# Patient Record
Sex: MALE | Race: WHITE | NOT HISPANIC OR LATINO | Employment: UNEMPLOYED | URBAN - METROPOLITAN AREA
[De-identification: names, ages, dates, MRNs, and addresses within clinical notes are randomized per-mention and may not be internally consistent; named-entity substitution may affect disease eponyms.]

---

## 2019-11-06 ENCOUNTER — CONSULT (OUTPATIENT)
Dept: PAIN MEDICINE | Facility: CLINIC | Age: 59
End: 2019-11-06
Payer: COMMERCIAL

## 2019-11-06 VITALS
HEIGHT: 76 IN | SYSTOLIC BLOOD PRESSURE: 130 MMHG | HEART RATE: 80 BPM | BODY MASS INDEX: 29.1 KG/M2 | WEIGHT: 239 LBS | DIASTOLIC BLOOD PRESSURE: 80 MMHG

## 2019-11-06 DIAGNOSIS — M47.816 LUMBAR SPONDYLOSIS: Primary | ICD-10-CM

## 2019-11-06 DIAGNOSIS — M54.50 CHRONIC BILATERAL LOW BACK PAIN WITHOUT SCIATICA: ICD-10-CM

## 2019-11-06 DIAGNOSIS — G89.29 CHRONIC BILATERAL LOW BACK PAIN WITHOUT SCIATICA: ICD-10-CM

## 2019-11-06 PROCEDURE — 99244 OFF/OP CNSLTJ NEW/EST MOD 40: CPT | Performed by: ANESTHESIOLOGY

## 2019-11-06 RX ORDER — LISINOPRIL 10 MG/1
10 TABLET ORAL DAILY
Refills: 0 | COMMUNITY
Start: 2019-10-12 | End: 2020-12-14

## 2019-11-06 RX ORDER — LABETALOL 200 MG/1
TABLET, FILM COATED ORAL
Refills: 0 | Status: ON HOLD | COMMUNITY
Start: 2019-10-08 | End: 2020-07-28 | Stop reason: SDUPTHER

## 2019-11-06 RX ORDER — GLIMEPIRIDE 4 MG/1
4 TABLET ORAL 2 TIMES DAILY
Refills: 0 | COMMUNITY
Start: 2019-09-07 | End: 2020-08-05 | Stop reason: SDUPTHER

## 2019-11-06 RX ORDER — LORAZEPAM 0.5 MG/1
TABLET ORAL
Refills: 0 | COMMUNITY
Start: 2019-10-12

## 2019-11-06 RX ORDER — HYDROCODONE BITARTRATE AND ACETAMINOPHEN 5; 325 MG/1; MG/1
1 TABLET ORAL EVERY 6 HOURS PRN
COMMUNITY
End: 2020-07-28 | Stop reason: HOSPADM

## 2019-11-06 RX ORDER — GABAPENTIN 300 MG/1
CAPSULE ORAL
Refills: 0 | COMMUNITY
Start: 2019-09-12 | End: 2020-07-28 | Stop reason: HOSPADM

## 2019-11-06 NOTE — PROGRESS NOTES
Assessment  1  Lumbar spondylosis    2  Chronic bilateral low back pain without sciatica        Plan  The patient's low back pain persists despite time, relative rest, activity modification and therapy  Based on the patient's symptoms and examination, I suspect that his pain is being generated by the lumbar facet joints  The facet joints are only one of many possible low back pain generators  Unfortunately, studies have demonstrated that history and examination alone are unreliable  We will schedule the patient for diagnostic lumbar medial branch blockade using a double block paradigm  If the patient receives significant pain relief of appropriate duration with bupivicaine 0 25%, we will confirm with bupivicaine 0 75%  If the patient demonstrates appropriate response to medial branch blockade we will schedule for radiofrequency ablation of the blocked nerves to provide long-term pain relief  Will also have him undergo a course of physical therapy for lumbar stabilization and strengthening  In the office today, we reviewed the nature of the patient's pathology in depth using  diagrams and models  We discussed the approach we would use for the medial branch block and provided literature for home review  The patient understands the risks associated with the procedure including bleeding, infection, tissue injury, allergic reaction and paralysis and provided written and verbal consent  in the office today  This note is created using dictation transcription  It may contain typographical errors, grammatical errors, improperly dictated words, background noise and other errors  My impressions and treatment recommendations were discussed in detail with the patient who verbalized understanding and had no further questions  Discharge instructions were provided  I personally saw and examined the patient and I agree with the above discussed plan of care      Orders Placed This Encounter   Procedures    FL spine and pain procedure     Standing Status:   Future     Standing Expiration Date:   11/6/2023     Order Specific Question:   Reason for Exam:     Answer:   b/l L3,4,5 MBB     Order Specific Question:   Anticoagulant hold needed? Answer:   no    Ambulatory referral to Physical Therapy     Standing Status:   Future     Standing Expiration Date:   11/6/2020     Referral Priority:   Routine     Referral Type:   Physical Therapy     Referral Reason:   Specialty Services Required     Requested Specialty:   Physical Therapy     Number of Visits Requested:   1     Expiration Date:   11/6/2020     New Medications Ordered This Visit   Medications    gabapentin (NEURONTIN) 300 mg capsule     Refill:  0    glimepiride (AMARYL) 4 mg tablet     Refill:  0    labetalol (NORMODYNE) 200 mg tablet     Refill:  0    lisinopril (ZESTRIL) 10 mg tablet     Sig: Take 10 mg by mouth daily     Refill:  0    metFORMIN (GLUCOPHAGE) 500 mg tablet     Sig: Take 500 mg by mouth 3 (three) times a day     Refill:  0    LORazepam (ATIVAN) 0 5 mg tablet     Refill:  0    HYDROcodone-acetaminophen (NORCO) 5-325 mg per tablet     Sig: Take 1 tablet by mouth every 6 (six) hours as needed for pain       History of Present Illness    Carmine Sever is a 61 y o  male longstanding history of low back pain  His pain is severe rates as 8/10 on the visual analog scale significant interfering with daily living activities  His pain is nearly constant describes sharp burning and shooting and throbbing he denies any weakness in his lower limbs  Lying down and relaxation decreases symptoms will standing, bending, sitting walking all aggravate them  Chiropractic treatment the past provided no relief  Currently takes Vicodin two tablets a day as well as ibuprofen  He has a history of diabetes and alcoholism  He smokes two packs of cigarettes a day and uses marijuana recreationally      I have personally reviewed and/or updated the patient's past medical history, past surgical history, family history, social history, current medications, allergies, and vital signs today  Review of Systems   Constitutional: Negative for fever and unexpected weight change  HENT: Negative for trouble swallowing  Eyes: Positive for visual disturbance  Respiratory: Negative for shortness of breath and wheezing  Cardiovascular: Negative for chest pain and palpitations  Gastrointestinal: Negative for constipation, diarrhea, nausea and vomiting  Endocrine: Negative for cold intolerance, heat intolerance and polydipsia  Genitourinary: Positive for frequency  Negative for difficulty urinating  Musculoskeletal: Positive for gait problem (difficulty walking decreased rom ) and joint swelling (joint stiffness)  Negative for arthralgias and myalgias  Skin: Negative for rash  Neurological: Positive for dizziness and weakness  Negative for seizures, syncope and headaches  Hematological: Does not bruise/bleed easily  Psychiatric/Behavioral: Negative for dysphoric mood  All other systems reviewed and are negative  There is no problem list on file for this patient  Past Medical History:   Diagnosis Date    [facility]ism ([facility] )     Anxiety     Diabetes ([facility] )     High cholesterol        History reviewed  No pertinent surgical history  History reviewed  No pertinent family history      Social History     Occupational History    Not on file   Tobacco Use    Smoking status: Current Every Day Smoker    Smokeless tobacco: Never Used   Substance and Sexual Activity    Alcohol use: Never     Frequency: Never    Drug use: Yes     Types: Marijuana    Sexual activity: Not Currently       Current Outpatient Medications on File Prior to Visit   Medication Sig    gabapentin (NEURONTIN) 300 mg capsule     glimepiride (AMARYL) 4 mg tablet     HYDROcodone-acetaminophen (NORCO) 5-325 mg per tablet Take 1 tablet by mouth every 6 (six) hours as needed for pain  labetalol (NORMODYNE) 200 mg tablet     lisinopril (ZESTRIL) 10 mg tablet Take 10 mg by mouth daily    LORazepam (ATIVAN) 0 5 mg tablet     metFORMIN (GLUCOPHAGE) 500 mg tablet Take 500 mg by mouth 3 (three) times a day     No current facility-administered medications on file prior to visit  No Known Allergies    Physical Exam    /80   Pulse 80   Ht 6' 4" (1 93 m)   Wt 108 kg (239 lb)   BMI 29 09 kg/m²     Constitutional: normal, well developed, well nourished, alert, in no distress and non-toxic and no overt pain behavior  and overweight  Eyes: anicteric  HEENT: grossly intact  Neck: supple, symmetric, trachea midline and no masses   Pulmonary:even and unlabored  Cardiovascular:No edema or pitting edema present  Skin:Normal without rashes or lesions and well hydrated  Psychiatric:Mood and affect appropriate  Neurologic:Cranial Nerves II-XII grossly intact  Musculoskeletal:normal,   Inspection:  Normal station and gait  Normal lumbar lordotic curve with no significant scoliosis or spinal step-off  Skin intact without erythema  No gross mass or muscle atrophy  Palpation:  There is no tenderness to palpation overlying the sacroiliac joint as well as the ischial bursa bilateral   No significant tenderness over the greater trochanteric bursa bilaterally  Motor/Strength:  5/5 strength in the bilateral lower limbs  The patient is able to heel and/toe walk  Tandem gait is intact  Reflexes: Deep tendon reflex are 2+ and symmetrical bilateral patella and Achilles  Sensation:   Sensation intact to light touch and pinprick in the bilateral lower limbs  Proprioception is intact at bilateral hallux  Maneuvers: Negative bilateral straight leg raising  Negative Renny's maneuver    Pain with lumbar extension            Imaging  XRAY LUMBAR SPINE   [facility] 10/17/19    Impression   Sever discogenic degenerative disease at L5/S1   Moderate to  Sever discogenic degenerative disease at L4-5 which has increased since 9/14/17    Minimal retrolisthesis of L4 on L5     I have personally reviewed pertinent films in PACS

## 2019-11-14 ENCOUNTER — TELEPHONE (OUTPATIENT)
Dept: PAIN MEDICINE | Facility: CLINIC | Age: 59
End: 2019-11-14

## 2019-11-14 ENCOUNTER — HOSPITAL ENCOUNTER (OUTPATIENT)
Dept: RADIOLOGY | Facility: CLINIC | Age: 59
Discharge: HOME | End: 2019-11-14
Payer: COMMERCIAL

## 2019-11-14 VITALS
DIASTOLIC BLOOD PRESSURE: 93 MMHG | TEMPERATURE: 98.6 F | SYSTOLIC BLOOD PRESSURE: 158 MMHG | OXYGEN SATURATION: 97 % | HEART RATE: 84 BPM | RESPIRATION RATE: 20 BRPM

## 2019-11-14 DIAGNOSIS — M54.50 CHRONIC BILATERAL LOW BACK PAIN WITHOUT SCIATICA: ICD-10-CM

## 2019-11-14 DIAGNOSIS — G89.29 CHRONIC BILATERAL LOW BACK PAIN WITHOUT SCIATICA: ICD-10-CM

## 2019-11-14 DIAGNOSIS — M47.816 LUMBAR SPONDYLOSIS: ICD-10-CM

## 2019-11-14 PROCEDURE — 64494 INJ PARAVERT F JNT L/S 2 LEV: CPT | Performed by: ANESTHESIOLOGY

## 2019-11-14 PROCEDURE — 64493 INJ PARAVERT F JNT L/S 1 LEV: CPT | Performed by: ANESTHESIOLOGY

## 2019-11-14 RX ORDER — BUPIVACAINE HCL/PF 2.5 MG/ML
10 VIAL (ML) INJECTION ONCE
Status: COMPLETED | OUTPATIENT
Start: 2019-11-14 | End: 2019-11-14

## 2019-11-14 RX ADMIN — BUPIVACAINE HYDROCHLORIDE 6 ML: 2.5 INJECTION, SOLUTION EPIDURAL; INFILTRATION; INTRACAUDAL at 14:08

## 2019-11-14 NOTE — TELEPHONE ENCOUNTER
S/w Dr Tung Costa  Advised of pt arrived at [facility] procedure suite w/ elevated bp, 155/91 and 171/99  No c/o headache, jaw / neck / chest or arm pain, no dizziness  Pt did confirm bp medication today  Per Dr Tung Costa, ok to proceed  Pt should take an additional lisinopril when he returns home and call the office to follow up  SL aware  S/w pt, advised of above  Pt verbalized understanding and appreciation

## 2019-11-14 NOTE — DISCHARGE INSTRUCTIONS

## 2019-11-14 NOTE — H&P
History of Present Illness: The patient is a 61 y o  male who presents with complaints of low back pain  There is no problem list on file for this patient  Past Medical History:   Diagnosis Date    [facility]     Anxiety     Diabetes [facility]     High cholesterol        No past surgical history on file  Current Outpatient Medications:     gabapentin (NEURONTIN) 300 mg capsule, , Disp: , Rfl: 0    glimepiride (AMARYL) 4 mg tablet, , Disp: , Rfl: 0    HYDROcodone-acetaminophen (NORCO) 5-325 mg per tablet, Take 1 tablet by mouth every 6 (six) hours as needed for pain, Disp: , Rfl:     labetalol (NORMODYNE) 200 mg tablet, , Disp: , Rfl: 0    lisinopril (ZESTRIL) 10 mg tablet, Take 10 mg by mouth daily, Disp: , Rfl: 0    LORazepam (ATIVAN) 0 5 mg tablet, , Disp: , Rfl: 0    metFORMIN (GLUCOPHAGE) 500 mg tablet, Take 500 mg by mouth 3 (three) times a day, Disp: , Rfl: 0    Current Facility-Administered Medications:     bupivacaine (PF) (MARCAINE) 0 25 % injection 10 mL, 10 mL, Perineural, Once, Gray Osuna, DO    No Known Allergies    Physical Exam:   Vitals:    11/14/19 1336   BP: 155/91   Pulse:    Resp:    Temp:    SpO2:      General: Awake, Alert, Oriented x 3, Mood and affect appropriate  Respiratory: Respirations even and unlabored  Cardiovascular: Peripheral pulses intact; no edema  Musculoskeletal Exam:  Pain with lumbar extension    ASA Score: II    Patient/Chart Verification  Patient ID Verified: Verbal  ID Band Applied: No  Consents Confirmed: Procedural  H&P( within 30 days) Verified: To be obtained in the Pre-Procedure area  Interval H&P(within 24 hr) Complete (required for Outpatients and Surgery Admit only): To be obtained in the Pre-Procedure area  Allergies Reviewed: Yes  Anticoag/NSAID held?: NA  Currently on antibiotics?: No  Pre-op Lab/Test Results Available: N/A    Assessment:   1  Lumbar spondylosis    2   Chronic bilateral low back pain without sciatica        Plan: b/l L3,4,5 MBB

## 2019-11-18 ENCOUNTER — EVALUATION (OUTPATIENT)
Dept: PHYSICAL THERAPY | Facility: CLINIC | Age: 59
End: 2019-11-18
Payer: COMMERCIAL

## 2019-11-18 DIAGNOSIS — M54.42 CHRONIC BILATERAL LOW BACK PAIN WITH BILATERAL SCIATICA: Primary | ICD-10-CM

## 2019-11-18 DIAGNOSIS — M54.41 CHRONIC BILATERAL LOW BACK PAIN WITH BILATERAL SCIATICA: Primary | ICD-10-CM

## 2019-11-18 DIAGNOSIS — G89.29 CHRONIC BILATERAL LOW BACK PAIN WITH BILATERAL SCIATICA: Primary | ICD-10-CM

## 2019-11-18 PROCEDURE — 97110 THERAPEUTIC EXERCISES: CPT | Performed by: PHYSICAL THERAPIST

## 2019-11-19 PROCEDURE — 97162 PT EVAL MOD COMPLEX 30 MIN: CPT | Performed by: PHYSICAL THERAPIST

## 2019-11-19 NOTE — PROGRESS NOTES
PT Evaluation     Today's date: 2019  Patient name: Robert Cruz  : 1960  MRN: [id number]  Referring provider: Taylor Crain DO  Dx:   Encounter Diagnosis     ICD-10-CM    1  Chronic bilateral low back pain with bilateral sciatica M54 42     M54 41     G89 29                   Assessment  Assessment details: Robert Cruz is a 61 y o  male presenting to outpatient physical therapy at [facility] with complaints of chronic low back pain with intermittent BLE radiculopathy  He reports that he has hx of diabetic neuropathy  Pt is currently being treated at pain management for his back pain  He presents with decreased range of motion, decreased strength, limited flexibility, poor postural awareness, poor body mechanics, poor balance, decreased tolerance to activity and decreased functional mobility due to Chronic bilateral low back pain with bilateral sciatica  (primary encounter diagnosis)   He would benefit from skilled PT services in order to address these deficits and reach maximum level of function   Thank you for the referral!  Impairments: abnormal gait, abnormal muscle firing, abnormal muscle tone, abnormal or restricted ROM, abnormal movement, activity intolerance, impaired balance, impaired physical strength, lacks appropriate home exercise program, pain with function, safety issue, scapular dyskinesis, poor posture  and poor body mechanics    Symptom irritability: moderateUnderstanding of Dx/Px/POC: good   Prognosis: good    Goals  STGs (in 4 weeks):  1  Pt will be independent with HEP  2  Pt will report having at most a 2/10 pain during functional mobility  3  Pt will demonstrate at least a 1 MMT grade in core and BLE strength  LTGs (in 8 weeks):  1  Pt will report having at least a 75% improvement since I E    2  Pt will demonstrate good postural awareness with sitting and standing     3  Pt will demonstrate good transfer techniques to decrease pain to his low back especially with AM bed mobility transfers  Plan  Planned modality interventions: unattended electrical stimulation, ultrasound and TENS  Planned therapy interventions: joint mobilization, manual therapy, neuromuscular re-education, gait training, home exercise program, therapeutic activities, therapeutic exercise, stretching and strengthening  Frequency: 3x week  Plan of Care beginning date: 2019  Plan of Care expiration date: 2020  Treatment plan discussed with: patient        Subjective Evaluation    History of Present Illness  Mechanism of injury: Pt is a 61year old male who presents with long hx of LBP with recent exacerbation in sxs secondary to unknown etiology  He reports that he is currently being treated at pain management for his LBP and was referred to skilled OP PT  He reports that he had an Xray which he states he was told he has OA  Quality of life: good    Pain  Current pain rating: 3  At best pain ratin  At worst pain rating: 3  Quality: radiating, sharp, throbbing, discomfort and dull ache  Relieving factors: relaxation and rest  Aggravating factors: standing, walking and lifting (AM stifness/difficulty transferring out of bed, prolonged positions )  Progression: worsening      Diagnostic Tests  X-ray: abnormal (OA as per pt)  Treatments  Current treatment: injection treatment  Patient Goals  Patient goals for therapy: decreased pain, improved balance, increased motion, increased strength, independence with ADLs/IADLs and return to sport/leisure activities (Decrease pain with prolonged positions as well as AM stiffness)          Objective    Flowsheet Rows      Most Recent Value   PT/OT G-Codes   Current Score  52   Projected Score  64        Palpation: no TTP    Observation: Pt sits with lateral trunk lean to the left      Trunk ROM: (*=pain)  FIS (in standing): 80 degrees  EIS (in standing): 15 degrees*  Lateral Flex (in standing): (R) 25 degrees (L) 25 degrees  Rotation: (in sitting): (R) 25% (L) 25%    MMT:  Upper core: 3-/5  Lower core: 2+/5     (R)   (L)  Hip Flex  4-/5   4-/5  Hip Abd  3+/5   3+/5  Hip Ext   3-/5   3-/5  Knee Ext  4-/5   4-/5  Knee Flex  3+/5   3+/5   Ankle DF  4-/5   4-/5  Ankle PF  4-/5   4-/5    Sensation: intact to light touch throughout BLE    Balance: SLS: (R) unable (L) unable     Special tests: (+) NICHOLAS, (+) piriformis test, (+) SLR due to decreased HS flexibility, (-) Slump test, (+) Scouring test (possible guarding)    Precautions: standard    HEP: seated HS stretch, piriformis stretch, TAC    Specialty Daily Treatment Diary       Manual 11/18       STM L/S        B PADMINI fields L/S                        Exercise Diary         TAC 10 sec x 10       TAC c BKFO        TAC c Marches        Dying Bugs        Cross Leg Rot stretch        PPT        Bridges c Ball Squeezes        Clamshells         Bridges c TB        Sidelying H' ABD        Prone quad stretch        PPU        Seated HS; Piriformis stretch 20 sec x 3 ea       Forward Trunk Flex                                                        Modalities        MH

## 2019-11-20 ENCOUNTER — OFFICE VISIT (OUTPATIENT)
Dept: PHYSICAL THERAPY | Facility: CLINIC | Age: 59
End: 2019-11-20
Payer: COMMERCIAL

## 2019-11-20 DIAGNOSIS — M54.41 CHRONIC BILATERAL LOW BACK PAIN WITH BILATERAL SCIATICA: Primary | ICD-10-CM

## 2019-11-20 DIAGNOSIS — M54.42 CHRONIC BILATERAL LOW BACK PAIN WITH BILATERAL SCIATICA: Primary | ICD-10-CM

## 2019-11-20 DIAGNOSIS — G89.29 CHRONIC BILATERAL LOW BACK PAIN WITH BILATERAL SCIATICA: Primary | ICD-10-CM

## 2019-11-20 PROCEDURE — 97010 HOT OR COLD PACKS THERAPY: CPT | Performed by: PHYSICAL THERAPIST

## 2019-11-20 PROCEDURE — 97110 THERAPEUTIC EXERCISES: CPT | Performed by: PHYSICAL THERAPIST

## 2019-11-20 PROCEDURE — 97140 MANUAL THERAPY 1/> REGIONS: CPT | Performed by: PHYSICAL THERAPIST

## 2019-11-20 NOTE — PROGRESS NOTES
Daily Note     Today's date: 2019  Patient name: Veronica Calvert  : 1960  MRN: [id number]  Referring provider: Gio Benson DO  Dx:   Encounter Diagnosis     ICD-10-CM    1  Chronic bilateral low back pain with bilateral sciatica M54 42     M54 41     G89 29                   Subjective: Pt reports that he did a lot of work at his house and he was able to get to some of his exercises  Objective: See treatment diary below      Assessment: Tolerated treatment well; initiated POC with MH in sitting f/b chair exercises  Reviewed HEP  Added core stabilization exercises  He was able to tolerate prone position during MT which pt consented to  He demonstrates decreased PA jt mobs in lumbar and thoracic spine which slightly improved post MT  He was encouraged to use heat at home prior to exercises  Patient demonstrated fatigue post treatment and would benefit from continued PT      Plan: Continue per plan of care        Precautions: standard    HEP: seated HS stretch, piriformis stretch, TAC    Specialty Daily Treatment Diary       Manual       STM L/S  SMF      B LAD  SMF      PA glides L/S  SMF                      Exercise Diary         TAC 10 sec x 10 10 sec x 10      TAC c BKFO        TAC c Marches        TAC c LTR  20x      Dying Bug        Cross Leg Rot stretch        PPT  20x      Bridges c Ball Squeezes  5 sec x 20      Clamshells        Bridges c TB        Sidelying H' ABD        Prone quad stretch        PPU        Seated HS; piriformis stretch 20 sec x 3 ea 20 sec x 3       Forward Trunk Flex  10 sec x 5 (forward)                                              Modalities          10 mins

## 2019-11-25 ENCOUNTER — OFFICE VISIT (OUTPATIENT)
Dept: PHYSICAL THERAPY | Facility: CLINIC | Age: 59
End: 2019-11-25
Payer: COMMERCIAL

## 2019-11-25 ENCOUNTER — TELEPHONE (OUTPATIENT)
Dept: RADIOLOGY | Facility: CLINIC | Age: 59
End: 2019-11-25

## 2019-11-25 DIAGNOSIS — M54.42 CHRONIC BILATERAL LOW BACK PAIN WITH BILATERAL SCIATICA: Primary | ICD-10-CM

## 2019-11-25 DIAGNOSIS — G89.29 CHRONIC BILATERAL LOW BACK PAIN WITH BILATERAL SCIATICA: Primary | ICD-10-CM

## 2019-11-25 DIAGNOSIS — M54.41 CHRONIC BILATERAL LOW BACK PAIN WITH BILATERAL SCIATICA: Primary | ICD-10-CM

## 2019-11-25 PROCEDURE — 97010 HOT OR COLD PACKS THERAPY: CPT | Performed by: PHYSICAL THERAPIST

## 2019-11-25 PROCEDURE — 97140 MANUAL THERAPY 1/> REGIONS: CPT | Performed by: PHYSICAL THERAPIST

## 2019-11-25 PROCEDURE — 97110 THERAPEUTIC EXERCISES: CPT | Performed by: PHYSICAL THERAPIST

## 2019-11-25 PROCEDURE — 97112 NEUROMUSCULAR REEDUCATION: CPT | Performed by: PHYSICAL THERAPIST

## 2019-11-25 NOTE — PROGRESS NOTES
Daily Note     Today's date: 2019  Patient name: Jamison Desir  : 1960  MRN: [id number]  Referring provider: Dima Callaway DO  Dx:   Encounter Diagnosis     ICD-10-CM    1  Chronic bilateral low back pain with bilateral sciatica M54 42     M54 41     G89 29                   Subjective: Pt reports that he did a lot of work over the weekend and today he is more stiff  Objective: See treatment diary below      Assessment: Tolerated treatment well; initiated POC with MH in sitting f/b chair stretches  VCs provided on proper sequencing with exercises  He was challenged with prone on elbows however he denies having sharp pain  He reports that he has less stiffness after today's session  Encouraged pt to use heat at home  Patient demonstrated fatigue post treatment and would benefit from continued PT      Plan: Continue per plan of care        Precautions: standard    HEP: seated HS stretch, piriformis stretch, TAC    Specialty Daily Treatment Diary       Manual      STM L/S  SMF SMF     B LAD  SMF SMF     PA glides L/S  SMF SMF                     Exercise Diary         TAC 10 sec x 10 10 sec x 10 10 sec x 5     TAC c BKFO   20 ea      TAC c Marches        TAC c LTR  20x 20x     Dying Bug        Cross Leg Rot stretch        PPT  20x 20x     Bridges c Ball Squeezes  5 sec x 20 5 sec x 20     Clamshells        Bridges c TB        Sidelying H' ABD        Prone quad stretch        PPU   On Elbows: 10x c OP     Seated HS; piriformis stretch 20 sec x 3 ea 20 sec x 3  30 sec x 3 ea     Forward Trunk Flex  10 sec x 5 (forward) 10 sec x 5 ea                                             Modalities        MH  10 mins  10 mins

## 2019-11-27 ENCOUNTER — OFFICE VISIT (OUTPATIENT)
Dept: PHYSICAL THERAPY | Facility: CLINIC | Age: 59
End: 2019-11-27
Payer: COMMERCIAL

## 2019-11-27 DIAGNOSIS — M54.42 CHRONIC BILATERAL LOW BACK PAIN WITH BILATERAL SCIATICA: Primary | ICD-10-CM

## 2019-11-27 DIAGNOSIS — M54.41 CHRONIC BILATERAL LOW BACK PAIN WITH BILATERAL SCIATICA: Primary | ICD-10-CM

## 2019-11-27 DIAGNOSIS — G89.29 CHRONIC BILATERAL LOW BACK PAIN WITH BILATERAL SCIATICA: Primary | ICD-10-CM

## 2019-11-27 PROCEDURE — 97140 MANUAL THERAPY 1/> REGIONS: CPT | Performed by: PHYSICAL THERAPIST

## 2019-11-27 PROCEDURE — 97112 NEUROMUSCULAR REEDUCATION: CPT | Performed by: PHYSICAL THERAPIST

## 2019-11-27 PROCEDURE — 97110 THERAPEUTIC EXERCISES: CPT | Performed by: PHYSICAL THERAPIST

## 2019-11-27 NOTE — PROGRESS NOTES
Daily Note     Today's date: 2019  Patient name: Amber Lomas  : 1960  MRN: [id number]  Referring provider: Oli Hewitt DO  Dx:   Encounter Diagnosis     ICD-10-CM    1  Chronic bilateral low back pain with bilateral sciatica M54 42     M54 41     G89 29                   Subjective: Pt reports that he feels okay today and felt fine after last visit  Objective: See treatment diary below      Assessment: Tolerated treatment well; initiated POC with MH in sitting while performing chair stretches  VCs provided on proper sequencing with exercises  He continues to demonstrate decreased PA glides in his lumbar spine which slightly improves post MT  He denies having pain during manual therapy  Encouraged pt to use heat at home  Patient demonstrated fatigue post treatment and would benefit from continued PT      Plan: Continue per plan of care        Precautions: standard    HEP: seated HS stretch, piriformis stretch, TAC    Specialty Daily Treatment Diary       Manual     STM L/S  SMF SMF SMF    B LAD  SMF SMF SMF    PA glides L/S  SMF SMF SMF                    Exercise Diary         TAC 10 sec x 10 10 sec x 10 10 sec x 5 10 sec x 5 DC   TAC c BKFO   20 ea  20 ea    TAC c Marches        TAC c LTR  20x 20x 20x    Dying Bug        Cross Leg Rot stretch        PPT  20x 20x 20x    Bridges c Ball Squeezes  5 sec x 20 5 sec x 20 5 sec x 20    Clamshells        Bridges c TB        Sidelying H' ABD        Prone quad stretch        PPU   On Elbows: 10x c OP On Elbows: 2x10 c OP    Seated HS; piriformis stretch 20 sec x 3 ea 20 sec x 3  30 sec x 3 ea 30 sec x 3 ea    Forward Trunk Flex  10 sec x 5 (forward) 10 sec x 5 ea 10 sec x 5 ea                            Wall Sags    NV            Modalities        MH  10 mins  10 mins  10 mins

## 2019-12-02 ENCOUNTER — OFFICE VISIT (OUTPATIENT)
Dept: PHYSICAL THERAPY | Facility: CLINIC | Age: 59
End: 2019-12-02
Payer: COMMERCIAL

## 2019-12-02 DIAGNOSIS — G89.29 CHRONIC BILATERAL LOW BACK PAIN WITH BILATERAL SCIATICA: Primary | ICD-10-CM

## 2019-12-02 DIAGNOSIS — M54.42 CHRONIC BILATERAL LOW BACK PAIN WITH BILATERAL SCIATICA: Primary | ICD-10-CM

## 2019-12-02 DIAGNOSIS — M54.41 CHRONIC BILATERAL LOW BACK PAIN WITH BILATERAL SCIATICA: Primary | ICD-10-CM

## 2019-12-02 PROCEDURE — 97110 THERAPEUTIC EXERCISES: CPT | Performed by: PHYSICAL THERAPIST

## 2019-12-02 PROCEDURE — 97140 MANUAL THERAPY 1/> REGIONS: CPT | Performed by: PHYSICAL THERAPIST

## 2019-12-02 NOTE — PROGRESS NOTES
Daily Note     Today's date: 2019  Patient name: Severo Colt  : 1960  MRN: [id number]  Referring provider: Yevgeniy Mesa DO  Dx:   Encounter Diagnosis     ICD-10-CM    1  Chronic bilateral low back pain with bilateral sciatica M54 42     M54 41     G89 29                   Subjective: Pt reports that he is hurting more today in his low back  He was moving furniture over the weekend  Objective: See treatment diary below      Assessment: Tolerated treatment well; initiated POC with MH in sitting while performing chair exercises  Pt performs standing lumbar extension stretches instead of PPU today due to increased low back pain  Added cross leg rotation stretch  Patient demonstrated fatigue post treatment and would benefit from continued PT      Plan: Continue per plan of care        Precautions: standard    HEP: seated HS stretch, piriformis stretch, TAC    Specialty Daily Treatment Diary       Manual    STM L/S  SMF SMF SMF SMF   B LAD  SMF SMF SMF SMF   PA glides L/S  SMF SMF SMF SMF                   Exercise Diary         TAC 10 sec x 10 10 sec x 10 10 sec x 5 10 sec x 5 DC   TAC c BKFO   20 ea  20 ea 20 ea   TAC c Marches        TAC c LTR  20x 20x 20x 20x   Dying Bug        Cross Leg Rot stretch     20 sec x 3   PPT  20x 20x 20x 30x   Bridges c Ball Squeezes  5 sec x 20 5 sec x 20 5 sec x 20 5 sec x 30   Clamshells        Bridges c TB        Sidelying H' ABD        Prone quad stretch        PPU   On Elbows: 10x c OP On Elbows: 2x10 c OP Hold   Seated HS; piriformis stretch 20 sec x 3 ea 20 sec x 3  30 sec x 3 ea 30 sec x 3 ea 30 sec x 3 ea   Forward Trunk Flex  10 sec x 5 (forward) 10 sec x 5 ea 10 sec x 5 ea 15 sec x 5 ea                           Wall Sags    NV 3x10           Modalities        MH  10 mins  10 mins  10 mins  10 mins

## 2019-12-04 ENCOUNTER — APPOINTMENT (OUTPATIENT)
Dept: PHYSICAL THERAPY | Facility: CLINIC | Age: 59
End: 2019-12-04
Payer: COMMERCIAL

## 2019-12-05 ENCOUNTER — OFFICE VISIT (OUTPATIENT)
Dept: PHYSICAL THERAPY | Facility: CLINIC | Age: 59
End: 2019-12-05
Payer: COMMERCIAL

## 2019-12-05 DIAGNOSIS — M54.42 CHRONIC BILATERAL LOW BACK PAIN WITH BILATERAL SCIATICA: Primary | ICD-10-CM

## 2019-12-05 DIAGNOSIS — M54.41 CHRONIC BILATERAL LOW BACK PAIN WITH BILATERAL SCIATICA: Primary | ICD-10-CM

## 2019-12-05 DIAGNOSIS — G89.29 CHRONIC BILATERAL LOW BACK PAIN WITH BILATERAL SCIATICA: Primary | ICD-10-CM

## 2019-12-05 PROCEDURE — 97110 THERAPEUTIC EXERCISES: CPT | Performed by: PHYSICAL THERAPIST

## 2019-12-05 PROCEDURE — 97140 MANUAL THERAPY 1/> REGIONS: CPT | Performed by: PHYSICAL THERAPIST

## 2019-12-05 NOTE — PROGRESS NOTES
Daily Note     Today's date: 2019  Patient name: Wilfrido Malcolm  : 1960  MRN: [id number]  Referring provider: Vania Mireles DO  Dx:   Encounter Diagnosis     ICD-10-CM    1  Chronic bilateral low back pain with bilateral sciatica M54 42     M54 41     G89 29                   Subjective: Pt reports that he has more pain today possibly due to working yesterday and weather  Objective: See treatment diary below      Assessment: Tolerated treatment well; initiated POC with  while performing chair stretches  VCs provided on proper sequencing with exercises  He continues to have hypotonicity with palpation of his lumbar paraspinals  He demonstrates decreased lumbar PA glides which slightly improved post MT  Pt was encouraged to continue to use heat at home and lie prone in AM and assess response  Pt was educated with DOMS  Patient demonstrated fatigue post treatment and would benefit from continued PT      Plan: Continue per plan of care        Precautions: standard    HEP: seated HS stretch, piriformis stretch, TAC    Specialty Daily Treatment Diary       Manual    STM L/S SMF    SMF   B LAD     SMF   PA glides L/S SMF    SMF                   Exercise Diary                 TAC c BKFO 20 ea    20 ea   TAC c Marches        TAC c LTR 20    20x   Dying Bug        Cross Leg Rot stretch 20 sec x 3    20 sec x 3   PPT 30    30x   Bridges c Ball Squeezes 5 sec x 30    5 sec x 30   Clamshells GTB 20x       Bridges c TB        Sidelying H' ABD        Prone quad stretch        PPU     Hold   Seated HS; piriformis stretch 30 sec x 3 ea    30 sec x 3 ea   Forward Trunk Flex 15 sec x 5 ea    15 sec x 5 ea                           Wall Sags 3x10    3x10           Modalities         10 mins     10 mins

## 2019-12-10 ENCOUNTER — OFFICE VISIT (OUTPATIENT)
Dept: PHYSICAL THERAPY | Facility: CLINIC | Age: 59
End: 2019-12-10
Payer: COMMERCIAL

## 2019-12-10 ENCOUNTER — APPOINTMENT (OUTPATIENT)
Dept: PHYSICAL THERAPY | Facility: CLINIC | Age: 59
End: 2019-12-10
Payer: COMMERCIAL

## 2019-12-10 DIAGNOSIS — G89.29 CHRONIC BILATERAL LOW BACK PAIN WITH BILATERAL SCIATICA: Primary | ICD-10-CM

## 2019-12-10 DIAGNOSIS — M54.42 CHRONIC BILATERAL LOW BACK PAIN WITH BILATERAL SCIATICA: Primary | ICD-10-CM

## 2019-12-10 DIAGNOSIS — M54.41 CHRONIC BILATERAL LOW BACK PAIN WITH BILATERAL SCIATICA: Primary | ICD-10-CM

## 2019-12-10 PROCEDURE — 97140 MANUAL THERAPY 1/> REGIONS: CPT | Performed by: PHYSICAL THERAPIST

## 2019-12-10 PROCEDURE — 97110 THERAPEUTIC EXERCISES: CPT | Performed by: PHYSICAL THERAPIST

## 2019-12-10 PROCEDURE — 97112 NEUROMUSCULAR REEDUCATION: CPT | Performed by: PHYSICAL THERAPIST

## 2019-12-10 NOTE — PROGRESS NOTES
Daily Note     Today's date: 12/10/2019  Patient name: Robert Cruz  : 1960  MRN: [id number]  Referring provider: Taylor Crain DO  Dx:   Encounter Diagnosis     ICD-10-CM    1  Chronic bilateral low back pain with bilateral sciatica M54 42     M54 41     G89 29                   Subjective: Pt reports feeling okay today  Objective: See treatment diary below      Assessment: Tolerated treatment well; initiated POC with MH in sitting while performing chair stretches  VCs provided on proper sequencing with exercises  He continues to demonstrate decreased lumbar PA jt mobs which slightly improved post MT  He denies having pain throughout session  Encouraged pt to use heat at home and perform gentle stretching  Patient demonstrated fatigue post treatment and would benefit from continued PT      Plan: Continue per plan of care        Precautions: standard    HEP: seated HS stretch, piriformis stretch, TAC    Specialty Daily Treatment Diary       Manual 12/5 12/10   12/2   STM L/S SMF SMF   SMF   B LAD  SMF   SMF   PA glides L/S SMF SMF   SMF                   Exercise Diary                 TAC c BKFO 20 ea GTB 20 ea   20 ea   TAC c Marches        TAC c LTR 20 20 ea   20x   Dying Bug        Cross Leg Rot stretch 20 sec x 3 20 sec x 3    20 sec x 3   PPT 30 30   30x   Bridges c Ball Squeezes 5 sec x 30 30x   5 sec x 30   Clamshells (sidelying) GTB 20x GTB 30x      Bridges c TB        Sidelying H' ABD        Prone quad stretch  30 sec x 3      PPU     Hold   Seated HS; piriformis stretch 30 sec x 3 ea 30 sec x 3 ea   30 sec x 3 ea   Forward Trunk Flex 15 sec x 5 ea 15 sec x 5 ea   15 sec x 5 ea                           Wall Sags 3x10 3x10   3x10           Modalities        MH 10 mins  10 mins    10 mins

## 2019-12-13 ENCOUNTER — OFFICE VISIT (OUTPATIENT)
Dept: PHYSICAL THERAPY | Facility: CLINIC | Age: 59
End: 2019-12-13
Payer: COMMERCIAL

## 2019-12-13 DIAGNOSIS — M54.41 CHRONIC BILATERAL LOW BACK PAIN WITH BILATERAL SCIATICA: Primary | ICD-10-CM

## 2019-12-13 DIAGNOSIS — M54.42 CHRONIC BILATERAL LOW BACK PAIN WITH BILATERAL SCIATICA: Primary | ICD-10-CM

## 2019-12-13 DIAGNOSIS — G89.29 CHRONIC BILATERAL LOW BACK PAIN WITH BILATERAL SCIATICA: Primary | ICD-10-CM

## 2019-12-13 PROCEDURE — 97140 MANUAL THERAPY 1/> REGIONS: CPT | Performed by: PHYSICAL THERAPIST

## 2019-12-13 PROCEDURE — 97112 NEUROMUSCULAR REEDUCATION: CPT | Performed by: PHYSICAL THERAPIST

## 2019-12-13 PROCEDURE — 97110 THERAPEUTIC EXERCISES: CPT | Performed by: PHYSICAL THERAPIST

## 2019-12-13 NOTE — PROGRESS NOTES
Daily Note     Today's date: 2019  Patient name: Dagmar Meek  : 1960  MRN: [id number]  Referring provider: Josefina Arreola DO  Dx:   Encounter Diagnosis     ICD-10-CM    1  Chronic bilateral low back pain with bilateral sciatica M54 42     M54 41     G89 29                   Subjective: Pt reports that he feels okay today  Objective: See treatment diary below      Assessment: Tolerated treatment well; initiated POC with MH in sitting f/b chair exercises  VCs provided on proper sequencing with exercises  Added SLR with TAC  Encouraged pt to continue to use heat and stretch at home  Patient demonstrated fatigue post treatment and would benefit from continued PT      Plan: Continue per plan of care        Precautions: standard    HEP: seated HS stretch, piriformis stretch, TAC    Specialty Daily Treatment Diary       Manual 12/5 12/10 12/12     STM L/S SMF SMF SMF     B LAD  SMF SMF     PA glides L/S SMF SMF SMF                     Exercise Diary         TAC c SLR   0# 2x10     TAC c BKFO 20 ea GTB 20 ea GTB 30 ea     TAC c Marches        TAC c LTR 20 20 ea 20 ea     Dying Bug        Cross Leg Rot stretch 20 sec x 3 20 sec x 3  30 sec x 3     PPT 30 30 30     Bridges c Ball Squeezes 5 sec x 30 30x 30x     Clamshells (sidelying) GTB 20x GTB 30x GTB 30     Bridges c TB        Sidelying H' ABD        Prone quad stretch  30 sec x 3 30 sec x 3     PPU        Seated HS; piriformis stretch 30 sec x 3 ea 30 sec x 3 ea 30 sec x 3 ea     Forward Trunk Flex 15 sec x 5 ea 15 sec x 5 ea 15 sec x 5 ea                             Wall Sags 3x10 3x10 3x10             Modalities        MH 10 mins  10 mins  10 mins

## 2019-12-17 ENCOUNTER — OFFICE VISIT (OUTPATIENT)
Dept: PHYSICAL THERAPY | Facility: CLINIC | Age: 59
End: 2019-12-17
Payer: COMMERCIAL

## 2019-12-17 DIAGNOSIS — M54.42 CHRONIC BILATERAL LOW BACK PAIN WITH BILATERAL SCIATICA: Primary | ICD-10-CM

## 2019-12-17 DIAGNOSIS — G89.29 CHRONIC BILATERAL LOW BACK PAIN WITH BILATERAL SCIATICA: Primary | ICD-10-CM

## 2019-12-17 DIAGNOSIS — M54.41 CHRONIC BILATERAL LOW BACK PAIN WITH BILATERAL SCIATICA: Primary | ICD-10-CM

## 2019-12-17 PROCEDURE — 97110 THERAPEUTIC EXERCISES: CPT

## 2019-12-17 PROCEDURE — 97112 NEUROMUSCULAR REEDUCATION: CPT

## 2019-12-17 PROCEDURE — 97140 MANUAL THERAPY 1/> REGIONS: CPT

## 2019-12-17 NOTE — PROGRESS NOTES
Daily Note     Today's date: 2019  Patient name: Tamir Martin  : 1960  MRN: [id number]  Referring provider: Ze Fofana DO  Dx:   Encounter Diagnosis     ICD-10-CM    1  Chronic bilateral low back pain with bilateral sciatica M54 42     M54 41     G89 29        Start Time: 1400  Stop Time: 1518  Total time in clinic (min): 78 minutes    Subjective: Pt reports his LB is a little more "achy" than usual today  "It's constant "  Notes he was a little more active than usual two days ago helping a friend work on their house  Objective: See treatment diary below      Assessment: Tolerated treatment well  Began program with , followed by sitting exercise  Moves slowly through program, but was able to complete all assigned exercise with no complaints of increased pain  Moderate soft tissue restriction noted along b/l lumbar paraspinal musculature during IASTM performed today  Frequently likes to talk and requires cues to stay on task, as well as provide reminders for assigned reps/sets  Most discomfort felt during wall sags  Pain began to resolve with additional exercise and rest throughout session  Patient demonstrated fatigue post treatment and would benefit from continued PT  Plan: Continue per plan of care        Precautions: standard    HEP: seated HS stretch, piriformis stretch, TAC    Specialty Daily Treatment Diary       Manual 12/5 12/10 12/12 12/17    STM L/S SMF SMF SMF IASTM AFB    B LAD  SMF SMF AFB    PA glides L/S SMF SMF SMF LL                    Exercise Diary         TAC c SLR   0# 2x10 0# 2x10    TAC c BKFO 20 ea GTB 20 ea GTB 30 ea GTB 30x ea    TAC c Marches        TAC c LTR 20 20 ea 20 ea 20 ea    Dying Bug        Cross Leg Rot stretch 20 sec x 3 20 sec x 3  30 sec x 3 30 sec x 3    PPT 30 30 30 30x    Bridges c Ball Squeezes 5 sec x 30 30x 30x 30x    Clamshells (sidelying) GTB 20x GTB 30x GTB 30 GTB 30    Bridges c TB        Sidelying H' ABD        Prone quad stretch 30 sec x 3 30 sec x 3 30 sec x 3    PPU        Seated HS; piriformis stretch 30 sec x 3 ea 30 sec x 3 ea 30 sec x 3 ea 30 sec x 3 ea    Forward Trunk Flex 15 sec x 5 ea 15 sec x 5 ea 15 sec x 5 ea 15 sec x 5 ea                            Wall Sags 3x10 3x10 3x10 3x10            Modalities        MH 10 mins  10 mins  10 mins  10 mins pre

## 2019-12-20 ENCOUNTER — OFFICE VISIT (OUTPATIENT)
Dept: PHYSICAL THERAPY | Facility: CLINIC | Age: 59
End: 2019-12-20
Payer: COMMERCIAL

## 2019-12-20 DIAGNOSIS — M54.41 CHRONIC BILATERAL LOW BACK PAIN WITH BILATERAL SCIATICA: Primary | ICD-10-CM

## 2019-12-20 DIAGNOSIS — M54.42 CHRONIC BILATERAL LOW BACK PAIN WITH BILATERAL SCIATICA: Primary | ICD-10-CM

## 2019-12-20 DIAGNOSIS — G89.29 CHRONIC BILATERAL LOW BACK PAIN WITH BILATERAL SCIATICA: Primary | ICD-10-CM

## 2019-12-20 PROCEDURE — 97110 THERAPEUTIC EXERCISES: CPT

## 2019-12-20 PROCEDURE — 97140 MANUAL THERAPY 1/> REGIONS: CPT

## 2019-12-20 PROCEDURE — 97112 NEUROMUSCULAR REEDUCATION: CPT

## 2019-12-20 NOTE — PROGRESS NOTES
Daily Note     Today's date: 2019  Patient name: Sushant Ga  : 1960  MRN: [id number]  Referring provider: Geri Jameson DO  Dx:   Encounter Diagnosis     ICD-10-CM    1  Chronic bilateral low back pain with bilateral sciatica M54 42     M54 41     G89 29        Start Time: 1000  Stop Time: 1115  Total time in clinic (min): 75 minutes    Subjective: Pt reports his LB continues to feel about the same since LV, with consistent pain/stiffness  Denies any radicular pain down BLE, with symptoms focused primarily in LB  Objective: See treatment diary below  Assessment: Tolerated treatment well  Started program with MH today, in conjunction with seated stretches  Increased pain in LB reported post completion of bridges c ball squeeze  Pain resolved throughout session with manual treatment and additional exercise  Continues to have moderate soft tissue restriction in lumbar musculature, L > R  Responds well to b/l LAD with decrease in symptoms  Evaluating PT, Patricia Holt, noted continued limitations in lumbar spine ROM during PA glided, but does see improvement since starting therapy  Patient would benefit from continued PT to further reduce pain/soft tissue restriction, increase strength, and maximize function  Plan: Continue per plan of care        Precautions: standard    HEP: seated HS stretch, piriformis stretch, TAC    Specialty Daily Treatment Diary       Manual 12/5 12/10 12/12 12/17 12/20   STM L/S SMF SMF SMF IASTM AFB IASTM AFB   B LAD  SMF SMF AFB AFB   PA glides L/S SMF SMF SMF LL SMF                   Exercise Diary         TAC c SLR   0# 2x10 0# 2x10 0# 2x10   TAC c BKFO 20 ea GTB 20 ea GTB 30 ea GTB 30x ea GTB 30x ea   TAC c Marches        TAC c LTR 20 20 ea 20 ea 20 ea 20x ea   Dying Bug        Cross Leg Rot stretch 20 sec x 3 20 sec x 3  30 sec x 3 30 sec x 3 30 sec x 3   PPT 30 30 30 30x 30x   Bridges c Ball Squeezes 5 sec x 30 30x 30x 30x 30x   Clamshells (sidelying) GTB 20x GTB 30x GTB 30 GTB 30 GTB 30   Bridges c TB        Sidelying H' ABD        Prone quad stretch  30 sec x 3 30 sec x 3 30 sec x 3 30 sec x 3   PPU        Seated HS; piriformis stretch 30 sec x 3 ea 30 sec x 3 ea 30 sec x 3 ea 30 sec x 3 ea 30 sec x 3 ea   Forward Trunk Flex 15 sec x 5 ea 15 sec x 5 ea 15 sec x 5 ea 15 sec x 5 ea 15 sec x 5 ea                           Wall Sags 3x10 3x10 3x10 3x10 NV           Modalities        MH 10 mins  10 mins  10 mins  10 mins pre 10 mins pre                       Skin integrity intact prior/post application of MH

## 2019-12-23 ENCOUNTER — APPOINTMENT (OUTPATIENT)
Dept: PHYSICAL THERAPY | Facility: CLINIC | Age: 59
End: 2019-12-23
Payer: COMMERCIAL

## 2019-12-27 ENCOUNTER — APPOINTMENT (OUTPATIENT)
Dept: PHYSICAL THERAPY | Facility: CLINIC | Age: 59
End: 2019-12-27
Payer: COMMERCIAL

## 2019-12-30 ENCOUNTER — OFFICE VISIT (OUTPATIENT)
Dept: PHYSICAL THERAPY | Facility: CLINIC | Age: 59
End: 2019-12-30
Payer: COMMERCIAL

## 2019-12-30 DIAGNOSIS — G89.29 CHRONIC BILATERAL LOW BACK PAIN WITH BILATERAL SCIATICA: Primary | ICD-10-CM

## 2019-12-30 DIAGNOSIS — M54.41 CHRONIC BILATERAL LOW BACK PAIN WITH BILATERAL SCIATICA: Primary | ICD-10-CM

## 2019-12-30 DIAGNOSIS — M54.42 CHRONIC BILATERAL LOW BACK PAIN WITH BILATERAL SCIATICA: Primary | ICD-10-CM

## 2019-12-30 PROCEDURE — 97140 MANUAL THERAPY 1/> REGIONS: CPT

## 2019-12-30 PROCEDURE — 97112 NEUROMUSCULAR REEDUCATION: CPT

## 2019-12-30 PROCEDURE — 97110 THERAPEUTIC EXERCISES: CPT

## 2019-12-30 NOTE — PROGRESS NOTES
Daily Note     Today's date: 2019  Patient name: Richie Celestin  : 1960  MRN: [id number]  Referring provider: Niurka Gonzales DO  Dx:   Encounter Diagnosis     ICD-10-CM    1  Chronic bilateral low back pain with bilateral sciatica M54 42     M54 41     G89 29        Start Time: 1205  Stop Time: 1315  Total time in clinic (min): 70 minutes    Subjective: Pt reports his LB continues to feel about the same since LV, with consistent pain/stiffness  Objective: See treatment diary below  Assessment: Tolerated treatment well  Started program with MH today, in conjunction with seated stretches  Pt continues to have moderate soft tissue restriction in lumbar musculature, L > R  Responds well to b/l LAD with decrease in symptoms  Pt reports he wants to try out his inversion table  Patient would benefit from continued PT to further reduce pain/soft tissue restriction, increase strength, and maximize function  Continue to progress as able  Plan: Continue per plan of care        Precautions: standard    HEP: seated HS stretch, piriformis stretch, TAC    Specialty Daily Treatment Diary       Manual    STM L/S IASTM HY  SMF IASTM AFB IASTM AFB   B LAD HY  SMF AFB AFB   PA glides L/S SMF  SMF LL SMF                   Exercise Diary         TAC c SLR 0# 2x10  0# 2x10 0# 2x10 0# 2x10   TAC c BKFO GTB 30x ea  GTB 30 ea GTB 30x ea GTB 30x ea   TAC c Marches        TAC c LTR 20x ea  20 ea 20 ea 20x ea   Dying Bug        Cross Leg Rot stretch 3x30"  30 sec x 3 30 sec x 3 30 sec x 3   PPT 30x  30 30x 30x   Bridges c Ball Squeezes 30x  30x 30x 30x   Clamshells (sidelying) GTB 30  GTB 30 GTB 30 GTB 30   Bridges c TB        Sidelying H' ABD        Prone quad stretch 30 sec x 3  30 sec x 3 30 sec x 3 30 sec x 3   PPU        Seated HS; piriformis stretch 30 sec x 3 ea  30 sec x 3 ea 30 sec x 3 ea 30 sec x 3 ea   Forward Trunk Flex 15 sec x 5 ea  15 sec x 5 ea 15 sec x 5 ea 15 sec x 5 ea Wall Sags 3x10  3x10 3x10 NV           Modalities        MH 10' pre   10 mins  10 mins pre 10 mins pre                       Skin integrity intact prior/post application of MH

## 2020-01-03 ENCOUNTER — OFFICE VISIT (OUTPATIENT)
Dept: PHYSICAL THERAPY | Facility: CLINIC | Age: 60
End: 2020-01-03
Payer: COMMERCIAL

## 2020-01-03 DIAGNOSIS — M54.42 CHRONIC BILATERAL LOW BACK PAIN WITH BILATERAL SCIATICA: Primary | ICD-10-CM

## 2020-01-03 DIAGNOSIS — M54.41 CHRONIC BILATERAL LOW BACK PAIN WITH BILATERAL SCIATICA: Primary | ICD-10-CM

## 2020-01-03 DIAGNOSIS — G89.29 CHRONIC BILATERAL LOW BACK PAIN WITH BILATERAL SCIATICA: Primary | ICD-10-CM

## 2020-01-03 PROCEDURE — 97110 THERAPEUTIC EXERCISES: CPT

## 2020-01-03 PROCEDURE — 97112 NEUROMUSCULAR REEDUCATION: CPT

## 2020-01-03 PROCEDURE — 97140 MANUAL THERAPY 1/> REGIONS: CPT

## 2020-01-03 NOTE — PROGRESS NOTES
Daily Note     Today's date: 1/3/2020  Patient name: Luiz Pulido  : 1960  MRN: [id number]  Referring provider: Graham Unger DO  Dx:   Encounter Diagnosis     ICD-10-CM    1  Chronic bilateral low back pain with bilateral sciatica M54 42     M54 41     G89 29        Start Time: 1010  Stop Time: 1115  Total time in clinic (min): 65 minutes    Subjective: Pt reports having continued pain in LB  Pain is slightly better compared to last visit  "At least it's not throbbing "      Objective: See treatment diary below      Assessment: Tolerated treatment well  Continues to present with moderate soft tissue restrictions along LB  Cross-frictional STM performed to b/l lumbar paraspinals and QL, in effort to relieve this tension  Responds most to LAD with decrease in symptoms  Slight pain when holding bridge with ball squeeze, as well as wall sag  Overall, extension based movement seem to cause increase in symptoms  Pain resolved with additional stretches and exercise  Patient would benefit from continued PT to further reduce soft tissue restrictions and decrease pain levels  Plan: Continue per plan of care  Progress as able       Precautions: standard    HEP: seated HS stretch, piriformis stretch, TAC    Specialty Daily Treatment Diary       Manual 12/30 1/3 12/12 12/17 12/20   STM L/S IASTM HY STM AFB SMF IASTM AFB IASTM AFB   B LAD HY AFB SMF AFB AFB   PA glides L/S SMF SMF SMF LL SMF                   Exercise Diary         TAC c SLR 0# 2x10 0# 2x10 0# 2x10 0# 2x10 0# 2x10   TAC c BKFO GTB 30x ea GTB 30x ea GTB 30 ea GTB 30x ea GTB 30x ea   TAC c Marches        TAC c LTR 20x ea 20x ea 20 ea 20 ea 20x ea   Dying Bug        Cross Leg Rot stretch 3x30" 30"x3 30 sec x 3 30 sec x 3 30 sec x 3   PPT 30x 30x 30 30x 30x   Bridges c Ball Squeezes 30x 30x P! 30x 30x 30x   Clamshells (sidelying) GTB 30 GTB 30 GTB 30 GTB 30 GTB 30   Bridges c TB        Sidelying H' ABD        Prone quad stretch 30 sec x 3 30 sec x 3 30 sec x 3 30 sec x 3 30 sec x 3   PPU        Seated HS; piriformis stretch 30 sec x 3 ea 30 sec x 3 ea 30 sec x 3 ea 30 sec x 3 ea 30 sec x 3 ea   Forward Trunk Flex 15 sec x 5 ea 15 sec x 5 ea 15 sec x 5 ea 15 sec x 5 ea 15 sec x 5 ea                           Wall Sags 3x10 x10 P!  3x10 3x10 NV           Modalities        MH 10' pre  10 min pre 10 mins  10 mins pre 10 mins pre

## 2020-01-06 ENCOUNTER — OFFICE VISIT (OUTPATIENT)
Dept: PHYSICAL THERAPY | Facility: CLINIC | Age: 60
End: 2020-01-06
Payer: COMMERCIAL

## 2020-01-06 DIAGNOSIS — M54.42 CHRONIC BILATERAL LOW BACK PAIN WITH BILATERAL SCIATICA: Primary | ICD-10-CM

## 2020-01-06 DIAGNOSIS — G89.29 CHRONIC BILATERAL LOW BACK PAIN WITH BILATERAL SCIATICA: Primary | ICD-10-CM

## 2020-01-06 DIAGNOSIS — M54.41 CHRONIC BILATERAL LOW BACK PAIN WITH BILATERAL SCIATICA: Primary | ICD-10-CM

## 2020-01-06 PROCEDURE — 97110 THERAPEUTIC EXERCISES: CPT | Performed by: PHYSICAL THERAPIST

## 2020-01-06 PROCEDURE — 97112 NEUROMUSCULAR REEDUCATION: CPT | Performed by: PHYSICAL THERAPIST

## 2020-01-06 PROCEDURE — 97140 MANUAL THERAPY 1/> REGIONS: CPT | Performed by: PHYSICAL THERAPIST

## 2020-01-06 NOTE — PROGRESS NOTES
Daily Note     Today's date: 2020  Patient name: Tamir Martin  : 1960  MRN: [id number]  Referring provider: Ze Fofana DO  Dx:   Encounter Diagnosis     ICD-10-CM    1  Chronic bilateral low back pain with bilateral sciatica M54 42     M54 41     G89 29                   Subjective: Pt reports that he continues to have stiffness in his low back homer when he has to perform a lot of bending over and lifting  He continues to have difficulty getting out of bed  Objective: See treatment diary below      Assessment: Tolerated treatment well; initiated POC with  while performing seated chair exercises  He continues to have decreased PA jt mobilization in lumbar spine  Added cat and camel  Patient demonstrated fatigue post treatment and would benefit from continued PT      Plan: Continue per plan of care  Precautions: standard    HEP: seated HS stretch, piriformis stretch, TAC    Specialty Daily Treatment Diary       Manual 12/30 1/3 1/6     STM L/S IASTM HY STM AFB SMF IASTM     B LAD HY AFB SMF     PA glides L/S SMF SMF SMF                     Exercise Diary         TAC c SLR 0# 2x10 0# 2x10 0# 2x10     TAC c BKFO GTB 30x ea GTB 30x ea GTB 30 ea     TAC c Marches        TAC c LTR 20x ea 20x ea 20x ea     Dying Bug        Cross Leg Rot stretch 3x30" 30"x3 30 sec x 3     PPT 30x 30x 30x     Bridges c Ball Squeezes 30x 30x P! 30x (performed after MT and jt mobs     Clamshells (sidelying) GTB 30 GTB 30 GTB 30     Bridges c TB        Sidelying H' ABD        Prone quad stretch 30 sec x 3 30 sec x 3 30 sec x 3     PPU        Seated HS; piriformis stretch 30 sec x 3 ea 30 sec x 3 ea 30 sec x 3 ea     Forward Trunk Flex 15 sec x 5 ea 15 sec x 5 ea 15 sec x 5 ea     Cat & Camel   10 ea                     Wall Sags 3x10 x10 P!  NV             Modalities         10' pre  10 min pre 10 min pre

## 2020-01-09 ENCOUNTER — OFFICE VISIT (OUTPATIENT)
Dept: PHYSICAL THERAPY | Facility: CLINIC | Age: 60
End: 2020-01-09
Payer: COMMERCIAL

## 2020-01-09 DIAGNOSIS — M54.41 CHRONIC BILATERAL LOW BACK PAIN WITH BILATERAL SCIATICA: Primary | ICD-10-CM

## 2020-01-09 DIAGNOSIS — M54.42 CHRONIC BILATERAL LOW BACK PAIN WITH BILATERAL SCIATICA: Primary | ICD-10-CM

## 2020-01-09 DIAGNOSIS — G89.29 CHRONIC BILATERAL LOW BACK PAIN WITH BILATERAL SCIATICA: Primary | ICD-10-CM

## 2020-01-09 PROCEDURE — 97140 MANUAL THERAPY 1/> REGIONS: CPT

## 2020-01-09 PROCEDURE — 97112 NEUROMUSCULAR REEDUCATION: CPT

## 2020-01-09 PROCEDURE — 97110 THERAPEUTIC EXERCISES: CPT

## 2020-01-09 NOTE — PROGRESS NOTES
Daily Note     Today's date: 2020  Patient name: Jamison Desir  : 1960  MRN: [id number]  Referring provider: Dima Callaway DO  Dx:   Encounter Diagnosis     ICD-10-CM    1  Chronic bilateral low back pain with bilateral sciatica M54 42     M54 41     G89 29        Start Time: 1215  Stop Time: 1320  Total time in clinic (min): 65 minutes    Subjective: Pt states he was a little sore following LV  Continues to have about the same level of LB pain, but no throbbing  Objective: See treatment diary below  Program initiated with  in conjunction with seated stretches  Assessment: Tolerated treatment well  Was able to perform all exercise with no complaints of increase in symptoms  Slight cramping in b/l glutes today throughout session  Continues to present with moderate-significant b/l soft tissue restriction along lumbar spine, limiting available ROM  Patient demonstrated fatigue post treatment and would benefit from continued PT in effort to further improve core/BLE strength, increase available ROM, and decrease frequency of symptoms  Plan: Continue per plan of care        Precautions: standard    HEP: seated HS stretch, piriformis stretch, TAC    Specialty Daily Treatment Diary       Manual 12/30 1/3 1/6 1/9    STM L/S IASTM HY STM AFB SMF IASTM AFB IASTM    B LAD HY AFB SMF AFB    PA glides L/S SMF SMF SMF BG                    Exercise Diary         TAC c SLR 0# 2x10 0# 2x10 0# 2x10 1 5# 2x10    TAC c BKFO GTB 30x ea GTB 30x ea GTB 30 ea GTB 30 ea    TAC c Marches        TAC c LTR 20x ea 20x ea 20x ea 20x ea    Dying Bug        Cross Leg Rot stretch 3x30" 30"x3 30 sec x 3 30 sec x 3    PPT 30x 30x 30x 30x    Bridges c Ball Squeezes 30x 30x P! 30x (performed after MT and jt mobs NV    Clamshells (sidelying) GTB 30 GTB 30 GTB 30 GTB 30    Bridges c TB        Sidelying H' ABD        Prone quad stretch 30 sec x 3 30 sec x 3 30 sec x 3 30 sec x 3    PPU        Seated HS; piriformis stretch 30 sec x 3 ea 30 sec x 3 ea 30 sec x 3 ea 30 sec x 3 ea    Forward Trunk Flex 15 sec x 5 ea 15 sec x 5 ea 15 sec x 5 ea 15 sec x 5 ea    Cat & Camel   10 ea NV                    Wall Sags 3x10 x10 P!  NV NV            Modalities        MH 10' pre  10 min pre 10 min pre 10 min pre

## 2020-01-13 ENCOUNTER — OFFICE VISIT (OUTPATIENT)
Dept: PHYSICAL THERAPY | Facility: CLINIC | Age: 60
End: 2020-01-13
Payer: COMMERCIAL

## 2020-01-13 DIAGNOSIS — M54.42 CHRONIC BILATERAL LOW BACK PAIN WITH BILATERAL SCIATICA: Primary | ICD-10-CM

## 2020-01-13 DIAGNOSIS — M54.41 CHRONIC BILATERAL LOW BACK PAIN WITH BILATERAL SCIATICA: Primary | ICD-10-CM

## 2020-01-13 DIAGNOSIS — G89.29 CHRONIC BILATERAL LOW BACK PAIN WITH BILATERAL SCIATICA: Primary | ICD-10-CM

## 2020-01-13 PROCEDURE — 97140 MANUAL THERAPY 1/> REGIONS: CPT | Performed by: PHYSICAL THERAPIST

## 2020-01-13 PROCEDURE — 97110 THERAPEUTIC EXERCISES: CPT | Performed by: PHYSICAL THERAPIST

## 2020-01-13 NOTE — PROGRESS NOTES
Daily Note     Today's date: 2020  Patient name: Carmine Sever  : 1960  MRN: [id number]  Referring provider: Norris Lares DO  Dx:   Encounter Diagnosis     ICD-10-CM    1  Chronic bilateral low back pain with bilateral sciatica M54 42     M54 41     G89 29                   Subjective: Pt reports that he is sore today  Objective: See treatment diary below      Assessment: Tolerated treatment well; initiated POC with MH in sitting f/b chair stretches  VCs provided on proper sequencing with exercises; time constraints today  Encouraged pt to use heat and continue to perform stretches  Patient demonstrated fatigue post treatment and would benefit from continued PT      Plan: Continue per plan of care  Precautions: standard    HEP: seated HS stretch, piriformis stretch, TAC    Specialty Daily Treatment Diary       Manual 12/30 1/3 1/6 1/9 1/13   STM L/S IASTM HY STM AFB SMF IASTM AFB IASTM SMF   B LAD HY AFB SMF AFB    PA glides L/S SMF SMF SMF BG SMF                   Exercise Diary         TAC c SLR 0# 2x10 0# 2x10 0# 2x10 1 5# 2x10 NV   TAC c BKFO GTB 30x ea GTB 30x ea GTB 30 ea GTB 30 ea NV   TAC c Marches        TAC c LTR 20x ea 20x ea 20x ea 20x ea 20x ea   Dying Bug        Cross Leg Rot stretch 3x30" 30"x3 30 sec x 3 30 sec x 3 NV   PPT 30x 30x 30x 30x 30x   Bridges c Ball Squeezes 30x 30x P! 30x (performed after MT and jt mobs NV NV   Clamshells (sidelying) GTB 30 GTB 30 GTB 30 GTB 30 NV   Bridges c TB        Sidelying H' ABD        Prone quad stretch 30 sec x 3 30 sec x 3 30 sec x 3 30 sec x 3 NV   PPU        Seated HS; piriformis stretch 30 sec x 3 ea 30 sec x 3 ea 30 sec x 3 ea 30 sec x 3 ea 30 sec x 3 ea   Forward Trunk Flex 15 sec x 5 ea 15 sec x 5 ea 15 sec x 5 ea 15 sec x 5 ea 15 sec x 5 ea   Cat & Camel   10 ea NV 10 ea   Seated GB 1-3     NV           Wall Sags 3x10 x10 P!    HOLD           Modalities        MH 10' pre  10 min pre 10 min pre 10 min pre 10 min pre   EStim     NV w/ possible home TENS? Addendum: 2/3/2020: Pt was called twice and did not return for further skilled PT  He is currently being discharged

## 2020-01-23 ENCOUNTER — APPOINTMENT (OUTPATIENT)
Dept: PHYSICAL THERAPY | Facility: CLINIC | Age: 60
End: 2020-01-23
Payer: COMMERCIAL

## 2020-01-27 ENCOUNTER — APPOINTMENT (OUTPATIENT)
Dept: PHYSICAL THERAPY | Facility: CLINIC | Age: 60
End: 2020-01-27
Payer: COMMERCIAL

## 2020-01-30 ENCOUNTER — APPOINTMENT (OUTPATIENT)
Dept: PHYSICAL THERAPY | Facility: CLINIC | Age: 60
End: 2020-01-30
Payer: COMMERCIAL

## 2020-07-22 ENCOUNTER — HOSPITAL ENCOUNTER (INPATIENT)
Facility: HOSPITAL | Age: 60
LOS: 4 days | Discharge: HOME | DRG: 057 | End: 2020-07-28
Attending: EMERGENCY MEDICINE | Admitting: SURGERY
Payer: COMMERCIAL

## 2020-07-22 ENCOUNTER — APPOINTMENT (OUTPATIENT)
Dept: RADIOLOGY | Facility: HOSPITAL | Age: 60
DRG: 057 | End: 2020-07-22
Payer: COMMERCIAL

## 2020-07-22 ENCOUNTER — APPOINTMENT (EMERGENCY)
Dept: RADIOLOGY | Facility: HOSPITAL | Age: 60
DRG: 057 | End: 2020-07-22
Payer: COMMERCIAL

## 2020-07-22 DIAGNOSIS — S01.01XA SCALP LACERATION, INITIAL ENCOUNTER: ICD-10-CM
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
## 2020-07-22 DIAGNOSIS — S32.010A CLOSED COMPRESSION FRACTURE OF L1 LUMBAR VERTEBRA, INITIAL ENCOUNTER (HCC): ICD-10-CM

## 2020-07-22 DIAGNOSIS — S32.010A CLOSED COMPRESSION FRACTURE OF BODY OF L1 VERTEBRA (HCC): ICD-10-CM

## 2020-07-22 DIAGNOSIS — E55.9 VITAMIN D DEFICIENCY: ICD-10-CM

## 2020-07-22 DIAGNOSIS — E11.9 TYPE 2 DIABETES MELLITUS (HCC): ICD-10-CM
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
## 2020-07-22 PROBLEM — R91.1 PULMONARY NODULE: Status: ACTIVE | Noted: 2020-07-22

## 2020-07-22 LAB
ANION GAP SERPL CALCULATED.3IONS-SCNC: 10 MMOL/L (ref 4–13)
APAP SERPL-MCNC: <2 UG/ML (ref 10–20)
BASOPHILS # BLD AUTO: 0.05 THOUSANDS/ΜL (ref 0–0.1)
BASOPHILS NFR BLD AUTO: 0 % (ref 0–1)
BUN SERPL-MCNC: 25 MG/DL (ref 5–25)
CALCIUM SERPL-MCNC: 9.5 MG/DL (ref 8.3–10.1)
CHLORIDE SERPL-SCNC: 105 MMOL/L (ref 100–108)
CO2 SERPL-SCNC: 23 MMOL/L (ref 21–32)
CREAT SERPL-MCNC: 0.77 MG/DL (ref 0.6–1.3)
EOSINOPHIL # BLD AUTO: 0.01 THOUSAND/ΜL (ref 0–0.61)
EOSINOPHIL NFR BLD AUTO: 0 % (ref 0–6)
ERYTHROCYTE [DISTWIDTH] IN BLOOD BY AUTOMATED COUNT: 13 % (ref 11.6–15.1)
ETHANOL SERPL-MCNC: <3 MG/DL (ref 0–3)
GFR SERPL CREATININE-BSD FRML MDRD: 99 ML/MIN/1.73SQ M
GLUCOSE P FAST SERPL-MCNC: 317 MG/DL (ref 65–99)
GLUCOSE SERPL-MCNC: 317 MG/DL (ref 65–140)
HCT VFR BLD AUTO: 44.9 % (ref 36.5–49.3)
HGB BLD-MCNC: 15.2 G/DL (ref 12–17)
IMM GRANULOCYTES # BLD AUTO: 0.15 THOUSAND/UL (ref 0–0.2)
IMM GRANULOCYTES NFR BLD AUTO: 1 % (ref 0–2)
LYMPHOCYTES # BLD AUTO: 1.53 THOUSANDS/ΜL (ref 0.6–4.47)
LYMPHOCYTES NFR BLD AUTO: 10 % (ref 14–44)
MCH RBC QN AUTO: 30.1 PG (ref 26.8–34.3)
MCHC RBC AUTO-ENTMCNC: 33.9 G/DL (ref 31.4–37.4)
MCV RBC AUTO: 89 FL (ref 82–98)
MONOCYTES # BLD AUTO: 0.81 THOUSAND/ΜL (ref 0.17–1.22)
MONOCYTES NFR BLD AUTO: 5 % (ref 4–12)
NEUTROPHILS # BLD AUTO: 13.38 THOUSANDS/ΜL (ref 1.85–7.62)
NEUTS SEG NFR BLD AUTO: 84 % (ref 43–75)
NRBC BLD AUTO-RTO: 0 /100 WBCS
PLATELET # BLD AUTO: 284 THOUSANDS/UL (ref 149–390)
PMV BLD AUTO: 8.9 FL (ref 8.9–12.7)
POTASSIUM SERPL-SCNC: 4.6 MMOL/L (ref 3.5–5.3)
RBC # BLD AUTO: 5.05 MILLION/UL (ref 3.88–5.62)
SALICYLATES SERPL-MCNC: 3 MG/DL (ref 3–20)
SODIUM SERPL-SCNC: 138 MMOL/L (ref 136–145)
WBC # BLD AUTO: 15.93 THOUSAND/UL (ref 4.31–10.16)

## 2020-07-22 PROCEDURE — 80329 ANALGESICS NON-OPIOID 1 OR 2: CPT | Performed by: EMERGENCY MEDICINE

## 2020-07-22 PROCEDURE — 36415 COLL VENOUS BLD VENIPUNCTURE: CPT | Performed by: EMERGENCY MEDICINE

## 2020-07-22 PROCEDURE — 72100 X-RAY EXAM L-S SPINE 2/3 VWS: CPT

## 2020-07-22 PROCEDURE — 80048 BASIC METABOLIC PNL TOTAL CA: CPT | Performed by: EMERGENCY MEDICINE

## 2020-07-22 PROCEDURE — 99285 EMERGENCY DEPT VISIT HI MDM: CPT

## 2020-07-22 PROCEDURE — 80320 DRUG SCREEN QUANTALCOHOLS: CPT | Performed by: EMERGENCY MEDICINE

## 2020-07-22 PROCEDURE — 12002 RPR S/N/AX/GEN/TRNK2.6-7.5CM: CPT | Performed by: EMERGENCY MEDICINE

## 2020-07-22 PROCEDURE — 99285 EMERGENCY DEPT VISIT HI MDM: CPT | Performed by: EMERGENCY MEDICINE

## 2020-07-22 PROCEDURE — 90471 IMMUNIZATION ADMIN: CPT

## 2020-07-22 PROCEDURE — 74177 CT ABD & PELVIS W/CONTRAST: CPT

## 2020-07-22 PROCEDURE — 99219 PR INITIAL OBSERVATION CARE/DAY 50 MINUTES: CPT | Performed by: SURGERY

## 2020-07-22 PROCEDURE — 96374 THER/PROPH/DIAG INJ IV PUSH: CPT

## 2020-07-22 PROCEDURE — 70450 CT HEAD/BRAIN W/O DYE: CPT

## 2020-07-22 PROCEDURE — 99255 IP/OBS CONSLTJ NEW/EST HI 80: CPT | Performed by: PHYSICIAN ASSISTANT

## 2020-07-22 PROCEDURE — 0HQ0XZZ REPAIR SCALP SKIN, EXTERNAL APPROACH: ICD-10-PCS | Performed by: SURGERY

## 2020-07-22 PROCEDURE — 72125 CT NECK SPINE W/O DYE: CPT

## 2020-07-22 PROCEDURE — 71045 X-RAY EXAM CHEST 1 VIEW: CPT

## 2020-07-22 PROCEDURE — 90715 TDAP VACCINE 7 YRS/> IM: CPT | Performed by: EMERGENCY MEDICINE

## 2020-07-22 PROCEDURE — 72131 CT LUMBAR SPINE W/O DYE: CPT

## 2020-07-22 PROCEDURE — 85025 COMPLETE CBC W/AUTO DIFF WBC: CPT | Performed by: EMERGENCY MEDICINE

## 2020-07-22 RX ORDER — NICOTINE 21 MG/24HR
1 PATCH, TRANSDERMAL 24 HOURS TRANSDERMAL DAILY
Status: DISCONTINUED | OUTPATIENT
Start: 2020-07-23 | End: 2020-07-28 | Stop reason: HOSPADM

## 2020-07-22 RX ORDER — LISINOPRIL 10 MG/1
10 TABLET ORAL DAILY
Status: DISCONTINUED | OUTPATIENT
Start: 2020-07-23 | End: 2020-07-28 | Stop reason: HOSPADM

## 2020-07-22 RX ORDER — LIDOCAINE HYDROCHLORIDE AND EPINEPHRINE 10; 10 MG/ML; UG/ML
10 INJECTION, SOLUTION INFILTRATION; PERINEURAL ONCE
Status: COMPLETED | OUTPATIENT
Start: 2020-07-22 | End: 2020-07-22

## 2020-07-22 RX ORDER — ACETAMINOPHEN 325 MG/1
975 TABLET ORAL EVERY 8 HOURS SCHEDULED
Status: DISCONTINUED | OUTPATIENT
Start: 2020-07-22 | End: 2020-07-28 | Stop reason: HOSPADM

## 2020-07-22 RX ORDER — NICOTINE 21 MG/24HR
1 PATCH, TRANSDERMAL 24 HOURS TRANSDERMAL DAILY
Status: DISCONTINUED | OUTPATIENT
Start: 2020-07-23 | End: 2020-07-22

## 2020-07-22 RX ORDER — INSULIN GLARGINE 100 [IU]/ML
35 INJECTION, SOLUTION SUBCUTANEOUS
COMMUNITY
End: 2020-08-19 | Stop reason: SDUPTHER

## 2020-07-22 RX ORDER — ACETAMINOPHEN 325 MG/1
975 TABLET ORAL ONCE
Status: COMPLETED | OUTPATIENT
Start: 2020-07-22 | End: 2020-07-22

## 2020-07-22 RX ORDER — ONDANSETRON 2 MG/ML
4 INJECTION INTRAMUSCULAR; INTRAVENOUS ONCE
Status: COMPLETED | OUTPATIENT
Start: 2020-07-22 | End: 2020-07-22

## 2020-07-22 RX ORDER — KETOROLAC TROMETHAMINE 30 MG/ML
15 INJECTION, SOLUTION INTRAMUSCULAR; INTRAVENOUS ONCE
Status: COMPLETED | OUTPATIENT
Start: 2020-07-22 | End: 2020-07-22

## 2020-07-22 RX ORDER — OXYCODONE HYDROCHLORIDE 5 MG/1
5 TABLET ORAL EVERY 4 HOURS PRN
Status: DISCONTINUED | OUTPATIENT
Start: 2020-07-22 | End: 2020-07-23

## 2020-07-22 RX ORDER — LABETALOL 100 MG/1
100 TABLET, FILM COATED ORAL EVERY 12 HOURS SCHEDULED
Status: DISCONTINUED | OUTPATIENT
Start: 2020-07-22 | End: 2020-07-28 | Stop reason: HOSPADM

## 2020-07-22 RX ORDER — AMLODIPINE BESYLATE 5 MG/1
5 TABLET ORAL DAILY
COMMUNITY
End: 2021-09-18 | Stop reason: SDUPTHER

## 2020-07-22 RX ORDER — ONDANSETRON 2 MG/ML
1 INJECTION INTRAMUSCULAR; INTRAVENOUS ONCE
Status: COMPLETED | OUTPATIENT
Start: 2020-07-22 | End: 2020-07-22

## 2020-07-22 RX ORDER — FENTANYL CITRATE 50 UG/ML
1 INJECTION, SOLUTION INTRAMUSCULAR; INTRAVENOUS ONCE
Status: COMPLETED | OUTPATIENT
Start: 2020-07-22 | End: 2020-07-22

## 2020-07-22 RX ORDER — LANOLIN ALCOHOL/MO/W.PET/CERES
6 CREAM (GRAM) TOPICAL
Status: DISCONTINUED | OUTPATIENT
Start: 2020-07-22 | End: 2020-07-28 | Stop reason: HOSPADM

## 2020-07-22 RX ORDER — INSULIN GLARGINE 100 [IU]/ML
15 INJECTION, SOLUTION SUBCUTANEOUS
Status: DISCONTINUED | OUTPATIENT
Start: 2020-07-22 | End: 2020-07-24

## 2020-07-22 RX ORDER — OXYCODONE HYDROCHLORIDE 10 MG/1
10 TABLET ORAL EVERY 4 HOURS PRN
Status: DISCONTINUED | OUTPATIENT
Start: 2020-07-22 | End: 2020-07-23

## 2020-07-22 RX ORDER — GABAPENTIN 100 MG/1
100 CAPSULE ORAL 3 TIMES DAILY
Status: DISCONTINUED | OUTPATIENT
Start: 2020-07-22 | End: 2020-07-28 | Stop reason: HOSPADM

## 2020-07-22 RX ORDER — HYDROMORPHONE HCL/PF 1 MG/ML
0.5 SYRINGE (ML) INJECTION
Status: DISCONTINUED | OUTPATIENT
Start: 2020-07-22 | End: 2020-07-23

## 2020-07-22 RX ORDER — AMLODIPINE BESYLATE 5 MG/1
5 TABLET ORAL DAILY
Status: DISCONTINUED | OUTPATIENT
Start: 2020-07-23 | End: 2020-07-28 | Stop reason: HOSPADM

## 2020-07-22 RX ADMIN — LABETALOL HYDROCHLORIDE 100 MG: 100 TABLET, FILM COATED ORAL at 22:31

## 2020-07-22 RX ADMIN — GABAPENTIN 100 MG: 100 CAPSULE ORAL at 22:31

## 2020-07-22 RX ADMIN — ACETAMINOPHEN 975 MG: 325 TABLET, FILM COATED ORAL at 12:56

## 2020-07-22 RX ADMIN — ONDANSETRON 4 MG: 2 INJECTION INTRAMUSCULAR; INTRAVENOUS at 11:58

## 2020-07-22 RX ADMIN — LIDOCAINE HYDROCHLORIDE,EPINEPHRINE BITARTRATE 10 ML: 10; .01 INJECTION, SOLUTION INFILTRATION; PERINEURAL at 12:54

## 2020-07-22 RX ADMIN — ENOXAPARIN SODIUM 30 MG: 30 INJECTION SUBCUTANEOUS at 19:47

## 2020-07-22 RX ADMIN — IOHEXOL 100 ML: 350 INJECTION, SOLUTION INTRAVENOUS at 18:27

## 2020-07-22 RX ADMIN — TETANUS TOXOID, REDUCED DIPHTHERIA TOXOID AND ACELLULAR PERTUSSIS VACCINE, ADSORBED 0.5 ML: 5; 2.5; 8; 8; 2.5 SUSPENSION INTRAMUSCULAR at 11:58

## 2020-07-22 RX ADMIN — NICOTINE 1 PATCH: 21 PATCH, EXTENDED RELEASE TRANSDERMAL at 23:54

## 2020-07-22 RX ADMIN — KETOROLAC TROMETHAMINE 15 MG: 30 INJECTION, SOLUTION INTRAMUSCULAR at 12:53

## 2020-07-22 RX ADMIN — OXYCODONE HYDROCHLORIDE 10 MG: 10 TABLET ORAL at 23:51

## 2020-07-22 RX ADMIN — INSULIN GLARGINE 15 UNITS: 100 INJECTION, SOLUTION SUBCUTANEOUS at 22:31

## 2020-07-22 RX ADMIN — MELATONIN 6 MG: at 23:51

## 2020-07-22 RX ADMIN — OXYCODONE HYDROCHLORIDE 10 MG: 10 TABLET ORAL at 19:47

## 2020-07-22 NOTE — ED PROVIDER NOTES
History  Chief Complaint   Patient presents with    Fall     pt fell from ladder that was on a platform, approx 5ft  +head strike on cement/brick wall  +asa     Patient is a 66-year-old male with past medical history of non-insulin-dependent diabetes mellitus, hyperlipidemia, history of alcohol abuse who presents the ED for evaluation of fall that occurred while he was at work today  Patient states he was on the ladder, subsequently lost his footing falling 5 ft and striking the posterior aspect of his head on a brick wall behind him  No LOC, but bystander reported brief confusion initially  EMS arrived and patient is placed in a cervical collar, placed on a backboard and brought to the ED for evaluation  The patient is complaining of low back pain only  He is alert and oriented x4, GCS 15  No anticoagulation or anti-platelet medications  He denies headache, neck pain, neck stiffness, hearing or vision changes, chest pain, shortness of breath, abdominal pain, his endorsing nausea but no vomiting, denies weakness, numbness, tingling  No urinary or fecal incontinence, urinary tension  Denies EtOH, denies drug use  History provided by:  Patient and EMS personnel   used: No    Fall   Mechanism of injury: fall    Injury location:  Head/neck  Head/neck injury location:  Head and scalp  Incident location:  Outdoors  Arrived directly from scene: yes    Fall:     Fall occurred:  From a ladder    Height of fall:  5ft    Impact surface: Brick wall      Point of impact:  Head    Entrapped after fall: no    Suspicion of alcohol use: no    Suspicion of drug use: no    Tetanus status:  Out of date  Prior to arrival data:     Bystander interventions:  Bystander C-spine precautions    Blood loss:  Minimal    Responsiveness at scene:  Alert    Orientation at scene:  Person, place, situation and time    Loss of consciousness: no      Amnesic to event: no      IV access status:  Established Medications administered:  Fentanyl    Immobilization:  C-collar    Airway condition since incident:  Stable    Breathing condition since incident:  Stable    Circulation condition since incident:  Stable    Mental status condition since incident:  Stable    Disability condition since incident:  Stable  Associated symptoms: back pain and nausea    Associated symptoms: no abdominal pain, no chest pain, no headaches, no neck pain and no vomiting    Risk factors: no anticoagulation therapy        Prior to Admission Medications   Prescriptions Last Dose Informant Patient Reported? Taking? HYDROcodone-acetaminophen (NORCO) 5-325 mg per tablet   Yes No   Sig: Take 1 tablet by mouth every 6 (six) hours as needed for pain   LORazepam (ATIVAN) 0 5 mg tablet   Yes No   amLODIPine (NORVASC) 5 mg tablet   Yes Yes   Sig: Take 5 mg by mouth daily   gabapentin (NEURONTIN) 300 mg capsule   Yes No   glimepiride (AMARYL) 4 mg tablet   Yes No   insulin glargine (LANTUS) 100 units/mL subcutaneous injection   Yes Yes   Sig: Inject 35 Units under the skin daily at bedtime   labetalol (NORMODYNE) 200 mg tablet   Yes No   lisinopril (ZESTRIL) 10 mg tablet   Yes No   Sig: Take 10 mg by mouth daily   metFORMIN (GLUCOPHAGE) 500 mg tablet   Yes No   Sig: Take 500 mg by mouth 3 (three) times a day      Facility-Administered Medications: None       Past Medical History:   Diagnosis Date    Alcoholism ([facility] )     Anxiety     Diabetes ([facility] )     Diabetes mellitus ([facility] )     High cholesterol        History reviewed  No pertinent surgical history  History reviewed  No pertinent family history  I have reviewed and agree with the history as documented      E-Cigarette/Vaping    E-Cigarette Use Never User      E-Cigarette/Vaping Substances    Nicotine No     THC No     CBD No     Flavoring No      Social History     Tobacco Use    Smoking status: Current Every Day Smoker     Packs/day: 2 00     Types: Cigarettes    Smokeless tobacco: Never Used   Substance Use Topics    Alcohol use: Not Currently     Frequency: Never    Drug use: Yes     Types: Marijuana, Other     Comment: prescription pills (unprescribed) daily        Review of Systems   Constitutional: Negative  Negative for appetite change, chills and fever  HENT: Negative  Eyes: Negative  Negative for photophobia and visual disturbance  Respiratory: Negative  Negative for cough, chest tightness and shortness of breath  Cardiovascular: Negative  Negative for chest pain and leg swelling  Gastrointestinal: Positive for nausea  Negative for abdominal pain, blood in stool, constipation, diarrhea and vomiting  Endocrine: Negative  Genitourinary: Negative  Negative for difficulty urinating, dysuria, flank pain, frequency and urgency  Musculoskeletal: Positive for back pain  Negative for neck pain and neck stiffness  Skin: Negative  Allergic/Immunologic: Negative  Neurological: Negative  Negative for dizziness, weakness, light-headedness and headaches  Hematological: Negative  Psychiatric/Behavioral: Negative  Physical Exam  ED Triage Vitals   Temperature Pulse Respirations Blood Pressure SpO2   07/22/20 1130 07/22/20 1130 07/22/20 1130 07/22/20 1130 07/22/20 1130   97 5 °F (36 4 °C) 96 12 (!) 164/115 97 %      Temp Source Heart Rate Source Patient Position - Orthostatic VS BP Location FiO2 (%)   07/22/20 1130 07/22/20 1130 07/22/20 1130 07/22/20 2249 --   Tympanic Monitor Lying Right arm       Pain Score       07/22/20 1630       5             Orthostatic Vital Signs  Vitals:    07/23/20 2230 07/23/20 2233 07/23/20 2308 07/24/20 0155   BP: (!) 161/107 (!) 162/104 154/98 122/86   Pulse: 103   102   Patient Position - Orthostatic VS: Lying Lying Lying        Physical Exam   Constitutional: He is oriented to person, place, and time  He appears well-developed and well-nourished  HENT:   Head: Normocephalic         Right Ear: Tympanic membrane normal  No hemotympanum  Left Ear: Tympanic membrane normal  No hemotympanum  Nose: Nose normal  No nasal septal hematoma  Mouth/Throat: Uvula is midline and oropharynx is clear and moist    Eyes: Pupils are equal, round, and reactive to light  Conjunctivae and EOM are normal    Neck: Normal range of motion, full passive range of motion without pain and phonation normal  Neck supple  No spinous process tenderness present  Cardiovascular: Normal rate, regular rhythm, normal heart sounds and intact distal pulses  No murmur heard  Pulses:       Carotid pulses are 2+ on the right side, and 2+ on the left side  Femoral pulses are 2+ on the right side, and 2+ on the left side  Dorsalis pedis pulses are 2+ on the right side, and 2+ on the left side  Pulmonary/Chest: Effort normal and breath sounds normal  He exhibits no tenderness and no crepitus  Abdominal: Soft  Normal appearance and bowel sounds are normal  There is no tenderness  There is no rigidity  Musculoskeletal: Normal range of motion  Right shoulder: Normal         Left shoulder: Normal         Right elbow: Normal        Left elbow: Normal         Right wrist: Normal         Left wrist: Normal         Right hip: Normal         Left hip: Normal         Right knee: Normal         Left knee: Normal         Right ankle: Normal         Left ankle: Normal         Cervical back: Normal         Thoracic back: Normal         Lumbar back: He exhibits bony tenderness  No step-offs or deformities of the C, T or L-spine   Neurological: He is alert and oriented to person, place, and time  He has normal strength  No cranial nerve deficit or sensory deficit  GCS eye subscore is 4  GCS verbal subscore is 5  GCS motor subscore is 6  Patient is alert and oriented to person, place, time, and situation  Speech is normal with no dysarthria, no aphasia  Cranial nerves II-XII intact  Sensation is intact bilaterally upper and lower extremities   Normal muscle tone, no clonus, no atrophy  5/5 muscle strength bilaterally upper extremities, 5/5 muscle strength bilaterally lower extremities  No pronator drift  Skin: Skin is warm, dry and intact  He is not diaphoretic  Psychiatric: He has a normal mood and affect  His behavior is normal    Vitals reviewed        ED Medications  Medications   enoxaparin (LOVENOX) subcutaneous injection 30 mg (30 mg Subcutaneous Given 7/23/20 1716)   acetaminophen (TYLENOL) tablet 975 mg (975 mg Oral Not Given 7/24/20 0500)   amLODIPine (NORVASC) tablet 5 mg (5 mg Oral Given 7/23/20 1059)   gabapentin (NEURONTIN) capsule 100 mg (100 mg Oral Given 7/23/20 2135)   labetalol (NORMODYNE) tablet 100 mg (100 mg Oral Given 7/23/20 2135)   lisinopril (ZESTRIL) tablet 10 mg (10 mg Oral Given 7/23/20 1058)   melatonin tablet 6 mg (6 mg Oral Given 7/23/20 2135)   nicotine (NICODERM CQ) 21 mg/24 hr TD 24 hr patch 1 patch (1 patch Transdermal Medication Applied 7/23/20 1053)   ondansetron (ZOFRAN) injection 4 mg (4 mg Intravenous Given 7/23/20 0840)   insulin lispro (HumaLOG) 100 units/mL subcutaneous injection 4-20 Units (8 Units Subcutaneous Given 7/23/20 1717)   methocarbamol (ROBAXIN) tablet 500 mg (500 mg Oral Not Given 7/24/20 0502)   hydrALAZINE (APRESOLINE) injection 5 mg (5 mg Intravenous Given 7/23/20 2236)   oxyCODONE (ROXICODONE) IR tablet 15 mg (15 mg Oral Given 7/24/20 0449)   oxyCODONE (ROXICODONE) immediate release tablet 10 mg (has no administration in time range)   LORazepam (ATIVAN) tablet 0 5 mg (0 5 mg Oral Given 7/23/20 1715)   calcium carbonate (TUMS) chewable tablet 500 mg (500 mg Oral Given 7/23/20 1716)   insulin glargine (LANTUS) subcutaneous injection 20 Units 0 2 mL (has no administration in time range)   polyethylene glycol (MIRALAX) packet 17 g (has no administration in time range)   senna-docusate sodium (SENOKOT S) 8 6-50 mg per tablet 1 tablet (has no administration in time range)   fentanyl citrate (PF) (FOR EMS ONLY) 100 mcg/2 mL injection 100 mcg (0 mcg Does not apply Given to EMS 7/22/20 1132)   ondansetron (FOR EMS ONLY) (ZOFRAN) 4 mg/2 mL injection 4 mg (0 mg Does not apply Given to EMS 7/22/20 1137)   tetanus-diphtheria-acellular pertussis (BOOSTRIX) IM injection 0 5 mL (0 5 mL Intramuscular Given 7/22/20 1158)   ondansetron (ZOFRAN) injection 4 mg (4 mg Intravenous Given 7/22/20 1158)   lidocaine-epinephrine (XYLOCAINE/EPINEPHRINE) 1 %-1:100,000 injection 10 mL (10 mL Infiltration Given 7/22/20 1254)   acetaminophen (TYLENOL) tablet 975 mg (975 mg Oral Given 7/22/20 1256)   ketorolac (TORADOL) injection 15 mg (15 mg Intravenous Given 7/22/20 1253)   iohexol (OMNIPAQUE) 350 MG/ML injection (MULTI-DOSE) 100 mL (100 mL Intravenous Given 7/22/20 1827)       Diagnostic Studies  Results Reviewed     Procedure Component Value Units Date/Time    CBC (With Platelets) [[id number]]  (Abnormal) Collected:  07/23/20 0616    Lab Status:  Final result Specimen:  Blood from Arm, Left Updated:  07/23/20 0700     WBC 13 98 Thousand/uL      RBC 4 90 Million/uL      Hemoglobin 14 8 g/dL      Hematocrit 43 8 %      MCV 89 fL      MCH 30 2 pg      MCHC 33 8 g/dL      RDW 13 0 %      Platelets 538 Thousands/uL      MPV 9 3 fL     Basic metabolic panel [[id number]]  (Abnormal) Collected:  07/23/20 0616    Lab Status:  Final result Specimen:  Blood from Arm, Left Updated:  07/23/20 0654     Sodium 136 mmol/L      Potassium 3 8 mmol/L      Chloride 103 mmol/L      CO2 25 mmol/L      ANION GAP 8 mmol/L      BUN 19 mg/dL      Creatinine 0 72 mg/dL      Glucose 314 mg/dL      Calcium 9 3 mg/dL      eGFR 102 ml/min/1 73sq m     Narrative:       [place] guidelines for Chronic Kidney Disease (CKD):     Stage 1 with normal or high GFR (GFR > 90 mL/min/1 73 square meters)    Stage 2 Mild CKD (GFR = 60-89 mL/min/1 73 square meters)    Stage 3A Moderate CKD (GFR = 45-59 mL/min/1 73 square meters)    Stage 3B Moderate CKD (GFR = 30-44 mL/min/1 73 square meters)    Stage 4 Severe CKD (GFR = 15-29 mL/min/1 73 square meters)    Stage 5 End Stage CKD (GFR <15 mL/min/1 73 square meters)  Note: GFR calculation is accurate only with a steady state creatinine    Ethanol [[id number]]  (Normal) Collected:  07/22/20 1725    Lab Status:  Final result Specimen:  Blood from Arm, Right Updated:  07/22/20 1929     Ethanol Lvl <3 mg/dL     Salicylate level [[id number]]  (Normal) Collected:  07/22/20 1725    Lab Status:  Final result Specimen:  Blood from Arm, Right Updated:  76/24/39 0759     Salicylate Lvl 3 mg/dL     Acetaminophen level-If concentration is detectable, please discuss with medical  on call   [[id number]]  (Abnormal) Collected:  07/22/20 1725    Lab Status:  Final result Specimen:  Blood from Arm, Right Updated:  07/22/20 1929     Acetaminophen Level <2 ug/mL     Basic metabolic panel [[id number]]  (Abnormal) Collected:  07/22/20 1725    Lab Status:  Final result Specimen:  Blood from Arm, Right Updated:  07/22/20 1815     Sodium 138 mmol/L      Potassium 4 6 mmol/L      Chloride 105 mmol/L      CO2 23 mmol/L      ANION GAP 10 mmol/L      BUN 25 mg/dL      Creatinine 0 77 mg/dL      Glucose 317 mg/dL      Glucose, Fasting 317 mg/dL      Calcium 9 5 mg/dL      eGFR 99 ml/min/1 73sq m     Narrative:       [place] guidelines for Chronic Kidney Disease (CKD):     Stage 1 with normal or high GFR (GFR > 90 mL/min/1 73 square meters)    Stage 2 Mild CKD (GFR = 60-89 mL/min/1 73 square meters)    Stage 3A Moderate CKD (GFR = 45-59 mL/min/1 73 square meters)    Stage 3B Moderate CKD (GFR = 30-44 mL/min/1 73 square meters)    Stage 4 Severe CKD (GFR = 15-29 mL/min/1 73 square meters)    Stage 5 End Stage CKD (GFR <15 mL/min/1 73 square meters)  Note: GFR calculation is accurate only with a steady state creatinine    CBC and differential [[id number]]  (Abnormal) Collected:  07/22/20 1725    Lab Status:  Final result Specimen:  Blood from Arm, Right Updated:  07/22/20 1737     WBC 15 93 Thousand/uL      RBC 5 05 Million/uL      Hemoglobin 15 2 g/dL      Hematocrit 44 9 %      MCV 89 fL      MCH 30 1 pg      MCHC 33 9 g/dL      RDW 13 0 %      MPV 8 9 fL      Platelets 380 Thousands/uL      nRBC 0 /100 WBCs      Neutrophils Relative 84 %      Immat GRANS % 1 %      Lymphocytes Relative 10 %      Monocytes Relative 5 %      Eosinophils Relative 0 %      Basophils Relative 0 %      Neutrophils Absolute 13 38 Thousands/µL      Immature Grans Absolute 0 15 Thousand/uL      Lymphocytes Absolute 1 53 Thousands/µL      Monocytes Absolute 0 81 Thousand/µL      Eosinophils Absolute 0 01 Thousand/µL      Basophils Absolute 0 05 Thousands/µL                  XR chest portable   Final Result by Miguel Giron MD (07/22 1909)      Right mid lung opacity or mass measuring approximately 3 7 cm  Follow-up enhanced chest CT recommended in this patient with smoking history  The study was marked in EPIC for significant notification  Workstation performed: [id number]         XR spine lumbar 2 or 3 views injury   Final Result by Usman Yancey MD (07/23 0930)         1  Anterior wedge compression fracture again noted with slightly greater loss of height anteriorly compared to the CT of 7/22/2020  Workstation performed: [id number]         CT abdomen pelvis w contrast   Final Result by Kenyon Dakin, MD (07/22 9066)      Unchanged appearance of the known mild superior endplate compression deformity of the L1 vertebral body without progressive loss of height  No other sites of injury are identified within the abdomen or the pelvis  Workstation performed: [id number]         CT head without contrast   Final Result by Nazia Nettles MD (07/22 1230)      Left parietal scalp hematoma  No acute intracranial hemorrhage or depressed calvarial fracture identified              Workstation performed: [id number] CT spine cervical without contrast   Final Result by Hilario Medina MD (07/22 1225)      No acute fracture or evidence for traumatic malalignment  I personally discussed this study with Dr Cynthia Whiteside on 7/22/2020 at 12:17 PM                 Workstation performed: [id number]         CT spine lumbar without contrast   Final Result by Hilario Medina MD (07/22 1221)      Acute fracture through the superior endplate of L1 with minimal loss of height  No significant bony retropulsion into the spinal canal       Degenerative changes of the lumbar spine, as described above  I personally discussed this study with Dr Cynthia Whiteside on 7/22/2020 at 12:17 PM                   Workstation performed: [id number]         XR spine lumbar 2 or 3 views injury    (Results Pending)         Procedures  Laceration repair  Date/Time: 7/22/2020 2:45 PM  Performed by: Lisa Martinez DO  Authorized by: Lisa Martinez DO   Consent: Verbal consent obtained  Risks and benefits: risks, benefits and alternatives were discussed  Consent given by: patient  Body area: head/neck  Location details: scalp  Laceration length: 3 cm  Foreign bodies: no foreign bodies  Tendon involvement: none  Nerve involvement: none  Vascular damage: no  Anesthesia: local infiltration    Anesthesia:  Local Anesthetic: lidocaine 1% with epinephrine  Anesthetic total: 5 mL    Wound Dehiscence:  Superficial Wound Dehiscence: simple closure      Procedure Details:  Preparation: Patient was prepped and draped in the usual sterile fashion  Irrigation solution: tap water  Irrigation method: tap  Amount of cleaning: extensive  Debridement: none  Degree of undermining: none  Skin closure: staples  Number of sutures: 8  Dressing: 4x4 sterile gauze  Patient tolerance: Patient tolerated the procedure well with no immediate complications            ED Course  ED Course as of Jul 24 0729 Wed Jul 22, 2020   1508 Patient was fitted with TLSO brace   Tried to get the patient to ambulate, but patient states he is unable to do so at this time due to pain  [phone] with trauma  Will admit for pain control  US AUDIT      Most Recent Value   Initial Alcohol Screen: US AUDIT-C    1  How often do you have a drink containing alcohol?  0 Filed at: 07/22/2020 1836   2  How many drinks containing alcohol do you have on a typical day you are drinking? 0 Filed at: 07/22/2020 1836   3a  Male UNDER 65: How often do you have five or more drinks on one occasion? 0 Filed at: 07/22/2020 1836   3b  FEMALE Any Age, or MALE 65+: How often do you have 4 or more drinks on one occassion? 0 Filed at: 07/22/2020 1836   Audit-C Score  0 Filed at: 07/22/2020 1836                  RUSS/DAST-10      Most Recent Value   How many times in the past year have you    Used an illegal drug or used a prescription medication for non-medical reasons? Never Filed at: 07/22/2020 1836                              MDM  Number of Diagnoses or Management Options  Closed compression fracture of L1 lumbar vertebra, initial encounter [facility]): new and requires workup  Fall from ladder, initial encounter: new and requires workup  Scalp laceration, initial encounter: new and does not require workup  Diagnosis management comments: 55-year-old male presents the ED as level see trauma after fall that occurred while at work  He has a posterior scalp laceration with hematoma, bleeding controlled with pressure  He has complained of back pain and has L-spine tenderness on examination but no step-offs or deformities, he is neurologically intact, GCS 15, alert and oriented x4  CT head and cervical spine no acute findings, patient's cervical collar was cleared as he has no pain, no tenderness to palpation, and full range of motion of the neck without difficulty or pain  His scalp laceration was repaired with staples, see procedure note  CT lumbar spine reveals an L1 lumbar vertebral fracture    Will order a TLSO brace, and orthopedic tech came to the ED and had this fitted for the patient  Attempted to ambulate the patient, patient was able to sit up with difficulty, states he is unable to walk secondary to pain  After multiple attempts at ambulating the patient, patient unable to do so at this time secondary to his discomfort, spoke trauma and plan to admit to the trauma service for pain control  Amount and/or Complexity of Data Reviewed  Tests in the radiology section of CPT®: reviewed and ordered  Decide to obtain previous medical records or to obtain history from someone other than the patient: yes  Obtain history from someone other than the patient: yes  Review and summarize past medical records: yes  Discuss the patient with other providers: yes  Independent visualization of images, tracings, or specimens: yes          Disposition  Final diagnoses:   Fall from ladder, initial encounter   Closed compression fracture of L1 lumbar vertebra, initial encounter ([facility] )   Scalp laceration, initial encounter     Time reflects when diagnosis was documented in both MDM as applicable and the Disposition within this note     Time User Action Codes Description Comment    7/22/2020  2:46 PM Maria Guadalupe Erickson Add [W11  XXXA] Fall from ladder, initial encounter     7/22/2020  2:47 PM Maria Guadalupe Erickson Add [S32 010A] Closed compression fracture of L1 lumbar vertebra, initial encounter ([facility] )     7/22/2020  2:47 PM Maria Guadalupe Erickson Add [S01 01XA] Scalp laceration, initial encounter     7/23/2020  5:58 PM Pollo Horne Add [S32 010A] Closed compression fracture of body of L1 vertebra ([facility] )     7/23/2020  5:58 PM Theodor Captselina Add Radha Peterson, initial encounter       ED Disposition     ED Disposition Condition Date/Time Comment    Discharge Stable Wed Jul 22, 2020  2:49 PM Yoder Large discharge to home/self care              Follow-up Information     Follow up With Specialties Details Why Contact Info Additional Information    [facility] Emergency Department Emergency Medicine Go to  7-10 days for staple removal [address] [facility], [address], [place], 5 [facility],  Internal Medicine Call in 1 week  [address] [facility] 997 [facility] 20       [facility] 34 Neurosurgery Follow up in 2 week(s) Follow up as scheduled in about 2 weeks with repeat Xray of  lumbar spine to be done 1-2 days prior to your apt  [address]  [facility] [address], [address] [place], [place], [address] [phone]          Current Discharge Medication List      CONTINUE these medications which have NOT CHANGED    Details   amLODIPine (NORVASC) 5 mg tablet Take 5 mg by mouth daily      insulin glargine (LANTUS) 100 units/mL subcutaneous injection Inject 35 Units under the skin daily at bedtime      gabapentin (NEURONTIN) 300 mg capsule Refills: 0      glimepiride (AMARYL) 4 mg tablet Refills: 0      HYDROcodone-acetaminophen (NORCO) 5-325 mg per tablet Take 1 tablet by mouth every 6 (six) hours as needed for pain      labetalol (NORMODYNE) 200 mg tablet Refills: 0      lisinopril (ZESTRIL) 10 mg tablet Take 10 mg by mouth daily  Refills: 0      LORazepam (ATIVAN) 0 5 mg tablet Refills: 0      metFORMIN (GLUCOPHAGE) 500 mg tablet Take 500 mg by mouth 3 (three) times a day  Refills: 0           Outpatient Discharge Orders   XR spine lumbar 2 or 3 views injury   Standing Status: Future Standing Exp  Date: 07/23/24       PDMP Review     None           ED Provider  Attending physically available and evaluated August Alexia PARKS managed the patient along with the ED Attending      Electronically Signed by         Suze Katz DO  07/24/20 7627

## 2020-07-22 NOTE — ASSESSMENT & PLAN NOTE
Status post fall from 5 ft, positive head trauma and questionable loss of consciousness with L1 SEP fracture - TLICS 1    Imaging reviewed personally and with attending, results are as follows:   CT lumbar spine 07/22/2020:  Acute fracture through superior endplate of L1 with minimal loss of height  No significant bony retropulsion into the spinal canal     Plan:   TLSO brace when out of bed and head of bed greater than 45°, patient artery wearing in the ED   Upright lumbar x-rays ordered and pending to assess spinal alignment   Medical management and pain control per primary team   DVT ppx:  SCDs, Lovenox   Mobilize as tolerated with assistance, PT / OT evaluation, must wear brace    Neurosurgery will follow in the periphery and review imaging once completed  If stable patient will follow-up in 2 weeks time with repeat upright lumbar spine x-rays  No surgical intervention warranted on this admission  Please call with questions or concerns, signed off for now

## 2020-07-22 NOTE — DISCHARGE INSTRUCTIONS
Keep your scalp wound clean and dry  It may ooze old blood for the next 3 to 4 days  You can keep it covered loosely with a clean dry gauze and if you are lying on it or it is open, please make sure you rest the back of your head where the wound is on a clean/dry towel  Seek medical attn if you develop fevers/chills, purulent or malodorous drainage or increased, pain/redness/swelling of the wound  Follow up with neurosurgery for your broken lumbar spine  Return to ED for numbness/weakness in your legs, difficulty urinating, incontinence    Neurosurgery discharge instructions following spine fracture:      TLSO brace to be worn when out of bed of head of bed greater than 45 degrees  May place brace on while sitting on edge of bed  May be removed for showering   No bending, twisting or heavy lifting  No strenuous activities  No Driving   Recommended to refrain from strenuous activity   Recommended to refrain from bending and twisting back   Recommended to limit lifting, pulling, pushing to no more then 10 lbs   No driving while being treated in back brace   Recommended that pt take fall precautions and refrain from activity that increases chance of fall or injury to back  **Please notify MD immediately if you have increased back or leg pain  New numbness, tingling and/or weakness in your legs  **      Seek medical attn if you develop worsening headaches, dizziness, visual changes, persistent nausea/vomiting, numbness/weakness/tingling of the extremities  Limit reading, texting, computer use and television to 15 minute intervals as tolerated  No working or driving until cleared by trauma and neurosurgery  No strenuous physical activity until cleared by trauma  No contact sports for 6 weeks  Avoid repeat head trauma for 6 weeks

## 2020-07-22 NOTE — ED ATTENDING ATTESTATION
7/22/2020  I, Lore Tafoya MD, saw and evaluated the patient  I have discussed the patient with the resident/non-physician practitioner and agree with the resident's/non-physician practitioner's findings, Plan of Care, and MDM as documented in the resident's/non-physician practitioner's note, except where noted  All available labs and Radiology studies were reviewed  I was present for key portions of any procedure(s) performed by the resident/non-physician practitioner and I was immediately available to provide assistance  At this point I agree with the current assessment done in the Emergency Department  I have conducted an independent evaluation of this patient a history and physical is as follows:   The patient fell off a ladder he was up about 5 ft total he fell he struck the back of his head on a brick wall he also landed onto his buttocks he complains of both headache mild nausea with no vomiting no complaints of neck pain no chest or abdominal pain he does complain of lower back pain no neurologic symptoms  No bowel bladder difficulty no weakness in the legs  Exam the patient is in no acute distress he has a laceration posterior occipital area    Pupils equal reactive neck nontender full range of motion chest wall nontender no crepitation lungs clear heart regular abdomen soft nontender pelvis stable tenderness noted in the lumbar spine area no midline neurologic exam motor strength 5/5 in lower extremities  Sensation is intact  Impression   fall head injury scalp laceration back injury status post fall  ED Course         Critical Care Time  Procedures

## 2020-07-22 NOTE — ASSESSMENT & PLAN NOTE
Status post fall from 5 ft, positive head trauma and questionable loss of consciousness with L1 SEP fracture - TLICS 1    Imaging reviewed personally and with attending, results are as follows:   CT lumbar spine 07/22/2020:  Acute fracture through superior endplate of L1 with minimal loss of height  No significant bony retropulsion into the spinal canal    X-ray lumbar spine 07/23/2020: Anterior wedge compression fracture again noted with slightly greater loss of height anteriorly compared to the CT of 7/22/2020  Straightening of lumbar lordosis may be due to position and/or spasm  Straightening of lumbar lordosis may be due to position and/or spasm  Plan:   TLSO brace when out of bed and head of bed greater than 45°, patient was wearing it in the ED  [facility] management and pain control per primary team   DVT ppx:  SCDs, Lovenox   Mobilize as tolerated with assistance, PT / OT evaluation, must wear brace   No neurosurgical intervention is anticipated at this time  Recommend continued conservative management in TLSO brace and PT as needed  Neurosurgery will see patient as needed during the remainder of his hospitalization  Neurosurgery will follow-up in 2 weeks with repeat upright lumbar spine x-rays   Please call with questions or concerns,

## 2020-07-22 NOTE — CONSULTS
Consult- Andrés Dale 1960, 61 y o  male MRN: [id number]    Unit/Bed#: ED 10 Encounter: [id number]    Primary Care Provider: Jennifer Marina DO   Date and time admitted to hospital: 7/22/2020 11:18 AM      Inpatient consult to Neurosurgery  Consult performed by: Joshua Vazquez PA-C  Consult ordered by: Linette Eller MD      Consult completed on 07/22/2020 and 4:30 p m  Closed compression fracture of body of L1 vertebra (HCC)  Assessment & Plan  Status post fall from 5 ft, positive head trauma and questionable loss of consciousness with L1 SEP fracture - TLICS 1    Imaging reviewed personally and with attending, results are as follows:   CT lumbar spine 07/22/2020:  Acute fracture through superior endplate of L1 with minimal loss of height  No significant bony retropulsion into the spinal canal     Plan:   TLSO brace when out of bed and head of bed greater than 45°, patient artery wearing in the ED   Upright lumbar x-rays ordered and pending to assess spinal alignment   Medical management and pain control per primary team   DVT ppx:  SCDs, Lovenox   Mobilize as tolerated with assistance, PT / OT evaluation, must wear brace    Neurosurgery will follow in the periphery and review imaging once completed  If stable patient will follow-up in 2 weeks time with repeat upright lumbar spine x-rays  No surgical intervention warranted on this admission  Please call with questions or concerns, signed off for now  Fall  Assessment & Plan  Fall from 5ft with +head strike and LOC  CT head and cervical spine without acute abnormality noted      History of Present Illness   HPI: Andrés Dale is a 61y o  year old male with PMH including chronic low back pain, opioid usage, past history of alcohol abuse with 3 years of sobriety, tobacco use, diabetes mellitus with peripheral neuropathy who presents status post fall from 5 ft with L1 superior endplate fracture      Patient states he was helping a friend build a deck when he slipped off and fell on the ground  Endorses positive head strike and loss of consciousness as he does not remember anything after the fall until he got to the ambulance  He complains of acute on chronic back pain, nonradiating  Denies bowel or bladder issues, saddle anesthesia, new numbness/tingling/weakness  At baseline he has numbness and tingling in his distal lower extremities secondary to diabetes  Patient reports he smokes "a few packs of day" of cigarettes "for a long time"  States he no longer drinks and is 3 year sober  States he has a script for Vicodin that he takes occasionally for back pain however does also obtain additional narcotics from friends  States he takes ibuprofen 600mg 3x a day for back pain  Review of Systems   Constitutional: Negative for chills and fever  HENT: Negative for hearing loss and trouble swallowing  Eyes: Negative for visual disturbance  Respiratory: Negative for chest tightness and shortness of breath  Cardiovascular: Negative for chest pain  Gastrointestinal: Negative for abdominal pain, constipation, diarrhea, nausea and vomiting  Genitourinary: Negative for difficulty urinating  Musculoskeletal: Positive for back pain  Negative for neck pain  Skin: Negative for wound  Neurological: Positive for numbness (Chronic)  Negative for dizziness, facial asymmetry, speech difficulty, weakness and headaches  Hematological: Does not bruise/bleed easily  Psychiatric/Behavioral: Negative for confusion  Historical Information   Past Medical History:   Diagnosis Date    [facility]ism ([facility] )     Anxiety     Diabetes ([facility] )     Diabetes mellitus ([facility] )     High cholesterol      History reviewed  No pertinent surgical history    Social History     Substance and Sexual Activity   Alcohol Use Not Currently    Frequency: Never     Social History     Substance and Sexual Activity   Drug Use Yes    Types: Marijuana, Other Comment: prescription pills (unprescribed) daily     Social History     Tobacco Use   Smoking Status Current Every Day Smoker   Smokeless Tobacco Never Used     History reviewed  No pertinent family history  Meds/Allergies   all current active meds have been reviewed, current meds:   Current Facility-Administered Medications   Medication Dose Route Frequency    enoxaparin (LOVENOX) subcutaneous injection 30 mg  30 mg Subcutaneous BID    [START ON 7/23/2020] nicotine (NICODERM CQ) 21 mg/24 hr TD 24 hr patch 1 patch  1 patch Transdermal Daily    and PTA meds:   Prior to Admission Medications   Prescriptions Last Dose Informant Patient Reported? Taking? HYDROcodone-acetaminophen (NORCO) 5-325 mg per tablet   Yes No   Sig: Take 1 tablet by mouth every 6 (six) hours as needed for pain   LORazepam (ATIVAN) 0 5 mg tablet   Yes No   gabapentin (NEURONTIN) 300 mg capsule   Yes No   glimepiride (AMARYL) 4 mg tablet   Yes No   labetalol (NORMODYNE) 200 mg tablet   Yes No   lisinopril (ZESTRIL) 10 mg tablet   Yes No   Sig: Take 10 mg by mouth daily   metFORMIN (GLUCOPHAGE) 500 mg tablet   Yes No   Sig: Take 500 mg by mouth 3 (three) times a day      Facility-Administered Medications: None     No Known Allergies    Objective   I/O     None          Physical Exam   Constitutional: He is oriented to person, place, and time  He appears well-developed and well-nourished  He is cooperative  TLSO on   HENT:   Head: Normocephalic and atraumatic  Eyes: Conjunctivae and EOM are normal    Neck: No spinous process tenderness and no muscular tenderness present  Cardiovascular: Normal rate  Pulmonary/Chest: Effort normal  No respiratory distress  Musculoskeletal: Normal range of motion  Cervical back: He exhibits no tenderness  Thoracic back: He exhibits no tenderness  Lumbar back: He exhibits no tenderness  Neurological: He is alert and oriented to person, place, and time   He has a normal Finger-Nose-Finger Test    Reflex Scores:       Patellar reflexes are 1+ on the right side and 1+ on the left side  Skin: Skin is warm, dry and intact  Psychiatric: His speech is normal and behavior is normal  Judgment and thought content normal  Cognition and memory are normal    Flat affect, slightly slow to respond     Neurologic Exam     Mental Status   Oriented to person, place, and time  Follows 1 step commands  Attention: normal  Concentration: normal    Speech: speech is normal   Level of consciousness: alert  Knowledge: good  Able to perform simple calculations  Able to repeat  Normal comprehension  Cranial Nerves     CN III, IV, VI   Extraocular motions are normal    CN III: no CN III palsy  CN VI: no CN VI palsy  Nystagmus: none   Diplopia: none  Ophthalmoparesis: none  Upgaze: normal  Downgaze: normal  Conjugate gaze: present    CN V   Right facial sensation deficit: none  Left facial sensation deficit: none    CN VII   Right facial weakness: none  Left facial weakness: none    CN VIII   Hearing: intact    CN IX, X   CN IX normal    CN X normal      CN XI   Right trapezius strength: normal  Left trapezius strength: normal    CN XII   CN XII normal      Motor Exam   Muscle bulk: normal  Overall muscle tone: normal  Right arm pronator drift: absent  Left arm pronator drift: absent  4+-5-/5 throughout     Sensory Exam   Light touch normal    Right leg proprioception: normal  Left leg proprioception: normal  DST intact     Gait, Coordination, and Reflexes     Coordination   Finger to nose coordination: normal    Tremor   Resting tremor: absent  Intention tremor: absent  Action tremor: absent    Reflexes   Right patellar: 1+  Left patellar: 1+  Right : 2+  Left : 2+  Right Smith: absent  Left Smith: absent  Right ankle clonus: absent  Left ankle clonus: absent      Vitals:Blood pressure 151/87, pulse 100, temperature 97 5 °F (36 4 °C), temperature source Tympanic, resp   rate 14, height 6' 4" (1 93 m), weight 108 kg (238 lb 1 5 oz), SpO2 95 %  ,Body mass index is 28 98 kg/m²  Lab Results:     Imaging Studies: I have personally reviewed pertinent reports  and I have personally reviewed pertinent films in PACS    Ct Head Without Contrast    Result Date: 7/22/2020  Impression: Left parietal scalp hematoma  No acute intracranial hemorrhage or depressed calvarial fracture identified  Workstation performed: [id number]     Ct Spine Cervical Without Contrast    Result Date: 7/22/2020  Impression: No acute fracture or evidence for traumatic malalignment  I personally discussed this study with Dr Manuel Grover on 7/22/2020 at 12:17 PM   Workstation performed: [id number]     Ct Spine Lumbar Without Contrast    Result Date: 7/22/2020  Impression: Acute fracture through the superior endplate of L1 with minimal loss of height  No significant bony retropulsion into the spinal canal  Degenerative changes of the lumbar spine, as described above  I personally discussed this study with Dr Manuel Grover on 7/22/2020 at 12:17 PM  Workstation performed: [id number]     EKG, Pathology, and Other Studies: I have personally reviewed pertinent reports  VTE Prophylaxis: Sequential compression device (Venodyne)  and Enoxaparin (Lovenox)    Code Status: Level 1 - Full Code  Advance Directive and Living Will:      Power of :    POLST:      Counseling / Coordination of Care  I spent 20 minutes with the patient

## 2020-07-22 NOTE — ASSESSMENT & PLAN NOTE
Fall from [facility] 28 with +head strike and LOC  CT head and cervical spine without acute abnormality noted

## 2020-07-22 NOTE — ORTHOTIC NOTE
Orthotic Note            Date: 7/22/2020      Patient Name: Ela Fox            Reason for Consult:  Patient Active Problem List   Diagnosis    Lumbar spondylosis    Chronic bilateral low back pain without sciatica     Large [place] TLSO     I measured, fit, and donned patient in [employer] while patient was supine in bed (log rolling to fit  Instructions/adjustments reviewed  x's 3  [employer] will continue to follow up  RN aware  Recommendations:  Please call Mobility Coordinator at ext  [phone] in regards to bracing instruction and/or adjustment    Chrissie Cline Restorative Technician, BS

## 2020-07-22 NOTE — H&P
H&P Exam - Trauma   Perez Gomez 61 y o  male MRN: [id number]  Unit/Bed#: ED 10 Encounter: [id number]    Assessment/Plan   Trauma Alert:  Evaluation  Model of Arrival: Ambulance  Trauma Team: Attending Victoria Galaviz, resident Beata Sawyer  Consultants: Neurosurgery: day team  Time Called 4:20    Trauma Active Problems:     Trauma Plan:  Basic labs, coma panel  Chest x-ray, CT abdomen pelvis with contrast  With acute L1 fracture, head strike with LOC will admit step-down 2 obs with HOT for frequent neuro checks  Neuro surg consult appreciated - Upright lumbar x-rays ordered and pending to assess spinal alignment  Continue to wear brace, elevate head of bed greater than 45°  Up as tolerated with assistance  Scalp laceration repair to the ED with 8 staples, Boostrix administered   Pain control  PT/OT, CM    Chief Complaint:  Back pain    History of Present Illness   HPI:  Perez Gomez is a 61 y o  male history of alcoholism, hypertension, diabetes on insulin with chronic lower extremity neuropathy who presents with 5 ft fall from ladder with loss of consciousness  Complains of low back pain, sharp nature, moderate in severity, worse with movement relieved with rest   Patient is unsure what he fell onto whether it was grass or concrete, the last thing he remembers was waking up being put in the ambulance that his friend called for  Denies any vomiting, thinners, give focal deficits since the fall  Says he has been sober for approximately 3 years  Smokes about 2 packs per day  Denies chest pain, abdominal pain, shortness of breath, cough, fever, chills, vomiting, recent illness, known sick contacts  Review of Systems   Constitutional: Negative for chills and fever  HENT: Negative for ear pain, sinus pain and sore throat  Eyes: Negative for pain  Respiratory: Negative for shortness of breath  Cardiovascular: Negative for chest pain  Gastrointestinal: Negative for abdominal pain, diarrhea, nausea and vomiting  Genitourinary: Negative for difficulty urinating, dysuria and flank pain  Musculoskeletal: Positive for back pain  Negative for neck pain  Neurological: Positive for numbness (Chronic, symmetric lower extremity paresthesia )  All other systems reviewed and are negative  12-point, complete review of systems was reviewed and negative except as stated above  Historical Information       Past Medical History:   Diagnosis Date    Alcoholism ([facility] )     Anxiety     Diabetes ([facility] )     Diabetes mellitus ([place] [address] )     High cholesterol      History reviewed  No pertinent surgical history  Social History   Social History     Substance and Sexual Activity   Alcohol Use Not Currently    Frequency: Never     Social History     Substance and Sexual Activity   Drug Use Yes    Types: Marijuana, Other    Comment: prescription pills (unprescribed) daily     Social History     Tobacco Use   Smoking Status Current Every Day Smoker   Smokeless Tobacco Never Used     E-Cigarette/Vaping    E-Cigarette Use Never User      E-Cigarette/Vaping Substances    Nicotine No     THC No     CBD No     Flavoring No      Immunization History   Administered Date(s) Administered    Tdap 07/22/2020     Last Tetanus:  Given today  Family History: Non-contributory      Meds/Allergies   all current active meds have been reviewed    No Known Allergies      PHYSICAL EXAM        Objective   Vitals:   First set: Temperature: 97 5 °F (36 4 °C) (07/22/20 1130)  Pulse: 96 (07/22/20 1130)  Respirations: 12 (07/22/20 1130)  Blood Pressure: (!) 164/115 (07/22/20 1130)    Primary Survey:   (A) Airway: intact  (B) Breathing: b/l bs  (C) Circulation: Pulses:  3/4 radial, femoral, dorsalis pedis  (D) Disabliity:  GCS 15  (E) Expose: complete       Secondary Survey: (Click on Physical Exam tab above)  Physical Exam   Constitutional: He is oriented to person, place, and time  He appears well-developed and well-nourished  No distress     Disheveled HENT:   Head: Normocephalic  Nose: Nose normal    Mouth/Throat: Oropharynx is clear and moist    Cerumen impaction bilaterally   Eyes: Conjunctivae and EOM are normal  Right eye exhibits no discharge  Left eye exhibits no discharge  No scleral icterus  Neck: Normal range of motion  Neck supple  Cardiovascular: Normal rate, normal heart sounds and intact distal pulses  Pulmonary/Chest: Effort normal and breath sounds normal  No stridor  No respiratory distress  Abdominal: Soft  He exhibits no distension  There is no tenderness  There is no rebound and no guarding  Musculoskeletal:   Wearing brace   Neurological: He is alert and oriented to person, place, and time  No cranial nerve deficit or sensory deficit (Diminished sensation to light touch at the lateral feet, between the 1st and 2nd toes, bilaterally  )  He exhibits normal muscle tone  Skin: Skin is warm and dry  Capillary refill takes less than 2 seconds  He is not diaphoretic  Psychiatric: He has a normal mood and affect  His behavior is normal    Nursing note and vitals reviewed  Invasive Devices     Peripheral Intravenous Line            Peripheral IV 07/22/20 Left Antecubital less than 1 day    Peripheral IV 07/22/20 Right Antecubital less than 1 day                Lab Results: Results: I have personally reviewed pertinent reports  Imaging/EKG Studies: Results: I have personally reviewed pertinent reports      Other Studies:     Code Status: Level 1 - Full Code  Advance Directive and Living Will:      Power of :    POLST:

## 2020-07-23 PROBLEM — E11.9 TYPE 2 DIABETES MELLITUS (HCC): Status: ACTIVE | Noted: 2020-07-23

## 2020-07-23 LAB
ANION GAP SERPL CALCULATED.3IONS-SCNC: 8 MMOL/L (ref 4–13)
BUN SERPL-MCNC: 19 MG/DL (ref 5–25)
CALCIUM SERPL-MCNC: 9.3 MG/DL (ref 8.3–10.1)
CHLORIDE SERPL-SCNC: 103 MMOL/L (ref 100–108)
CO2 SERPL-SCNC: 25 MMOL/L (ref 21–32)
CREAT SERPL-MCNC: 0.72 MG/DL (ref 0.6–1.3)
ERYTHROCYTE [DISTWIDTH] IN BLOOD BY AUTOMATED COUNT: 13 % (ref 11.6–15.1)
GFR SERPL CREATININE-BSD FRML MDRD: 102 ML/MIN/1.73SQ M
GLUCOSE SERPL-MCNC: 258 MG/DL (ref 65–140)
GLUCOSE SERPL-MCNC: 260 MG/DL (ref 65–140)
GLUCOSE SERPL-MCNC: 308 MG/DL (ref 65–140)
GLUCOSE SERPL-MCNC: 314 MG/DL (ref 65–140)
HCT VFR BLD AUTO: 43.8 % (ref 36.5–49.3)
HGB BLD-MCNC: 14.8 G/DL (ref 12–17)
MCH RBC QN AUTO: 30.2 PG (ref 26.8–34.3)
MCHC RBC AUTO-ENTMCNC: 33.8 G/DL (ref 31.4–37.4)
MCV RBC AUTO: 89 FL (ref 82–98)
PLATELET # BLD AUTO: 284 THOUSANDS/UL (ref 149–390)
PMV BLD AUTO: 9.3 FL (ref 8.9–12.7)
POTASSIUM SERPL-SCNC: 3.8 MMOL/L (ref 3.5–5.3)
RBC # BLD AUTO: 4.9 MILLION/UL (ref 3.88–5.62)
SODIUM SERPL-SCNC: 136 MMOL/L (ref 136–145)
WBC # BLD AUTO: 13.98 THOUSAND/UL (ref 4.31–10.16)

## 2020-07-23 PROCEDURE — 82948 REAGENT STRIP/BLOOD GLUCOSE: CPT

## 2020-07-23 PROCEDURE — 99245 OFF/OP CONSLTJ NEW/EST HI 55: CPT | Performed by: PHYSICIAN ASSISTANT

## 2020-07-23 PROCEDURE — 97167 OT EVAL HIGH COMPLEX 60 MIN: CPT

## 2020-07-23 PROCEDURE — 85027 COMPLETE CBC AUTOMATED: CPT | Performed by: EMERGENCY MEDICINE

## 2020-07-23 PROCEDURE — 97163 PT EVAL HIGH COMPLEX 45 MIN: CPT

## 2020-07-23 PROCEDURE — 99225 PR SBSQ OBSERVATION CARE/DAY 25 MINUTES: CPT | Performed by: SURGERY

## 2020-07-23 PROCEDURE — 99233 SBSQ HOSP IP/OBS HIGH 50: CPT | Performed by: PHYSICIAN ASSISTANT

## 2020-07-23 PROCEDURE — 80048 BASIC METABOLIC PNL TOTAL CA: CPT | Performed by: EMERGENCY MEDICINE

## 2020-07-23 RX ORDER — HYDRALAZINE HYDROCHLORIDE 20 MG/ML
5 INJECTION INTRAMUSCULAR; INTRAVENOUS EVERY 6 HOURS PRN
Status: DISCONTINUED | OUTPATIENT
Start: 2020-07-23 | End: 2020-07-28 | Stop reason: HOSPADM

## 2020-07-23 RX ORDER — ONDANSETRON 2 MG/ML
4 INJECTION INTRAMUSCULAR; INTRAVENOUS EVERY 6 HOURS PRN
Status: DISCONTINUED | OUTPATIENT
Start: 2020-07-23 | End: 2020-07-28 | Stop reason: HOSPADM

## 2020-07-23 RX ORDER — LORAZEPAM 0.5 MG/1
0.5 TABLET ORAL EVERY 8 HOURS PRN
Status: DISCONTINUED | OUTPATIENT
Start: 2020-07-23 | End: 2020-07-28 | Stop reason: HOSPADM

## 2020-07-23 RX ORDER — METHOCARBAMOL 500 MG/1
500 TABLET, FILM COATED ORAL EVERY 6 HOURS SCHEDULED
Status: DISCONTINUED | OUTPATIENT
Start: 2020-07-23 | End: 2020-07-28 | Stop reason: HOSPADM

## 2020-07-23 RX ORDER — CALCIUM CARBONATE 200(500)MG
500 TABLET,CHEWABLE ORAL DAILY PRN
Status: DISCONTINUED | OUTPATIENT
Start: 2020-07-23 | End: 2020-07-28 | Stop reason: HOSPADM

## 2020-07-23 RX ORDER — OXYCODONE HYDROCHLORIDE 10 MG/1
10 TABLET ORAL EVERY 4 HOURS PRN
Status: DISCONTINUED | OUTPATIENT
Start: 2020-07-23 | End: 2020-07-28 | Stop reason: HOSPADM

## 2020-07-23 RX ORDER — DOCUSATE SODIUM 100 MG/1
100 CAPSULE, LIQUID FILLED ORAL 2 TIMES DAILY
Status: DISCONTINUED | OUTPATIENT
Start: 2020-07-23 | End: 2020-07-24

## 2020-07-23 RX ADMIN — OXYCODONE HYDROCHLORIDE 10 MG: 10 TABLET ORAL at 05:59

## 2020-07-23 RX ADMIN — HYDRALAZINE HYDROCHLORIDE 5 MG: 20 INJECTION INTRAMUSCULAR; INTRAVENOUS at 22:36

## 2020-07-23 RX ADMIN — METHOCARBAMOL TABLETS 500 MG: 500 TABLET, COATED ORAL at 17:16

## 2020-07-23 RX ADMIN — INSULIN LISPRO 12 UNITS: 100 INJECTION, SOLUTION INTRAVENOUS; SUBCUTANEOUS at 11:33

## 2020-07-23 RX ADMIN — ACETAMINOPHEN 975 MG: 325 TABLET, FILM COATED ORAL at 14:09

## 2020-07-23 RX ADMIN — LABETALOL HYDROCHLORIDE 100 MG: 100 TABLET, FILM COATED ORAL at 10:54

## 2020-07-23 RX ADMIN — LORAZEPAM 0.5 MG: 0.5 TABLET ORAL at 17:15

## 2020-07-23 RX ADMIN — OXYCODONE HYDROCHLORIDE 15 MG: 10 TABLET ORAL at 19:20

## 2020-07-23 RX ADMIN — ENOXAPARIN SODIUM 30 MG: 30 INJECTION SUBCUTANEOUS at 17:16

## 2020-07-23 RX ADMIN — AMLODIPINE BESYLATE 5 MG: 5 TABLET ORAL at 10:59

## 2020-07-23 RX ADMIN — NICOTINE 1 PATCH: 21 PATCH, EXTENDED RELEASE TRANSDERMAL at 10:53

## 2020-07-23 RX ADMIN — OXYCODONE HYDROCHLORIDE 15 MG: 10 TABLET ORAL at 23:26

## 2020-07-23 RX ADMIN — CALCIUM CARBONATE (ANTACID) CHEW TAB 500 MG 500 MG: 500 CHEW TAB at 17:16

## 2020-07-23 RX ADMIN — INSULIN LISPRO 8 UNITS: 100 INJECTION, SOLUTION INTRAVENOUS; SUBCUTANEOUS at 17:17

## 2020-07-23 RX ADMIN — ACETAMINOPHEN 975 MG: 325 TABLET, FILM COATED ORAL at 05:59

## 2020-07-23 RX ADMIN — DOCUSATE SODIUM 100 MG: 100 CAPSULE, LIQUID FILLED ORAL at 17:16

## 2020-07-23 RX ADMIN — ENOXAPARIN SODIUM 30 MG: 30 INJECTION SUBCUTANEOUS at 10:54

## 2020-07-23 RX ADMIN — HYDRALAZINE HYDROCHLORIDE 5 MG: 20 INJECTION INTRAMUSCULAR; INTRAVENOUS at 14:06

## 2020-07-23 RX ADMIN — DOCUSATE SODIUM 100 MG: 100 CAPSULE, LIQUID FILLED ORAL at 10:59

## 2020-07-23 RX ADMIN — HYDROMORPHONE HYDROCHLORIDE 0.5 MG: 1 INJECTION, SOLUTION INTRAMUSCULAR; INTRAVENOUS; SUBCUTANEOUS at 12:35

## 2020-07-23 RX ADMIN — LISINOPRIL 10 MG: 10 TABLET ORAL at 10:58

## 2020-07-23 RX ADMIN — INSULIN GLARGINE 15 UNITS: 100 INJECTION, SOLUTION SUBCUTANEOUS at 21:35

## 2020-07-23 RX ADMIN — HYDROMORPHONE HYDROCHLORIDE 0.5 MG: 1 INJECTION, SOLUTION INTRAMUSCULAR; INTRAVENOUS; SUBCUTANEOUS at 03:20

## 2020-07-23 RX ADMIN — OXYCODONE HYDROCHLORIDE 15 MG: 10 TABLET ORAL at 14:53

## 2020-07-23 RX ADMIN — ACETAMINOPHEN 975 MG: 325 TABLET, FILM COATED ORAL at 21:35

## 2020-07-23 RX ADMIN — LABETALOL HYDROCHLORIDE 100 MG: 100 TABLET, FILM COATED ORAL at 21:35

## 2020-07-23 RX ADMIN — GABAPENTIN 100 MG: 100 CAPSULE ORAL at 10:54

## 2020-07-23 RX ADMIN — OXYCODONE HYDROCHLORIDE 10 MG: 10 TABLET ORAL at 10:54

## 2020-07-23 RX ADMIN — METHOCARBAMOL TABLETS 500 MG: 500 TABLET, COATED ORAL at 23:03

## 2020-07-23 RX ADMIN — GABAPENTIN 100 MG: 100 CAPSULE ORAL at 17:16

## 2020-07-23 RX ADMIN — METHOCARBAMOL TABLETS 500 MG: 500 TABLET, COATED ORAL at 11:34

## 2020-07-23 RX ADMIN — GABAPENTIN 100 MG: 100 CAPSULE ORAL at 21:35

## 2020-07-23 RX ADMIN — ONDANSETRON 4 MG: 2 INJECTION INTRAMUSCULAR; INTRAVENOUS at 08:40

## 2020-07-23 RX ADMIN — MELATONIN 6 MG: at 21:35

## 2020-07-23 NOTE — OCCUPATIONAL THERAPY NOTE
Occupational Therapy Evaluation     Patient Name: Braxton Jorge  EBZDT'T Date: 7/23/2020  Problem List  Principal Problem:    Closed compression fracture of body of L1 vertebra [facility])  Active Problems:    Fall    Pulmonary nodule    Type 2 diabetes mellitus [facility])    Past Medical History  Past Medical History:   Diagnosis Date    Alcoholism ([facility] )     Anxiety     Diabetes ([facility] )     Diabetes mellitus ([facility] )     High cholesterol      Past Surgical History  History reviewed  No pertinent surgical history  07/23/20 1039   Note Type   Note type Eval only   Restrictions/Precautions   Weight Bearing Precautions Per Order No   Braces or Orthoses TLSO  (when OOB)   Other Precautions Bed Alarm; Fall Risk;Pain;Spinal precautions   Pain Assessment   Pain Assessment Tool 0-10   Pain Score 8   Pain Location/Orientation Orientation: Lower; Location: Back   Hospital Pain Intervention(s) Repositioned;Cold applied; Ambulation/increased activity; Emotional support   Home Living   Type of [address] [place] [address] Two level; Other (Comment)  (Pt lives in basement, 13 steps to go down, 0 ROLANDO home )   Bathroom Shower/Tub Tub/shower unit   Bathroom Toilet Standard   Bathroom Equipment Grab bars in shower   Additional Comments Pt lives in basement of 2 SH with 0 ROLANDO  pt reports there are 13 steps to go enter the basement  pt reports that he has to go upstairs for the bathroom and kitchen  pt denies use of DME but claims that he will use a walking stick when walking far distances outside   Prior Function   Level of [place] Independent with ADLs and functional mobility   Lives With [facility] Help From Friend(s); Family   ADL Assistance Independent   IADLs Independent   Falls in the last 6 months 1 to 4  (1)   Vocational Unemployed   Comments Pt reports that he lives with his brother but he is not helpful  pt reports that he stays in the basement and will only go upstairs when needed   pt reports that he has a supportive girlfriend that will be able to assist with his needs   Lifestyle   Autonomy PTA pt reports being I in ADLs, IADLs and functional mobility   Reciprocal Relationships girlfriend   Service to Others Unemployed, picked up small jobs but hard to do with COVID   Intrinsic Gratification watching tv  bird watching   Psychosocial   Psychosocial (WDL) WDL   Patient Behaviors/Mood Cooperative;Calm   Subjective   Subjective "my brother isn't much help but my girlfriend will be around"   ADL   Where Assessed Edge of bed   Eating Assistance 4  Minimal Assistance   Grooming Assistance 4  Minimal Assistance   UB Bathing Assistance 4  Minimal Assistance   LB [address] 3  Moderate Assistance   [address] 4  [address] 3  Moderate [address]  3  Moderate Assistance   Bed Mobility   Rolling L 4  Minimal assistance   Additional items Assist x 1;Bedrails; Increased time required;Verbal cues;LE management   Supine to Sit 4  Minimal assistance   Additional items Assist x 1;Bedrails; Increased time required;Verbal cues;LE management   Sit to Supine 4  Minimal assistance   Additional items Assist x 1; Increased time required;LE management;Verbal cues   Additional Comments Pt able to sit EOB and reported feeling nausous and dizzy  HR was in the 120s and BP was taken, resulting 178/116 with 137 MAP, BP was taken a 2nd time at EOB resulting to be 181/115 with 137 MAP  Pt was positioned back to supine in bed; BP taken resulting 187/106 with 133 MAP  BP was taken again after 5 mins @ 184/106 with 132 MAP  After an additional 5 mins of resting supine, HR decreased to 110 and BR was taken an additional time at 180/107 with 160 MAP  RODRIGO Weldon, RODRIGO Nickerson, NP from trauma were all notified  RODRIGO Chavez present at end of session to further assess pt      Transfers   Additional Comments Unable to assess transfer   Balance   Static Sitting Fair -   Dynamic Sitting Poor + Activity Tolerance   Activity Tolerance Patient limited by fatigue;Patient limited by pain;Treatment limited secondary to medical complications (Comment)  (Increase of BP, see comment above)   Medical Staff Made Aware OT Robby, PT Valdemar Reyna RN, RODRIGO Goldman and Latisha Lovett, NP updated    Nurse Made Aware Spoke with RODRIGO Wild before and updated after   RUE Assessment   RUE Assessment WFL   LUE Assessment   LUE Assessment WFL   Hand Function   Gross Motor Coordination Functional   Fine Motor Coordination Functional   Cognition   Overall Cognitive Status Impaired   Arousal/Participation Cooperative   Attention Within functional limits   Orientation Level Oriented X4   Memory Decreased recall of precautions   Following Commands Follows one step commands with increased time or repetition   Comments Pt was flat affect and reported he was nausous when moving  Pt was re-educated on spinal precautions and precautions were written on board for visual cues   Assessment   Limitation Decreased ADL status; Decreased Safe judgement during ADL;Decreased cognition;Decreased endurance;Decreased high-level ADLs; Decreased self-care trans   Prognosis Good   Assessment Pt is a 60 y/o M admitted at [facility] on 7/22/20 post fall from 5ft ladder w/ positive loss of consciousness with posterior scalp laceration   Pt dx with closed compression fx of body L1 vertebra  Imaging of head and cervical spine were negative  Pt has PMH of  alcoholism, anxiety, DM2 and high cholesterol  Active OT orders & up in chair orders  Pt currently has spinal precautions and to wear TLSO brace at all time out of bed  PTA, pt reports being I in ADLs, IADLs and functional mobility  Pt lives in basement of [address] with brother but reports his brother will not be able to assist   Pt denies use of DME, and says he uses a walking stick for long distance walks outside when his back is hurting  Pt is currently MIN A assist level with UB ADLs and MOD A with LB ADLs   Pt able to perform log rolling bed mobility with MIN Ax1  Unable to assess sit to stand transfers and funcitional mobility due to secondary medical complication of hypertension t/o session  Pt able to sit EOB and reported feeling nausous and dizzy  HR was in the 120s and BP was taken, resulting 178/116 with 137 MAP, BP was taken a 2nd time at EOB resulting to be 181/115 with 137 MAP  Pt was positioned back to supine in bed; BP taken resulting 187/106 with 133 MAP  BP was taken again after 5 mins @ 184/106 with 132 MAP  After an additional 5 mins of resting supine, HR decreased to 110 and BR was taken an additional time at 180/107 with 160 MAP  RN Rachel Gerardo, RN Celine Puri, NP from trauma were all notified  See OTR to complete transfer and functional mobility evaluation  Pt is limited at this time 2* activity tolerance, pain, unsupportive home environment, decreased I in ADLs and IADLs, endurance, cognitive impairments, and secondary medical complications  These impair baseline function in grooming, oral hygiene, bathing, toilet hygiene, dressing, health maintenance, functional mobility, household maintenance, driving, and job seeking  Pt educated on spinal precautions and use of brace  From OT standpoint anticipate d/c to short term rehab pending progress  OT will continue to follow 3-5x/wk in the next 10-14 days to meet the following goals  Goals   Patient Goals Return home   LTG Time Frame 10-14   Plan   Treatment Interventions ADL retraining; Endurance training;Cognitive reorientation;Patient/family training; Compensatory technique education;Continued evaluation; Energy conservation; Activityengagement   Goal Expiration Date 08/06/20   OT Frequency 3-5x/wk   Recommendation   OT Discharge Recommendation Post-Acute Rehabilitation Services   OT - OK to Discharge Yes   Modified Giuseppe Scale   Modified Giuseppe Scale 4     GOALS    1  Pt will complete UB ADL tasks @ MOD I assist level with DME PRN    2   Pt will complete LB ADL tasks @ MOD I assist level with DME PRN    3  Pt will complete IADL tasks @ MOD I assist level with DME PRN    4  Pt will have G carryover of safety awareness during ADL/IADL task & functional mobility    5  Pt will complete bed mobility at [facility] for preparation on OOB activity    6  Pt will sit EOB with G trunk control for 10 mins while completing ADL tasks to increase activity tolerance    7   Pt will participate in formal cog assessment w/ G attention to assist w/ safe d/c    OTR TO SEE TO ASSESS TRANSFERS AND FUNCTIONAL MOBILITY AS APPROPRIATE     OLU Boo

## 2020-07-23 NOTE — PROGRESS NOTES
Progress Note - Tina Salguero 1960, 61 y o  male MRN: [id number]    [facility] Encounter: [id number]    Primary Care Provider: iLnh Ridley DO   Date and time admitted to hospital: 7/22/2020 11:18 AM        * Closed compression fracture of body of L1 vertebra (HCC)  Assessment & Plan  Status post fall from 5 ft, positive head trauma and questionable loss of consciousness with L1 SEP fracture - TLICS 1    Imaging reviewed personally and with attending, results are as follows:   CT lumbar spine 07/22/2020:  Acute fracture through superior endplate of L1 with minimal loss of height  No significant bony retropulsion into the spinal canal    X-ray lumbar spine 07/23/2020: Anterior wedge compression fracture again noted with slightly greater loss of height anteriorly compared to the CT of 7/22/2020  Straightening of lumbar lordosis may be due to position and/or spasm  Straightening of lumbar lordosis may be due to position and/or spasm  Plan:   TLSO brace when out of bed and head of bed greater than 45°, patient was wearing it in the ED  [facility] management and pain control per primary team   DVT ppx:  SCDs, Lovenox   Mobilize as tolerated with assistance, PT / OT evaluation, must wear brace   No neurosurgical intervention is anticipated at this time  Recommend continued conservative management in TLSO brace and PT as needed  Neurosurgery will see patient as needed during the remainder of his hospitalization  Neurosurgery will follow-up in 2 weeks with repeat upright lumbar spine x-rays  Please call with questions or concerns,    Fall  Assessment & Plan  Fall from 5ft with +head strike and LOC  CT head and cervical spine without acute abnormality noted    Subjective/Objective      Chief Complaint: "My back hurts"    Subjective:  Patient reported he continued to have back pain that he rated as 8/10 on the pain scale    Patient denies any new numbness, tingling, weakness bilateral arms or legs  Patient denies bowel or bladder incontinence  Patient reported that since he had the back of his head, he has been having dizziness and nausea  Patient denies visual changes  Objective:  Alert and awake, no acute distress    I/O       07/21 0701 - 07/22 0700 07/22 0701 - 07/23 0700 07/23 0701 - 07/24 0700    P  O   480 620    Total Intake(mL/kg)  480 (4 4) 620 (5 7)    Urine (mL/kg/hr)  1000 1050 (0 9)    Total Output  1000 1050    Net  -520 -430                 Invasive Devices     Peripheral Intravenous Line            Peripheral IV 07/23/20 Left Forearm less than 1 day                Physical Exam:  Vitals: Blood pressure 148/94, pulse 104, temperature 98 5 °F (36 9 °C), resp  rate 18, height 6' 4" (1 93 m), weight 108 kg (238 lb 1 5 oz), SpO2 96 %  ,Body mass index is 28 98 kg/m²  General appearance: alert, appears stated age, cooperative and no acute distress  Head:posterior/occipital scalp laceration with staples in place  Eyes: EOMI, PERRL  Neck: supple, symmetrical, trachea midline   Lungs: non labored breathing  Heart: regular heart rate  Neurologic:   Mental status: Alert, oriented, thought content appropriate  Cranial nerves: grossly intact (Cranial nerves II-XII)  Sensory: normal to LT X 4  Motor: moving all extremities, strength bilateral upper extremities 4+-5/5, BLE 4/5 2/2 to pain inhibition     Reflexes: 2+ and symmetric  Coordination:no drift bilaterally      Lab Results:  Results from last 7 days   Lab Units 07/23/20  0616 07/22/20  1725   WBC Thousand/uL 13 98* 15 93*   HEMOGLOBIN g/dL 14 8 15 2   HEMATOCRIT % 43 8 44 9   PLATELETS Thousands/uL 284 284   NEUTROS PCT %  --  84*   MONOS PCT %  --  5     Results from last 7 days   Lab Units 07/23/20  0616 07/22/20  1725   POTASSIUM mmol/L 3 8 4 6   CHLORIDE mmol/L 103 105   CO2 mmol/L 25 23   BUN mg/dL 19 25   CREATININE mg/dL 0 72 0 77   CALCIUM mg/dL 9 3 9 5               Imaging Studies: I have personally reviewed pertinent reports and I have personally reviewed pertinent films in PACS    Xr Chest Portable    Result Date: 7/22/2020  Impression: Right mid lung opacity or mass measuring approximately 3 7 cm  Follow-up enhanced chest CT recommended in this patient with smoking history  The study was marked in EPIC for significant notification  Workstation performed: [id number]     Xr Spine Lumbar 2 Or 3 Views Injury    Result Date: 7/23/2020  Impression: 1  Anterior wedge compression fracture again noted with slightly greater loss of height anteriorly compared to the CT of 7/22/2020  Workstation performed: [id number]     Ct Head Without Contrast    Result Date: 7/22/2020  Impression: Left parietal scalp hematoma  No acute intracranial hemorrhage or depressed calvarial fracture identified  Workstation performed: [id number]     Ct Spine Cervical Without Contrast    Result Date: 7/22/2020  Impression: No acute fracture or evidence for traumatic malalignment  I personally discussed this study with Dr Jim Pringle on 7/22/2020 at 12:17 PM   Workstation performed: [id number]     Ct Spine Lumbar Without Contrast    Result Date: 7/22/2020  Impression: Acute fracture through the superior endplate of L1 with minimal loss of height  No significant bony retropulsion into the spinal canal  Degenerative changes of the lumbar spine, as described above  I personally discussed this study with Dr Jim Pringle on 7/22/2020 at 12:17 PM  Workstation performed: [id number]     Ct Abdomen Pelvis W Contrast    Result Date: 7/22/2020  Impression: Unchanged appearance of the known mild superior endplate compression deformity of the L1 vertebral body without progressive loss of height  No other sites of injury are identified within the abdomen or the pelvis  Workstation performed: [id number] ]    EKG, Pathology, and Other Studies: I have personally reviewed pertinent reports          VTE Mechanical Prophylaxis: sequential compression device    PLEASE NOTE:  This encounter may have been completed utilizing the [employer] Direct Speech Voice Recognition Software  Grammatical errors, random word insertions, pronoun errors and incomplete sentences are occasional consequences of the system due to software limitations, ambient noise and hardware issues  These may be missed by proof reading prior to affixing electronic signature  Any questions or concerns about the content, text or information contained within the body of this dictation should be directly addressed to the advanced practitioner or physician for clarification  Please do not hesitate to call me directly if you have any questions or concerns

## 2020-07-23 NOTE — PLAN OF CARE
Problem: PAIN - ADULT  Goal: Verbalizes/displays adequate comfort level or baseline comfort level  Description  Interventions:  - Encourage patient to monitor pain and request assistance  - Assess pain using appropriate pain scale  - Administer analgesics based on type and severity of pain and evaluate response  - Implement non-pharmacological measures as appropriate and evaluate response  - Consider cultural and social influences on pain and pain management  - Notify physician/advanced practitioner if interventions unsuccessful or patient reports new pain  Outcome: Progressing     Problem: INFECTION - ADULT  Goal: Absence or prevention of progression during hospitalization  Description  INTERVENTIONS:  - Assess and monitor for signs and symptoms of infection  - Monitor lab/diagnostic results  - Monitor all insertion sites, i e  indwelling lines, tubes, and drains  - Monitor endotracheal if appropriate and nasal secretions for changes in amount and color  - [place] appropriate cooling/warming therapies per order  - Administer medications as ordered  - Instruct and encourage patient and family to use good hand hygiene technique  - Identify and instruct in appropriate isolation precautions for identified infection/condition  Outcome: Progressing     Problem: SAFETY ADULT  Goal: Patient will remain free of falls  Description  INTERVENTIONS:  - Assess patient frequently for physical needs  -  Identify cognitive and physical deficits and behaviors that affect risk of falls    -  [place] fall precautions as indicated by assessment   - Educate patient/family on patient safety including physical limitations  - Instruct patient to call for assistance with activity based on assessment  - Modify environment to reduce risk of injury  - Consider OT/PT consult to assist with strengthening/mobility  Outcome: Progressing  Goal: Maintain or return to baseline ADL function  Description  INTERVENTIONS:  -  Assess patient's ability to carry out ADLs; assess patient's baseline for ADL function and identify physical deficits which impact ability to perform ADLs (bathing, care of mouth/teeth, toileting, grooming, dressing, etc )  - Assess/evaluate cause of self-care deficits   - Assess range of motion  - Assess patient's mobility; develop plan if impaired  - Assess patient's need for assistive devices and provide as appropriate  - Encourage maximum independence but intervene and supervise when necessary  - Involve family in performance of ADLs  - Assess for home care needs following discharge   - Consider OT consult to assist with ADL evaluation and planning for discharge  - Provide patient education as appropriate  Outcome: Progressing  Goal: Maintain or return mobility status to optimal level  Description  INTERVENTIONS:  - Assess patient's baseline mobility status (ambulation, transfers, stairs, etc )    - Identify cognitive and physical deficits and behaviors that affect mobility  - Identify mobility aids required to assist with transfers and/or ambulation (gait belt, sit-to-stand, lift, walker, cane, etc )  - [place] fall precautions as indicated by assessment  - Record patient progress and toleration of activity level on Mobility SBAR; progress patient to next Phase/Stage  - Instruct patient to call for assistance with activity based on assessment  - Consider rehabilitation consult to assist with strengthening/weightbearing, etc   Outcome: Progressing     Problem: DISCHARGE PLANNING  Goal: Discharge to home or other facility with appropriate resources  Description  INTERVENTIONS:  - Identify barriers to discharge w/patient and caregiver  - Arrange for needed discharge resources and transportation as appropriate  - Identify discharge learning needs (meds, wound care, etc )  - Arrange for interpretive services to assist at discharge as needed  - Refer to Case Management Department for coordinating discharge planning if the patient needs post-hospital services based on physician/advanced practitioner order or complex needs related to functional status, cognitive ability, or social support system  Outcome: Progressing     Problem: Knowledge Deficit  Goal: Patient/family/caregiver demonstrates understanding of disease process, treatment plan, medications, and discharge instructions  Description  Complete learning assessment and assess knowledge base  Interventions:  - Provide teaching at level of understanding  - Provide teaching via preferred learning methods  Outcome: Progressing     Problem: Potential for Falls  Goal: Patient will remain free of falls  Description  INTERVENTIONS:  - Assess patient frequently for physical needs  -  Identify cognitive and physical deficits and behaviors that affect risk of falls    -  [place] fall precautions as indicated by assessment   - Educate patient/family on patient safety including physical limitations  - Instruct patient to call for assistance with activity based on assessment  - Modify environment to reduce risk of injury  - Consider OT/PT consult to assist with strengthening/mobility  Outcome: Progressing

## 2020-07-23 NOTE — PLAN OF CARE
Problem: PAIN - ADULT  Goal: Verbalizes/displays adequate comfort level or baseline comfort level  Description  Interventions:  - Encourage patient to monitor pain and request assistance  - Assess pain using appropriate pain scale  - Administer analgesics based on type and severity of pain and evaluate response  - Implement non-pharmacological measures as appropriate and evaluate response  - Consider cultural and social influences on pain and pain management  - Notify physician/advanced practitioner if interventions unsuccessful or patient reports new pain  Outcome: Progressing     Problem: INFECTION - ADULT  Goal: Absence or prevention of progression during hospitalization  Description  INTERVENTIONS:  - Assess and monitor for signs and symptoms of infection  - Monitor lab/diagnostic results  - Monitor all insertion sites, i e  indwelling lines, tubes, and drains  - Monitor endotracheal if appropriate and nasal secretions for changes in amount and color  - [place] appropriate cooling/warming therapies per order  - Administer medications as ordered  - Instruct and encourage patient and family to use good hand hygiene technique  - Identify and instruct in appropriate isolation precautions for identified infection/condition  Outcome: Progressing     Problem: SAFETY ADULT  Goal: Patient will remain free of falls  Description  INTERVENTIONS:  - Assess patient frequently for physical needs  -  Identify cognitive and physical deficits and behaviors that affect risk of falls    -  [place] fall precautions as indicated by assessment   - Educate patient/family on patient safety including physical limitations  - Instruct patient to call for assistance with activity based on assessment  - Modify environment to reduce risk of injury  - Consider OT/PT consult to assist with strengthening/mobility  Outcome: Progressing  Goal: Maintain or return to baseline ADL function  Description  INTERVENTIONS:  -  Assess patient's ability to carry out ADLs; assess patient's baseline for ADL function and identify physical deficits which impact ability to perform ADLs (bathing, care of mouth/teeth, toileting, grooming, dressing, etc )  - Assess/evaluate cause of self-care deficits   - Assess range of motion  - Assess patient's mobility; develop plan if impaired  - Assess patient's need for assistive devices and provide as appropriate  - Encourage maximum independence but intervene and supervise when necessary  - Involve family in performance of ADLs  - Assess for home care needs following discharge   - Consider OT consult to assist with ADL evaluation and planning for discharge  - Provide patient education as appropriate  Outcome: Progressing  Goal: Maintain or return mobility status to optimal level  Description  INTERVENTIONS:  - Assess patient's baseline mobility status (ambulation, transfers, stairs, etc )    - Identify cognitive and physical deficits and behaviors that affect mobility  - Identify mobility aids required to assist with transfers and/or ambulation (gait belt, sit-to-stand, lift, walker, cane, etc )  - [place] fall precautions as indicated by assessment  - Record patient progress and toleration of activity level on Mobility SBAR; progress patient to next Phase/Stage  - Instruct patient to call for assistance with activity based on assessment  - Consider rehabilitation consult to assist with strengthening/weightbearing, etc   Outcome: Progressing     Problem: DISCHARGE PLANNING  Goal: Discharge to home or other facility with appropriate resources  Description  INTERVENTIONS:  - Identify barriers to discharge w/patient and caregiver  - Arrange for needed discharge resources and transportation as appropriate  - Identify discharge learning needs (meds, wound care, etc )  - Arrange for interpretive services to assist at discharge as needed  - Refer to Case Management Department for coordinating discharge planning if the patient needs post-hospital services based on physician/advanced practitioner order or complex needs related to functional status, cognitive ability, or social support system  Outcome: Progressing     Problem: Knowledge Deficit  Goal: Patient/family/caregiver demonstrates understanding of disease process, treatment plan, medications, and discharge instructions  Description  Complete learning assessment and assess knowledge base    Interventions:  - Provide teaching at level of understanding  - Provide teaching via preferred learning methods  Outcome: Progressing

## 2020-07-23 NOTE — UTILIZATION REVIEW
Initial Clinical Review    Admission: Date/Time/Statement: Admission Orders (From admission, onward)     Ordered        07/22/20 1622  Place in Observation  Once                   Orders Placed This Encounter   Procedures    Place in Observation     Standing Status:   Standing     Number of Occurrences:   1     Order Specific Question:   Admitting Physician     Answer:   Eliza Dong     Order Specific Question:   Level of Care     Answer:   Level 2 Stepdown / HOT [14]     ED Arrival Information     Expected Arrival Acuity Means of Arrival Escorted By Service Admission Type    - 7/22/2020 11:18 [facility]    Arrival Complaint    fall off ladder        Chief Complaint   Patient presents with    Fall     pt fell from ladder that was on a platform, approx 5ft  +head strike on cement/brick wall  +asa     Assessment/Plan:   61y Male to ED presents with S/p Fall from 5 ft ladder with LOC and back pain  Pain worse with movement and relieved with rest  Unable to recall event, unsure if he fell onto grass or concrete  PMH for Alcoholism, HTN, Diabetes on Insulin with Chronic lower extremity neuropathy  Smokes approx 2 pks/day  Admit Observation level of care for S/p Fall, Head strike with LOC, Acute L1 fracture and Scalp hematoma  High Trauma Observation  Neurosurgery consult  Upright lumbar xrays  Continue to wear brace, elevate head of bed greater than 45°  Scalp laceration repair with 8 staples in ED  Pain control  7/22 Neurosurgery cons; L1 fracture  Fall from 5 ft, positive head trauma and questionable loss of consciousness with L1 SEP fracture - TLICS 1  CT lumbar spine 07/22/2020:  Acute fracture through superior endplate of L1 with minimal loss of height  No significant bony retropulsion into the spinal canal  TLSO brace when out of bed and head of bed greater than 45  Upright lumbar xrays  7/23 Progress notes; Pain control    7/23 Acute Pain Management cons; Schedule tylenol, gabapentin and methocarbamol  Increase prn oxycodone and d/c Iv Dilaudid  Recommend adding Lidocaine patch for 12 hrs daily  Continue ketorolac at 30 mg IV q 6 hours scheduled x5 days total if okay with neurosurgery  ED Triage Vitals   Temperature Pulse Respirations Blood Pressure SpO2   07/22/20 1130 07/22/20 1130 07/22/20 1130 07/22/20 1130 07/22/20 1130   97 5 °F (36 4 °C) 96 12 (!) 164/115 97 %      Temp Source Heart Rate Source Patient Position - Orthostatic VS BP Location FiO2 (%)   07/22/20 1130 07/22/20 1130 07/22/20 1130 07/22/20 2249 --   Tympanic Monitor Lying Right arm       Pain Score       07/22/20 1630       5        Wt Readings from Last 1 Encounters:   07/22/20 108 kg (238 lb 1 5 oz)     Additional Vital Signs:   07/23/20 06:42:49  98 7 °F (37 1 °C)  107  Abnormal   18  133/95  108  97 %  --  --   07/23/20 03:17:15  98 4 °F (36 9 °C)  115  Abnormal   18  132/95  107  97 %  None (Room air)  Lying   07/22/20 22:53:33  --  129  Abnormal   18  151/101  Abnormal   118  95 %  None (Room air)  Lying   07/22/20 22:49:25  99 °F (37 2 °C)  124  Abnormal   --  153/105  Abnormal   121  95 %  None (Room air)  Lying   07/22/20 22:31:20  --  126  Abnormal   --  137/96  110  94 %         07/22/20 19:16:39  98 °F (36 7 °C)  114  Abnormal   --  141/98  112  93 %  --  --   07/22/20 1630  --  82  16  142/82  --  95 %  --  Lying   07/22/20 1530  --  98  14  140/80  --  95 %  --  Lying   07/22/20 14:30:43  --  100  14  151/87  --  95 %  --  Lying   07/22/20 1330  --  102  14  156/106  Abnormal              Pertinent Labs/Diagnostic Test Results:   7/22 CT Lumbar Spine -  Acute fracture through the superior endplate of L1 with minimal loss of height  No significant bony retropulsion into the spinal canal   Degenerative changes of the lumbar spine  7/22 CT Cervical Spine - No acute fracture or evidence for traumatic malalignment      7/22 CT Head - Left parietal scalp hematoma  No acute intracranial hemorrhage or depressed calvarial fracture identified    7/22 CT Abd/Pelvis - Unchanged appearance of the known mild superior endplate compression deformity of the L1 vertebral body without progressive loss of height  No other sites of injury are identified within the abdomen or the pelvis      7/22 PCXR - Right mid lung opacity or mass measuring approximately 3 7 cm      Results from last 7 days   Lab Units 07/23/20  0616 07/22/20  1725   WBC Thousand/uL 13 98* 15 93*   HEMOGLOBIN g/dL 14 8 15 2   HEMATOCRIT % 43 8 44 9   PLATELETS Thousands/uL 284 284   NEUTROS ABS Thousands/µL  --  13 38*         Results from last 7 days   Lab Units 07/23/20  0616 07/22/20  1725   SODIUM mmol/L 136 138   POTASSIUM mmol/L 3 8 4 6   CHLORIDE mmol/L 103 105   CO2 mmol/L 25 23   ANION GAP mmol/L 8 10   BUN mg/dL 19 25   CREATININE mg/dL 0 72 0 77   EGFR ml/min/1 73sq m 102 99   CALCIUM mg/dL 9 3 9 5     Results from last 7 days   Lab Units 07/23/20  0616 07/22/20  1725   GLUCOSE RANDOM mg/dL 314* 317*       Results from last 7 days   Lab Units 07/22/20  1725   ETHANOL LVL mg/dL <3   ACETAMINOPHEN LVL ug/mL <2*   SALICYLATE LVL mg/dL 3       ED Treatment:   Medication Administration from 07/22/2020 1118 to 07/22/2020 1913       Date/Time Order Dose Route Action     07/22/2020 1158 tetanus-diphtheria-acellular pertussis (BOOSTRIX) IM injection 0 5 mL 0 5 mL Intramuscular Given     07/22/2020 1158 ondansetron (ZOFRAN) injection 4 mg 4 mg Intravenous Given     07/22/2020 1254 lidocaine-epinephrine (XYLOCAINE/EPINEPHRINE) 1 %-1:100,000 injection 10 mL 10 mL Infiltration Given     07/22/2020 1256 acetaminophen (TYLENOL) tablet 975 mg 975 mg Oral Given     07/22/2020 1253 ketorolac (TORADOL) injection 15 mg 15 mg Intravenous Given     07/22/2020 1827 iohexol (OMNIPAQUE) 350 MG/ML injection (MULTI-DOSE) 100 mL 100 mL Intravenous Given        Past Medical History:   Diagnosis Date    Alcoholism ([address] )     Anxiety     Diabetes ([facility] )     Diabetes mellitus ([facility] )     High cholesterol      Present on Admission:  **None**      Admitting Diagnosis: Injury [T14 90XA]  Scalp laceration, initial encounter [S01 01XA]  Closed compression fracture of L1 lumbar vertebra, initial encounter ([facility] ) [S32 010A]  Age/Sex: 61 y o  male     Admission Orders:  Neuro checks q1h  Spine Brace    Scheduled Medications:  Medications:  acetaminophen 975 mg Oral Q8H [facility]   amLODIPine 5 mg Oral Daily   docusate sodium 100 mg Oral BID   enoxaparin 30 mg Subcutaneous BID   gabapentin 100 mg Oral TID   insulin glargine 15 Units Subcutaneous HS   insulin lispro 4-20 Units Subcutaneous TID AC   labetalol 100 mg Oral Q12H [facility]   lisinopril 10 mg Oral Daily   melatonin 6 mg Oral HS   nicotine 1 patch Transdermal Daily     Continuous IV Infusions: None     PRN Meds:  HYDROmorphone 0 5 mg Intravenous Q1H PRN 7/23 x1   ondansetron 4 mg Intravenous Q6H PRN 7/23 x1   oxyCODONE 10 mg Oral Q4H PRN 7/22 x2, 7/23 x1   oxyCODONE 5 mg Oral Q4H PRN     Network Utilization Review Department  [email or web address]  ATTENTION: Please call with any questions or concerns to [phone] and carefully listen to the prompts so that you are directed to the right person  All voicemails are confidential   Lizett Grand all requests for admission clinical reviews, approved or denied determinations and any other requests to dedicated fax number below belonging to the campus where the patient is receiving treatment   List of dedicated fax numbers for the Facilities:  FACILITY NAME UR FAX NUMBER   ADMISSION DENIALS (Administrative/Medical Necessity) [phone]   [address]  (Maternity/NICU/Pediatrics) [phone]   Debra Minaya [phone]   Melanie Polk [phone]   [place] [phone]   Sheron Combs [place] [id number] [facility] [phone] [facility]  [phone] [address] [facility]  [phone]   [address] [place] [address] [phone]

## 2020-07-23 NOTE — PHYSICAL THERAPY NOTE
PHYSICAL THERAPY EVALUATION          Patient Name: Lopez Mata  [id number] Date: 7/23/2020 07/23/20 1038   Note Type   Note type Eval only   Pain Assessment   Pain Assessment Tool 0-10   Pain Score 8   Pain Location/Orientation Location: Back   Pain Onset/Description Descriptor: Sore   Patient's Stated Pain Goal No pain   Hospital Pain Intervention(s) Repositioned; Ambulation/increased activity   Home Living   Type of [address] Two level; Laundry in basement;Bed/bath upstairs;Stairs to enter with rails  (Pt lives in basement of [facility] w/ FF in to basement, FF to BR)   Bathroom Shower/Tub Tub/shower unit   Bathroom Toilet Standard   Bathroom Equipment Grab bars in [facility] Other (Comment)  (Pt denies)   Prior Function   Level of [place] Independent with ADLs and functional mobility   Lives With Family  (Pt's brother lives on first floor above basement)   Receives Help From Family;Friend(s)   ADL Assistance Independent   IADLs Independent   Falls in the last 6 months 1 to 4  (1)   Vocational Unemployed   Comments PTA, pt denies use of DME and was fully independent  Pt reports his gf stays with him often and can assist prn  Pt reports that his brother "isn't much help, he works a lot"  Restrictions/Precautions   Weight Bearing Precautions Per Order No   Braces or Orthoses TLSO  (When OOB)   Other Precautions Bed Alarm; Fall Risk;Pain;Spinal precautions   General   Additional Pertinent History Pt's vitals taken once seated EOB as pt began to c/o dizziness, nausea and sweating  EOB BP - 178/116, an additional BP taken seated - 181/115  Pt assisted back to Supine, BP was taken - 187/106  5 min later in supine - 184/106  An additional 5 min later in supine - 180/107   At rest, pt's HR was low 100s bpm  With activity, pt's HR elevated to 120s bpm  Once returned to lying supine, pt's HR recovered to low 100s bpm  Sana Ga RN, Ariel Wolf RN, and Pratima Pacheco NP all made aware  OOB functional mobility deferred at this time  Further assessment of mobility to be completed when appropriate  Family/Caregiver Present No   Cognition   Overall Cognitive Status WFL   Arousal/Participation Alert   Orientation Level Oriented X4   Memory Within functional limits   Following Commands Follows one step commands without difficulty   RLE Assessment   RLE Assessment WFL   LLE Assessment   LLE Assessment WFL   Bed Mobility   Rolling L 4  Minimal assistance   Additional items Assist x 1;Bedrails; Increased time required;Verbal cues;LE management   Supine to Sit 4  Minimal assistance   Additional items Assist x 1;HOB elevated; Bedrails; Increased time required;Verbal cues;LE management   Sit to Supine 4  Minimal assistance   Additional items Assist x 1; Increased time required; Bedrails;LE management;Verbal cues   Transfers   Sit to Stand Unable to assess   Ambulation/Elevation   Gait pattern Not tested   Stair Management Assistance Not tested   Balance   Static Sitting Fair -   Dynamic Sitting Poor +   Endurance Deficit   Endurance Deficit Yes   Endurance Deficit Description increased HR, sweating   Activity Tolerance   Activity Tolerance Patient limited by pain; Patient limited by fatigue;Treatment limited secondary to medical complications (Comment)   Medical Staff Made Aware Sana Ga RN, Ariel Wolf RN, Pratima Pacheco NP   Nurse Made Aware Yes   Assessment   Prognosis Fair   Problem List Decreased range of motion;Decreased endurance; Impaired balance;Decreased mobility;Orthopedic restrictions;Pain   Assessment Pt is a 61 y o  Male presenting to [facility]  s/p fall from ladder with + head strike, +LOC  Pt was admitted with a primary diagnosis of closed compression fracture of body of L1 vertebra 2* fall   Pt's PMH affecting evaluation includes alcoholism, anxiety, diabetes mellitus, high cholesterol and personal factors including living alone and steps to negotiate at home  TLSO when OOB per neurosurgery  Pt seen for high complexity PT eval due to ongoing medical management of admitting diagnosis, TLSO, spinal precautions, continuous pulse oximetry monitoring, decreased functional ability and activity tolerance compared to baseline, pain limiting function and fall risk  Upon evaluation, pt was resting in bed  Pt required Min Ax1 with bed mobility  Once seated EOB, pt c/o nausea, dizziness and asked "is it hot in here?", and shortly after pt noted to be diaphoretic  Vitals were assessed multiple times, please see above for BP and HR  Pt was assisted back into supine position due to unchanging symptoms  Once in supine, pt's HR recovered to low 100s bpm, however BP remained high, which deferred further assessment of functional ability  Nursing team was made aware and Bonnie Meyers was present for end of session  Based on PT eval, pt's current list of impairments includes decreased ROM 2* spinal precautions and pain, decreased endurance, impaired balance, decreased mobility and fall risk  PT will continue to follow pt for remainder of hospital stay to address these impairments  PT D/C recommendation for short term rehab pending pt's progress and functional ability  Barriers to Discharge None   Goals   Patient Goals To go home   STG Expiration Date 08/02/20   Short Term Goal #1 In 10 days, pt will  Christopher Paige 1) perform all aspects of bed mobility with supervision in order to reduce caregiver burden  2) maintain spinal precautions 100% of the time in order to encourage proper healing  3) improve dynamic sitting balance by 1 grade in order to reduce risk of falls  Deferred evaluation of further mobility due to high BP  Transfer, ambulation and stair negotiation to be assessed when appropriate  PT to see at that time  Plan   Treatment/Interventions Functional transfer training;LE strengthening/ROM; Elevations; Therapeutic exercise; Endurance training;Patient/family training;Equipment eval/education; Bed mobility;Gait training;Spoke to nursing;Spoke to advanced practitioner;OT   PT Frequency Other (Comment)  (3-6x/wk)   Recommendation   PT Discharge Recommendation Post-Acute Rehabilitation Services   Equipment Recommended Walker  (RW)   PT - OK to Discharge No  (Pending transfers, ambulation and stair trial )   Additional Comments PT eval limited by unstable vitals  Nursing team made aware     Modified Giuseppe Scale   Modified Giuseppe Scale 4   Barthel Index   Feeding 10   Bathing 0   Grooming Score 0   Dressing Score 5   Bladder Score 10   Bowels Score 10   Toilet Use Score 5   Transfers (Bed/Chair) Score 5   Mobility (Level Surface) Score 0   Stairs Score 0   Barthel Index Score 45     Aarti Edmondson, SPT

## 2020-07-23 NOTE — CONSULTS
Consultation - Acute Pain Service   Manuel Chaney 61 y o  male MRN: [id number]  Unit/Bed#: [facility] 604-01 Encounter: [id number]               Assessment/Plan     Assessment:   Patient Active Problem List   Diagnosis    Lumbar spondylosis    Chronic bilateral low back pain without sciatica    Closed compression fracture of body of L1 vertebra (HCC)    Fall    Pulmonary nodule    Type 2 diabetes mellitus ([facility] [address] )        Plan:   · Continue acetaminophen 975 mg p o  q 8 hours scheduled  · Suggest increase oxycodone to 15 mg p o  q 4 hours p r n  moderate pain  · Suggest increase oxycodone to 15 mg p o  q 4 hours p r n  severe pain  · Suggest discontinue IV hydromorphone  · Suggest continue ketorolac at 30 mg IV q 6 hours scheduled x5 days total if okay with neurosurgery  · Continue gabapentin 100 mg p o  t i d  scheduled  · Continue methocarbamol 500 mg p o  q 6 hours scheduled  · Suggest add lidocaine patch over L1 for 12 hours daily  · Continue Colace to help avoid opioid induced constipation  · At discharge, suggest provide patient with prescription for additional hydrocodone/acetaminophen 5-325 mg for 3-4 day course  Would also supply patient with prescription for methocarbamol 500 mg p o  q 6 hours scheduled for several days  · Patient would benefit from continued follow-up with [facility]  APS will continue to follow; please contact APS ( [phone]) with any further questions    History of Present Illness    Admit Date:  7/22/2020  Hospital Day:  0 days  Primary Service:  Trauma  Attending Provider:  [place] Chaney MD  Reason for Consult / Principal Problem:  Acute pain secondary to acute compression fracture of L1   HPI: Manuel Chaney is a 61y o  year old male who presents with a 5 ft fall with head trauma and possible loss of consciousness diagnosed with an acute L1 compression fracture with no neurologic deficits    Patient with a history of type 2 diabetes, nicotine abuse, alcoholism with 3 years sobriety and apparent opioid use disorder  Patient has chronic lower back pain for which he has seen pain management and has begun injections but has not yet had an ablation  He currently takes hydrocodone/acetaminophen prescribed by his primary physician  Review of the PDMP shows a prescription on 6/25/20 for 60 tablets and a 30 day supply  On questioning, patient admits to smoking 2 packs of cigarettes a day  States he has been sober for 3 years and occasionally smokes marijuana  Patient also seen by the resident who was told by the patient that he gets Morphine from friends and other things  Current pain location(s):  Midline lower back  Pain Scale:   9/10  Quality:  Sharp, aching  Current Analgesic regimen:    Oxycodone 5 mg p o  q 4 hours p r n  moderate pain  Oxycodone 10 mg p o  q 4 hours p r n  severe pain  Hydromorphone 0 5 mg IV q 1 hour p r n  breakthrough pain  Acetaminophen 975 mg p o  q 8 hours scheduled  Gabapentin 100 mg p o  t i d  scheduled  Methocarbamol 500 mg p o  q 6 hours scheduled  Pain History:  Chronic lower back pain  Pain Management Provider:  Pain medication prescribed by primary care physician although patient has followed with Yasmin Segura's pain and spine  I have reviewed the patient's controlled substance dispensing history in the Prescription Drug Monitoring Program in compliance with the [facility] regulations before prescribing any controlled substances  Consults    Review of Systems   Constitutional: Positive for activity change  Negative for fever  Respiratory: Negative  Cardiovascular: Negative  Gastrointestinal: Negative  Genitourinary: Negative  Musculoskeletal: Positive for back pain  Negative for arthralgias, myalgias and neck pain  Neurological: Negative for dizziness, weakness, numbness and headaches  Hematological: Negative          Historical Information   Past Medical History:   Diagnosis Date  Alcoholism ([facility] )     Anxiety     Diabetes ([facility] )     Diabetes mellitus ([place] [address] )     High cholesterol      History reviewed  No pertinent surgical history    Social History   Social History     Substance and Sexual Activity   Alcohol Use Not Currently    Frequency: Never     Social History     Substance and Sexual Activity   Drug Use Yes    Types: Marijuana, Other    Comment: prescription pills (unprescribed) daily     Social History     Tobacco Use   Smoking Status Current Every Day Smoker    Packs/day: 2 00    Types: Cigarettes   Smokeless Tobacco Never Used     Family History: non-contributory    Meds/Allergies   all current active meds have been reviewed, current meds:   Current Facility-Administered Medications   Medication Dose Route Frequency    acetaminophen (TYLENOL) tablet 975 mg  975 mg Oral Q8H [address]    amLODIPine (NORVASC) tablet 5 mg  5 mg Oral Daily    docusate sodium (COLACE) capsule 100 mg  100 mg Oral BID    enoxaparin (LOVENOX) subcutaneous injection 30 mg  30 mg Subcutaneous BID    gabapentin (NEURONTIN) capsule 100 mg  100 mg Oral TID    HYDROmorphone (DILAUDID) injection 0 5 mg  0 5 mg Intravenous Q1H PRN    insulin glargine (LANTUS) subcutaneous injection 15 Units 0 15 mL  15 Units Subcutaneous HS    insulin lispro (HumaLOG) 100 units/mL subcutaneous injection 4-20 Units  4-20 Units Subcutaneous TID AC    labetalol (NORMODYNE) tablet 100 mg  100 mg Oral Q12H [address]    lisinopril (ZESTRIL) tablet 10 mg  10 mg Oral Daily    melatonin tablet 6 mg  6 mg Oral HS    methocarbamol (ROBAXIN) tablet 500 mg  500 mg Oral Q6H [address]    nicotine (NICODERM CQ) 21 mg/24 hr TD 24 hr patch 1 patch  1 patch Transdermal Daily    ondansetron (ZOFRAN) injection 4 mg  4 mg Intravenous Q6H PRN    oxyCODONE (ROXICODONE) immediate release tablet 10 mg  10 mg Oral Q4H PRN    oxyCODONE (ROXICODONE) IR tablet 5 mg  5 mg Oral Q4H PRN    and PTA meds:   Prior to Admission Medications   Prescriptions Last Dose Informant Patient Reported? Taking? HYDROcodone-acetaminophen (NORCO) 5-325 mg per tablet   Yes No   Sig: Take 1 tablet by mouth every 6 (six) hours as needed for pain   LORazepam (ATIVAN) 0 5 mg tablet   Yes No   amLODIPine (NORVASC) 5 mg tablet   Yes Yes   Sig: Take 5 mg by mouth daily   gabapentin (NEURONTIN) 300 mg capsule   Yes No   glimepiride (AMARYL) 4 mg tablet   Yes No   insulin glargine (LANTUS) 100 units/mL subcutaneous injection   Yes Yes   Sig: Inject 35 Units under the skin daily at bedtime   labetalol (NORMODYNE) 200 mg tablet   Yes No   lisinopril (ZESTRIL) 10 mg tablet   Yes No   Sig: Take 10 mg by mouth daily   metFORMIN (GLUCOPHAGE) 500 mg tablet   Yes No   Sig: Take 500 mg by mouth 3 (three) times a day      Facility-Administered Medications: None       No Known Allergies    Objective   Temp:  [98 °F (36 7 °C)-99 °F (37 2 °C)] 98 5 °F (36 9 °C)  HR:  [] 115  Resp:  [14-20] 18  BP: (132-187)/() 162/104    Intake/Output Summary (Last 24 hours) at 7/23/2020 1248  Last data filed at 7/23/2020 1119  Gross per 24 hour   Intake 480 ml   Output 1650 ml   Net -1170 ml       Physical Exam   Constitutional: He is oriented to person, place, and time  Vital signs are normal  He is cooperative  Non-toxic appearance  No distress  Eyes: Pupils are equal, round, and reactive to light  Conjunctivae are normal    Cardiovascular: Normal rate and regular rhythm  Pulmonary/Chest: Effort normal and breath sounds normal    Abdominal: Soft  There is no tenderness  Musculoskeletal:        Lumbar back: He exhibits tenderness  He exhibits no deformity  Neurological: He is alert and oriented to person, place, and time  GCS eye subscore is 4  GCS verbal subscore is 5  GCS motor subscore is 6  Skin: Skin is warm and dry  Lab Results:   I have personally reviewed pertinent labs  , CBC:   Lab Results   Component Value Date    WBC 13 98 (H) 07/23/2020    HGB 14 8 07/23/2020    HCT 43 8 07/23/2020 MCV 89 07/23/2020     07/23/2020    MCH 30 2 07/23/2020    MCHC 33 8 07/23/2020    RDW 13 0 07/23/2020    MPV 9 3 07/23/2020    NRBC 0 07/22/2020   , CMP:   Lab Results   Component Value Date    SODIUM 136 07/23/2020    K 3 8 07/23/2020     07/23/2020    CO2 25 07/23/2020    BUN 19 07/23/2020    CREATININE 0 72 07/23/2020    CALCIUM 9 3 07/23/2020    EGFR 102 07/23/2020   , PT/PTT:No results found for: PT, PTT    Imaging Studies: I have personally reviewed pertinent reports  EKG, Pathology, and Other Studies: I have personally reviewed pertinent reports  Counseling / Coordination of Care  Total floor / unit time spent today Level 4 = 80 minutes  Greater than 50% of total time was spent with the patient and / or family counseling and / or coordination of care  A description of the counseling / coordination of care:  Patient interview, physical examination, review of PDMP, review of medical record, review of imaging and laboratory data, development pain management plan, discussion of pain management plan with patient and primary service      Sofia Buckley PA-C

## 2020-07-23 NOTE — PLAN OF CARE
Problem: OCCUPATIONAL THERAPY ADULT  Goal: Performs self-care activities at highest level of function for planned discharge setting  See evaluation for individualized goals  Description  Treatment Interventions: ADL retraining, Endurance training, Cognitive reorientation, Patient/family training, Compensatory technique education, Continued evaluation, Energy conservation, Activityengagement          See flowsheet documentation for full assessment, interventions and recommendations  Note:   Limitation: Decreased ADL status, Decreased Safe judgement during ADL, Decreased cognition, Decreased endurance, Decreased high-level ADLs, Decreased self-care trans  Prognosis: Good  Assessment: Pt is a 60 y/o M admitted [facility] on 7/[facility]/20 post fall from 5ft ladder w/ positive loss of consciousness with posterior scalp laceration   Pt dx with closed compression fx of body L1 vertebra  Imaging of head and cervical spine were negative  Pt has PMH of  alcoholism, anxiety, DM2 and high cholesterol  Active OT orders & up in chair orders  Pt currently has spinal precautions and to wear TLSO brace at all time out of bed  PTA, pt reports being I in ADLs, IADLs and functional mobility  Pt lives in [place] [facility] with brother but reports his brother will not be able to assist   Pt denies use of DME, and says he uses a walking stick for long distance walks outside when his back is hurting  Pt is currently MIN A assist level with UB ADLs and MOD A with LB ADLs  Pt able to perform log rolling bed mobility with MIN Ax1  Unable to assess sit to stand transfers and funcitional mobility due to secondary medical complication of hypertension t/o session  Pt able to sit EOB and reported feeling nausous and dizzy  HR was in the 120s and BP was taken, resulting 178/116 with 137 MAP, BP was taken a 2nd time at EOB resulting to be 181/115 with 137 MAP  Pt was positioned back to supine in bed; BP taken resulting 187/106 with 133 MAP   BP was taken again after 5 mins @ 184/106 with 132 MAP  After an additional 5 mins of resting supine, HR decreased to 110 and BR was taken an additional time at 180/107 with 160 MAP  RN Isabell Eng, RN Roshni Malloy, NP from trauma were all notified  See OTR to complete transfer and functional mobility evaluation  Pt is limited at this time 2* activity tolerance, pain, unsupportive home environment, decreased I in ADLs and IADLs, endurance, cognitive impairments, and secondary medical complications  These impair baseline function in grooming, oral hygiene, bathing, toilet hygiene, dressing, health maintenance, functional mobility, household maintenance, driving, and job seeking  Pt educated on spinal precautions and use of brace  From OT standpoint anticipate d/c to short term rehab pending progress  OT will continue to follow 3-5x/wk in the next 10-14 days to meet the following goals  OT Discharge Recommendation: Post-Acute Rehabilitation Services  OT - OK to Discharge:  Yes

## 2020-07-23 NOTE — ASSESSMENT & PLAN NOTE
Status post fall from 5 ft, positive head trauma and questionable loss of consciousness with L1 SEP fracture - TLICS 1     Imaging reviewed personally and with attending, results are as follows:  · CT lumbar spine 07/22/2020:  Acute fracture through superior endplate of L1 with minimal loss of height  No significant bony retropulsion into the spinal canal      Plan:  · TLSO brace when out of bed and head of bed greater than 45°, patient artery wearing in the ED  · Upright lumbar x-rays ordered and pending to assess spinal alignment  · Medical management and pain control per primary team  · DVT ppx:  SCDs, Lovenox  · Mobilize as tolerated with assistance, PT / OT evaluation, must wear brace     Neurosurgery will follow in the periphery and review imaging once completed  If stable patient will follow-up in 2 weeks time with repeat upright lumbar spine x-rays  No surgical intervention warranted on this admission    Please call with questions or concerns, signed off for now       -Pain regimen in place, monitor for improvement in pain

## 2020-07-23 NOTE — PROGRESS NOTES
Progress Note - Karine Pfeiffer 1960, 61 y o  male MRN: [id number]    [facility] Encounter: [id number]    Primary Care Provider: Akbar García DO   Date and time admitted to hospital: 7/22/2020 11:18 AM        Type 2 diabetes mellitus ([facility] [address] )  Assessment & Plan    -Bedtime lispro 15U (home dose is 35U)  -Algorithm 5 SSI   -Advance as needed       Pulmonary nodule  Assessment & Plan  -Right mid lung opacity or mass measuring approximately 3 7 cm, will need outpatient follow up CT as he has smoking h/o    Closed compression fracture of body of L1 vertebra (HCC)  Assessment & Plan  Status post fall from 5 ft, positive head trauma and questionable loss of consciousness with L1 SEP fracture - TLICS 1     Imaging reviewed personally and with attending, results are as follows:  · CT lumbar spine 07/22/2020:  Acute fracture through superior endplate of L1 with minimal loss of height  No significant bony retropulsion into the spinal canal      Plan:  · TLSO brace when out of bed and head of bed greater than 45°, patient artery wearing in the ED  · Upright lumbar x-rays ordered and pending to assess spinal alignment  · Medical management and pain control per primary team  · DVT ppx:  SCDs, Lovenox  · Mobilize as tolerated with assistance, PT / OT evaluation, must wear brace     Neurosurgery will follow in the periphery and review imaging once completed  If stable patient will follow-up in 2 weeks time with repeat upright lumbar spine x-rays  No surgical intervention warranted on this admission  Please call with questions or concerns, signed off for now       -Pain regimen in place, monitor for improvement in pain         Disposition: PT, OT eval pending      SUBJECTIVE:  Chief Complaint: "My back hurts"    Subjective: This am patient reporting continued back pain that is severe with any movement, causing him to be nauseous   Feels like the back of his head feels "like a lump" and has been at times touching/rubbing it, stating he wishes his head had been shaved  Some evidence of recent oozing on pillowcase from head wound  Denies any significant head pain, abdominal pain, chest pain, SOB, lightheadedness, or dizziness         OBJECTIVE:     Meds/Allergies     Current Facility-Administered Medications:     acetaminophen (TYLENOL) tablet 975 mg, 975 mg, Oral, Q8H [facility], Doretha Rock MD, 975 mg at 07/23/20 0559    amLODIPine (NORVASC) tablet 5 mg, 5 mg, Oral, Daily, Charles Vidal MD    docusate sodium (COLACE) capsule 100 mg, 100 mg, Oral, BID, Charles Vidal MD    enoxaparin (LOVENOX) subcutaneous injection 30 mg, 30 mg, Subcutaneous, BID, Doretha Rock MD, 30 mg at 07/22/20 1947    gabapentin (NEURONTIN) capsule 100 mg, 100 mg, Oral, TID, Gilmer Rainey MD, 100 mg at 07/22/20 2231    HYDROmorphone (DILAUDID) injection 0 5 mg, 0 5 mg, Intravenous, Q1H PRN, Gilmer Rainey MD, 0 5 mg at 07/23/20 0320    insulin glargine (LANTUS) subcutaneous injection 15 Units 0 15 mL, 15 Units, Subcutaneous, HS, Gilmer Rainey MD, 15 Units at 07/22/20 2231    insulin lispro (HumaLOG) 100 units/mL subcutaneous injection 4-20 Units, 4-20 Units, Subcutaneous, TID AC **AND** Fingerstick Glucose (POCT), , , TID AC, Charles Vidal MD    labetalol (NORMODYNE) tablet 100 mg, 100 mg, Oral, Q12H [facility], Gilmer Rainey MD, 100 mg at 07/22/20 2231    lisinopril (ZESTRIL) tablet 10 mg, 10 mg, Oral, Daily, Chalres Vidal MD    melatonin tablet 6 mg, 6 mg, Oral, HS, Charles Vidal MD, 6 mg at 07/22/20 2351    nicotine (NICODERM CQ) 21 mg/24 hr TD 24 hr patch 1 patch, 1 patch, Transdermal, Daily, Charles Vidal MD, 1 patch at 07/22/20 7278    ondansetron (ZOFRAN) injection 4 mg, 4 mg, Intravenous, Q6H PRN, Charles Vidal MD    oxyCODONE (ROXICODONE) immediate release tablet 10 mg, 10 mg, Oral, Q4H PRN, Doretha Rock MD, 10 mg at 07/23/20 0862    oxyCODONE (ROXICODONE) IR tablet 5 mg, 5 mg, Oral, Q4H PRN, Doretha Rock MD Vitals:   Vitals:    07/23/20 0642   BP: 133/95   Pulse: (!) 107   Resp: 18   Temp: 98 7 °F (37 1 °C)   SpO2: 97%       Intake/Output:  I/O       07/21 0701 - 07/22 0700 07/22 0701 - 07/23 0700 07/23 0701 - 07/24 0700    P  O   480     Total Intake(mL/kg)  480 (4 4)     Urine (mL/kg/hr)  1000     Total Output  1000     Net  -520                   Nutrition/GI Proph/Bowel Reg: Diabetic diet  Colace  Physical Exam:   GENERAL APPEARANCE: Well appearing, slightly disheveled   NEURO: awake, oriented  Full strength throughout  HEENT: pupils equal and reactive  Posterior scalp wound with staples in place  Evidence of recent oozing on pillow but no evident active bleeding present  CV: Rate of 102, no murmurs  LUNGS: ctab  GI: soft, nontender  : deferred  MSK: no deformities   SKIN: warm, dry     Invasive Devices     Peripheral Intravenous Line            Peripheral IV 07/23/20 Left Forearm less than 1 day                 Lab Results:   BMP/CMP:   Lab Results   Component Value Date    SODIUM 136 07/23/2020    K 3 8 07/23/2020     07/23/2020    CO2 25 07/23/2020    BUN 19 07/23/2020    CREATININE 0 72 07/23/2020    CALCIUM 9 3 07/23/2020    EGFR 102 07/23/2020    and CBC:   Lab Results   Component Value Date    WBC 13 98 (H) 07/23/2020    HGB 14 8 07/23/2020    HCT 43 8 07/23/2020    MCV 89 07/23/2020     07/23/2020    MCH 30 2 07/23/2020    MCHC 33 8 07/23/2020    RDW 13 0 07/23/2020    MPV 9 3 07/23/2020    NRBC 0 07/22/2020     Imaging/EKG Studies:   CXR: Right mid lung opacity or mass measuring approximately 3 7 cm  Follow-up enhanced chest CT recommended in this patient with smoking history  CT abd/pelv: ABDOMEN     LOWER CHEST:  No clinically significant abnormality identified in the visualized lower chest      LIVER/BILIARY TREE:  Unremarkable      GALLBLADDER:  No calcified gallstones   No pericholecystic inflammatory change      SPLEEN:  Unremarkable      PANCREAS: Unremarkable      ADRENAL GLANDS:  Unremarkable      KIDNEYS/URETERS:  Unremarkable  No hydronephrosis      STOMACH AND BOWEL:  Unremarkable      APPENDIX:  No findings to suggest appendicitis      ABDOMINOPELVIC CAVITY:  No ascites  No pneumoperitoneum  No lymphadenopathy      VESSELS:  Unremarkable for patient's age      PELVIS     REPRODUCTIVE ORGANS:  Unremarkable for patient's age      URINARY BLADDER:  Unremarkable      ABDOMINAL WALL/INGUINAL REGIONS:  Unremarkable      OSSEOUS STRUCTURES:  Known mild acute superior endplate compression deformity of L1 unchanged from 2 days earlier CT lumbar spine  No progressive loss of height since today's earlier study  No paraspinal hematoma      IMPRESSION:     Unchanged appearance of the known mild superior endplate compression deformity of the L1 vertebral body without progressive loss of height      No other sites of injury are identified within the abdomen or the pelvis        VTE Prophylaxis: Enoxaparin (Lovenox)

## 2020-07-23 NOTE — QUICK NOTE
Please note that on my consultation note dated 7/23/20 at 12:48, IA wrote under the plan to increase oxycodone to 15 mg p o  q 4 hours p r n  moderate pain  That should read suggest increase oxycodone to 10 mg p o  q 4 hours p r n  moderate pain  This was incorrectly dictated and the orders reflect the appropriate doses      Gricelda Kingston PA-C

## 2020-07-23 NOTE — PLAN OF CARE
Problem: PHYSICAL THERAPY ADULT  Goal: Performs mobility at highest level of function for planned discharge setting  See evaluation for individualized goals  Description  Treatment/Interventions: Functional transfer training, LE strengthening/ROM, Elevations, Therapeutic exercise, Endurance training, Patient/family training, Equipment eval/education, Bed mobility, Gait training, Spoke to nursing, Spoke to advanced practitioner, OT  Equipment Recommended: Walker(RW)       See flowsheet documentation for full assessment, interventions and recommendations  Note:   Prognosis: Fair  Problem List: Decreased range of motion, Decreased endurance, Impaired balance, Decreased mobility, Orthopedic restrictions, Pain  Assessment: Pt is a 61 y o  Male presenting to [facility] s/p fall from ladder with + head strike, +LOC  Pt was admitted with a primary diagnosis of closed compression fracture of body of L1 vertebra 2* fall  Pt's PMH affecting evaluation includes alcoholism, anxiety, diabetes mellitus, high cholesterol and personal factors including living alone and steps to negotiate at home  TLSO when OOB per neurosurgery  Pt seen for high complexity PT eval due to ongoing medical management of admitting diagnosis, TLSO, spinal precautions, continuous pulse oximetry monitoring, decreased functional ability and activity tolerance compared to baseline, pain limiting function and fall risk  Upon evaluation, pt was resting in bed  Pt required Min Ax1 with bed mobility  Once seated EOB, pt c/o nausea, dizziness and asked "is it hot in here?", and shortly after pt noted to be diaphoretic  Vitals were assessed multiple times, please see above for BP and HR  Pt was assisted back into supine position due to unchanging symptoms  Once in supine, pt's HR recovered to low 100s bpm, however BP remained high, which deferred further assessment of functional ability   Nursing team was made aware and Anita Jung RN was present for end of session  Based on PT eval, pt's current list of impairments includes decreased ROM 2* spinal precautions and pain, decreased endurance, impaired balance, decreased mobility and fall risk  PT will continue to follow pt for remainder of hospital stay to address these impairments  PT D/C recommendation for short term rehab pending pt's progress and functional ability  Barriers to Discharge: None     PT Discharge Recommendation: Post-Acute Rehabilitation Services     PT - OK to Discharge: No(Pending transfers, ambulation and stair trial )    See flowsheet documentation for full assessment

## 2020-07-23 NOTE — ASSESSMENT & PLAN NOTE
-Bedtime lispro increased to 20 U from 15U (home dose is 35U)  -Algorithm 5 SSI   -Advance as needed   - On metformin and amaryl at home, currently held

## 2020-07-23 NOTE — ASSESSMENT & PLAN NOTE
-Right mid lung opacity or mass measuring approximately 3 7 cm, will need outpatient follow up CT as he has smoking h/o

## 2020-07-23 NOTE — ASSESSMENT & PLAN NOTE
Status post fall from 5 ft, positive head trauma and questionable loss of consciousness with L1 SEP fracture - TLICS 1    · CT lumbar spine 07/22/2020:  Acute fracture through superior endplate of L1 with minimal loss of height    No significant bony retropulsion into the spinal canal      · No surgical intervention at this time   · TLSO brace when out of bed and head of bed greater than 45°  · Upright lumbar spine XR completed, reviewed by neurosurgery   · Multimodal pain regimen as below  · Mobilize as tolerated with assistance, PT / OT evaluation, must wear brace  · Neurosurgery follow up in 2 weeks as outpatient with repeat lumbar spine XR

## 2020-07-23 NOTE — SOCIAL WORK
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  Pt has no hx of IP rehab or VNA  Pt's children will transport home  CM will discuss therapy recs once they're completed  CM reviewed d/c planning process including the following: identifying help at home, patient preference for d/c planning needs, Discharge Lounge, [facility] Meds to Bed program, availability of treatment team to discuss questions or concerns patient and/or family may have regarding understanding medications and recognizing signs and symptoms once discharged  CM also encouraged patient to follow up with all recommended appointments after discharge  Patient advised of importance for patient and family to participate in managing patients medical well being

## 2020-07-24 ENCOUNTER — TELEPHONE (OUTPATIENT)
Dept: NEUROSURGERY | Facility: CLINIC | Age: 60
End: 2020-07-24

## 2020-07-24 PROBLEM — G89.11 ACUTE PAIN DUE TO TRAUMA: Status: ACTIVE | Noted: 2020-07-24

## 2020-07-24 LAB
GLUCOSE SERPL-MCNC: 209 MG/DL (ref 65–140)
GLUCOSE SERPL-MCNC: 228 MG/DL (ref 65–140)
GLUCOSE SERPL-MCNC: 243 MG/DL (ref 65–140)
GLUCOSE SERPL-MCNC: 245 MG/DL (ref 65–140)

## 2020-07-24 PROCEDURE — 97535 SELF CARE MNGMENT TRAINING: CPT

## 2020-07-24 PROCEDURE — 97530 THERAPEUTIC ACTIVITIES: CPT

## 2020-07-24 PROCEDURE — 99232 SBSQ HOSP IP/OBS MODERATE 35: CPT | Performed by: SURGERY

## 2020-07-24 PROCEDURE — 82948 REAGENT STRIP/BLOOD GLUCOSE: CPT

## 2020-07-24 PROCEDURE — 99232 SBSQ HOSP IP/OBS MODERATE 35: CPT | Performed by: PHYSICIAN ASSISTANT

## 2020-07-24 RX ORDER — INSULIN GLARGINE 100 [IU]/ML
35 INJECTION, SOLUTION SUBCUTANEOUS
Status: DISCONTINUED | OUTPATIENT
Start: 2020-07-24 | End: 2020-07-28 | Stop reason: HOSPADM

## 2020-07-24 RX ORDER — AMOXICILLIN 250 MG
1 CAPSULE ORAL 2 TIMES DAILY
Status: DISCONTINUED | OUTPATIENT
Start: 2020-07-24 | End: 2020-07-28 | Stop reason: HOSPADM

## 2020-07-24 RX ORDER — FAMOTIDINE 20 MG/1
20 TABLET, FILM COATED ORAL 2 TIMES DAILY
Status: DISCONTINUED | OUTPATIENT
Start: 2020-07-24 | End: 2020-07-28 | Stop reason: HOSPADM

## 2020-07-24 RX ORDER — POLYETHYLENE GLYCOL 3350 17 G/17G
17 POWDER, FOR SOLUTION ORAL DAILY PRN
Status: DISCONTINUED | OUTPATIENT
Start: 2020-07-24 | End: 2020-07-28 | Stop reason: HOSPADM

## 2020-07-24 RX ORDER — INSULIN GLARGINE 100 [IU]/ML
20 INJECTION, SOLUTION SUBCUTANEOUS
Status: DISCONTINUED | OUTPATIENT
Start: 2020-07-24 | End: 2020-07-24

## 2020-07-24 RX ADMIN — LABETALOL HYDROCHLORIDE 100 MG: 100 TABLET, FILM COATED ORAL at 08:49

## 2020-07-24 RX ADMIN — FAMOTIDINE 20 MG: 20 TABLET ORAL at 08:49

## 2020-07-24 RX ADMIN — ACETAMINOPHEN 975 MG: 325 TABLET, FILM COATED ORAL at 21:24

## 2020-07-24 RX ADMIN — LABETALOL HYDROCHLORIDE 100 MG: 100 TABLET, FILM COATED ORAL at 20:30

## 2020-07-24 RX ADMIN — METHOCARBAMOL TABLETS 500 MG: 500 TABLET, COATED ORAL at 12:17

## 2020-07-24 RX ADMIN — DOCUSATE SODIUM AND SENNOSIDES 1 TABLET: 8.6; 5 TABLET ORAL at 17:45

## 2020-07-24 RX ADMIN — OXYCODONE HYDROCHLORIDE 15 MG: 10 TABLET ORAL at 13:50

## 2020-07-24 RX ADMIN — INSULIN LISPRO 8 UNITS: 100 INJECTION, SOLUTION INTRAVENOUS; SUBCUTANEOUS at 08:56

## 2020-07-24 RX ADMIN — GABAPENTIN 100 MG: 100 CAPSULE ORAL at 20:30

## 2020-07-24 RX ADMIN — ONDANSETRON 4 MG: 2 INJECTION INTRAMUSCULAR; INTRAVENOUS at 12:17

## 2020-07-24 RX ADMIN — GABAPENTIN 100 MG: 100 CAPSULE ORAL at 08:49

## 2020-07-24 RX ADMIN — AMLODIPINE BESYLATE 5 MG: 5 TABLET ORAL at 08:49

## 2020-07-24 RX ADMIN — GABAPENTIN 100 MG: 100 CAPSULE ORAL at 17:44

## 2020-07-24 RX ADMIN — POLYETHYLENE GLYCOL 3350 17 G: 17 POWDER, FOR SOLUTION ORAL at 12:17

## 2020-07-24 RX ADMIN — NICOTINE 1 PATCH: 21 PATCH, EXTENDED RELEASE TRANSDERMAL at 08:50

## 2020-07-24 RX ADMIN — OXYCODONE HYDROCHLORIDE 15 MG: 10 TABLET ORAL at 04:49

## 2020-07-24 RX ADMIN — ENOXAPARIN SODIUM 30 MG: 30 INJECTION SUBCUTANEOUS at 08:48

## 2020-07-24 RX ADMIN — METHOCARBAMOL TABLETS 500 MG: 500 TABLET, COATED ORAL at 04:54

## 2020-07-24 RX ADMIN — FAMOTIDINE 20 MG: 20 TABLET ORAL at 17:46

## 2020-07-24 RX ADMIN — CALCIUM CARBONATE (ANTACID) CHEW TAB 500 MG 500 MG: 500 CHEW TAB at 20:30

## 2020-07-24 RX ADMIN — ACETAMINOPHEN 975 MG: 325 TABLET, FILM COATED ORAL at 13:51

## 2020-07-24 RX ADMIN — OXYCODONE HYDROCHLORIDE 15 MG: 10 TABLET ORAL at 20:36

## 2020-07-24 RX ADMIN — METHOCARBAMOL TABLETS 500 MG: 500 TABLET, COATED ORAL at 17:44

## 2020-07-24 RX ADMIN — INSULIN GLARGINE 35 UNITS: 100 INJECTION, SOLUTION SUBCUTANEOUS at 21:24

## 2020-07-24 RX ADMIN — DOCUSATE SODIUM AND SENNOSIDES 1 TABLET: 8.6; 5 TABLET ORAL at 08:49

## 2020-07-24 RX ADMIN — ACETAMINOPHEN 975 MG: 325 TABLET, FILM COATED ORAL at 04:53

## 2020-07-24 RX ADMIN — MELATONIN 6 MG: at 21:24

## 2020-07-24 RX ADMIN — LISINOPRIL 10 MG: 10 TABLET ORAL at 08:49

## 2020-07-24 RX ADMIN — INSULIN LISPRO 8 UNITS: 100 INJECTION, SOLUTION INTRAVENOUS; SUBCUTANEOUS at 17:45

## 2020-07-24 RX ADMIN — INSULIN LISPRO 8 UNITS: 100 INJECTION, SOLUTION INTRAVENOUS; SUBCUTANEOUS at 12:17

## 2020-07-24 RX ADMIN — ENOXAPARIN SODIUM 30 MG: 30 INJECTION SUBCUTANEOUS at 17:44

## 2020-07-24 NOTE — TELEPHONE ENCOUNTER
2020-CALLED PT, LEFT MESSAGE ON MACHINE CONFIRMING 2020 APT AND XRAY NEEDED PRIOR TO APT    2020-PT DISCHARGED TO HOME    2020-PT Erich 2020-PT Erich 2020-PT Erich 2020 APT Beau [facility]oks      ----- Message from Armando Dutta PA-C sent at 2020  6:00 PM EDT -----  Regardin Wk apt  Hello  Please help pt with 2 wk apt with xray lumbar with PA

## 2020-07-24 NOTE — PLAN OF CARE
Problem: PHYSICAL THERAPY ADULT  Goal: Performs mobility at highest level of function for planned discharge setting  See evaluation for individualized goals  Description  Treatment/Interventions: Functional transfer training, LE strengthening/ROM, Elevations, Therapeutic exercise, Endurance training, Patient/family training, Equipment eval/education, Bed mobility, Gait training, Spoke to nursing, Spoke to advanced practitioner, OT  Equipment Recommended: Walker(RW)       See flowsheet documentation for full assessment, interventions and recommendations  7/24/2020 1501 by Leanna Ly, PT  Note:   Prognosis: Fair  Problem List: Decreased strength, Decreased range of motion, Decreased endurance, Impaired balance, Decreased mobility, Pain, Orthopedic restrictions  Assessment: Pt presents with decreased mobility, strength, balance, and activity tolerance  Pt demonstrated ability to perform bed mobility at supervision  Pt sat EOB and immediately reports dizzines, nausea, diaphoretic, and dry heaving  Pt sat EOB ~3 minutes w/ no resolution of symptoms  Pt requesting to be returned supine  Pt educated on importance of elevating HOB throughout the day to orient body to an upright position  Pt continues to benefit from skilled therapy to maximize functional independence  Recommendation at this time is TBD- medical status limiting evaluation at this time  Pt requires transfers, mobility, and stair trials before d/c  Pt significantly limited at this time 2' N/V, dizziness, and diaphoresis  Barriers to Discharge: None     PT Discharge Recommendation: (TBD- medical status limiting evaluation at this time)     PT - OK to Discharge: No    See flowsheet documentation for full assessment

## 2020-07-24 NOTE — PROGRESS NOTES
Progress Note - Braxton Jorge 1960, 61 y o  male MRN: [id number]    Unit/Bed#: [facility] 604-01 Encounter: [id number]    Primary Care Provider: Magy Hauser DO   Date and time admitted to hospital: 7/22/2020 11:18 AM    Fall  Assessment & Plan  · Injuries as below   · PT/OT - rec post acute IP rehab     * Closed compression fracture of body of L1 vertebra (HCC)  Assessment & Plan  Status post fall from 5 ft, positive head trauma and questionable loss of consciousness with L1 SEP fracture - TLICS 1    · CT lumbar spine 07/22/2020:  Acute fracture through superior endplate of L1 with minimal loss of height    No significant bony retropulsion into the spinal canal      · No surgical intervention at this time   · TLSO brace when out of bed and head of bed greater than 45°  · Upright lumbar spine XR completed, reviewed by neurosurgery   · Multimodal pain regimen as below  · Mobilize as tolerated with assistance, PT / OT evaluation, must wear brace  · Neurosurgery follow up in 2 weeks as outpatient with repeat lumbar spine XR     Acute pain due to trauma  Assessment & Plan  · APS consult, recs appreciated    · Regimen as below:  · Tylenol 975 mg q8h georgia   · Oxycodone increased to 10/15 mg for mod/severe pain q4h prn  · Gabpentin 100 mg TID georgia   · Robaxin 500 mg q6h georgia  · Lidocaine patch to L spine   · Can consider Toradol 30 mg IV q6h georgia x 5 doses if cleared by neurosurgery   · Rec'd to d/c on hydrocodone/acetaminophen 5-325 mg x3-4 days and robaxin 500 q6h [place] x 4 days   · Suggest continued outpatient follow up with [facility]     Type 2 diabetes mellitus ([facility] [address] )  Assessment & Plan  -Bedtime lispro increased to 20 U from 15U (home dose is 35U)  -Algorithm 5 SSI   -Advance as needed   - On metformin and amaryl at home, currently held     Pulmonary nodule  Assessment & Plan  -Right mid lung opacity or mass measuring approximately 3 7 cm, will need outpatient follow up CT as he has smoking h/o    Disposition: IP rehab vs home with outpatient therapy depending on stair work and transfers       SUBJECTIVE:  Chief Complaint: Back pain    Subjective: Patient reports low back pain, unsure if changes in pain regimen per APS yesterday have made a different in his pain  Complaining of GERD symptoms, denies CP, SOB, abdominal pain  Discussed his BG control with him, reports his blood sugars typically run in 200s         OBJECTIVE:     Meds/Allergies     Current Facility-Administered Medications:     acetaminophen (TYLENOL) tablet 975 mg, 975 mg, Oral, Q8H [address], Catalina Arzola MD, 975 mg at 07/24/20 0453    amLODIPine (NORVASC) tablet 5 mg, 5 mg, Oral, Daily, Alexandria Vidal MD, 5 mg at 07/23/20 1059    calcium carbonate (TUMS) chewable tablet 500 mg, 500 mg, Oral, Daily PRN, Vipin Salomon MD, 500 mg at 07/23/20 1716    enoxaparin (LOVENOX) subcutaneous injection 30 mg, 30 mg, Subcutaneous, BID, Catalina Arzola MD, 30 mg at 07/23/20 1716    famotidine (PEPCID) tablet 20 mg, 20 mg, Oral, BID, Tom Wynn PA-C    gabapentin (NEURONTIN) capsule 100 mg, 100 mg, Oral, TID, Alexandria Vidal MD, 100 mg at 07/23/20 2135    hydrALAZINE (APRESOLINE) injection 5 mg, 5 mg, Intravenous, Q6H PRN, Cal Luque PA-C, 5 mg at 07/23/20 2236    insulin glargine (LANTUS) subcutaneous injection 20 Units 0 2 mL, 20 Units, Subcutaneous, HS, Tom Wynn PA-C    insulin lispro (HumaLOG) 100 units/mL subcutaneous injection 4-20 Units, 4-20 Units, Subcutaneous, TID AC, 8 Units at 07/23/20 1717 **AND** Fingerstick Glucose (POCT), , , TID AC, Alexandria Vidal MD    labetalol (NORMODYNE) tablet 100 mg, 100 mg, Oral, Q12H [address], Will Kamara MD, 100 mg at 07/23/20 2135    lisinopril (ZESTRIL) tablet 10 mg, 10 mg, Oral, Daily, Alexandria Vidal MD, 10 mg at 07/23/20 1058    LORazepam (ATIVAN) tablet 0 5 mg, 0 5 mg, Oral, Q8H PRN, Vipin Salomon MD, 0 5 mg at 07/23/20 1715    melatonin tablet 6 mg, 6 mg, Oral, HS, Alexandria Vidal MD, 6 mg at 07/23/20 2135    methocarbamol (ROBAXIN) tablet 500 mg, 500 mg, Oral, Q6H EARNEST, GRZEGORZ Shepherd, 500 mg at 07/24/20 0454    nicotine (NICODERM CQ) 21 mg/24 hr TD 24 hr patch 1 patch, 1 patch, Transdermal, Daily, Danielle Owen MD, 1 patch at 07/23/20 1053    ondansetron (ZOFRAN) injection 4 mg, 4 mg, Intravenous, Q6H PRN, Khari Vidal MD, 4 mg at 07/23/20 0840    oxyCODONE (ROXICODONE) immediate release tablet 10 mg, 10 mg, Oral, Q4H PRN, GRZEGORZ Shepherd    oxyCODONE (ROXICODONE) IR tablet 15 mg, 15 mg, Oral, Q4H PRN, GRZEGORZ Shepherd, 15 mg at 07/24/20 0449    polyethylene glycol (MIRALAX) packet 17 g, 17 g, Oral, Daily PRN, Raymond Gaona PA-C    senna-docusate sodium (SENOKOT S) 8 6-50 mg per tablet 1 tablet, 1 tablet, Oral, BID, Raymond Gaona PA-C     Vitals:   Vitals:    07/24/20 0729   BP: 129/90   Pulse: 104   Resp: 16   Temp: 98 6 °F (37 °C)   SpO2: 96%       Intake/Output:  I/O       07/22 0701 - 07/23 0700 07/23 0701 - 07/24 0700    P  O  480 738    Total Intake(mL/kg) 480 (4 4) 738 (6 8)    Urine (mL/kg/hr) 1000 1780 (0 7)    Total Output 1000 1780    Net -520 1041                 Nutrition/GI Proph/Bowel Reg: Level 2 carb controlled, senna/colace, miralax    Physical Exam:   GENERAL APPEARANCE: Patient seen lying in bed, appears comfortable  In no acute distress  NEURO: GCS 15  No focal deficits  HEENT: Normocephalic, posterior occipital scalp laceration with staples in place, dried blood matted hair but without active bleeding  PEERL, EOMI  CV: RRR, no murmurs, rubs or gallops  LUNGS: CTABL, no wheezes, rhonchi, rales, or crackles  GI: Soft, non-tender, non-distended  Bowel sounds active   MSK: Moves all extremities  Strength 5/5 throughout and equal bilaterally  Sensation intact  SKIN: Warm and dry  Invasive Devices     Peripheral Intravenous Line            Peripheral IV 07/23/20 Left Forearm 1 day                 Lab Results: N/A  Imaging/EKG Studies: Results:  I have personally reviewed pertinent reports  and I have personally reviewed pertinent films in PACS     Xr Spine Lumbar 2 Or 3 Views Injury    Result Date: 7/23/2020  Impression: 1  Anterior wedge compression fracture again noted with slightly greater loss of height anteriorly compared to the CT of 7/22/2020  Workstation performed: [id number]     Ct Spine Lumbar Without Contrast    Result Date: 7/22/2020  Impression: Acute fracture through the superior endplate of L1 with minimal loss of height  No significant bony retropulsion into the spinal canal  Degenerative changes of the lumbar spine, as described above    I personally discussed this study with Dr Kathryn Sterling on 7/22/2020 at 12:17 PM  Workstation performed: [id number]     Other Studies: n/a  VTE Prophylaxis: Sequential compression device (Venodyne)  and Enoxaparin (Lovenox)

## 2020-07-24 NOTE — UTILIZATION REVIEW
Notification of Inpatient Admission/Inpatient Authorization Request   This is a Notification of Inpatient Admission for [address]  Be advised that this patient was admitted to our facility under Inpatient Status  Contact Krystle Naqvi at [phone] for additional admission information  Santiago Snell  DEPT  DEDICATED -[phone]  Patient Name:   Sara Padilla   YOB: 1960       [address]   [address]:   [facility]  Tax ID: [id number]  NPI: [id number] Attending Provider/NPI:  Phone:  Address: Alyssa Huber, Donna Jama [[id number]]   [phone]  Same as NIKI/Miguel [address] of Service Code: 24 Place of Service Name:  [address] [place] [address]   Start Date: 7/24/20 1422 Discharge Date & Time: No discharge date for patient encounter  Type of Admission: Inpatient Status Discharge Disposition (if discharged): Final discharge disposition not confirmed   Patient Diagnoses: Injury [T14 90XA]  Scalp laceration, initial encounter [S01 01XA]  Closed compression fracture of L1 lumbar vertebra, initial [facility] ([facility] ) [S59 814J]   Orders: Admission Orders (From admission, onward)   Ordered     07/24/20 1421 Inpatient Admission  Once        07/22/20 1622 Place in Observation  Once                 Assigned Utilization Review Contact: Krystle Naqvi  Utilization   Network Utilization Review Department  Phone: [phone]; Fax [phone]  Email: [email or web address]   ATTENTION PAYERS: Please call the assigned Utilization  directly with any questions or concerns ALL voicemails in the department are confidential  Send all requests for admission clinical reviews, approved or denied determinations and any other requests to dedicated fax number belonging to the campus where the patient is receiving treatment

## 2020-07-24 NOTE — PHYSICAL THERAPY NOTE
PHYSICAL THERAPY NOTE  Patient Name: Lopez Mata  FBRVL'M Date: 7/24/2020 07/24/20 1012   Pain Assessment   Pain Assessment Tool 0-10   Pain Score 4   Pain Location/Orientation Location: Head;Location: Neck   Hospital Pain Intervention(s) Repositioned; Ambulation/increased activity; Emotional support   Restrictions/Precautions   Weight Bearing Precautions Per Order No   Braces or Orthoses TLSO   Other Precautions Bed Alarm; Fall Risk;Pain;Spinal precautions   General   Chart Reviewed Yes   Response to Previous Treatment Patient reporting fatigue but able to participate  Family/Caregiver Present No   Cognition   Arousal/Participation Alert; Responsive; Cooperative   Attention Within functional limits   Orientation Level Oriented X4   Memory Decreased recall of precautions   Following Commands Follows one step commands with increased time or repetition   Subjective   Subjective Pt reporting feeling better lying supine and agreeable to work w/ therapy   Bed Mobility   Supine to Sit 5  Supervision   Additional items Increased time required   Sit to Supine 5  Supervision   Additional items Increased time required   Additional Comments Pt lying supine upon PT arrival  Pt sat EOB and immediately reports dizzines, nausea, diaphoretic, and dry heaving  Pt sat EOB ~3 minutes w/ no resolution of symptoms  Pt requesting to be returned supine  Pt educated on importance of elevating HOB throughout the day to orient body to an upright position     Transfers   Sit to Stand Unable to assess   Balance   Static Sitting Fair +   Endurance Deficit   Endurance Deficit Yes   Endurance Deficit Description nausea, vomitting, dizziness, diaphoretic   Activity Tolerance   Activity Tolerance Patient limited by fatigue;Treatment limited secondary to medical complications (Comment)  (dizziness, N/V, diaphoretic)   Medical Staff Made Aware OT   Nurse Made Aware yes Assessment   Prognosis Fair   Problem List Decreased strength;Decreased range of motion;Decreased endurance; Impaired balance;Decreased mobility;Pain;Orthopedic restrictions   Assessment Pt presents with decreased mobility, strength, balance, and activity tolerance  Pt demonstrated ability to perform bed mobility at supervision  Pt sat EOB and immediately reports dizzines, nausea, diaphoretic, and dry heaving  Pt sat EOB ~3 minutes w/ no resolution of symptoms  Pt requesting to be returned supine  Pt educated on importance of elevating HOB throughout the day to orient body to an upright position  Pt continues to benefit from skilled therapy to maximize functional independence  Recommendation at this time is TBD- medical status limiting evaluation at this time  Pt requires transfers, mobility, and stair trials before d/c  Pt significantly limited at this time 2' N/V, dizziness, and diaphoresis     Goals   Patient Goals to feel better   STG Expiration Date 08/02/20   PT Treatment Day 1   Plan   Treatment/Interventions Functional transfer training;Patient/family training;Bed mobility;Spoke to nursing   Progress Slow progress, decreased activity tolerance   PT Frequency   (3-6x/week)   Recommendation   PT Discharge Recommendation   (TBD- medical status limiting evaluation at this time)   Equipment Recommended   (TBD)   PT - OK to Discharge No   Additional Comments Needs transfers, ambulation, and stair trials     Eleno Ghotra, PT, DPT

## 2020-07-24 NOTE — PROGRESS NOTES
Progress Note - Acute Pain Service    An Diaz 61 y o  male MRN: [id number]  Unit/Bed#: [facility] 604-01 Encounter: [id number]      Assessment:   Principal Problem:    Closed compression fracture of body of L1 vertebra ([address] )  Active Problems:    Fall    Pulmonary nodule    Type 2 diabetes mellitus (HCC)    Acute pain due to trauma      Plan:   · Continue acetaminophen 975 mg p o  q 8 hours scheduled  · Continue oxycodone 10 mg p o  q 4 hours p r n  moderate pain  · Continue oxycodone 15 mg p o  q 4 hours p r n  severe pain  · Continue gabapentin 100 mg p o  t i d  scheduled  · Continue methocarbamol 500 mg p o  q 6 hours scheduled  · Continue bowel regimen to avoid opioid induced constipation  · At discharge, suggest oxycodone 5-10 mg p o  q 4 hours p r n  moderate to severe pain  APS sign off  Thank you for the Consult  Please contact APS ( btwn [phone]) with any further questions    Pain History  Current pain location(s):  Lower back  Pain Scale:   6/10  Quality:  Aching  24 hour history:  Patient states pain somewhat improved this morning, however continues to have hot flashes, hypertension and diaphoresis any time he stands up which takes approximately 30 minutes to resolve  At rest, patient comfortable with soreness in the back  States he is able to move around in the bed better today  Opioid requirement previous 24 hours:  Oxycodone 75 mg p o , hydromorphone 0 5 mg IV       Meds/Allergies   all current active meds have been reviewed, current meds:   Current Facility-Administered Medications   Medication Dose Route Frequency    acetaminophen (TYLENOL) tablet 975 mg  975 mg Oral Q8H [address]    amLODIPine (NORVASC) tablet 5 mg  5 mg Oral Daily    calcium carbonate (TUMS) chewable tablet 500 mg  500 mg Oral Daily PRN    enoxaparin (LOVENOX) subcutaneous injection 30 mg  30 mg Subcutaneous BID    famotidine (PEPCID) tablet 20 mg  20 mg Oral BID    gabapentin (NEURONTIN) capsule 100 mg  100 mg Oral TID    hydrALAZINE (APRESOLINE) injection 5 mg  5 mg Intravenous Q6H PRN    insulin glargine (LANTUS) subcutaneous injection 35 Units 0 35 mL  35 Units Subcutaneous HS    insulin lispro (HumaLOG) 100 units/mL subcutaneous injection 4-20 Units  4-20 Units Subcutaneous TID AC    labetalol (NORMODYNE) tablet 100 mg  100 mg Oral Q12H [facility]    lisinopril (ZESTRIL) tablet 10 mg  10 mg Oral Daily    LORazepam (ATIVAN) tablet 0 5 mg  0 5 mg Oral Q8H PRN    melatonin tablet 6 mg  6 mg Oral HS    methocarbamol (ROBAXIN) tablet 500 mg  500 mg Oral Q6H [facility]    nicotine (NICODERM CQ) 21 mg/24 hr TD 24 hr patch 1 patch  1 patch Transdermal Daily    ondansetron (ZOFRAN) injection 4 mg  4 mg Intravenous Q6H PRN    oxyCODONE (ROXICODONE) immediate release tablet 10 mg  10 mg Oral Q4H PRN    oxyCODONE (ROXICODONE) IR tablet 15 mg  15 mg Oral Q4H PRN    polyethylene glycol (MIRALAX) packet 17 g  17 g Oral Daily PRN    senna-docusate sodium (SENOKOT S) 8 6-50 mg per tablet 1 tablet  1 tablet Oral BID    and PTA meds:   Prior to Admission Medications   Prescriptions Last Dose Informant Patient Reported? Taking?    HYDROcodone-acetaminophen (NORCO) 5-325 mg per tablet   Yes No   Sig: Take 1 tablet by mouth every 6 (six) hours as needed for pain   LORazepam (ATIVAN) 0 5 mg tablet   Yes No   amLODIPine (NORVASC) 5 mg tablet   Yes Yes   Sig: Take 5 mg by mouth daily   gabapentin (NEURONTIN) 300 mg capsule   Yes No   glimepiride (AMARYL) 4 mg tablet   Yes No   insulin glargine (LANTUS) 100 units/mL subcutaneous injection   Yes Yes   Sig: Inject 35 Units under the skin daily at bedtime   labetalol (NORMODYNE) 200 mg tablet   Yes No   lisinopril (ZESTRIL) 10 mg tablet   Yes No   Sig: Take 10 mg by mouth daily   metFORMIN (GLUCOPHAGE) 500 mg tablet   Yes No   Sig: Take 500 mg by mouth 3 (three) times a day      Facility-Administered Medications: None       No Known Allergies    Objective     Temp:  [97 8 °F (36 6 °C)-99 °F (37 2 °C)] 97 8 °F (36 6 °C)  HR:  [101-115] 101  Resp:  [16-19] 16  BP: (122-162)/() 137/94    Physical Exam   Constitutional: He is oriented to person, place, and time  He is cooperative  No distress  Eyes: Pupils are equal, round, and reactive to light  Cardiovascular: Normal rate and regular rhythm  Pulmonary/Chest: Effort normal and breath sounds normal    Neurological: He is alert and oriented to person, place, and time  GCS eye subscore is 4  GCS verbal subscore is 5  GCS motor subscore is 6  Skin: Skin is warm and dry  Lab Results:   Results from last 7 days   Lab Units 07/23/20  0616   WBC Thousand/uL 13 98*   HEMOGLOBIN g/dL 14 8   HEMATOCRIT % 43 8   PLATELETS Thousands/uL 284      Results from last 7 days   Lab Units 07/23/20  0616   POTASSIUM mmol/L 3 8   CHLORIDE mmol/L 103   CO2 mmol/L 25   BUN mg/dL 19   CREATININE mg/dL 0 72   CALCIUM mg/dL 9 3       Imaging Studies: I have personally reviewed pertinent reports  EKG, Pathology, and Other Studies: I have personally reviewed pertinent reports  Counseling / Coordination of Care  Total floor / unit time spent today 20 minutes  Greater than 50% of total time was spent with the patient and / or family counseling and / or coordination of care  A description of the counseling / coordination of care:  Patient interview, physical examination, review of medical record, review of imaging and laboratory data, development of pain management plan, discussion of pain management plan with patient and primary service      Mariann Slater PA-C

## 2020-07-24 NOTE — ASSESSMENT & PLAN NOTE
· APS consult, recs appreciated    · Regimen as below:  · Tylenol 975 mg q8h [place]   · Oxycodone increased to 10/15 mg for mod/severe pain q4h prn  · Gabpentin 100 mg TID georg[place]   · Robaxin 500 mg q6h [place]  · Lidocaine patch to L spine   · Can consider Toradol 30 mg IV q6h [place] x 5 doses if cleared by neurosurgery   · Rec'd to d/c on hydrocodone/acetaminophen 5-325 mg x3-4 days and robaxin 500 q6h [place] x 4 days   · Suggest continued outpatient follow up with [facility]

## 2020-07-24 NOTE — PLAN OF CARE
Problem: OCCUPATIONAL THERAPY ADULT  Goal: Performs self-care activities at highest level of function for planned discharge setting  See evaluation for individualized goals  Description  Treatment Interventions: ADL retraining, Endurance training, Cognitive reorientation, Patient/family training, Compensatory technique education, Continued evaluation, Energy conservation, Activityengagement          See flowsheet documentation for full assessment, interventions and recommendations  Outcome: Progressing  Note:   Limitation: Decreased ADL status, Decreased Safe judgement during ADL, Decreased cognition, Decreased endurance, Decreased high-level ADLs, Decreased self-care trans  Prognosis: Good  Assessment: pt seen for OT session -continues to be limited by poor tolerance to upright activity - trauma informed of same - will need to continue to follow and assess physical needs as pt is able to tolerate activity     OT Discharge Recommendation: (pending )  OT - OK to Discharge:  Yes

## 2020-07-24 NOTE — UTILIZATION REVIEW
Initial Clinical Review    Admission: Date/Time/Statement:   Admission Orders: Observation 7/22 @ 1622 and changed to Inpatient on 7/24 @ 1421  Pt requiring continue stay for Treatment for Vertebral fractures/Pain issues/  Med Adjusments     Ordered        07/24/20 1421  Inpatient Admission  Once         07/22/20 1622  Place in Observation  Once                    Inpatient Admission     Standing Status:   Standing     Number of Occurrences:   1     Order Specific Question:   Admitting Physician     Answer:   Norman Ashley     Order Specific Question:   Level of Care     Answer:   Med Surg [16]     Order Specific Question:   Estimated length of stay     Answer:   More than 2 Midnights     Order Specific Question:   Certification     Answer:   I certify that inpatient services are medically necessary for this patient for a duration of greater than two midnights  See H&P and MD Progress Notes for additional information about the patient's course of treatment  ED Arrival Information     Expected Arrival Acuity Means of Arrival Escorted By Service Admission Type    - 7/22/2020 11:18 [facility] [address] [place] [address]    Arrival Complaint    fall off ladder        Chief Complaint   Patient presents with    Fall     pt fell from ladder that was on a platform, approx 5ft  +head strike on cement/brick wall  +asa     Assessment/Plan:   61y Male to ED presents with S/p Fall from 5 ft ladder with LOC and back pain  Pain worse with movement and relieved with rest  Unable to recall event, unsure if he fell onto grass or concrete  PMH for Alcoholism, HTN, Diabetes on Insulin with Chronic lower extremity neuropathy  Smokes approx 2 pks/day  Admit Observation level of care for S/p Fall, Head strike with LOC, Acute L1 fracture and Scalp hematoma  High Trauma Observation  Neurosurgery consult  Upright lumbar xrays  Continue to wear brace, elevate head of bed greater than 45°   Scalp laceration repair with 8 staples in ED  Pain control  7/22 Neurosurgery cons; L1 fracture  Fall from 5 ft, positive head trauma and questionable loss of consciousness with L1 SEP fracture - TLICS 1  CT lumbar spine 07/22/2020:  Acute fracture through superior endplate of L1 with minimal loss of height  No significant bony retropulsion into the spinal canal  TLSO brace when out of bed and head of bed greater than 45  Upright lumbar xrays  7/23 Progress notes; Pain control  7/23 Acute Pain Management cons; Schedule tylenol, gabapentin and methocarbamol  Increase prn oxycodone and d/c Iv Dilaudid  Recommend adding Lidocaine patch for 12 hrs daily  Continue ketorolac at 30 mg IV q 6 hours scheduled x5 days total if okay with neurosurgery  7/24 Progress notes; No surgical intervention at this time  Multimodal pain regimen  TLSO brace when OOB  Can consider Toradol 30 mg IV q6h [place] x 5 doses if cleared by neurosurgery  HS lispro insulin increased to 20 units from 15 units  On metformin and amaryl at home, currently held  Pt c/o low back pain, unsure if changes in pain regimen per APS yesterday have made a different in his pain  Complaining of GERD symptoms  No clear d/c recommendation by PT as this time  Pain somewhat improved this am but pt continues to have hot flashes, hypertension and diaphoresis any time he stands up which takes approximately 30 minutes to resolve per Pain management       ED Triage Vitals   Temperature Pulse Respirations Blood Pressure SpO2   07/22/20 1130 07/22/20 1130 07/22/20 1130 07/22/20 1130 07/22/20 1130   97 5 °F (36 4 °C) 96 12 (!) 164/115 97 %      Temp Source Heart Rate Source Patient Position - Orthostatic VS BP Location FiO2 (%)   07/22/20 1130 07/22/20 1130 07/22/20 1130 07/22/20 2249 --   Tympanic Monitor Lying Right arm       Pain Score       07/22/20 1630       5          Wt Readings from Last 1 Encounters:   07/22/20 108 kg (238 lb 1 5 oz)     Additional Vital Signs:   07/24/20 07:29:59  98 6 °F (37 °C)  104  16  129/90  103  96 %  --  --   07/24/20 01:55:26  98 5 °F (36 9 °C)  102  18  122/86  98  95 %  --  --   07/23/20 2308  --  --  --  154/98  --  --  --  Lying   07/23/20 2233  --  --  --  162/104  Abnormal    --  --  --  Lying   BP: Katelyn Ehrich from Trauma notified and aware at 07/23/20 2233 07/23/20 22:30:56  --  103  --  161/107Abnormal    125  94 %  --  Lying   BP: Katelyn Ryanich from Trauma notified and aware at 07/23/20 2230 07/23/20 2134  --  --  --  162/102  Abnormal   --  --  --  Lying   07/23/20 21:32:07  98 9 °F (37 2 °C)  105  19  159/106  Abnormal   124  97 %  --  Lying   07/23/20 1923  --  --  --  154/96  --  --  --  Lying   07/23/20 19:15:10  99 °F (37 2 °C)  103  18  153/104  Abnormal                07/23/20 06:42:49  98 7 °F (37 1 °C)  107  Abnormal   18  133/95  108  97 %  --  --   07/23/20 03:17:15  98 4 °F (36 9 °C)  115  Abnormal   18  132/95  107  97 %  None (Room air)  Lying   07/22/20 22:53:33  --  129  Abnormal   18  151/101  Abnormal   118  95 %  None (Room air)  Lying   07/22/20 22:49:25  99 °F (37 2 °C)  124  Abnormal   --  153/105  Abnormal   121  95 %  None (Room air)  Lying   07/22/20 22:31:20  --  126  Abnormal   --  137/96  110  94 %         07/22/20 19:16:39  98 °F (36 7 °C)  114  Abnormal   --  141/98  112  93 %  --  --   07/22/20 1630  --  82  16  142/82  --  95 %  --  Lying   07/22/20 1530  --  98  14  140/80  --  95 %  --  Lying   07/22/20 14:30:43  --  100  14  151/87  --  95 %  --  Lying   07/22/20 1330  --  102  14  156/106  Abnormal              Pertinent Labs/Diagnostic Test Results:   7/22 CT Lumbar Spine -  Acute fracture through the superior endplate of L1 with minimal loss of height  No significant bony retropulsion into the spinal canal   Degenerative changes of the lumbar spine  7/22 CT Cervical Spine - No acute fracture or evidence for traumatic malalignment      7/22 CT Head - Left parietal scalp hematoma    No acute intracranial hemorrhage or depressed calvarial fracture identified    7/22 CT Abd/Pelvis - Unchanged appearance of the known mild superior endplate compression deformity of the L1 vertebral body without progressive loss of height  No other sites of injury are identified within the abdomen or the pelvis      7/22 PCXR - Right mid lung opacity or mass measuring approximately 3 7 cm      Results from last 7 days   Lab Units 07/23/20  0616 07/22/20  1725   WBC Thousand/uL 13 98* 15 93*   HEMOGLOBIN g/dL 14 8 15 2   HEMATOCRIT % 43 8 44 9   PLATELETS Thousands/uL 284 284   NEUTROS ABS Thousands/µL  --  13 38*         Results from last 7 days   Lab Units 07/23/20  0616 07/22/20  1725   SODIUM mmol/L 136 138   POTASSIUM mmol/L 3 8 4 6   CHLORIDE mmol/L 103 105   CO2 mmol/L 25 23   ANION GAP mmol/L 8 10   BUN mg/dL 19 25   CREATININE mg/dL 0 72 0 77   EGFR ml/min/1 73sq m 102 99   CALCIUM mg/dL 9 3 9 5     Results from last 7 days   Lab Units 07/23/20  0616 07/22/20  1725   GLUCOSE RANDOM mg/dL 314* 317*       Results from last 7 days   Lab Units 07/22/20  1725   ETHANOL LVL mg/dL <3   ACETAMINOPHEN LVL ug/mL <2*   SALICYLATE LVL mg/dL 3       ED Treatment:   Medication Administration from 07/22/2020 1118 to 07/22/2020 1913       Date/Time Order Dose Route Action     07/22/2020 1158 tetanus-diphtheria-acellular pertussis (BOOSTRIX) IM injection 0 5 mL 0 5 mL Intramuscular Given     07/22/2020 1158 ondansetron (ZOFRAN) injection 4 mg 4 mg Intravenous Given     07/22/2020 1254 lidocaine-epinephrine (XYLOCAINE/EPINEPHRINE) 1 %-1:100,000 injection 10 mL 10 mL Infiltration Given     07/22/2020 1256 acetaminophen (TYLENOL) tablet 975 mg 975 mg Oral Given     07/22/2020 1253 ketorolac (TORADOL) injection 15 mg 15 mg Intravenous Given     07/22/2020 1827 iohexol (OMNIPAQUE) 350 MG/ML injection (MULTI-DOSE) 100 mL 100 mL Intravenous Given        Past Medical History:   Diagnosis Date    Alcoholism ([address] )     Anxiety     Diabetes ([facility] )     Diabetes mellitus ([place] [address] )     High cholesterol      Present on Admission:   Acute pain due to trauma      Admitting Diagnosis: Injury [T14 90XA]  Scalp laceration, initial encounter [S01 01XA]  Closed compression fracture of L1 lumbar vertebra, initial encounter ([facility] ) [S32 010A]  Age/Sex: 61 y o  male     Admission Orders:  Neuro checks q1h  Spine Brace    Scheduled Medications:    Medications:  acetaminophen 975 mg Oral Q8H [address]   amLODIPine 5 mg Oral Daily   enoxaparin 30 mg Subcutaneous BID   famotidine 20 mg Oral BID   gabapentin 100 mg Oral TID   insulin glargine 35 Units Subcutaneous HS   insulin lispro 4-20 Units Subcutaneous TID AC   labetalol 100 mg Oral Q12H [address]   lisinopril 10 mg Oral Daily   melatonin 6 mg Oral HS   methocarbamol 500 mg Oral Q6H [address]   nicotine 1 patch Transdermal Daily   senna-docusate sodium 1 tablet Oral BID     Continuous IV Infusions: None     PRN Meds:  HYDROmorphone 0 5 mg Intravenous Q1H PRN 7/23 x2   Hydralazine 5mg  Intravenous Q6H PRN 7/23 x2   ondansetron 4 mg Intravenous Q6H PRN 7/23 x1, 7/24 x1   oxyCODONE 10 mg Oral Q4H PRN 7/22 x2, 7/23 x2   oxyCODONE 15 mg Oral Q4H PRN 7/23 x3, 7/24 x2     Network Utilization Review Department  [email or web address]  ATTENTION: Please call with any questions or concerns to [phone] and carefully listen to the prompts so that you are directed to the right person  All voicemails are confidential   Aric Junior all requests for admission clinical reviews, approved or denied determinations and any other requests to dedicated fax number below belonging to the campus where the patient is receiving treatment   List of dedicated fax numbers for the Facilities:  FACILITY NAME UR FAX NUMBER   ADMISSION DENIALS (Administrative/Medical Necessity) [phone]   [address] (Maternity/NICU/Pediatrics) [phone]   Riley Cline [address] [facility] [phone]   Kaleb Seay Dionte Kwok [phone]   Margarita Kingston [facility] [address] [facility] [phone]   [facility]  [phone]   [address] [facility]  [phone]   [address] [place] [address] [address] [facility] [phone]

## 2020-07-24 NOTE — PLAN OF CARE
Problem: PHYSICAL THERAPY ADULT  Goal: Performs mobility at highest level of function for planned discharge setting  See evaluation for individualized goals  Description  Treatment/Interventions: Functional transfer training, LE strengthening/ROM, Elevations, Therapeutic exercise, Endurance training, Patient/family training, Equipment eval/education, Bed mobility, Gait training, Spoke to nursing, Spoke to advanced practitioner, OT  Equipment Recommended: Walker(RW)       See flowsheet documentation for full assessment, interventions and recommendations  Outcome: Progressing  Note:   Prognosis: Fair  Problem List: Decreased strength, Decreased range of motion, Decreased endurance, Impaired balance, Decreased mobility, Pain, Orthopedic restrictions  Assessment: Pt presents with decreased mobility, strength, balance, and activity tolerance  Pt demonstrated ability to perform bed mobility at supervision  Pt sat EOB and immediately reports dizzines, nausea, diaphoretic, and dry heaving  Pt sat EOB ~3 minutes w/ no resolution of symptoms  Pt requesting to be returned supine  Pt educated on importance of elevating HOB throughout the day to orient body to an upright position  Pt continues to benefit from skilled therapy to maximize functional independence  Recommendation at this time is TBD- medical status limiting evaluation at this time  Pt requires transfers, mobility, and stair trials before d/c  Pt significantly limited at this time 2' N/V, dizziness, and diaphoresis  Barriers to Discharge: None     PT Discharge Recommendation: (TBD- medical status limiting evaluation at this time)     PT - OK to Discharge: No    See flowsheet documentation for full assessment

## 2020-07-24 NOTE — PHYSICAL THERAPY NOTE
Pt presents with decreased mobility, strength, balance, and activity tolerance  Pt demonstrated ability to perform bed mobility at supervision  Pt sat EOB and immediately reports dizzines, nausea, diaphoretic, and dry heaving  Pt sat EOB ~3 minutes w/ no resolution of symptoms  Pt requesting to be returned supine  Pt educated on importance of elevating HOB throughout the day to orient body to an upright position  Pt continues to benefit from skilled therapy to maximize functional independence  Recommendation at this time is rehab  Pt requires transfers, mobility, and stair trials before d/c  Pt significantly limited at this time 2' N/V, dizziness, and diaphoresis

## 2020-07-24 NOTE — OCCUPATIONAL THERAPY NOTE
OccupationalTherapy Progress Note     Patient Name: Karine Pfeiffer  Today's Date: 7/24/2020  Problem List  Principal Problem:    Closed compression fracture of body of L1 vertebra [facility])  Active Problems:    Fall    Pulmonary nodule    Type 2 diabetes mellitus ([facility] )    Acute pain due to trauma          07/24/20 1015   Restrictions/Precautions   Weight Bearing Precautions Per Order No   Braces or Orthoses TLSO   Other Precautions Chair Alarm;Cognitive; Fall Risk;Spinal precautions   Lifestyle   Autonomy I adls and mobiltiy - i iadls   Reciprocal Relationships supportive s/o   Service to Others not working    Intrinsic Gratification sedentary   Pain Assessment   Pain Assessment Tool 0-10   Pain Score 4   Pain Location/Orientation Location: Neck; Location: Head   Hospital Pain Intervention(s) Repositioned; Emotional support   Bed Mobility   Supine to Sit 5  Supervision   Sit to Supine 5  Supervision   Additional Comments pt laying in bed on approach - agreeable to try to get oob - able to sit self up on EOB w/o difficulty - sat at EOB x 3 min - c/o dizziness, nausea - no resolution of symptoms with time - pt noted to become diaphoretic, pale and began dry heaving - needed to return to supine - symptoms resolved after returning to supine after ~5 min - educated pt on importance of gradually increasing HOB t/o day to increase upright tolerance - pt expressed understanding    Transfers   Sit to Stand Unable to assess   Cognition   Arousal/Participation Alert; Cooperative   Attention Within functional limits   Orientation Level Oriented X4   Memory Decreased recall of precautions   Following Commands Follows one step commands without difficulty   Comments unable to fully assess cognitive function 2* poor tolerance to upright    Activity Tolerance   Activity Tolerance Treatment limited secondary to medical complications (Comment)   Assessment   Assessment pt seen for OT session -continues to be limited by poor tolerance to upright activity - trauma informed of same - will need to continue to follow and assess physical needs as pt is able to tolerate activity   Plan   Treatment Interventions ADL retraining;Functional transfer training; Endurance training;Patient/family training;Cognitive reorientation;Equipment evaluation/education; Compensatory technique education; Activityengagement   Goal Expiration Date 08/06/20   OT Treatment Day 1   OT Frequency 3-5x/wk   Recommendation   OT Discharge Recommendation   (pending )     Lou Ch, OT

## 2020-07-24 NOTE — SOCIAL WORK
Pt not medically stable for d/c at this time  Pt is home v rehab   Pt wants to d/c home when medically stable

## 2020-07-24 NOTE — PLAN OF CARE
Problem: PAIN - ADULT  Goal: Verbalizes/displays adequate comfort level or baseline comfort level  Description  Interventions:  - Encourage patient to monitor pain and request assistance  - Assess pain using appropriate pain scale  - Administer analgesics based on type and severity of pain and evaluate response  - Implement non-pharmacological measures as appropriate and evaluate response  - Consider cultural and social influences on pain and pain management  - Notify physician/advanced practitioner if interventions unsuccessful or patient reports new pain  Outcome: Progressing     Problem: SAFETY ADULT  Goal: Patient will remain free of falls  Description  INTERVENTIONS:  - Assess patient frequently for physical needs  -  Identify cognitive and physical deficits and behaviors that affect risk of falls    -  [place] fall precautions as indicated by assessment   - Educate patient/family on patient safety including physical limitations  - Instruct patient to call for assistance with activity based on assessment  - Modify environment to reduce risk of injury  - Consider OT/PT consult to assist with strengthening/mobility  Outcome: Progressing  Goal: Maintain or return to baseline ADL function  Description  INTERVENTIONS:  -  Assess patient's ability to carry out ADLs; assess patient's baseline for ADL function and identify physical deficits which impact ability to perform ADLs (bathing, care of mouth/teeth, toileting, grooming, dressing, etc )  - Assess/evaluate cause of self-care deficits   - Assess range of motion  - Assess patient's mobility; develop plan if impaired  - Assess patient's need for assistive devices and provide as appropriate  - Encourage maximum independence but intervene and supervise when necessary  - Involve family in performance of ADLs  - Assess for home care needs following discharge   - Consider OT consult to assist with ADL evaluation and planning for discharge  - Provide patient education as appropriate  Outcome: Progressing  Goal: Maintain or return mobility status to optimal level  Description  INTERVENTIONS:  - Assess patient's baseline mobility status (ambulation, transfers, stairs, etc )    - Identify cognitive and physical deficits and behaviors that affect mobility  - Identify mobility aids required to assist with transfers and/or ambulation (gait belt, sit-to-stand, lift, walker, cane, etc )  - [place] fall precautions as indicated by assessment  - Record patient progress and toleration of activity level on Mobility SBAR; progress patient to next Phase/Stage  - Instruct patient to call for assistance with activity based on assessment  - Consider rehabilitation consult to assist with strengthening/weightbearing, etc   Outcome: Progressing     Problem: Potential for Falls  Goal: Patient will remain free of falls  Description  INTERVENTIONS:  - Assess patient frequently for physical needs  -  Identify cognitive and physical deficits and behaviors that affect risk of falls    -  [place] fall precautions as indicated by assessment   - Educate patient/family on patient safety including physical limitations  - Instruct patient to call for assistance with activity based on assessment  - Modify environment to reduce risk of injury  - Consider OT/PT consult to assist with strengthening/mobility  Outcome: Progressing     Problem: SKIN/TISSUE INTEGRITY - ADULT  Goal: Skin integrity remains intact  Description  INTERVENTIONS  - Identify patients at risk for skin breakdown  - Assess and monitor skin integrity  - Assess and monitor nutrition and hydration status  - Monitor labs (i e  albumin)  - Assess for incontinence   - Turn and reposition patient  - Assist with mobility/ambulation  - Relieve pressure over bony prominences  - Avoid friction and shearing  - Provide appropriate hygiene as needed including keeping skin clean and dry  - Evaluate need for skin moisturizer/barrier cream  - Collaborate with interdisciplinary team (i e  Nutrition, Rehabilitation, etc )   - Patient/family teaching  Outcome: Progressing  Goal: Incision(s), wounds(s) or drain site(s) healing without S/S of infection  Description  INTERVENTIONS  - Assess and document risk factors for skin impairment   - Assess and document dressing, incision, wound bed, drain sites and surrounding tissue  - Consider nutrition services referral as needed  - Oral mucous membranes remain intact  - Provide patient/ family education  Outcome: Progressing  Goal: Oral mucous membranes remain intact  Description  INTERVENTIONS  - Assess oral mucosa and hygiene practices  - Implement preventative oral hygiene regimen  - Implement oral medicated treatments as ordered  - Initiate Nutrition services referral as needed  Outcome: Progressing     Problem: MUSCULOSKELETAL - ADULT  Goal: Maintain or return mobility to safest level of function  Description  INTERVENTIONS:  - Assess patient's ability to carry out ADLs; assess patient's baseline for ADL function and identify physical deficits which impact ability to perform ADLs (bathing, care of mouth/teeth, toileting, grooming, dressing, etc )  - Assess/evaluate cause of self-care deficits   - Assess range of motion  - Assess patient's mobility  - Assess patient's need for assistive devices and provide as appropriate  - Encourage maximum independence but intervene and supervise when necessary  - Involve family in performance of ADLs  - Assess for home care needs following discharge   - Consider OT consult to assist with ADL evaluation and planning for discharge  - Provide patient education as appropriate  Outcome: Progressing  Goal: Maintain proper alignment of affected body part  Description  INTERVENTIONS:  - Support, maintain and protect limb and body alignment  - Provide patient/ family with appropriate education  Outcome: Progressing

## 2020-07-25 PROBLEM — S06.0X0A CONCUSSION WITHOUT LOSS OF CONSCIOUSNESS: Status: ACTIVE | Noted: 2020-07-25

## 2020-07-25 LAB
ANION GAP SERPL CALCULATED.3IONS-SCNC: 7 MMOL/L (ref 4–13)
BASOPHILS # BLD AUTO: 0.04 THOUSANDS/ΜL (ref 0–0.1)
BASOPHILS NFR BLD AUTO: 0 % (ref 0–1)
BUN SERPL-MCNC: 13 MG/DL (ref 5–25)
CALCIUM SERPL-MCNC: 8.7 MG/DL (ref 8.3–10.1)
CHLORIDE SERPL-SCNC: 100 MMOL/L (ref 100–108)
CO2 SERPL-SCNC: 28 MMOL/L (ref 21–32)
CREAT SERPL-MCNC: 0.66 MG/DL (ref 0.6–1.3)
EOSINOPHIL # BLD AUTO: 0.23 THOUSAND/ΜL (ref 0–0.61)
EOSINOPHIL NFR BLD AUTO: 3 % (ref 0–6)
ERYTHROCYTE [DISTWIDTH] IN BLOOD BY AUTOMATED COUNT: 12.6 % (ref 11.6–15.1)
GFR SERPL CREATININE-BSD FRML MDRD: 106 ML/MIN/1.73SQ M
GLUCOSE SERPL-MCNC: 213 MG/DL (ref 65–140)
GLUCOSE SERPL-MCNC: 241 MG/DL (ref 65–140)
GLUCOSE SERPL-MCNC: 253 MG/DL (ref 65–140)
GLUCOSE SERPL-MCNC: 276 MG/DL (ref 65–140)
GLUCOSE SERPL-MCNC: 295 MG/DL (ref 65–140)
HCT VFR BLD AUTO: 41.5 % (ref 36.5–49.3)
HGB BLD-MCNC: 14 G/DL (ref 12–17)
IMM GRANULOCYTES # BLD AUTO: 0.04 THOUSAND/UL (ref 0–0.2)
IMM GRANULOCYTES NFR BLD AUTO: 0 % (ref 0–2)
LYMPHOCYTES # BLD AUTO: 3.14 THOUSANDS/ΜL (ref 0.6–4.47)
LYMPHOCYTES NFR BLD AUTO: 35 % (ref 14–44)
MCH RBC QN AUTO: 30.3 PG (ref 26.8–34.3)
MCHC RBC AUTO-ENTMCNC: 33.7 G/DL (ref 31.4–37.4)
MCV RBC AUTO: 90 FL (ref 82–98)
MONOCYTES # BLD AUTO: 1.02 THOUSAND/ΜL (ref 0.17–1.22)
MONOCYTES NFR BLD AUTO: 11 % (ref 4–12)
NEUTROPHILS # BLD AUTO: 4.64 THOUSANDS/ΜL (ref 1.85–7.62)
NEUTS SEG NFR BLD AUTO: 51 % (ref 43–75)
NRBC BLD AUTO-RTO: 0 /100 WBCS
PLATELET # BLD AUTO: 273 THOUSANDS/UL (ref 149–390)
PMV BLD AUTO: 9.2 FL (ref 8.9–12.7)
POTASSIUM SERPL-SCNC: 3.3 MMOL/L (ref 3.5–5.3)
RBC # BLD AUTO: 4.62 MILLION/UL (ref 3.88–5.62)
SODIUM SERPL-SCNC: 135 MMOL/L (ref 136–145)
WBC # BLD AUTO: 9.11 THOUSAND/UL (ref 4.31–10.16)

## 2020-07-25 PROCEDURE — 97535 SELF CARE MNGMENT TRAINING: CPT

## 2020-07-25 PROCEDURE — 82948 REAGENT STRIP/BLOOD GLUCOSE: CPT

## 2020-07-25 PROCEDURE — 85025 COMPLETE CBC W/AUTO DIFF WBC: CPT | Performed by: PHYSICIAN ASSISTANT

## 2020-07-25 PROCEDURE — 99232 SBSQ HOSP IP/OBS MODERATE 35: CPT | Performed by: SURGERY

## 2020-07-25 PROCEDURE — 97116 GAIT TRAINING THERAPY: CPT

## 2020-07-25 PROCEDURE — 97530 THERAPEUTIC ACTIVITIES: CPT

## 2020-07-25 PROCEDURE — 80048 BASIC METABOLIC PNL TOTAL CA: CPT | Performed by: PHYSICIAN ASSISTANT

## 2020-07-25 RX ORDER — POTASSIUM CHLORIDE 20 MEQ/1
40 TABLET, EXTENDED RELEASE ORAL ONCE
Status: COMPLETED | OUTPATIENT
Start: 2020-07-25 | End: 2020-07-25

## 2020-07-25 RX ADMIN — OXYCODONE HYDROCHLORIDE 15 MG: 10 TABLET ORAL at 04:44

## 2020-07-25 RX ADMIN — OXYCODONE HYDROCHLORIDE 15 MG: 10 TABLET ORAL at 00:28

## 2020-07-25 RX ADMIN — ONDANSETRON 4 MG: 2 INJECTION INTRAMUSCULAR; INTRAVENOUS at 12:27

## 2020-07-25 RX ADMIN — METHOCARBAMOL TABLETS 500 MG: 500 TABLET, COATED ORAL at 00:28

## 2020-07-25 RX ADMIN — INSULIN LISPRO 12 UNITS: 100 INJECTION, SOLUTION INTRAVENOUS; SUBCUTANEOUS at 18:07

## 2020-07-25 RX ADMIN — INSULIN LISPRO 8 UNITS: 100 INJECTION, SOLUTION INTRAVENOUS; SUBCUTANEOUS at 12:28

## 2020-07-25 RX ADMIN — OXYCODONE HYDROCHLORIDE 15 MG: 10 TABLET ORAL at 21:55

## 2020-07-25 RX ADMIN — OXYCODONE HYDROCHLORIDE 15 MG: 10 TABLET ORAL at 15:40

## 2020-07-25 RX ADMIN — LABETALOL HYDROCHLORIDE 100 MG: 100 TABLET, FILM COATED ORAL at 08:24

## 2020-07-25 RX ADMIN — INSULIN LISPRO 12 UNITS: 100 INJECTION, SOLUTION INTRAVENOUS; SUBCUTANEOUS at 08:28

## 2020-07-25 RX ADMIN — MELATONIN 6 MG: at 21:54

## 2020-07-25 RX ADMIN — GABAPENTIN 100 MG: 100 CAPSULE ORAL at 15:40

## 2020-07-25 RX ADMIN — FAMOTIDINE 20 MG: 20 TABLET ORAL at 08:24

## 2020-07-25 RX ADMIN — DOCUSATE SODIUM AND SENNOSIDES 1 TABLET: 8.6; 5 TABLET ORAL at 08:24

## 2020-07-25 RX ADMIN — ENOXAPARIN SODIUM 30 MG: 30 INJECTION SUBCUTANEOUS at 08:24

## 2020-07-25 RX ADMIN — ACETAMINOPHEN 975 MG: 325 TABLET, FILM COATED ORAL at 21:54

## 2020-07-25 RX ADMIN — AMLODIPINE BESYLATE 5 MG: 5 TABLET ORAL at 08:25

## 2020-07-25 RX ADMIN — METHOCARBAMOL TABLETS 500 MG: 500 TABLET, COATED ORAL at 23:24

## 2020-07-25 RX ADMIN — OXYCODONE HYDROCHLORIDE 15 MG: 10 TABLET ORAL at 09:26

## 2020-07-25 RX ADMIN — POTASSIUM CHLORIDE 40 MEQ: 1500 TABLET, EXTENDED RELEASE ORAL at 08:24

## 2020-07-25 RX ADMIN — METHOCARBAMOL TABLETS 500 MG: 500 TABLET, COATED ORAL at 18:07

## 2020-07-25 RX ADMIN — FAMOTIDINE 20 MG: 20 TABLET ORAL at 18:07

## 2020-07-25 RX ADMIN — ACETAMINOPHEN 975 MG: 325 TABLET, FILM COATED ORAL at 13:44

## 2020-07-25 RX ADMIN — METHOCARBAMOL TABLETS 500 MG: 500 TABLET, COATED ORAL at 12:35

## 2020-07-25 RX ADMIN — LISINOPRIL 10 MG: 10 TABLET ORAL at 08:25

## 2020-07-25 RX ADMIN — LORAZEPAM 0.5 MG: 0.5 TABLET ORAL at 23:25

## 2020-07-25 RX ADMIN — NICOTINE 1 PATCH: 21 PATCH, EXTENDED RELEASE TRANSDERMAL at 08:25

## 2020-07-25 RX ADMIN — INSULIN GLARGINE 35 UNITS: 100 INJECTION, SOLUTION SUBCUTANEOUS at 21:54

## 2020-07-25 RX ADMIN — LABETALOL HYDROCHLORIDE 100 MG: 100 TABLET, FILM COATED ORAL at 21:54

## 2020-07-25 RX ADMIN — DOCUSATE SODIUM AND SENNOSIDES 1 TABLET: 8.6; 5 TABLET ORAL at 18:07

## 2020-07-25 RX ADMIN — ACETAMINOPHEN 975 MG: 325 TABLET, FILM COATED ORAL at 05:43

## 2020-07-25 RX ADMIN — GABAPENTIN 100 MG: 100 CAPSULE ORAL at 21:54

## 2020-07-25 RX ADMIN — POLYETHYLENE GLYCOL 3350 17 G: 17 POWDER, FOR SOLUTION ORAL at 06:25

## 2020-07-25 RX ADMIN — ENOXAPARIN SODIUM 30 MG: 30 INJECTION SUBCUTANEOUS at 18:09

## 2020-07-25 RX ADMIN — GABAPENTIN 100 MG: 100 CAPSULE ORAL at 08:24

## 2020-07-25 RX ADMIN — METHOCARBAMOL TABLETS 500 MG: 500 TABLET, COATED ORAL at 05:43

## 2020-07-25 NOTE — OCCUPATIONAL THERAPY NOTE
Occupational Therapy Treatment Note      Lazarus Hsu    7/25/2020    Principal Problem:    Closed compression fracture of body of L1 vertebra (HCC)  Active Problems:    Fall    Pulmonary nodule    Type 2 diabetes mellitus (HCC)    Acute pain due to trauma    Concussion without loss of consciousness      Past Medical History:   Diagnosis Date    Alcoholism ([facility] )     Anxiety     Diabetes ([facility] )     Diabetes mellitus ([facility] )     High cholesterol        History reviewed  No pertinent surgical history  07/25/20 1003   Restrictions/Precautions   Weight Bearing Precautions Per Order No   Braces or Orthoses TLSO   Other Precautions Chair Alarm;Multiple lines;Telemetry; Fall Risk;Pain;Spinal precautions   Lifestyle   Autonomy I adls and mobiltiy - i iadls   Reciprocal Relationships supportive s/o   Service to Others not working    Intrinsic Gratification sedentary   Pain Assessment   Pain Assessment Tool 0-10   Pain Score 9   Pain Location/Orientation Location: Back;Orientation: Lower   Pain Onset/Description Onset: Ongoing   Patient's Stated Pain Goal No pain   Hospital Pain Intervention(s) Repositioned; Ambulation/increased activity; Emotional support   ADL   Toileting Assistance  5  Supervision/Setup   Toileting Deficit Setup;Use of bedpan/urinal setup   Toileting Comments pt found supine in bed attempting to use bedpan; pt unable to have BM but reported having to urinate  Pt handed urinal and able to manage toileting w/ setup  Also able to bridge and move bedpan independently  Used urinal x2 during treatment; both times from supine/seated position   Offered to have pt use bathroom once up but pt reporting he no longer had to go   Bed Mobility   Rolling L 5  Supervision   Additional items Increased time required;Verbal cues   Supine to Sit 5  Supervision   Additional items Increased time required;Verbal cues   Sit to Supine Unable to assess   Additional Comments Pt log rolled to L side w/ S, +VC for safe log roll technique  Pt went from supine to sit w/ S, HOB partially elevated for assist  TLSO donned with assistance w/ seated EOB  Transfers   Sit to Stand 4  Minimal assistance   Additional items Assist x 1; Increased time required;Verbal cues   Stand to Sit 4  Minimal assistance   Additional items Assist x 1; Increased time required;Verbal cues   Additional Comments Pt performed STS transfer from EOB w/ MIn A x1 for mild force production into standing  RW for support  +VC for hand placement  Pt reported feeling dizzy/nausea while seated EOB; /97; w/ 2 min seated break pt reporting symptoms starting to subside  Pt then stood and /98  After short functional mobility in room, pt seated in recliner and /96  Pt reported feeling diaphoretic; given cold cloth to keep on head during treatment which helped resolve pt's symptoms  Functional Mobility   Functional Mobility 4  Minimal assistance   Additional Comments pt ambulated short household distance w/ Min A x1, RW for support in standing  SBA 2nd for chair follow 2/2 ongoing symptoms of dizziness,nausea  Did not require chair for seated rest break   Additional items Rolling walker   Cognition   Overall Cognitive Status Impaired   Arousal/Participation Responsive; Cooperative   Attention Within functional limits   Orientation Level Oriented X4   Memory Within functional limits   Following Commands Follows one step commands without difficulty   Comments Pt is pleasant and cooperative; able to recall spinal precautions w/ VC but has decreased carryover/safety during functional tasks  Needs occasional safety cues     Activity Tolerance   Activity Tolerance Patient limited by fatigue;Patient limited by pain   Medical Staff Made Aware PT, RN, Trauma   Assessment   Assessment Patient participated in Skilled OT session 7/25/2020 with interventions consisting of ADL re training with the use of correct body mechnaics, Energy Conservation techniques, deep breathing technique, safety awareness and fall prevention techniques, maintaining spinal precautions,  therapeutic activities to: increase activity tolerance and increase OOB/ sitting tolerance   Patient agreeable to OT treatment session, upon arrival patient was found supine in bed  In comparison to previous session, patient with improvements in EOB sitting tolerance, functional mobility, standing tolerance, endurance and toileting  Patient requiring verbal cues for safety, verbal cues for correct technique, verbal cues for pacing thru activity steps and frequent rest periods  Patient continues to be functioning below baseline level, occupational performance remains limited secondary to factors listed above and increased risk for falls and injury  From OT standpoint, recommendation at time of d/c would be Short Term Rehab; pending progress, LOS and availability of increased support, may progress to home w/ family support  Patient to benefit from continued Occupational Therapy treatment while in the hospital to address deficits as defined above and maximize level of functional independence with ADLs and functional mobility  Plan   Treatment Interventions ADL retraining;Functional transfer training; Endurance training;Cognitive reorientation;Patient/family training;Equipment evaluation/education; Compensatory technique education;Continued evaluation; Energy conservation; Activityengagement   Goal Expiration Date 08/06/20   OT Treatment Day 2   OT Frequency 3-5x/wk   Recommendation   OT Discharge Recommendation Post-Acute Rehabilitation Services  (vs  home pending progress/LOS)   OT - OK to Discharge Yes  (when medically stable)   Barthel Index   Feeding 10   Bathing 0   Grooming Score 5   Dressing Score 5   Bladder Score 10   Bowels Score 10   Toilet Use Score 5   Transfers (Bed/Chair) Score 10   Mobility (Level Surface) Score 0   Stairs Score 0   Barthel Index Score 55       ADDITIONAL OT GOALS:    - Pt will improve functional transfers to Mod I on/off all surfaces using DME as needed w/ G balance/safety     - Pt will improve functional mobility during ADL/IADL/leisure tasks to Mod I using DME as needed w/ G balance/safety       Areta Money MS, OTR/L

## 2020-07-25 NOTE — PROGRESS NOTES
Progress Note - Karine Pfeiffer 1960, 61 y o  male MRN: [id number]    Unit/Bed#: [facility] 604-01 Encounter: [id number]    Primary Care Provider: Akbar García DO   Date and time admitted to hospital: 7/22/2020 11:18 AM        Concussion without loss of consciousness  Assessment & Plan  - concussion education given at bedside  - patient informed that he needs to get up and ambulate today with PT and OT  - will require outpatient follow-up  - continue p r n  Pain control  - monitor for any worsening nausea or vomiting  - no new visual or auditory deficits    Acute pain due to trauma  Assessment & Plan  · APS consult, recs appreciated    · Regimen as below:  · Tylenol 975 mg q8h [place]   · Oxycodone increased to 10/15 mg for mod/severe pain q4h prn  · Gabpentin 100 mg TID [place]   · Robaxin 500 mg q6h [place]  · Lidocaine patch to L spine   · Rec'd to d/c on hydrocodone/acetaminophen 5-325 mg x3-4 days and robaxin 500 q6h [place] x 4 days   · Suggest continued outpatient follow up with [facility]     Type 2 diabetes mellitus (HCC)  Assessment & Plan  (P) [phone]   - Restarted on home regimen Lantus 35 units  - Algorithm 5 SSI    - Advance as needed   - On metformin and amaryl at home, currently held     Pulmonary nodule  Assessment & Plan  -Right mid lung opacity or mass measuring approximately 3 7 cm, will need outpatient follow up CT as he has smoking h/o    Fall  Assessment & Plan  · Injuries as below   · PT/OT - rec post acute IP rehab     * Closed compression fracture of body of L1 vertebra (HCC)  Assessment & Plan  Status post fall from 5 ft, positive head trauma and questionable loss of consciousness with L1 SEP fracture - TLICS 1    · CT lumbar spine 07/22/2020:  Acute fracture through superior endplate of L1 with minimal loss of height    No significant bony retropulsion into the spinal canal      · No surgical intervention at this time   · TLSO brace when out of bed and head of bed greater than 45°  · Upright lumbar spine XR completed, reviewed by neurosurgery   · Multimodal pain regimen as below  · Mobilize as tolerated with assistance, PT / OT evaluation, must wear brace  · Neurosurgery follow up in 2 weeks as outpatient with repeat lumbar spine XR     DVT prophylaxis: SCDs and Lovenox  PT and OT: eval and treat    Disposition:  DC planning  PT and OT evaluation  Continue p r n  Pain control  Add lengthy discussion with patient today regarding the plan moving forward in regards of his possible concussion as well as compression fracture  Informed the importance of wearing the brace at this time  Recommending PT and OT continue to evaluate patient  Up and out of bed today  SUBJECTIVE:  Chief Complaint: "No new complaints "    Subjective:  Patient reports that he has not been wearing his brace as well as he feels he has concussion  He is reporting headaches  He is also reporting some back pain  Does have a history of chronic back pain performed this a m  Otherwise resting comfortably        OBJECTIVE:     Meds/Allergies     Current Facility-Administered Medications:     acetaminophen (TYLENOL) tablet 975 mg, 975 mg, Oral, Q8H [facility], Ida Aguilar MD, 975 mg at 07/25/20 0543    amLODIPine (NORVASC) tablet 5 mg, 5 mg, Oral, Daily, Bridgette Vidal MD, 5 mg at 07/25/20 0825    calcium carbonate (TUMS) chewable tablet 500 mg, 500 mg, Oral, Daily PRN, Hellen Price MD, 500 mg at 07/24/20 2030    enoxaparin (LOVENOX) subcutaneous injection 30 mg, 30 mg, Subcutaneous, BID, Ida Aguilar MD, 30 mg at 07/25/20 0824    famotidine (PEPCID) tablet 20 mg, 20 mg, Oral, BID, Mary Beth Turner PA-C, 20 mg at 07/25/20 3374    gabapentin (NEURONTIN) capsule 100 mg, 100 mg, Oral, TID, Bridgette Vidal MD, 100 mg at 07/25/20 0824    hydrALAZINE (APRESOLINE) injection 5 mg, 5 mg, Intravenous, Q6H PRN, Lucky Mortimer, PA-C, 5 mg at 07/23/20 2236    insulin glargine (LANTUS) subcutaneous injection 35 Units 0 35 mL, 35 Units, Subcutaneous, HS, Rollo Meth, PA-C, 35 Units at 07/24/20 2124    insulin lispro (HumaLOG) 100 units/mL subcutaneous injection 4-20 Units, 4-20 Units, Subcutaneous, TID AC, 12 Units at 07/25/20 0828 **AND** Fingerstick Glucose (POCT), , , TID AC, Christiano Collier MD    labetalol (NORMODYNE) tablet 100 mg, 100 mg, Oral, Q12H [address], Christiano Collier MD, 100 mg at 07/25/20 0824    lisinopril (ZESTRIL) tablet 10 mg, 10 mg, Oral, Daily, Marty Vidal MD, 10 mg at 07/25/20 0825    LORazepam (ATIVAN) tablet 0 5 mg, 0 5 mg, Oral, Q8H PRN, Carrillo Patterson MD, 0 5 mg at 07/23/20 1715    melatonin tablet 6 mg, 6 mg, Oral, HS, Marty Vidal MD, 6 mg at 07/24/20 2124    methocarbamol (ROBAXIN) tablet 500 mg, 500 mg, Oral, Q6H EARNEST, GRZEGORZ Shepherd, 500 mg at 07/25/20 0543    nicotine (NICODERM CQ) 21 mg/24 hr TD 24 hr patch 1 patch, 1 patch, Transdermal, Daily, Marty Vidal MD, 1 patch at 07/25/20 0825    ondansetron (ZOFRAN) injection 4 mg, 4 mg, Intravenous, Q6H PRN, Marty Vidal MD, 4 mg at 07/24/20 1217    oxyCODONE (ROXICODONE) immediate release tablet 10 mg, 10 mg, Oral, Q4H PRN, GRZEGORZ Shepherd    oxyCODONE (ROXICODONE) IR tablet 15 mg, 15 mg, Oral, Q4H PRN, GRZEGORZ Shepherd, 15 mg at 07/25/20 0926    polyethylene glycol (MIRALAX) packet 17 g, 17 g, Oral, Daily PRN, Karen BlendNOAH, 17 g at 07/25/20 9814    senna-docusate sodium (SENOKOT S) 8 6-50 mg per tablet 1 tablet, 1 tablet, Oral, BID, Kaleta Blend, PA-C, 1 tablet at 07/25/20 5480     Vitals:   Vitals:    07/25/20 0805   BP: 143/96   Pulse: 91   Resp:    Temp: 98 2 °F (36 8 °C)   SpO2: 96%       Intake/Output:  I/O       07/23 0701 - 07/24 0700 07/24 0701 - 07/25 0700 07/25 0701 - 07/26 0700    P  O  738 1898 240    Total Intake(mL/kg) 738 (6 8) 1898 (17 6) 240 (2 2)    Urine (mL/kg/hr) 1780 (0 7) 2730 (1 1)     Total Output 1780 2730     Net -1042 -832 +240                  Nutrition/GI Proph/Bowel Reg: Continue current diet    Physical Exam:   GENERAL APPEARANCE: NAD  NEURO: GCS 15  HEENT: Normocephalic  CV: RRR  LUNGS: CTA b/l  GI: Non-tender, non-distended  : No siddiqi  MSK: +2 pulses on extremities  SKIN: Warm, dry, intact    Invasive Devices     Peripheral Intravenous Line            Peripheral IV 07/23/20 Left Forearm 2 days                 Lab Results:   Results: I have personally reviewed pertinent reports   , BMP/CMP:   Lab Results   Component Value Date    SODIUM 135 (L) 07/25/2020    K 3 3 (L) 07/25/2020     07/25/2020    CO2 28 07/25/2020    BUN 13 07/25/2020    CREATININE 0 66 07/25/2020    CALCIUM 8 7 07/25/2020    EGFR 106 07/25/2020    and CBC:   Lab Results   Component Value Date    WBC 9 11 07/25/2020    HGB 14 0 07/25/2020    HCT 41 5 07/25/2020    MCV 90 07/25/2020     07/25/2020    MCH 30 3 07/25/2020    MCHC 33 7 07/25/2020    RDW 12 6 07/25/2020    MPV 9 2 07/25/2020    NRBC 0 07/25/2020     Imaging/EKG Studies: Results: I have personally reviewed pertinent reports      Other Studies: no other studies  VTE Prophylaxis: SCDs and Lovenox

## 2020-07-25 NOTE — ASSESSMENT & PLAN NOTE
- concussion education given at bedside  - patient informed that he needs to get up and ambulate today with PT and OT  - will require outpatient follow-up  - continue p r n   Pain control  - monitor for any worsening nausea or vomiting  - no new visual or auditory deficits

## 2020-07-25 NOTE — ASSESSMENT & PLAN NOTE
· APS consult, recs appreciated    · Regimen as below:  · Tylenol 975 mg q8h [place]   · Oxycodone increased to 10/15 mg for mod/severe pain q4h prn  · Gabpentin 100 mg TID [place]   · Robaxin 500 mg q6h [place]  · Lidocaine patch to L spine   · Rec'd to d/c on hydrocodone/acetaminophen 5-325 mg x3-4 days and robaxin 500 q6h [place] x 4 days   · Suggest continued outpatient follow up with [facility]

## 2020-07-25 NOTE — PLAN OF CARE
Problem: OCCUPATIONAL THERAPY ADULT  Goal: Performs self-care activities at highest level of function for planned discharge setting  See evaluation for individualized goals  Description  Treatment Interventions: ADL retraining, Endurance training, Cognitive reorientation, Patient/family training, Compensatory technique education, Continued evaluation, Energy conservation, Activityengagement          See flowsheet documentation for full assessment, interventions and recommendations  Outcome: Progressing  Note:   Limitation: Decreased ADL status, Decreased Safe judgement during ADL, Decreased cognition, Decreased endurance, Decreased high-level ADLs, Decreased self-care trans  Prognosis: Good  Assessment: Patient participated in Skilled OT session 7/25/2020 with interventions consisting of ADL re training with the use of correct body mechnaics, Energy Conservation techniques, deep breathing technique, safety awareness and fall prevention techniques, maintaining spinal precautions,  therapeutic activities to: increase activity tolerance and increase OOB/ sitting tolerance   Patient agreeable to OT treatment session, upon arrival patient was found supine in bed  In comparison to previous session, patient with improvements in EOB sitting tolerance, functional mobility, standing tolerance, endurance and toileting  Patient requiring verbal cues for safety, verbal cues for correct technique, verbal cues for pacing thru activity steps and frequent rest periods  Patient continues to be functioning below baseline level, occupational performance remains limited secondary to factors listed above and increased risk for falls and injury  From OT standpoint, recommendation at time of d/c would be Short Term Rehab; pending progress, LOS and availability of increased support, may progress to home w/ family support     Patient to benefit from continued Occupational Therapy treatment while in the hospital to address deficits as defined above and maximize level of functional independence with ADLs and functional mobility       OT Discharge Recommendation: Post-Acute Rehabilitation Services(vs  home pending progress/LOS)  OT - OK to Discharge: Yes(when medically stable)     Jyoti Aguilar MS, OTR/L

## 2020-07-25 NOTE — PLAN OF CARE
Problem: PHYSICAL THERAPY ADULT  Goal: Performs mobility at highest level of function for planned discharge setting  See evaluation for individualized goals  Description  Treatment/Interventions: Functional transfer training, LE strengthening/ROM, Elevations, Therapeutic exercise, Endurance training, Patient/family training, Equipment eval/education, Bed mobility, Gait training, Spoke to nursing, Spoke to advanced practitioner, OT  Equipment Recommended: Walker(RW)       See flowsheet documentation for full assessment, interventions and recommendations  Outcome: Progressing  Note:   Prognosis: Good  Problem List: Decreased strength, Decreased range of motion, Decreased endurance, Impaired balance, Decreased mobility, Decreased safety awareness, Pain, Orthopedic restrictions  Assessment: Pt presents with decreased mobility, strength, balance, and activity tolerance  Pt demonstrated ability to perform bed mobility at supervision  Pt demonstrates good understanding of logroll technique for OOB mobility  Pt able to recall 3/3 spinal precautions  TLSO donned sitting EOB  Pt reported feeling dizzy/nauseous sitting EOB  BP found to be 143/97  Pt performed STS at [address] [facility] A and RW  Pt's BP in standing was 143/98  Pt given cold, wet washcloth and placed on forehead throughout session- pt reports this helped symptoms  Pt ambulated 50ft x2 w/ RW at [address] + chair follow  Pt required multiple standing rest breaks throughout ambulation 2' pain and dizziness  Pt c/o of dizziness, pain, diaphoretic, and nausea throughout treatment  Pt's BP found to be 144/96 post-ambulation  Pt demonstrates improvement this session being able to perform transfers and mobility- pt continue to be limited by above stated symptoms  Pt continues to benefit from skilled therapy to maximize functional independence  Recommendation at this time is STR vs Home PT pending progression and available support at home   Pt would benefit from continued ambulation, functional transfers, strength, activity tolerance, and stair trial   Barriers to Discharge: None     PT Discharge Recommendation: (STR vs Home PT)     PT - OK to Discharge: No    See flowsheet documentation for full assessment

## 2020-07-25 NOTE — ASSESSMENT & PLAN NOTE
(P) [phone]   - Restarted on home regimen Lantus 35 units  - Algorithm 5 SSI    - Advance as needed   - On metformin and amaryl at home, currently held

## 2020-07-25 NOTE — PHYSICAL THERAPY NOTE
PHYSICAL THERAPY NOTE  Patient Name: Jana Galvan  [id number] Date: 7/25/2020 07/25/20 1002   Pain Assessment   Pain Assessment Tool 0-10   Pain Score 8   Pain Location/Orientation Location: Back   Hospital Pain Intervention(s) Repositioned; Ambulation/increased activity; Emotional support   Restrictions/Precautions   Weight Bearing Precautions Per Order No   Braces or Orthoses TLSO   Other Precautions Chair Alarm;Multiple lines; Fall Risk;Pain;Spinal precautions   General   Chart Reviewed Yes   Response to Previous Treatment Patient reporting fatigue but able to participate  Family/Caregiver Present No   Cognition   Arousal/Participation Alert; Responsive; Cooperative   Attention Within functional limits   Orientation Level Oriented X4   Memory Decreased recall of precautions   Following Commands Follows one step commands without difficulty   Comments Pt able to recall 3/3 spinal precautions but requires VCs for carryover w/ functional mobility   Subjective   Subjective "I feel better than yesterday"   Bed Mobility   Supine to Sit 5  Supervision   Additional items Increased time required   Sit to Supine Unable to assess   Additional Comments Pt demonstrated good understanding of logroll technique  Pt performed bed mobility at supervision  Pt's TLSO donned sitting EOB  Transfers   Sit to Stand 4  Minimal assistance   Additional items Assist x 1; Increased time required;Verbal cues   Stand to Sit 4  Minimal assistance   Additional items Assist x 1; Increased time required;Verbal cues   Additional Comments Pt reported feeling dizzy/nauseous sitting EOB  BP found to be 143/97  Pt performed STS at [address] [facility] A and RW  Pt's BP in standing was 143/98  Pt given cold, wet washcloth and pt placed on forehead throughout session- pt reports this helped symptoms  Pt c/o fo dizziness, pain, diaphoretic, and nausea throughout treatment   Pt's BP found to be 144/96 post-ambulation    Ambulation/Elevation   Gait pattern Excessively slow; Short stride; Inconsistent edward   Gait Assistance 4  Minimal assist   Additional items Assist x 1;Verbal cues  (+ chair follow)   Assistive Device Rolling walker   Distance 50ft x2  (multiple standing rest breaks 2' pain and dizziness)   Stair Management Assistance Not tested   Balance   Static Sitting Fair +   Dynamic Sitting Fair   Static Standing Fair   Dynamic Standing Fair -   Ambulatory Poor +   Endurance Deficit   Endurance Deficit Yes   Endurance Deficit Description dizzy, pain, nausea, diaphoresis   Activity Tolerance   Activity Tolerance Patient limited by fatigue;Patient limited by pain   Medical Staff Made Aware OT   Nurse Made Aware yes   Assessment   Prognosis Good   Problem List Decreased strength;Decreased range of motion;Decreased endurance; Impaired balance;Decreased mobility; Decreased safety awareness;Pain;Orthopedic restrictions   Assessment Pt presents with decreased mobility, strength, balance, and activity tolerance  Pt demonstrated ability to perform bed mobility at supervision  Pt demonstrates good understanding of logroll technique for OOB mobility  Pt able to recall 3/3 spinal precautions  TLSO donned sitting EOB  Pt reported feeling dizzy/nauseous sitting EOB  BP found to be 143/97  Pt performed STS at [address] [place] [address] A and RW  Pt's BP in standing was 143/98  Pt given cold, wet washcloth and placed on forehead throughout session- pt reports this helped symptoms  Pt ambulated 50ft x2 w/ RW at [address] + chair follow  Pt required multiple standing rest breaks throughout ambulation 2' pain and dizziness  Pt c/o of dizziness, pain, diaphoretic, and nausea throughout treatment  Pt's BP found to be 144/96 post-ambulation  Pt demonstrates improvement this session being able to perform transfers and mobility- pt continue to be limited by above stated symptoms   Pt continues to benefit from skilled therapy to maximize functional independence  Recommendation at this time is STR vs Home PT pending progression and available support at home  Pt would benefit from continued ambulation, functional transfers, strength, activity tolerance, and stair trial    Goals   Patient Goals to feel better   [facility] Expiration Date 08/02/20   Short Term Goal #2 In addition to above stated goals, 1 Pt will demonstrate the ability to perform all functional transfers at Mod I in order to maximize functional independence and decrease burden on caregivers  2 Pt will demonstrate the ability to ambulate at least 150ft with least restrictive device at Mod I in order to maximize functional independence  3 Pt will demonstrate the ability to negotiate 12 steps at Mod I in order to return to household/community mobility  4 Pt will demonstrate improved balance by one grade in order to decrease risk of falls  5 Pt will increase b/l LE strength by 1 grade in order to increase ease of functional mobility and transfers   PT Treatment Day 2   Plan   Treatment/Interventions Functional transfer training;LE strengthening/ROM; Endurance training;Patient/family training;Equipment eval/education; Bed mobility;Gait training;Spoke to nursing   Progress Progressing toward goals   PT Frequency   (3-6x/week)   Recommendation   PT Discharge Recommendation   (STR vs Home PT)   Equipment Recommended Walker   PT - OK to Discharge No   Additional Comments more ambulation and stair trial     Calra Monsivais, PT, DPT

## 2020-07-25 NOTE — PLAN OF CARE
Problem: PAIN - ADULT  Goal: Verbalizes/displays adequate comfort level or baseline comfort level  Description  Interventions:  - Encourage patient to monitor pain and request assistance  - Assess pain using appropriate pain scale  - Administer analgesics based on type and severity of pain and evaluate response  - Implement non-pharmacological measures as appropriate and evaluate response  - Consider cultural and social influences on pain and pain management  - Notify physician/advanced practitioner if interventions unsuccessful or patient reports new pain  Outcome: Progressing     Problem: INFECTION - ADULT  Goal: Absence or prevention of progression during hospitalization  Description  INTERVENTIONS:  - Assess and monitor for signs and symptoms of infection  - Monitor lab/diagnostic results  - Monitor all insertion sites, i e  indwelling lines, tubes, and drains  - Monitor endotracheal if appropriate and nasal secretions for changes in amount and color  - [place] appropriate cooling/warming therapies per order  - Administer medications as ordered  - Instruct and encourage patient and family to use good hand hygiene technique  - Identify and instruct in appropriate isolation precautions for identified infection/condition  Outcome: Progressing     Problem: SAFETY ADULT  Goal: Patient will remain free of falls  Description  INTERVENTIONS:  - Assess patient frequently for physical needs  -  Identify cognitive and physical deficits and behaviors that affect risk of falls    -  [place] fall precautions as indicated by assessment   - Educate patient/family on patient safety including physical limitations  - Instruct patient to call for assistance with activity based on assessment  - Modify environment to reduce risk of injury  - Consider OT/PT consult to assist with strengthening/mobility  Outcome: Progressing  Goal: Maintain or return to baseline ADL function  Description  INTERVENTIONS:  -  Assess patient's ability to carry out ADLs; assess patient's baseline for ADL function and identify physical deficits which impact ability to perform ADLs (bathing, care of mouth/teeth, toileting, grooming, dressing, etc )  - Assess/evaluate cause of self-care deficits   - Assess range of motion  - Assess patient's mobility; develop plan if impaired  - Assess patient's need for assistive devices and provide as appropriate  - Encourage maximum independence but intervene and supervise when necessary  - Involve family in performance of ADLs  - Assess for home care needs following discharge   - Consider OT consult to assist with ADL evaluation and planning for discharge  - Provide patient education as appropriate  Outcome: Progressing  Goal: Maintain or return mobility status to optimal level  Description  INTERVENTIONS:  - Assess patient's baseline mobility status (ambulation, transfers, stairs, etc )    - Identify cognitive and physical deficits and behaviors that affect mobility  - Identify mobility aids required to assist with transfers and/or ambulation (gait belt, sit-to-stand, lift, walker, cane, etc )  - [place] fall precautions as indicated by assessment  - Record patient progress and toleration of activity level on Mobility SBAR; progress patient to next Phase/Stage  - Instruct patient to call for assistance with activity based on assessment  - Consider rehabilitation consult to assist with strengthening/weightbearing, etc   Outcome: Progressing     Problem: DISCHARGE PLANNING  Goal: Discharge to home or other facility with appropriate resources  Description  INTERVENTIONS:  - Identify barriers to discharge w/patient and caregiver  - Arrange for needed discharge resources and transportation as appropriate  - Identify discharge learning needs (meds, wound care, etc )  - Arrange for interpretive services to assist at discharge as needed  - Refer to Case Management Department for coordinating discharge planning if the patient needs post-hospital services based on physician/advanced practitioner order or complex needs related to functional status, cognitive ability, or social support system  Outcome: Progressing     Problem: Knowledge Deficit  Goal: Patient/family/caregiver demonstrates understanding of disease process, treatment plan, medications, and discharge instructions  Description  Complete learning assessment and assess knowledge base  Interventions:  - Provide teaching at level of understanding  - Provide teaching via preferred learning methods  Outcome: Progressing     Problem: Potential for Falls  Goal: Patient will remain free of falls  Description  INTERVENTIONS:  - Assess patient frequently for physical needs  -  Identify cognitive and physical deficits and behaviors that affect risk of falls    -  [place] fall precautions as indicated by assessment   - Educate patient/family on patient safety including physical limitations  - Instruct patient to call for assistance with activity based on assessment  - Modify environment to reduce risk of injury  - Consider OT/PT consult to assist with strengthening/mobility  Outcome: Progressing     Problem: SKIN/TISSUE INTEGRITY - ADULT  Goal: Skin integrity remains intact  Description  INTERVENTIONS  - Identify patients at risk for skin breakdown  - Assess and monitor skin integrity  - Assess and monitor nutrition and hydration status  - Monitor labs (i e  albumin)  - Assess for incontinence   - Turn and reposition patient  - Assist with mobility/ambulation  - Relieve pressure over bony prominences  - Avoid friction and shearing  - Provide appropriate hygiene as needed including keeping skin clean and dry  - Evaluate need for skin moisturizer/barrier cream  - Collaborate with interdisciplinary team (i e  Nutrition, Rehabilitation, etc )   - Patient/family teaching  Outcome: Progressing  Goal: Incision(s), wounds(s) or drain site(s) healing without S/S of infection  Description  INTERVENTIONS  - Assess and document risk factors for skin impairment   - Assess and document dressing, incision, wound bed, drain sites and surrounding tissue  - Consider nutrition services referral as needed  - Oral mucous membranes remain intact  - Provide patient/ family education  Outcome: Progressing  Goal: Oral mucous membranes remain intact  Description  INTERVENTIONS  - Assess oral mucosa and hygiene practices  - Implement preventative oral hygiene regimen  - Implement oral medicated treatments as ordered  - Initiate Nutrition services referral as needed  Outcome: Progressing     Problem: MUSCULOSKELETAL - ADULT  Goal: Maintain or return mobility to safest level of function  Description  INTERVENTIONS:  - Assess patient's ability to carry out ADLs; assess patient's baseline for ADL function and identify physical deficits which impact ability to perform ADLs (bathing, care of mouth/teeth, toileting, grooming, dressing, etc )  - Assess/evaluate cause of self-care deficits   - Assess range of motion  - Assess patient's mobility  - Assess patient's need for assistive devices and provide as appropriate  - Encourage maximum independence but intervene and supervise when necessary  - Involve family in performance of ADLs  - Assess for home care needs following discharge   - Consider OT consult to assist with ADL evaluation and planning for discharge  - Provide patient education as appropriate  Outcome: Progressing  Goal: Maintain proper alignment of affected body part  Description  INTERVENTIONS:  - Support, maintain and protect limb and body alignment  - Provide patient/ family with appropriate education  Outcome: Progressing

## 2020-07-26 LAB
GLUCOSE SERPL-MCNC: 144 MG/DL (ref 65–140)
GLUCOSE SERPL-MCNC: 219 MG/DL (ref 65–140)
GLUCOSE SERPL-MCNC: 288 MG/DL (ref 65–140)
GLUCOSE SERPL-MCNC: 303 MG/DL (ref 65–140)

## 2020-07-26 PROCEDURE — 97116 GAIT TRAINING THERAPY: CPT

## 2020-07-26 PROCEDURE — 99233 SBSQ HOSP IP/OBS HIGH 50: CPT | Performed by: SURGERY

## 2020-07-26 PROCEDURE — 82948 REAGENT STRIP/BLOOD GLUCOSE: CPT

## 2020-07-26 PROCEDURE — 97535 SELF CARE MNGMENT TRAINING: CPT

## 2020-07-26 RX ORDER — MAGNESIUM CARB/ALUMINUM HYDROX 105-160MG
296 TABLET,CHEWABLE ORAL ONCE
Status: COMPLETED | OUTPATIENT
Start: 2020-07-26 | End: 2020-07-26

## 2020-07-26 RX ADMIN — METHOCARBAMOL TABLETS 500 MG: 500 TABLET, COATED ORAL at 05:26

## 2020-07-26 RX ADMIN — LABETALOL HYDROCHLORIDE 100 MG: 100 TABLET, FILM COATED ORAL at 21:47

## 2020-07-26 RX ADMIN — INSULIN LISPRO 12 UNITS: 100 INJECTION, SOLUTION INTRAVENOUS; SUBCUTANEOUS at 09:32

## 2020-07-26 RX ADMIN — INSULIN GLARGINE 35 UNITS: 100 INJECTION, SOLUTION SUBCUTANEOUS at 21:47

## 2020-07-26 RX ADMIN — ACETAMINOPHEN 975 MG: 325 TABLET, FILM COATED ORAL at 05:26

## 2020-07-26 RX ADMIN — GABAPENTIN 100 MG: 100 CAPSULE ORAL at 09:34

## 2020-07-26 RX ADMIN — ACETAMINOPHEN 975 MG: 325 TABLET, FILM COATED ORAL at 14:07

## 2020-07-26 RX ADMIN — MAGNESIUM CITRATE 296 ML: 1.75 LIQUID ORAL at 12:07

## 2020-07-26 RX ADMIN — LORAZEPAM 0.5 MG: 0.5 TABLET ORAL at 21:47

## 2020-07-26 RX ADMIN — ENOXAPARIN SODIUM 30 MG: 30 INJECTION SUBCUTANEOUS at 09:31

## 2020-07-26 RX ADMIN — OXYCODONE HYDROCHLORIDE 15 MG: 10 TABLET ORAL at 18:10

## 2020-07-26 RX ADMIN — INSULIN LISPRO 8 UNITS: 100 INJECTION, SOLUTION INTRAVENOUS; SUBCUTANEOUS at 18:12

## 2020-07-26 RX ADMIN — GABAPENTIN 100 MG: 100 CAPSULE ORAL at 21:47

## 2020-07-26 RX ADMIN — OXYCODONE HYDROCHLORIDE 15 MG: 10 TABLET ORAL at 14:08

## 2020-07-26 RX ADMIN — NICOTINE 1 PATCH: 21 PATCH, EXTENDED RELEASE TRANSDERMAL at 09:32

## 2020-07-26 RX ADMIN — METHOCARBAMOL TABLETS 500 MG: 500 TABLET, COATED ORAL at 18:10

## 2020-07-26 RX ADMIN — FAMOTIDINE 20 MG: 20 TABLET ORAL at 18:10

## 2020-07-26 RX ADMIN — INSULIN LISPRO 8 UNITS: 100 INJECTION, SOLUTION INTRAVENOUS; SUBCUTANEOUS at 12:07

## 2020-07-26 RX ADMIN — ACETAMINOPHEN 975 MG: 325 TABLET, FILM COATED ORAL at 21:46

## 2020-07-26 RX ADMIN — METHOCARBAMOL TABLETS 500 MG: 500 TABLET, COATED ORAL at 12:06

## 2020-07-26 RX ADMIN — LABETALOL HYDROCHLORIDE 100 MG: 100 TABLET, FILM COATED ORAL at 09:34

## 2020-07-26 RX ADMIN — OXYCODONE HYDROCHLORIDE 15 MG: 10 TABLET ORAL at 02:15

## 2020-07-26 RX ADMIN — DOCUSATE SODIUM AND SENNOSIDES 1 TABLET: 8.6; 5 TABLET ORAL at 18:10

## 2020-07-26 RX ADMIN — INSULIN LISPRO 8 UNITS: 100 INJECTION, SOLUTION INTRAVENOUS; SUBCUTANEOUS at 09:33

## 2020-07-26 RX ADMIN — AMLODIPINE BESYLATE 5 MG: 5 TABLET ORAL at 09:35

## 2020-07-26 RX ADMIN — MELATONIN 6 MG: at 21:47

## 2020-07-26 RX ADMIN — GABAPENTIN 100 MG: 100 CAPSULE ORAL at 18:10

## 2020-07-26 RX ADMIN — DOCUSATE SODIUM AND SENNOSIDES 1 TABLET: 8.6; 5 TABLET ORAL at 09:34

## 2020-07-26 RX ADMIN — FAMOTIDINE 20 MG: 20 TABLET ORAL at 09:35

## 2020-07-26 RX ADMIN — ENOXAPARIN SODIUM 30 MG: 30 INJECTION SUBCUTANEOUS at 18:10

## 2020-07-26 RX ADMIN — OXYCODONE HYDROCHLORIDE 15 MG: 10 TABLET ORAL at 09:35

## 2020-07-26 RX ADMIN — LISINOPRIL 10 MG: 10 TABLET ORAL at 09:34

## 2020-07-26 NOTE — PLAN OF CARE
Problem: OCCUPATIONAL THERAPY ADULT  Goal: Performs self-care activities at highest level of function for planned discharge setting  See evaluation for individualized goals  Description  Treatment Interventions: ADL retraining, Endurance training, Cognitive reorientation, Patient/family training, Compensatory technique education, Continued evaluation, Energy conservation, Activityengagement          See flowsheet documentation for full assessment, interventions and recommendations  Outcome: Progressing  Note:   Limitation: Decreased ADL status, Decreased Safe judgement during ADL, Decreased cognition, Decreased endurance, Decreased high-level ADLs, Decreased self-care trans  Prognosis: Good  Assessment: pt participated in am ot session and was seen focusing on ub and lb adls, bed mobility, functional mobility and trnasfers with rw as well as toileting  pt 's tolerence of oob activity improved  he is not impulsive, reports minimal dizziness / sweating  pt had no episodes of lob or unsteadiness  pt was conversant and answered / engaged with therapists/   pt does however require mod cues and asst to hieu tlso, need to assess carryover of newlearning     OT Discharge Recommendation: Post-Acute Rehabilitation Services  OT - OK to Discharge:  Yes  ARELI Melo

## 2020-07-26 NOTE — PHYSICAL THERAPY NOTE
Physical Therapy Progress Note     07/26/20 1110   Pain Assessment   Pain Assessment Tool 0-10   Pain Score 7   Pain Location/Orientation Orientation: Lower; Location: Back   Hospital Pain Intervention(s) Medication (See MAR); Repositioned; Ambulation/increased activity   Restrictions/Precautions   Weight Bearing Precautions Per Order No   Braces or Orthoses TLSO   Other Precautions Chair Alarm; Bed Alarm;Pain; Fall Risk;Spinal precautions   General   Chart Reviewed Yes   Response to Previous Treatment Patient with no complaints from previous session  Family/Caregiver Present No   Cognition   Overall Cognitive Status WFL   Arousal/Participation Alert; Responsive; Cooperative   Attention Within functional limits   Orientation Level Oriented X4   Memory Within functional limits   Following Commands Follows all commands and directions without difficulty   Bed Mobility   Supine to Sit 5  Supervision   Additional items HOB elevated; Increased time required;Verbal cues   Transfers   Sit to Stand 5  Supervision   Additional items Increased time required;Verbal cues   Stand to Sit 5  Supervision   Additional items Increased time required;Verbal cues   Ambulation/Elevation   Gait pattern Improper Weight shift; Short stride; Ataxia; Shuffling;Decreased foot clearance   Gait Assistance 5  Supervision   Assistive Device Rolling walker   Distance 360'   Endurance Deficit   Endurance Deficit No   Activity Tolerance   Nurse Made Aware RODRIGO Patel ok to see   Assessment   Prognosis Good   Problem List Decreased strength;Decreased endurance;Decreased range of motion; Impaired balance;Decreased mobility;Pain;Orthopedic restrictions   Assessment Pt supine in bed upon arrival, pleasant and agreeable to OOB ambualtion, BP supine 158/105  Pt requires S for supine to sit EOB once seated pt reports some dizziness, drecreased by sitting EOB and cold washcloth on neck   Pt reports dizziness subsided requires S for STS transfer, once standing pt ambulates to bathroom and is able to stand a toilet without LOB, pt ambulates 360' with RW and S with one LOB but able to correct himself  Pt returned to seated in recliner at end of session restng comfortably no reports of dizziness  Pt will continue to benenfit from skilled PT to increase strength, endurance and balance to maximize safe fucntional mobility  Barriers to Discharge Decreased caregiver support; Inaccessible home environment   Goals   Patient Goals none stated   [facility] Expiration Date 08/02/20   Short Term Goal #2 In addition to above stated goals, 1 Pt will demonstrate the ability to perform all functional transfers at Mod I in order to maximize functional independence and decrease burden on caregivers  2 Pt will demonstrate the ability to ambulate at least 150ft with least restrictive device at Mod I in order to maximize functional independence  3 Pt will demonstrate the ability to negotiate 12 steps at Mod I in order to return to household/community mobility  4 Pt will demonstrate improved balance by one grade in order to decrease risk of falls  5 Pt will increase b/l LE strength by 1 grade in order to increase ease of functional mobility and transfers   Plan   Treatment/Interventions Functional transfer training;LE strengthening/ROM; Therapeutic exercise; Endurance training;Patient/family training;Bed mobility;Gait training   Progress Progressing toward goals   PT Frequency   (3-6x/week)   Recommendation   PT Discharge Recommendation Post-Acute Rehabilitation Services   Equipment Recommended Walker   PT - OK to Discharge Yes  (to STR when medically ready)     Jarett Adams, PTA

## 2020-07-26 NOTE — ASSESSMENT & PLAN NOTE
- concussion education given at bedside  - patient informed that he needs continue to work with PT and OT  - will require outpatient follow-up  - continue p r n   Pain control  - monitor for any worsening nausea or vomiting  - no new visual or auditory deficits

## 2020-07-26 NOTE — PROGRESS NOTES
Progress Note - Jana Galvan 1960, 61 y o  male MRN: [id number]    Unit/Bed#: [facility] 604-01 Encounter: [id number]    Primary Care Provider: Kelly Okeefe DO   Date and time admitted to hospital: 7/22/2020 11:18 AM        Concussion without loss of consciousness  Assessment & Plan  - concussion education given at bedside  - patient informed that he needs continue to work with PT and OT  - will require outpatient follow-up  - continue p r n  Pain control  - monitor for any worsening nausea or vomiting  - no new visual or auditory deficits    Acute pain due to trauma  Assessment & Plan  · APS consult, recs appreciated    · Regimen as below:  · Tylenol 975 mg q8h [place]   · Oxycodone increased to 10/15 mg for mod/severe pain q4h prn  · Gabpentin 100 mg TID [place]   · Robaxin 500 mg q6h [place]  · Lidocaine patch to L spine   · Rec'd to d/c on hydrocodone/acetaminophen 5-325 mg x3-4 days and robaxin 500 q6h [place] x 4 days   · Suggest continued outpatient follow up with [facility] pain     Type 2 diabetes mellitus ([facility] [address] )  Assessment & Plan  (P) 256   - Restarted on home regimen Lantus 35 units  - Algorithm 5 SSI    - Advance as needed   - added 8 units of Humalog t i d  With meals  - On metformin and amaryl at home, currently held     Pulmonary nodule  Assessment & Plan  -Right mid lung opacity or mass measuring approximately 3 7 cm, will need outpatient follow up CT as he has smoking h/o    Fall  Assessment & Plan  · Injuries as below   · PT/OT - rec post acute IP rehab     * Closed compression fracture of body of L1 vertebra (HCC)  Assessment & Plan  Status post fall from 5 ft, positive head trauma and questionable loss of consciousness with L1 SEP fracture - TLICS 1    · CT lumbar spine 07/22/2020:  Acute fracture through superior endplate of L1 with minimal loss of height    No significant bony retropulsion into the spinal canal      · No surgical intervention at this time   · TLSO brace when out of bed and head of bed greater than 45°  · Upright lumbar spine XR completed, reviewed by neurosurgery   · Multimodal pain regimen as below  · Mobilize as tolerated with assistance, PT / OT evaluation, must wear brace  · Neurosurgery follow up in 2 weeks as outpatient with repeat lumbar spine XR     DVT Prophylaxis: SCDs and Lovenox  PT and OT: eval and treat    Disposition: D/C pending  Likely require rehab  Will discuss with case management  SUBJECTIVE:  Chief Complaint: "I am doing better today "    Subjective:  Patient reports that he was able to work with PT and OT yesterday  Reports that he feeling little bit better after he was able to get up and ambulate  Reports his pain is more controlled today  Reports no new headaches  No new visual or auditory deficits        OBJECTIVE:     Meds/Allergies     Current Facility-Administered Medications:     acetaminophen (TYLENOL) tablet 975 mg, 975 mg, Oral, Q8H [address], Riya Dixon MD, 975 mg at 07/26/20 0526    amLODIPine (NORVASC) tablet 5 mg, 5 mg, Oral, Daily, Maria D Vidal MD, 5 mg at 07/25/20 0825    calcium carbonate (TUMS) chewable tablet 500 mg, 500 mg, Oral, Daily PRN, Sebastian Elliott MD, 500 mg at 07/24/20 2030    enoxaparin (LOVENOX) subcutaneous injection 30 mg, 30 mg, Subcutaneous, BID, Riya Dixon MD, 30 mg at 07/25/20 1809    famotidine (PEPCID) tablet 20 mg, 20 mg, Oral, BID, Silvia Hdz PA-C, 20 mg at 07/25/20 1807    gabapentin (NEURONTIN) capsule 100 mg, 100 mg, Oral, TID, Maria D Vidal MD, 100 mg at 07/25/20 2154    hydrALAZINE (APRESOLINE) injection 5 mg, 5 mg, Intravenous, Q6H PRN, Leatha Bazan PA-C, 5 mg at 07/23/20 2236    insulin glargine (LANTUS) subcutaneous injection 35 Units 0 35 mL, 35 Units, Subcutaneous, HS, Yajaira Hoyos PA-C, 35 Units at 07/25/20 2154    insulin lispro (HumaLOG) 100 units/mL subcutaneous injection 4-20 Units, 4-20 Units, Subcutaneous, TID AC, 12 Units at 07/25/20 1807 **AND** Fingerstick Glucose (POCT), , , PAXTON SANDOVAL, Rosalva Abby Park MD    insulin lispro (HumaLOG) 100 units/mL subcutaneous injection 8 Units, 8 Units, Subcutaneous, TID With Meals, Kaylie Livingston, NOAH    labetalol (NORMODYNE) tablet 100 mg, 100 mg, Oral, Q12H [address], Danielle Owen MD, 100 mg at 07/25/20 2154    lisinopril (ZESTRIL) tablet 10 mg, 10 mg, Oral, Daily, Khari Vidal MD, 10 mg at 07/25/20 0825    LORazepam (ATIVAN) tablet 0 5 mg, 0 5 mg, Oral, Q8H PRN, Myrlene Crigler, MD, 0 5 mg at 07/25/20 2325    melatonin tablet 6 mg, 6 mg, Oral, HS, Khari Vidal MD, 6 mg at 07/25/20 2154    methocarbamol (ROBAXIN) tablet 500 mg, 500 mg, Oral, Q6H EARNEST, GRZEGORZ Shepherd, 500 mg at 07/26/20 0526    nicotine (NICODERM CQ) 21 mg/24 hr TD 24 hr patch 1 patch, 1 patch, Transdermal, Daily, Khari Vidal MD, 1 patch at 07/25/20 0825    ondansetron (ZOFRAN) injection 4 mg, 4 mg, Intravenous, Q6H PRN, Khari Vidal MD, 4 mg at 07/25/20 1227    oxyCODONE (ROXICODONE) immediate release tablet 10 mg, 10 mg, Oral, Q4H PRN, GRZEGORZ Shepherd    oxyCODONE (ROXICODONE) IR tablet 15 mg, 15 mg, Oral, Q4H PRN, GRZEGORZ Shepherd, 15 mg at 07/26/20 0215    polyethylene glycol (MIRALAX) packet 17 g, 17 g, Oral, Daily PRN, Raymond Gaona PA-C, 17 g at 07/25/20 0270    senna-docusate sodium (SENOKOT S) 8 6-50 mg per tablet 1 tablet, 1 tablet, Oral, BID, Raymond Gaona PA-C, 1 tablet at 07/25/20 1807     Vitals:   Vitals:    07/26/20 0655   BP: (!) 153/106   Pulse: 84   Resp: 16   Temp: 98 7 °F (37 1 °C)   SpO2: 98%       Intake/Output:  I/O       07/24 0701 - 07/25 0700 07/25 0701 - 07/26 0700 07/26 0701 - 07/27 0700    P  O  1898 1764     Total Intake(mL/kg) 1898 (17 6) 1764 (16 3)     Urine (mL/kg/hr) 2730 (1 1) 650 (0 3)     Total Output 2730 650     Net -832 +1114            Unmeasured Urine Occurrence  3 x            Nutrition/GI Proph/Bowel Reg:  Continue current diet    Physical Exam:   GENERAL APPEARANCE:  No acute distress  NEURO:  GCS 15  HEENT: Normocephalic  CV:  Regular rate and rhythm  LUNGS:  CTA bilaterally  GI:  Nontender, nondistended  :  No [place]  MSK:  +2 pulses on extremities  SKIN:  Warm, dry, intact    Invasive Devices     Peripheral Intravenous Line            Peripheral IV 07/23/20 Left Forearm 3 days                 Lab Results: Results: I have personally reviewed pertinent reports   , BMP/CMP: No results found for: SODIUM, K, CL, CO2, ANIONGAP, BUN, CREATININE, GLUCOSE, CALCIUM, AST, ALT, ALKPHOS, PROT, BILITOT, EGFR and CBC: No results found for: WBC, HGB, HCT, MCV, PLT, ADJUSTEDWBC, MCH, MCHC, RDW, MPV, NRBC  Imaging/EKG Studies: Results: I have personally reviewed pertinent reports      Other Studies: No other studies  VTE Prophylaxis: SCDs and Lovenox

## 2020-07-26 NOTE — PLAN OF CARE
Problem: PHYSICAL THERAPY ADULT  Goal: Performs mobility at highest level of function for planned discharge setting  See evaluation for individualized goals  Description  Treatment/Interventions: Functional transfer training, LE strengthening/ROM, Elevations, Therapeutic exercise, Endurance training, Patient/family training, Equipment eval/education, Bed mobility, Gait training, Spoke to nursing, Spoke to advanced practitioner, OT  Equipment Recommended: Walker(RW)       See flowsheet documentation for full assessment, interventions and recommendations  Outcome: Progressing  Note:   Prognosis: Good  Problem List: Decreased strength, Decreased endurance, Decreased range of motion, Impaired balance, Decreased mobility, Pain, Orthopedic restrictions  Assessment: Pt supine in bed upon arrival, pleasant and agreeable to OOB ambualtion, BP supine 158/105  Pt requires S for supine to sit EOB once seated pt reports some dizziness, drecreased by sitting EOB and cold washcloth on neck  Pt reports dizziness subsided requires S for STS transfer, once standing pt ambulates to bathroom and is able to stand a toilet without LOB, pt ambulates 360' with RW and S with one LOB but able to correct himself  Pt returned to seated in recliner at end of session restng comfortably no reports of dizziness  Pt will continue to benenfit from skilled PT to increase strength, endurance and balance to maximize safe fucntional mobility  Barriers to Discharge: Decreased caregiver support, Inaccessible home environment     PT Discharge Recommendation: [id number]     PT - OK to Discharge: Yes(to STR when medically ready)    See flowsheet documentation for full assessment

## 2020-07-26 NOTE — PLAN OF CARE
Problem: PAIN - ADULT  Goal: Verbalizes/displays adequate comfort level or baseline comfort level  Description  Interventions:  - Encourage patient to monitor pain and request assistance  - Assess pain using appropriate pain scale  - Administer analgesics based on type and severity of pain and evaluate response  - Implement non-pharmacological measures as appropriate and evaluate response  - Consider cultural and social influences on pain and pain management  - Notify physician/advanced practitioner if interventions unsuccessful or patient reports new pain  Outcome: Progressing     Problem: INFECTION - ADULT  Goal: Absence or prevention of progression during hospitalization  Description  INTERVENTIONS:  - Assess and monitor for signs and symptoms of infection  - Monitor lab/diagnostic results  - Monitor all insertion sites, i e  indwelling lines, tubes, and drains  - Monitor endotracheal if appropriate and nasal secretions for changes in amount and color  - [place] appropriate cooling/warming therapies per order  - Administer medications as ordered  - Instruct and encourage patient and family to use good hand hygiene technique  - Identify and instruct in appropriate isolation precautions for identified infection/condition  Outcome: Progressing     Problem: SAFETY ADULT  Goal: Patient will remain free of falls  Description  INTERVENTIONS:  - Assess patient frequently for physical needs  -  Identify cognitive and physical deficits and behaviors that affect risk of falls    -  [place] fall precautions as indicated by assessment   - Educate patient/family on patient safety including physical limitations  - Instruct patient to call for assistance with activity based on assessment  - Modify environment to reduce risk of injury  - Consider OT/PT consult to assist with strengthening/mobility  Outcome: Progressing  Goal: Maintain or return to baseline ADL function  Description  INTERVENTIONS:  -  Assess patient's ability to carry out ADLs; assess patient's baseline for ADL function and identify physical deficits which impact ability to perform ADLs (bathing, care of mouth/teeth, toileting, grooming, dressing, etc )  - Assess/evaluate cause of self-care deficits   - Assess range of motion  - Assess patient's mobility; develop plan if impaired  - Assess patient's need for assistive devices and provide as appropriate  - Encourage maximum independence but intervene and supervise when necessary  - Involve family in performance of ADLs  - Assess for home care needs following discharge   - Consider OT consult to assist with ADL evaluation and planning for discharge  - Provide patient education as appropriate  Outcome: Progressing  Goal: Maintain or return mobility status to optimal level  Description  INTERVENTIONS:  - Assess patient's baseline mobility status (ambulation, transfers, stairs, etc )    - Identify cognitive and physical deficits and behaviors that affect mobility  - Identify mobility aids required to assist with transfers and/or ambulation (gait belt, sit-to-stand, lift, walker, cane, etc )  - [place] fall precautions as indicated by assessment  - Record patient progress and toleration of activity level on Mobility SBAR; progress patient to next Phase/Stage  - Instruct patient to call for assistance with activity based on assessment  - Consider rehabilitation consult to assist with strengthening/weightbearing, etc   Outcome: Progressing     Problem: DISCHARGE PLANNING  Goal: Discharge to home or other facility with appropriate resources  Description  INTERVENTIONS:  - Identify barriers to discharge w/patient and caregiver  - Arrange for needed discharge resources and transportation as appropriate  - Identify discharge learning needs (meds, wound care, etc )  - Arrange for interpretive services to assist at discharge as needed  - Refer to Case Management Department for coordinating discharge planning if the patient needs post-hospital services based on physician/advanced practitioner order or complex needs related to functional status, cognitive ability, or social support system  Outcome: Progressing     Problem: Knowledge Deficit  Goal: Patient/family/caregiver demonstrates understanding of disease process, treatment plan, medications, and discharge instructions  Description  Complete learning assessment and assess knowledge base  Interventions:  - Provide teaching at level of understanding  - Provide teaching via preferred learning methods  Outcome: Progressing     Problem: Potential for Falls  Goal: Patient will remain free of falls  Description  INTERVENTIONS:  - Assess patient frequently for physical needs  -  Identify cognitive and physical deficits and behaviors that affect risk of falls    -  [place] fall precautions as indicated by assessment   - Educate patient/family on patient safety including physical limitations  - Instruct patient to call for assistance with activity based on assessment  - Modify environment to reduce risk of injury  - Consider OT/PT consult to assist with strengthening/mobility  Outcome: Progressing     Problem: SKIN/TISSUE INTEGRITY - ADULT  Goal: Skin integrity remains intact  Description  INTERVENTIONS  - Identify patients at risk for skin breakdown  - Assess and monitor skin integrity  - Assess and monitor nutrition and hydration status  - Monitor labs (i e  albumin)  - Assess for incontinence   - Turn and reposition patient  - Assist with mobility/ambulation  - Relieve pressure over bony prominences  - Avoid friction and shearing  - Provide appropriate hygiene as needed including keeping skin clean and dry  - Evaluate need for skin moisturizer/barrier cream  - Collaborate with interdisciplinary team (i e  Nutrition, Rehabilitation, etc )   - Patient/family teaching  Outcome: Progressing  Goal: Incision(s), wounds(s) or drain site(s) healing without S/S of infection  Description  INTERVENTIONS  - Assess and document risk factors for skin impairment   - Assess and document dressing, incision, wound bed, drain sites and surrounding tissue  - Consider nutrition services referral as needed  - Oral mucous membranes remain intact  - Provide patient/ family education  Outcome: Progressing  Goal: Oral mucous membranes remain intact  Description  INTERVENTIONS  - Assess oral mucosa and hygiene practices  - Implement preventative oral hygiene regimen  - Implement oral medicated treatments as ordered  - Initiate Nutrition services referral as needed  Outcome: Progressing     Problem: MUSCULOSKELETAL - ADULT  Goal: Maintain or return mobility to safest level of function  Description  INTERVENTIONS:  - Assess patient's ability to carry out ADLs; assess patient's baseline for ADL function and identify physical deficits which impact ability to perform ADLs (bathing, care of mouth/teeth, toileting, grooming, dressing, etc )  - Assess/evaluate cause of self-care deficits   - Assess range of motion  - Assess patient's mobility  - Assess patient's need for assistive devices and provide as appropriate  - Encourage maximum independence but intervene and supervise when necessary  - Involve family in performance of ADLs  - Assess for home care needs following discharge   - Consider OT consult to assist with ADL evaluation and planning for discharge  - Provide patient education as appropriate  Outcome: Progressing  Goal: Maintain proper alignment of affected body part  Description  INTERVENTIONS:  - Support, maintain and protect limb and body alignment  - Provide patient/ family with appropriate education  Outcome: Progressing     Problem: Prexisting or High Potential for Compromised Skin Integrity  Goal: Skin integrity is maintained or improved  Description  INTERVENTIONS:  - Identify patients at risk for skin breakdown  - Assess and monitor skin integrity  - Assess and monitor nutrition and hydration status  - Monitor labs   - Assess for incontinence   - Turn and reposition patient  - Assist with mobility/ambulation  - Relieve pressure over bony prominences  - Avoid friction and shearing  - Provide appropriate hygiene as needed including keeping skin clean and dry  - Evaluate need for skin moisturizer/barrier cream  - Collaborate with interdisciplinary team   - Patient/family teaching  - Consider wound care consult   Outcome: Progressing     Problem: NEUROSENSORY - ADULT  Goal: Achieves stable or improved neurological status  Description  INTERVENTIONS  - Monitor and report changes in neurological status  - Monitor vital signs such as temperature, blood pressure, glucose, and any other labs ordered   - Initiate measures to prevent increased intracranial pressure  - Monitor for seizure activity and implement precautions if appropriate      Outcome: Progressing  Goal: Achieves maximal functionality and self care  Description  INTERVENTIONS  - Monitor swallowing and airway patency with patient fatigue and changes in neurological status  - Encourage and assist patient to increase activity and self care     - Encourage visually impaired, hearing impaired and aphasic patients to use assistive/communication devices  Outcome: Progressing

## 2020-07-26 NOTE — ASSESSMENT & PLAN NOTE
(P) 256   - Restarted on home regimen Lantus 35 units  - Algorithm 5 SSI    - Advance as needed   - added 8 units of Humalog t i d   With meals  - On metformin and amaryl at home, currently held

## 2020-07-26 NOTE — OCCUPATIONAL THERAPY NOTE
Occupational Therapy Treatment Note:         07/26/20 1113   Restrictions/Precautions   Braces or Orthoses TLSO   Other Precautions Chair Alarm   Pain Assessment   Pain Assessment Tool 0-10   Pain Score 7  (p activty)   Pain Location/Orientation Orientation: Lower  (back)   ADL   Where Assessed Edge of bed  (standing for jillian and buttocks)   Grooming Assistance 5  Supervision/Setup   Grooming Comments in stance sinkside   UB Bathing Assistance 5  Supervision/Setup   LB Bathing Assistance 5  Supervision/Setup   UB Dressing Assistance 5  Supervision/Setup   LB Dressing Assistance 3  Moderate Assistance   LB Dressing Comments to hiue doff socks pants and tlso seated   Toileting Assistance  5  Supervision/Setup   Toileting Deficit   (stood to urinate)   Functional Standing Tolerance   Time   (pt tolerated standing x 5 min c f+ bal for adls)   Bed Mobility   Supine to Sit 5  Supervision   Sit to Supine 5  Supervision   Transfers   Sit to Stand 5  Supervision   Stand to Sit 5  Supervision   Additional items   (rw)   Functional Mobility   Functional Mobility 5  Supervision   Additional items Rolling walker   Cognition   Arousal/Participation Alert; Cooperative   Attention Within functional limits   Memory Within functional limits   Following Commands Follows all commands and directions without difficulty   Activity Tolerance   Activity Tolerance Patient tolerated treatment well   Medical Staff Made Aware   (b/p prior to yaw 158/105)   Assessment   Assessment pt participated in am ot session and was seen focusing on ub and lb adls, bed mobility, functional mobility and trnasfers with rw as well as toileting  pt 's tolerence of oob activity improved  he is not impulsive, reports minimal dizziness / sweating  pt had no episodes of lob or unsteadiness   pt was conversant and answered / engaged with therapists/   pt does however require mod cues and asst to hieu tlso, need to assess carryover of newlearning   Plan Treatment Interventions ADL retraining;Functional transfer training; Endurance training;Cognitive reorientation;Patient/family training;Equipment evaluation/education; Activityengagement   Goal Expiration Date 08/06/20   OT Treatment Day   (3)   OT Frequency 3-5x/wk   Recommendation   OT Discharge Recommendation Post-Acute Rehabilitation Services   OT - OK to Discharge Yes   [facility]

## 2020-07-27 LAB
ALBUMIN SERPL BCP-MCNC: 3.4 G/DL (ref 3.5–5)
ANION GAP SERPL CALCULATED.3IONS-SCNC: 7 MMOL/L (ref 4–13)
BASOPHILS # BLD AUTO: 0.05 THOUSANDS/ΜL (ref 0–0.1)
BASOPHILS NFR BLD AUTO: 1 % (ref 0–1)
BUN SERPL-MCNC: 10 MG/DL (ref 5–25)
CALCIUM SERPL-MCNC: 9.3 MG/DL (ref 8.3–10.1)
CHLORIDE SERPL-SCNC: 102 MMOL/L (ref 100–108)
CO2 SERPL-SCNC: 27 MMOL/L (ref 21–32)
CREAT SERPL-MCNC: 0.59 MG/DL (ref 0.6–1.3)
EOSINOPHIL # BLD AUTO: 0.2 THOUSAND/ΜL (ref 0–0.61)
EOSINOPHIL NFR BLD AUTO: 2 % (ref 0–6)
ERYTHROCYTE [DISTWIDTH] IN BLOOD BY AUTOMATED COUNT: 12.7 % (ref 11.6–15.1)
EST. AVERAGE GLUCOSE BLD GHB EST-MCNC: 232 MG/DL
GFR SERPL CREATININE-BSD FRML MDRD: 111 ML/MIN/1.73SQ M
GLUCOSE SERPL-MCNC: 201 MG/DL (ref 65–140)
GLUCOSE SERPL-MCNC: 202 MG/DL (ref 65–140)
GLUCOSE SERPL-MCNC: 203 MG/DL (ref 65–140)
GLUCOSE SERPL-MCNC: 231 MG/DL (ref 65–140)
GLUCOSE SERPL-MCNC: 234 MG/DL (ref 65–140)
HBA1C MFR BLD: 9.7 %
HCT VFR BLD AUTO: 39.9 % (ref 36.5–49.3)
HGB BLD-MCNC: 13.6 G/DL (ref 12–17)
IMM GRANULOCYTES # BLD AUTO: 0.07 THOUSAND/UL (ref 0–0.2)
IMM GRANULOCYTES NFR BLD AUTO: 1 % (ref 0–2)
LYMPHOCYTES # BLD AUTO: 2.85 THOUSANDS/ΜL (ref 0.6–4.47)
LYMPHOCYTES NFR BLD AUTO: 26 % (ref 14–44)
MAGNESIUM SERPL-MCNC: 2 MG/DL (ref 1.6–2.6)
MCH RBC QN AUTO: 30.3 PG (ref 26.8–34.3)
MCHC RBC AUTO-ENTMCNC: 34.1 G/DL (ref 31.4–37.4)
MCV RBC AUTO: 89 FL (ref 82–98)
MONOCYTES # BLD AUTO: 1.12 THOUSAND/ΜL (ref 0.17–1.22)
MONOCYTES NFR BLD AUTO: 10 % (ref 4–12)
NEUTROPHILS # BLD AUTO: 6.56 THOUSANDS/ΜL (ref 1.85–7.62)
NEUTS SEG NFR BLD AUTO: 60 % (ref 43–75)
NRBC BLD AUTO-RTO: 0 /100 WBCS
PHOSPHATE SERPL-MCNC: 2.8 MG/DL (ref 2.7–4.5)
PLATELET # BLD AUTO: 290 THOUSANDS/UL (ref 149–390)
PMV BLD AUTO: 9.5 FL (ref 8.9–12.7)
POTASSIUM SERPL-SCNC: 3.6 MMOL/L (ref 3.5–5.3)
RBC # BLD AUTO: 4.49 MILLION/UL (ref 3.88–5.62)
SODIUM SERPL-SCNC: 136 MMOL/L (ref 136–145)
WBC # BLD AUTO: 10.85 THOUSAND/UL (ref 4.31–10.16)

## 2020-07-27 PROCEDURE — 82040 ASSAY OF SERUM ALBUMIN: CPT | Performed by: INTERNAL MEDICINE

## 2020-07-27 PROCEDURE — 99232 SBSQ HOSP IP/OBS MODERATE 35: CPT | Performed by: SURGERY

## 2020-07-27 PROCEDURE — 85025 COMPLETE CBC W/AUTO DIFF WBC: CPT | Performed by: PHYSICIAN ASSISTANT

## 2020-07-27 PROCEDURE — 82043 UR ALBUMIN QUANTITATIVE: CPT | Performed by: INTERNAL MEDICINE

## 2020-07-27 PROCEDURE — 99255 IP/OBS CONSLTJ NEW/EST HI 80: CPT | Performed by: INTERNAL MEDICINE

## 2020-07-27 PROCEDURE — 83036 HEMOGLOBIN GLYCOSYLATED A1C: CPT | Performed by: PHYSICIAN ASSISTANT

## 2020-07-27 PROCEDURE — 82570 ASSAY OF URINE CREATININE: CPT | Performed by: INTERNAL MEDICINE

## 2020-07-27 PROCEDURE — 83735 ASSAY OF MAGNESIUM: CPT | Performed by: PHYSICIAN ASSISTANT

## 2020-07-27 PROCEDURE — 82948 REAGENT STRIP/BLOOD GLUCOSE: CPT

## 2020-07-27 PROCEDURE — 97535 SELF CARE MNGMENT TRAINING: CPT

## 2020-07-27 PROCEDURE — 80048 BASIC METABOLIC PNL TOTAL CA: CPT | Performed by: PHYSICIAN ASSISTANT

## 2020-07-27 PROCEDURE — 84100 ASSAY OF PHOSPHORUS: CPT | Performed by: PHYSICIAN ASSISTANT

## 2020-07-27 RX ORDER — GLIMEPIRIDE 2 MG/1
4 TABLET ORAL
Status: DISCONTINUED | OUTPATIENT
Start: 2020-07-28 | End: 2020-07-28 | Stop reason: HOSPADM

## 2020-07-27 RX ADMIN — METHOCARBAMOL TABLETS 500 MG: 500 TABLET, COATED ORAL at 01:02

## 2020-07-27 RX ADMIN — METHOCARBAMOL TABLETS 500 MG: 500 TABLET, COATED ORAL at 05:46

## 2020-07-27 RX ADMIN — LISINOPRIL 10 MG: 10 TABLET ORAL at 08:37

## 2020-07-27 RX ADMIN — DOCUSATE SODIUM AND SENNOSIDES 1 TABLET: 8.6; 5 TABLET ORAL at 08:37

## 2020-07-27 RX ADMIN — INSULIN LISPRO 8 UNITS: 100 INJECTION, SOLUTION INTRAVENOUS; SUBCUTANEOUS at 11:37

## 2020-07-27 RX ADMIN — GABAPENTIN 100 MG: 100 CAPSULE ORAL at 08:37

## 2020-07-27 RX ADMIN — FAMOTIDINE 20 MG: 20 TABLET ORAL at 17:55

## 2020-07-27 RX ADMIN — METHOCARBAMOL TABLETS 500 MG: 500 TABLET, COATED ORAL at 17:54

## 2020-07-27 RX ADMIN — METHOCARBAMOL TABLETS 500 MG: 500 TABLET, COATED ORAL at 11:31

## 2020-07-27 RX ADMIN — LABETALOL HYDROCHLORIDE 100 MG: 100 TABLET, FILM COATED ORAL at 08:37

## 2020-07-27 RX ADMIN — DOCUSATE SODIUM AND SENNOSIDES 1 TABLET: 8.6; 5 TABLET ORAL at 17:54

## 2020-07-27 RX ADMIN — GABAPENTIN 100 MG: 100 CAPSULE ORAL at 16:29

## 2020-07-27 RX ADMIN — ENOXAPARIN SODIUM 30 MG: 30 INJECTION SUBCUTANEOUS at 08:37

## 2020-07-27 RX ADMIN — AMLODIPINE BESYLATE 5 MG: 5 TABLET ORAL at 08:37

## 2020-07-27 RX ADMIN — LORAZEPAM 0.5 MG: 0.5 TABLET ORAL at 21:25

## 2020-07-27 RX ADMIN — OXYCODONE HYDROCHLORIDE 15 MG: 10 TABLET ORAL at 01:02

## 2020-07-27 RX ADMIN — OXYCODONE HYDROCHLORIDE 15 MG: 10 TABLET ORAL at 05:45

## 2020-07-27 RX ADMIN — LORAZEPAM 0.5 MG: 0.5 TABLET ORAL at 13:04

## 2020-07-27 RX ADMIN — METFORMIN HYDROCHLORIDE 1000 MG: 500 TABLET ORAL at 17:54

## 2020-07-27 RX ADMIN — GABAPENTIN 100 MG: 100 CAPSULE ORAL at 21:24

## 2020-07-27 RX ADMIN — NICOTINE 1 PATCH: 21 PATCH, EXTENDED RELEASE TRANSDERMAL at 08:37

## 2020-07-27 RX ADMIN — INSULIN LISPRO 8 UNITS: 100 INJECTION, SOLUTION INTRAVENOUS; SUBCUTANEOUS at 17:55

## 2020-07-27 RX ADMIN — INSULIN LISPRO 4 UNITS: 100 INJECTION, SOLUTION INTRAVENOUS; SUBCUTANEOUS at 08:36

## 2020-07-27 RX ADMIN — MELATONIN 6 MG: at 21:24

## 2020-07-27 RX ADMIN — LABETALOL HYDROCHLORIDE 100 MG: 100 TABLET, FILM COATED ORAL at 21:25

## 2020-07-27 RX ADMIN — OXYCODONE HYDROCHLORIDE 15 MG: 10 TABLET ORAL at 20:37

## 2020-07-27 RX ADMIN — ACETAMINOPHEN 975 MG: 325 TABLET, FILM COATED ORAL at 21:24

## 2020-07-27 RX ADMIN — ENOXAPARIN SODIUM 30 MG: 30 INJECTION SUBCUTANEOUS at 17:54

## 2020-07-27 RX ADMIN — INSULIN GLARGINE 35 UNITS: 100 INJECTION, SOLUTION SUBCUTANEOUS at 21:25

## 2020-07-27 RX ADMIN — LORAZEPAM 0.5 MG: 0.5 TABLET ORAL at 05:45

## 2020-07-27 RX ADMIN — OXYCODONE HYDROCHLORIDE 15 MG: 10 TABLET ORAL at 16:29

## 2020-07-27 RX ADMIN — FAMOTIDINE 20 MG: 20 TABLET ORAL at 08:37

## 2020-07-27 RX ADMIN — OXYCODONE HYDROCHLORIDE 15 MG: 10 TABLET ORAL at 11:31

## 2020-07-27 RX ADMIN — ACETAMINOPHEN 975 MG: 325 TABLET, FILM COATED ORAL at 05:45

## 2020-07-27 RX ADMIN — ACETAMINOPHEN 975 MG: 325 TABLET, FILM COATED ORAL at 13:04

## 2020-07-27 NOTE — PLAN OF CARE
Problem: PAIN - ADULT  Goal: Verbalizes/displays adequate comfort level or baseline comfort level  Description  Interventions:  - Encourage patient to monitor pain and request assistance  - Assess pain using appropriate pain scale  - Administer analgesics based on type and severity of pain and evaluate response  - Implement non-pharmacological measures as appropriate and evaluate response  - Consider cultural and social influences on pain and pain management  - Notify physician/advanced practitioner if interventions unsuccessful or patient reports new pain  Outcome: Progressing     Problem: INFECTION - ADULT  Goal: Absence or prevention of progression during hospitalization  Description  INTERVENTIONS:  - Assess and monitor for signs and symptoms of infection  - Monitor lab/diagnostic results  - Monitor all insertion sites, i e  indwelling lines, tubes, and drains  - Monitor endotracheal if appropriate and nasal secretions for changes in amount and color  - [place] appropriate cooling/warming therapies per order  - Administer medications as ordered  - Instruct and encourage patient and family to use good hand hygiene technique  - Identify and instruct in appropriate isolation precautions for identified infection/condition  Outcome: Progressing     Problem: SAFETY ADULT  Goal: Patient will remain free of falls  Description  INTERVENTIONS:  - Assess patient frequently for physical needs  -  Identify cognitive and physical deficits and behaviors that affect risk of falls    -  [place] fall precautions as indicated by assessment   - Educate patient/family on patient safety including physical limitations  - Instruct patient to call for assistance with activity based on assessment  - Modify environment to reduce risk of injury  - Consider OT/PT consult to assist with strengthening/mobility  Outcome: Progressing  Goal: Maintain or return to baseline ADL function  Description  INTERVENTIONS:  -  Assess patient's ability to carry out ADLs; assess patient's baseline for ADL function and identify physical deficits which impact ability to perform ADLs (bathing, care of mouth/teeth, toileting, grooming, dressing, etc )  - Assess/evaluate cause of self-care deficits   - Assess range of motion  - Assess patient's mobility; develop plan if impaired  - Assess patient's need for assistive devices and provide as appropriate  - Encourage maximum independence but intervene and supervise when necessary  - Involve family in performance of ADLs  - Assess for home care needs following discharge   - Consider OT consult to assist with ADL evaluation and planning for discharge  - Provide patient education as appropriate  Outcome: Progressing  Goal: Maintain or return mobility status to optimal level  Description  INTERVENTIONS:  - Assess patient's baseline mobility status (ambulation, transfers, stairs, etc )    - Identify cognitive and physical deficits and behaviors that affect mobility  - Identify mobility aids required to assist with transfers and/or ambulation (gait belt, sit-to-stand, lift, walker, cane, etc )  - [place] fall precautions as indicated by assessment  - Record patient progress and toleration of activity level on Mobility SBAR; progress patient to next Phase/Stage  - Instruct patient to call for assistance with activity based on assessment  - Consider rehabilitation consult to assist with strengthening/weightbearing, etc   Outcome: Progressing     Problem: DISCHARGE PLANNING  Goal: Discharge to home or other facility with appropriate resources  Description  INTERVENTIONS:  - Identify barriers to discharge w/patient and caregiver  - Arrange for needed discharge resources and transportation as appropriate  - Identify discharge learning needs (meds, wound care, etc )  - Arrange for interpretive services to assist at discharge as needed  - Refer to Case Management Department for coordinating discharge planning if the patient needs post-hospital services based on physician/advanced practitioner order or complex needs related to functional status, cognitive ability, or social support system  Outcome: Progressing     Problem: Knowledge Deficit  Goal: Patient/family/caregiver demonstrates understanding of disease process, treatment plan, medications, and discharge instructions  Description  Complete learning assessment and assess knowledge base  Interventions:  - Provide teaching at level of understanding  - Provide teaching via preferred learning methods  Outcome: Progressing     Problem: Potential for Falls  Goal: Patient will remain free of falls  Description  INTERVENTIONS:  - Assess patient frequently for physical needs  -  Identify cognitive and physical deficits and behaviors that affect risk of falls    -  [place] fall precautions as indicated by assessment   - Educate patient/family on patient safety including physical limitations  - Instruct patient to call for assistance with activity based on assessment  - Modify environment to reduce risk of injury  - Consider OT/PT consult to assist with strengthening/mobility  Outcome: Progressing     Problem: SKIN/TISSUE INTEGRITY - ADULT  Goal: Skin integrity remains intact  Description  INTERVENTIONS  - Identify patients at risk for skin breakdown  - Assess and monitor skin integrity  - Assess and monitor nutrition and hydration status  - Monitor labs (i e  albumin)  - Assess for incontinence   - Turn and reposition patient  - Assist with mobility/ambulation  - Relieve pressure over bony prominences  - Avoid friction and shearing  - Provide appropriate hygiene as needed including keeping skin clean and dry  - Evaluate need for skin moisturizer/barrier cream  - Collaborate with interdisciplinary team (i e  Nutrition, Rehabilitation, etc )   - Patient/family teaching  Outcome: Progressing  Goal: Incision(s), wounds(s) or drain site(s) healing without S/S of infection  Description  INTERVENTIONS  - Assess and document risk factors for skin impairment   - Assess and document dressing, incision, wound bed, drain sites and surrounding tissue  - Consider nutrition services referral as needed  - Oral mucous membranes remain intact  - Provide patient/ family education  Outcome: Progressing  Goal: Oral mucous membranes remain intact  Description  INTERVENTIONS  - Assess oral mucosa and hygiene practices  - Implement preventative oral hygiene regimen  - Implement oral medicated treatments as ordered  - Initiate Nutrition services referral as needed  Outcome: Progressing     Problem: MUSCULOSKELETAL - ADULT  Goal: Maintain or return mobility to safest level of function  Description  INTERVENTIONS:  - Assess patient's ability to carry out ADLs; assess patient's baseline for ADL function and identify physical deficits which impact ability to perform ADLs (bathing, care of mouth/teeth, toileting, grooming, dressing, etc )  - Assess/evaluate cause of self-care deficits   - Assess range of motion  - Assess patient's mobility  - Assess patient's need for assistive devices and provide as appropriate  - Encourage maximum independence but intervene and supervise when necessary  - Involve family in performance of ADLs  - Assess for home care needs following discharge   - Consider OT consult to assist with ADL evaluation and planning for discharge  - Provide patient education as appropriate  Outcome: Progressing  Goal: Maintain proper alignment of affected body part  Description  INTERVENTIONS:  - Support, maintain and protect limb and body alignment  - Provide patient/ family with appropriate education  Outcome: Progressing     Problem: NEUROSENSORY - ADULT  Goal: Achieves stable or improved neurological status  Description  INTERVENTIONS  - Monitor and report changes in neurological status  - Monitor vital signs such as temperature, blood pressure, glucose, and any other labs ordered   - Initiate measures to prevent increased intracranial pressure  - Monitor for seizure activity and implement precautions if appropriate      Outcome: Progressing  Goal: Achieves maximal functionality and self care  Description  INTERVENTIONS  - Monitor swallowing and airway patency with patient fatigue and changes in neurological status  - Encourage and assist patient to increase activity and self care     - Encourage visually impaired, hearing impaired and aphasic patients to use assistive/communication devices  Outcome: Progressing     Problem: Prexisting or High Potential for Compromised Skin Integrity  Goal: Skin integrity is maintained or improved  Description  INTERVENTIONS:  - Identify patients at risk for skin breakdown  - Assess and monitor skin integrity  - Assess and monitor nutrition and hydration status  - Monitor labs   - Assess for incontinence   - Turn and reposition patient  - Assist with mobility/ambulation  - Relieve pressure over bony prominences  - Avoid friction and shearing  - Provide appropriate hygiene as needed including keeping skin clean and dry  - Evaluate need for skin moisturizer/barrier cream  - Collaborate with interdisciplinary team   - Patient/family teaching  - Consider wound care consult   Outcome: Progressing

## 2020-07-27 NOTE — SOCIAL WORK
Patient recommended for rehab  Patient refusing  Patient wants to go home  Patient states a girlfriend can assist at times at home  Patient's brother lives upstairs  Patient refusing [facility] PT/OT also  Patient states he was doing outpatient therapy already and knows all the exercises

## 2020-07-27 NOTE — ASSESSMENT & PLAN NOTE
(P) [phone]   - Restarted on home regimen Lantus 35 units  - Algorithm 5 SSI   - added 8 units of Humalog t i d  With meals  - home amaryl and metformin resumed  Home Trulicity not on formulary  Patient states his BS run high regardless of all home medications and HbA1c today is 9 7%  Consult endocrine for further recommendations/adjustments for discharge

## 2020-07-27 NOTE — ASSESSMENT & PLAN NOTE
· Injuries as below   · PT/OT - rec post acute IP rehab but patient prefers to go home, understanding the risks  Possible d/c home tomorrow

## 2020-07-27 NOTE — PROGRESS NOTES
Progress Note - Syed Galvez 1960, 61 y o  male MRN: [id number]    [facility] Encounter: [id number]    Primary Care Provider: Syeda Eddy DO   Date and time admitted to hospital: 7/22/2020 11:18 AM        Fall  Assessment & Plan  · Injuries as below   · PT/OT - rec post acute IP rehab but patient prefers to go home, understanding the risks  Possible d/c home tomorrow  * Closed compression fracture of body of L1 vertebra (HCC)  Assessment & Plan  Status post fall from 5 ft, positive head trauma and questionable loss of consciousness with L1 SEP fracture - TLICS 1    · CT lumbar spine 07/22/2020:  Acute fracture through superior endplate of L1 with minimal loss of height  No significant bony retropulsion into the spinal canal      · No surgical intervention at this time   · TLSO brace when out of bed and head of bed greater than 45°  · Upright lumbar spine XR completed, reviewed by neurosurgery   · Multimodal pain regimen as below  · Mobilize as tolerated with assistance, PT / OT evaluation, must wear brace  · Neurosurgery follow up in 2 weeks as outpatient with repeat lumbar spine XR     Acute pain due to trauma  Assessment & Plan  · APS consult, recs appreciated    · Regimen as below:  · Tylenol 975 mg q8h [place]   · Oxycodone increased to 10/15 mg for mod/severe pain q4h prn  · Gabpentin 100 mg TID [place]   · Robaxin 500 mg q6h [place]  · Lidocaine patch to L spine   · Rec'd to d/c on hydrocodone/acetaminophen 5-325 mg x3-4 days and robaxin 500 q6h [place] x 4 days   · Suggest continued outpatient follow up with [facility] and pain     Concussion without loss of consciousness  Assessment & Plan  - concussion education given at bedside  - patient informed that he needs continue to work with PT and OT  - will require outpatient follow-up  - continue p r n   Pain control  - monitor for any worsening nausea or vomiting  - no new visual or auditory deficits    Pulmonary nodule  Assessment & Plan  -Right mid lung opacity or mass measuring approximately 3 7 cm, will need outpatient follow up CT as he has smoking h/o    Type 2 diabetes mellitus ([facility] [address] )  Assessment & Plan  (P) [id number]   - Restarted on home regimen Lantus 35 units  - Algorithm 5 SSI   - added 8 units of Humalog t i d  With meals  - home amaryl and metformin resumed  Home Trulicity not on formulary  Patient states his BS run high regardless of all home medications and HbA1c today is 9 7%  Consult endocrine for further recommendations/adjustments for discharge  Disposition: continue med-surg status, Patient being recommended for discharge to rehab however he is refusing and wants to go home possibly as early as tomorrow once his pain control is improved  Will consult endocrine today and have PT/OT re-evaluate with plans for possible discharge home with home therapy tomorrow, 07/28/2020  SUBJECTIVE:  Chief Complaint: "My head is sore "    Subjective:  Patient notes discomfort in the back of her head  Otherwise he is feeling pretty well other than does having some pain all over  He would like to be able to go home the does not feel quite ready today due to pain and difficulty with mobilization  I did explain that is recommended that he go to inpatient rehab however he is refusing at this time  He is tolerating breakfast and is nausea and vomiting has resolved  I discussed with him his diabetes regimen and will reorder this at this time as well as an endocrine consult due to uncontrolled blood sugars  He is in agreement with this        OBJECTIVE:     Meds/Allergies     Current Facility-Administered Medications:     acetaminophen (TYLENOL) tablet 975 mg, 975 mg, Oral, Q8H [facility], Mine Rivera MD, 975 mg at 07/27/20 1304    amLODIPine (NORVASC) tablet 5 mg, 5 mg, Oral, Daily, Diamond Vidal MD, 5 mg at 07/27/20 0530    calcium carbonate (TUMS) chewable tablet 500 mg, 500 mg, Oral, Daily PRN, Jeannie Dodge MD, 500 mg at 07/24/20 2030    enoxaparin (LOVENOX) subcutaneous injection 30 mg, 30 mg, Subcutaneous, BID, Han Umaña MD, 30 mg at 07/27/20 0837    famotidine (PEPCID) tablet 20 mg, 20 mg, Oral, BID, Tony Bautista PA-C, 20 mg at 07/27/20 5895    gabapentin (NEURONTIN) capsule 100 mg, 100 mg, Oral, TID, Nina Vidal MD, 100 mg at 07/27/20 0837    [START ON 7/28/2020] glimepiride (AMARYL) tablet 4 mg, 4 mg, Oral, Daily With Breakfast, Ze Daily PA-C    hydrALAZINE (APRESOLINE) injection 5 mg, 5 mg, Intravenous, Q6H PRN, Annalise Fitzgerald PA-C, 5 mg at 07/23/20 2236    insulin glargine (LANTUS) subcutaneous injection 35 Units 0 35 mL, 35 Units, Subcutaneous, HS, Yajaira Hoyos PA-C, 35 Units at 07/26/20 2147    insulin lispro (HumaLOG) 100 units/mL subcutaneous injection 10 Units, 10 Units, Subcutaneous, TID With Meals, Brandyn Murrya MD, 10 Units at 07/27/20 1138    insulin lispro (HumaLOG) 100 units/mL subcutaneous injection 4-20 Units, 4-20 Units, Subcutaneous, TID AC, 8 Units at 07/27/20 1137 **AND** Fingerstick Glucose (POCT), , , TID AC, Brandyn Murray MD    labetalol (NORMODYNE) tablet 100 mg, 100 mg, Oral, Q12H [facility], Nina Vidal MD, 100 mg at 07/27/20 0837    lisinopril (ZESTRIL) tablet 10 mg, 10 mg, Oral, Daily, Nina Viadl MD, 10 mg at 07/27/20 2443    LORazepam (ATIVAN) tablet 0 5 mg, 0 5 mg, Oral, Q8H PRN, Ray Sorenson MD, 0 5 mg at 07/27/20 1304    melatonin tablet 6 mg, 6 mg, Oral, HS, Nina Vidal MD, 6 mg at 07/26/20 2147    metFORMIN (GLUCOPHAGE) tablet 1,000 mg, 1,000 mg, Oral, Daily With Dinner, Ze Daily PA-C    [START ON 7/28/2020] metFORMIN (GLUCOPHAGE) tablet 500 mg, 500 mg, Oral, Daily With Breakfast, Ze Daily PA-C    methocarbamol (ROBAXIN) tablet 500 mg, 500 mg, Oral, Q6H EARNEST, GRZEGORZ Shepherd, 500 mg at 07/27/20 1131    nicotine (NICODERM CQ) 21 mg/24 hr TD 24 hr patch 1 patch, 1 patch, Transdermal, Daily, Brandyn Murray MD, 1 patch at 07/27/20 0630    ondansetron (ZOFRAN) injection 4 mg, 4 mg, Intravenous, Q6H PRN, Fabricio Vidal MD, 4 mg at 07/25/20 1227    oxyCODONE (ROXICODONE) immediate release tablet 10 mg, 10 mg, Oral, Q4H PRN, GRZEGORZ Shepherd    oxyCODONE (ROXICODONE) IR tablet 15 mg, 15 mg, Oral, Q4H PRN, GRZEGORZ Shepherd, 15 mg at 07/27/20 1131    polyethylene glycol (MIRALAX) packet 17 g, 17 g, Oral, Daily PRN, Iraida Youssef PA-C, 17 g at 07/25/20 7601    senna-docusate sodium (SENOKOT S) 8 6-50 mg per tablet 1 tablet, 1 tablet, Oral, BID, FEI Giraldo-C, 1 tablet at 07/27/20 9687     Vitals:   Vitals:    07/27/20 0747   BP: 153/96   Pulse: 85   Resp: 16   Temp: 98 3 °F (36 8 °C)   SpO2: 95%       Intake/Output:  I/O       07/25 0701 - 07/26 0700 07/26 0701 - 07/27 0700 07/27 0701 - 07/28 0700    P  O  1764 1140 420    Total Intake(mL/kg) 1764 (16 3) 1140 (10 6) 420 (3 9)    Urine (mL/kg/hr) 650 (0 3) 2300 (0 9)     Total Output 650 2300     Net +1114 -1160 +420           Unmeasured Urine Occurrence 3 x             Nutrition/GI Proph/Bowel Reg:  Diabetic    Physical Exam:   GENERAL APPEARANCE:  No acute distress  NEURO:  GCS 15, nonfocal exam  HEENT:  +posterior scalp wound with staples in place and hematoma soft with no signs of infection or drainage  CV:  Regular rate and rhythm, no murmurs gallops or rubs  LUNGS:  Clear to auscultation bilaterally  GI:  Soft, nontender, nondistended  :  Voiding  MSK:  Moving all extremities equally with full strength  SKIN:  Pink, warm, dry    Invasive Devices     Peripheral Intravenous Line            Peripheral IV 07/27/20 Right Arm less than 1 day                 Lab Results:   Results: I have personally reviewed pertinent reports   , BMP/CMP:   Lab Results   Component Value Date    SODIUM 136 07/27/2020    K 3 6 07/27/2020     07/27/2020    CO2 27 07/27/2020    BUN 10 07/27/2020    CREATININE 0 59 (L) 07/27/2020    CALCIUM 9 3 07/27/2020    EGFR 111 07/27/2020    and CBC: Lab Results   Component Value Date    WBC 10 85 (H) 07/27/2020    HGB 13 6 07/27/2020    HCT 39 9 07/27/2020    MCV 89 07/27/2020     07/27/2020    MCH 30 3 07/27/2020    MCHC 34 1 07/27/2020    RDW 12 7 07/27/2020    MPV 9 5 07/27/2020    NRBC 0 07/27/2020     Imaging/EKG Studies: Results: I have personally reviewed pertinent reports      Other Studies:  No new  VTE Prophylaxis: Sequential compression device (Venodyne)  and Enoxaparin (Lovenox)

## 2020-07-27 NOTE — CONSULTS
Endocrinology  Consultation         Patient Information: Michael Claire 61 y o  male MRN: [id number]  Unit/Bed#: [facility] 604-01 Encounter: [id number]  PCP: Carrillo Santa DO  Date of Admission:  7/22/2020  Date of Consultation: 07/27/20  Requesting Physician: Deneen Pace MD    Reason For Consultation:   Poorly controlled DM TYPE 2     Assessment/Plan:    1  Hyperglycemia 2/2  Poorly controlled Type 2 DM  - A1C 9 7, FBG - 201-213, Prelunch  144-253, Pre dinner - 219 to 295    2  Acute L1 fracture 2/2 mechanical fall from height of 5 feet  3  Posterior scalp laceration 2/2 mechanical fall  4  Hypertension  5  Ambulatory dysfunction 2/2 recent fall   6  R/o underlying metabolic bone disease    Plan   -  Continue Glargine 35 u QHS  - Continue Metformin 500 qam and 1000 mg QPM , and Glimiperide 4mg OD   - Continue correctional insulin coverage with algorithm 5  - Increase insulin lispro 13 U TID AC  - Continue diabetic diet  Discussed with patient about not drinking juice or eating fruits in syrup or rice crispies with his meals    - Recommend checking PTH, Vit D levels, S  Albumin levels  - Pt will benefit from outpatient DEXA and likely Testosterone levels  - Rest of care per primary team and pain management  - On discharge   , recommend to continue Metfromin 500 QAM, 1000mg QPM, Glimiperide 4mg daily, Glargine 35 UQHS, and start lispro 13 U QHS, and Trulicity Qweekly  History of Present Illness:    Michael Claire is a 61 y o  male who is originally admitted on 7/22/2020 due to mechanical fall off of a ladder from a height of  5 feet  He sustained a posterior scalp laceration ( s/p laceration repair in the ED)  and associated hematoma and L1 end plate fracture post the fall  He was hospitalized for further pain management and care  He was conservatively managed with back brace, pain management and PT/OT  We are consulted for poorly controlled blood sugars         He has been diabetic for past 5 - 10 years( unable to tell me exactly when he was diagnosed) but reports being insulin dependent for about the same time  At home he is on Metfrom 500mg QAM and 1000mg QPM, Glargine 35 u QHS , Glimiperide 4mg BID, and recently started on weekly Trulicity 3 months ago  Does not regularly check his blood sugars at home, and does not remember A1C  His blood sugars are managed by PCP as outpatient  Saw an ophthalmologist 1 year ago and reports normal eye exam  He has not seen a podiatrist ever  Has hypoglycemic episodes approx once every 6 months on the current home meds  Since hospitalization, he  Was initially started on Glargine 15 QHS and increased to 35 U QHS on 7/24  Lispro meal time  of 8-10 u TID AC, and correctional insulin  On the correctional scale he was requiring 20-32 u/ day  Metformin was restarted on 7/27  Review of Systems:    Review of Systems  Negative except for as mentioned in HPI  Past Medical and Surgical History:     Past Medical History:   Diagnosis Date    Alcoholism ([place] [address] )     Anxiety     Diabetes ([place] [address] )     Diabetes mellitus ([place] [address] )     High cholesterol        History reviewed  No pertinent surgical history  Meds/Allergies: Allergies: No Known Allergies    Social History:     Marital Status: Single    Substance Use History:   Social History     Substance and Sexual Activity   Alcohol Use Not Currently    Frequency: Never     Social History     Tobacco Use   Smoking Status Current Every Day Smoker    Packs/day: 2 00    Types: Cigarettes   Smokeless Tobacco Never Used     Social History     Substance and Sexual Activity   Drug Use Yes    Types: Marijuana, Other    Comment: prescription pills (unprescribed) daily       Family History:    Adopted and family history is unknown      Physical Exam:     Vitals:   Blood Pressure: 146/95 (07/27/20 1441)  Pulse: 87 (07/27/20 1441)  Temperature: 97 9 °F (36 6 °C) (07/27/20 1435)  Temp Source: Oral (07/27/20 0747)  Respirations: 16 (07/27/20 1435)  Height: 6' 4" (193 cm) (07/22/20 1430)  Weight - Scale: 108 kg (238 lb 1 5 oz) (07/22/20 1430)  SpO2: 97 % (07/27/20 1441)    Physical Exam    General: middle aged,  male resting in bed, saturating on room air  HEENT: Laceration s/p repair with staples over parieto -occiptal region  Surrounding swelling noted  moist mucosa,   Respiratory: CTA bilaterally, no wheezes or crackles  Cardiovascular: RRR, no murmur, Normal S1 and S2, no pedal edema  Abdomen: Soft, non-tender, non-distended, normal bowel sounds in all quadrants  Musculoskeletal: normal ROM in upper and lower extremities  Integumentary: warm, dry  Neurological: a/o x3, no gross motor or sensory deficits  Psychiatric: cooperative     Additional Data:     Lab Results: I have personally reviewed pertinent reports  Results from last 7 days   Lab Units 07/27/20  0609   WBC Thousand/uL 10 85*   HEMOGLOBIN g/dL 13 6   HEMATOCRIT % 39 9   PLATELETS Thousands/uL 290   NEUTROS PCT % 60   LYMPHS PCT % 26   MONOS PCT % 10   EOS PCT % 2     Results from last 7 days   Lab Units 07/27/20  0609   POTASSIUM mmol/L 3 6   CHLORIDE mmol/L 102   CO2 mmol/L 27   BUN mg/dL 10   CREATININE mg/dL 0 59*   CALCIUM mg/dL 9 3           Imaging: I have personally reviewed pertinent reports  Xr Chest Portable: 7/22/2020 Impression: Right mid lung opacity or mass measuring approximately 3 7 cm  Follow-up enhanced chest CT recommended in this patient with smoking history  The study was marked in EPIC for significant notification  Workstation performed: [id number]     Xr Spine Lumbar 2 Or 3 Views Injury - 7/23/2020 Impression: 1  Anterior wedge compression fracture again noted with slightly greater loss of height anteriorly compared to the CT of 7/22/2020  Workstation performed: [id number]     Ct Head Without Contrast 7/22/2020  Impression: Left parietal scalp hematoma  No acute intracranial hemorrhage or depressed calvarial fracture identified  Workstation performed: [id number]     Ct Spine Cervical Without Contrast  7/22/2020 Impression: No acute fracture or evidence for traumatic malalignment  I personally discussed this study with Dr Ayah Randolph on 7/22/2020 at 12:17 PM   Workstation performed: [id number]     Ct Spine Lumbar Without Contrast 7/22/2020 Impression: Acute fracture through the superior endplate of L1 with minimal loss of height  No significant bony retropulsion into the spinal canal  Degenerative changes of the lumbar spine, as described above  I personally discussed this study with Dr Ayah Randolph on 7/22/2020 at 12:17 PM  Workstation performed: [id number]     Ct Abdomen Pelvis W Contrast 7/22/2020 impression: Unchanged appearance of the known mild superior endplate compression deformity of the L1 vertebral body without progressive loss of height  No other sites of injury are identified within the abdomen or the pelvis  Workstation performed: [id number]     ** Please Note: This note has been constructed using a voice recognition system   **

## 2020-07-27 NOTE — OCCUPATIONAL THERAPY NOTE
Occupational Therapy Treatment Note:       07/27/20 1435   Restrictions/Precautions   Braces or Orthoses TLSO   Other Precautions Fall Risk   Transfers   Sit to Stand 5  Supervision   Stand to Sit 5  Supervision   Toilet transfer 5  Supervision   Additional items Standard toilet   Functional Mobility   Functional Mobility 5  Supervision   Additional items   (to from bathroom)   Cognition   Comments pt was noted to require min cues to follow photo for tlso donning  pt was noted to place axillary straps over shoulders  (in photo pt was wearing dark tanktop which was similar in appearance to straps)  plan to continue to assess pts safety with adls iadls   Activity Tolerance   Activity Tolerance Patient tolerated treatment well   Assessment   Assessment pt participated in pm ot session and was seen focusing on donning tlso, functional mobility and trnasfers to from bed and std toilet  pt with strong le's and was able to lower self onto std toilet  pt required min asst and mod vc's for tlso donning  pt noted to place axillary straps over shoulders needing mod intervention to adjust    pt with + sweating noted again this session  pt continues to require asst for lb dressing secondary to low back pain  pt continues to be recommended for in pt rehab inorder to focus on increasing lb adls to I, tlso donning to I and functional mobility with / without a d  pt is motivated and cooperative  pts s/o works daily and reportedly does not live with him   Plan   Treatment Interventions ADL retraining;Functional transfer training; Endurance training;Cognitive reorientation;Patient/family training;Equipment evaluation/education; Activityengagement   Goal Expiration Date 08/06/20   OT Treatment Day 1   OT Frequency 3-5x/wk   Recommendation   OT Discharge Recommendation Post-Acute Rehabilitation Services   OT - OK to Discharge Yes   Jelena Baker

## 2020-07-28 VITALS
DIASTOLIC BLOOD PRESSURE: 89 MMHG | HEIGHT: 76 IN | OXYGEN SATURATION: 96 % | RESPIRATION RATE: 18 BRPM | HEART RATE: 79 BPM | BODY MASS INDEX: 28.99 KG/M2 | SYSTOLIC BLOOD PRESSURE: 138 MMHG | TEMPERATURE: 98.3 F | WEIGHT: 238.1 LBS

## 2020-07-28 LAB
25(OH)D3 SERPL-MCNC: 18.7 NG/ML (ref 30–100)
CREAT UR-MCNC: 51.6 MG/DL
GLUCOSE SERPL-MCNC: 157 MG/DL (ref 65–140)
GLUCOSE SERPL-MCNC: 179 MG/DL (ref 65–140)
MICROALBUMIN UR-MCNC: 85.5 MG/L (ref 0–20)
MICROALBUMIN/CREAT 24H UR: 166 MG/G CREATININE (ref 0–30)
PTH-INTACT SERPL-MCNC: 34.2 PG/ML (ref 18.4–80.1)
T4 FREE SERPL-MCNC: 1.07 NG/DL (ref 0.76–1.46)
TSH SERPL DL<=0.05 MIU/L-ACNC: 1.82 UIU/ML (ref 0.36–3.74)

## 2020-07-28 PROCEDURE — 82948 REAGENT STRIP/BLOOD GLUCOSE: CPT

## 2020-07-28 PROCEDURE — 84439 ASSAY OF FREE THYROXINE: CPT | Performed by: INTERNAL MEDICINE

## 2020-07-28 PROCEDURE — 97535 SELF CARE MNGMENT TRAINING: CPT

## 2020-07-28 PROCEDURE — 83970 ASSAY OF PARATHORMONE: CPT | Performed by: INTERNAL MEDICINE

## 2020-07-28 PROCEDURE — 99238 HOSP IP/OBS DSCHRG MGMT 30/<: CPT | Performed by: PHYSICIAN ASSISTANT

## 2020-07-28 PROCEDURE — NC001 PR NO CHARGE: Performed by: SURGERY

## 2020-07-28 PROCEDURE — NC001 PR NO CHARGE: Performed by: PHYSICIAN ASSISTANT

## 2020-07-28 PROCEDURE — 82306 VITAMIN D 25 HYDROXY: CPT | Performed by: INTERNAL MEDICINE

## 2020-07-28 PROCEDURE — 84443 ASSAY THYROID STIM HORMONE: CPT | Performed by: INTERNAL MEDICINE

## 2020-07-28 RX ORDER — CALCIUM GLUCONATE 45(500) MG
500 TABLET ORAL 2 TIMES DAILY
Qty: 60 TABLET | Refills: 0 | Status: SHIPPED | OUTPATIENT
Start: 2020-07-28 | End: 2020-10-02 | Stop reason: ALTCHOICE

## 2020-07-28 RX ORDER — GABAPENTIN 100 MG/1
100 CAPSULE ORAL 3 TIMES DAILY
Qty: 90 CAPSULE | Refills: 0 | Status: SHIPPED | OUTPATIENT
Start: 2020-07-28

## 2020-07-28 RX ORDER — OXYCODONE HYDROCHLORIDE 10 MG/1
10 TABLET ORAL ONCE
Status: COMPLETED | OUTPATIENT
Start: 2020-07-28 | End: 2020-07-28

## 2020-07-28 RX ORDER — POLYETHYLENE GLYCOL 3350 17 G/17G
17 POWDER, FOR SOLUTION ORAL DAILY PRN
Qty: 14 EACH | Refills: 0 | Status: SHIPPED | OUTPATIENT
Start: 2020-07-28

## 2020-07-28 RX ORDER — ACETAMINOPHEN 325 MG/1
975 TABLET ORAL EVERY 8 HOURS SCHEDULED
Qty: 30 TABLET | Refills: 0 | Status: SHIPPED | OUTPATIENT
Start: 2020-07-28 | End: 2022-06-30

## 2020-07-28 RX ORDER — METHOCARBAMOL 500 MG/1
500 TABLET, FILM COATED ORAL EVERY 6 HOURS SCHEDULED
Qty: 60 TABLET | Refills: 0 | Status: SHIPPED | OUTPATIENT
Start: 2020-07-28 | End: 2020-10-09 | Stop reason: SDUPTHER

## 2020-07-28 RX ORDER — OXYCODONE HYDROCHLORIDE 5 MG/1
TABLET ORAL
Qty: 30 TABLET | Refills: 0 | Status: SHIPPED | OUTPATIENT
Start: 2020-07-28 | End: 2020-10-02 | Stop reason: ALTCHOICE

## 2020-07-28 RX ORDER — LABETALOL 200 MG/1
100 TABLET, FILM COATED ORAL 2 TIMES DAILY
Refills: 0
Start: 2020-07-28 | End: 2020-12-21

## 2020-07-28 RX ORDER — AMOXICILLIN 250 MG
1 CAPSULE ORAL 2 TIMES DAILY
Refills: 0
Start: 2020-07-28 | End: 2020-10-02 | Stop reason: ALTCHOICE

## 2020-07-28 RX ADMIN — GLIMEPIRIDE 4 MG: 2 TABLET ORAL at 08:31

## 2020-07-28 RX ADMIN — NICOTINE 1 PATCH: 21 PATCH, EXTENDED RELEASE TRANSDERMAL at 08:32

## 2020-07-28 RX ADMIN — METHOCARBAMOL TABLETS 500 MG: 500 TABLET, COATED ORAL at 05:59

## 2020-07-28 RX ADMIN — METHOCARBAMOL TABLETS 500 MG: 500 TABLET, COATED ORAL at 11:34

## 2020-07-28 RX ADMIN — DOCUSATE SODIUM AND SENNOSIDES 1 TABLET: 8.6; 5 TABLET ORAL at 08:31

## 2020-07-28 RX ADMIN — OXYCODONE HYDROCHLORIDE 10 MG: 10 TABLET ORAL at 12:04

## 2020-07-28 RX ADMIN — INSULIN LISPRO 4 UNITS: 100 INJECTION, SOLUTION INTRAVENOUS; SUBCUTANEOUS at 08:29

## 2020-07-28 RX ADMIN — AMLODIPINE BESYLATE 5 MG: 5 TABLET ORAL at 08:31

## 2020-07-28 RX ADMIN — INSULIN LISPRO 4 UNITS: 100 INJECTION, SOLUTION INTRAVENOUS; SUBCUTANEOUS at 11:35

## 2020-07-28 RX ADMIN — METHOCARBAMOL TABLETS 500 MG: 500 TABLET, COATED ORAL at 00:25

## 2020-07-28 RX ADMIN — LABETALOL HYDROCHLORIDE 100 MG: 100 TABLET, FILM COATED ORAL at 08:31

## 2020-07-28 RX ADMIN — OXYCODONE HYDROCHLORIDE 15 MG: 10 TABLET ORAL at 08:40

## 2020-07-28 RX ADMIN — ENOXAPARIN SODIUM 30 MG: 30 INJECTION SUBCUTANEOUS at 08:31

## 2020-07-28 RX ADMIN — ACETAMINOPHEN 975 MG: 325 TABLET, FILM COATED ORAL at 05:59

## 2020-07-28 RX ADMIN — FAMOTIDINE 20 MG: 20 TABLET ORAL at 08:31

## 2020-07-28 RX ADMIN — OXYCODONE HYDROCHLORIDE 15 MG: 10 TABLET ORAL at 01:09

## 2020-07-28 RX ADMIN — METFORMIN HYDROCHLORIDE 500 MG: 500 TABLET ORAL at 08:31

## 2020-07-28 RX ADMIN — GABAPENTIN 100 MG: 100 CAPSULE ORAL at 08:31

## 2020-07-28 RX ADMIN — LISINOPRIL 10 MG: 10 TABLET ORAL at 08:31

## 2020-07-28 NOTE — ASSESSMENT & PLAN NOTE
· APS consult, recs appreciated    · Regimen as below:  · Tylenol 975 mg q8h [place]   · Oxycodone increased to 10/15 mg for mod/severe pain q4h prn  · Gabpentin 100 mg TID [place]   · Robaxin 500 mg q6h [place]  · Lidocaine patch to L spine   · Will discharge on oxycodone 5-10 mg po q4h prn as recommended by APS  · Suggest continued outpatient follow up with [facility]

## 2020-07-28 NOTE — ASSESSMENT & PLAN NOTE
- concussion education given at bedside  - patient informed that he needs continue to work with PT and OT  - will require outpatient follow-up  - continue p r n   Pain control  - no new visual or auditory deficits  - follow up with trauma in 2 weeks

## 2020-07-28 NOTE — ASSESSMENT & PLAN NOTE
(P) [phone]   - Restarted on home regimen Lantus 35 units  - Algorithm 5 SSI   - Increased to 13 units of Humalog t i d  With meals per endocrinology consult  Will continue on discharge  - home amaryl and metformin resumed  Home Trulicity not on formulary  Patient states his BS run high regardless of all home medications and HbA1c is 9 7%    -Consulted endocrine for further recommendations/adjustments for discharge, recs as above  - recommend outpatient f/u with endocrinology and PCP

## 2020-07-28 NOTE — SOCIAL WORK
Prescription for HumaLOG insulin sent to [facility] for pricing at the patient's request  [facility] generic brand is at no cost  [facility]  Chichi ENAMORADO is aware and is [facility] all [facility]  Pt's son is transporting home and will  medications  Pt refusing home care services  He is knowledgeable of how to inject subcutaneously but not draw up medications  Pt expressed understanding that he would need to draw up insulin  Discussed same w/ nurse, Charlotte Ramsey who will instruct him on same

## 2020-07-28 NOTE — ASSESSMENT & PLAN NOTE
· Injuries as below   · PT/OT - rec post acute IP rehab but patient prefers to go home, understanding the risks  Possible d/c home 7/28

## 2020-07-28 NOTE — PROGRESS NOTES
Reviewed insulin administration with patient, including drawing up insulin from vial, site selection and administration  Patient indicates understanding of same

## 2020-07-28 NOTE — PROGRESS NOTES
Progress Note - Malaika Rahman 1960, 61 y o  male MRN: [id number]    [facility] Encounter: [id number]    Primary Care Provider: Vance Thibodeaux DO   Date and time admitted to hospital: 7/22/2020 11:18 AM        Concussion without loss of consciousness  Assessment & Plan  - concussion education given at bedside  - patient informed that he needs continue to work with PT and OT  - will require outpatient follow-up  - continue p r n  Pain control  - monitor for any worsening nausea or vomiting  - no new visual or auditory deficits    Acute pain due to trauma  Assessment & Plan  · APS consult, recs appreciated    · Regimen as below:  · Tylenol 975 mg q8h [place]   · Oxycodone increased to 10/15 mg for mod/severe pain q4h prn  · Gabpentin 100 mg TID [place]   · Robaxin 500 mg q6h [place]  · Lidocaine patch to L spine   · Rec'd to d/c on hydrocodone/acetaminophen 5-325 mg x3-4 days and robaxin 500 q6h [place] x 4 days   · Suggest continued outpatient follow up with [facility]     Type 2 diabetes mellitus ([facility] [address] )  Assessment & Plan  (P) [phone]   - Restarted on home regimen Lantus 35 units  - Algorithm 5 SSI   - Increased to 13 units of Humalog t i d  With meals per endocrinology consult  - home amaryl and metformin resumed  Home Trulicity not on formulary  Patient states his BS run high regardless of all home medications and HbA1c today is 9 7%  -Consulted endocrine for further recommendations/adjustments for discharge, recs as above    Pulmonary nodule  Assessment & Plan  -Right mid lung opacity or mass measuring approximately 3 7 cm, will need outpatient follow up CT as he has smoking h/o    Fall  Assessment & Plan  · Injuries as below   · PT/OT - rec post acute IP rehab but patient prefers to go home, understanding the risks  Possible d/c home 7/28    * Closed compression fracture of body of L1 vertebra (HCC)  Assessment & Plan  Status post fall from 5 ft, positive head trauma and questionable loss of consciousness with L1 SEP fracture - TLICS 1    · CT lumbar spine 07/22/2020:  Acute fracture through superior endplate of L1 with minimal loss of height  No significant bony retropulsion into the spinal canal      · No surgical intervention at this time   · TLSO brace when out of bed and head of bed greater than 45°  · Upright lumbar spine XR completed, reviewed by neurosurgery   · Multimodal pain regimen as below  · Mobilize as tolerated with assistance, PT / OT evaluation, must wear brace  · Neurosurgery follow up in 2 weeks as outpatient with repeat lumbar spine XR           Disposition:  Patient is planned for discharge today  Recommendation is for rehab, however he is refusing an state he wants to go home  He was seen by Endocrinology who increased his prandial insulin dose to 13 units t i d  A c  PTOT eval be necessary and then he will be discharged  SUBJECTIVE:  Chief Complaint:  Persistent pain due to traumatic injury    Subjective: The patient notes that his pain waxes and wanes, and overnight required medication to remain under control  His pain is worst at the site of his stable posterior scalp laceration   Otherwise he has no complaints at this time      OBJECTIVE:     Meds/Allergies     Current Facility-Administered Medications:     acetaminophen (TYLENOL) tablet 975 mg, 975 mg, Oral, Q8H [facility], Daryl Hays MD, 975 mg at 07/28/20 0559    amLODIPine (NORVASC) tablet 5 mg, 5 mg, Oral, Daily, Melva Vidal MD, 5 mg at 07/28/20 0831    calcium carbonate (TUMS) chewable tablet 500 mg, 500 mg, Oral, Daily PRN, Luis Taveras MD, 500 mg at 07/24/20 2030    enoxaparin (LOVENOX) subcutaneous injection 30 mg, 30 mg, Subcutaneous, BID, Daryl Hays MD, 30 mg at 07/28/20 0831    famotidine (PEPCID) tablet 20 mg, 20 mg, Oral, BID, Syd Glez PA-C, 20 mg at 07/28/20 0831    gabapentin (NEURONTIN) capsule 100 mg, 100 mg, Oral, TID, Melva Vidal MD, 100 mg at 07/28/20 0831   glimepiride (AMARYL) tablet 4 mg, 4 mg, Oral, Daily With Breakfast, Guera Haney PA-C, 4 mg at 07/28/20 0831    hydrALAZINE (APRESOLINE) injection 5 mg, 5 mg, Intravenous, Q6H PRN, Manjula Farmer PA-C, 5 mg at 07/23/20 2236    insulin glargine (LANTUS) subcutaneous injection 35 Units 0 35 mL, 35 Units, Subcutaneous, HS, Yajaira Hoyos PA-C, 35 Units at 07/27/20 2125    insulin lispro (HumaLOG) 100 units/mL subcutaneous injection 13 Units, 13 Units, Subcutaneous, TID With Meals, Rajendra Nolan MD, 13 Units at 07/28/20 0830    insulin lispro (HumaLOG) 100 units/mL subcutaneous injection 4-20 Units, 4-20 Units, Subcutaneous, TID AC, 4 Units at 07/28/20 0829 **AND** Fingerstick Glucose (POCT), , , TID AC, Suzy Vidal MD    labetalol (NORMODYNE) tablet 100 mg, 100 mg, Oral, Q12H [facility], Rich Redd MD, 100 mg at 07/28/20 0831    lisinopril (ZESTRIL) tablet 10 mg, 10 mg, Oral, Daily, Suzy Vidal MD, 10 mg at 07/28/20 0831    LORazepam (ATIVAN) tablet 0 5 mg, 0 5 mg, Oral, Q8H PRN, Milana Ocampo MD, 0 5 mg at 07/27/20 2125    melatonin tablet 6 mg, 6 mg, Oral, HS, Suzy Vidal MD, 6 mg at 07/27/20 2124    metFORMIN (GLUCOPHAGE) tablet 1,000 mg, 1,000 mg, Oral, Daily With Dinner, Guera Haney PA-C, 1,000 mg at 07/27/20 1754    metFORMIN (GLUCOPHAGE) tablet 500 mg, 500 mg, Oral, Daily With Breakfast, Guera Haney PA-C, 500 mg at 07/28/20 0831    methocarbamol (ROBAXIN) tablet 500 mg, 500 mg, Oral, Q6H EARNEST, GRZEGORZ Shepherd, 500 mg at 07/28/20 0559    nicotine (NICODERM CQ) 21 mg/24 hr TD 24 hr patch 1 patch, 1 patch, Transdermal, Daily, Rich Redd MD, 1 patch at 07/28/20 0832    ondansetron (ZOFRAN) injection 4 mg, 4 mg, Intravenous, Q6H PRN, Suzy Vidal MD, 4 mg at 07/25/20 1227    oxyCODONE (ROXICODONE) immediate release tablet 10 mg, 10 mg, Oral, Q4H PRN, GRZEGORZ Shepherd    oxyCODONE (ROXICODONE) IR tablet 15 mg, 15 mg, Oral, Q4H PRN, GRZEGORZ Shepherd, 15 mg at 07/28/20 0840    polyethylene glycol (MIRALAX) packet 17 g, 17 g, Oral, Daily PRN, Kaleta Blend, PA-C, 17 g at 07/25/20 8218    senna-docusate sodium (SENOKOT S) 8 6-50 mg per tablet 1 tablet, 1 tablet, Oral, BID, Kaleta Blend, PA-C, 1 tablet at 07/28/20 0831     Vitals:   Vitals:    07/28/20 0828   BP: (!) 151/102   Pulse: 87   Resp:    Temp: 98 2 °F (36 8 °C)   SpO2: 96%       Intake/Output:  I/O       07/26 0701 - 07/27 0700 07/27 0701 - 07/28 0700 07/28 0701 - 07/29 0700    P  O  1140 1590 180    Total Intake(mL/kg) 1140 (10 6) 1590 (14 7) 180 (1 7)    Urine (mL/kg/hr) 2300 (0 9) 1600 (0 6)     Stool  0     Total Output 2300 1600     Net -1160 -10 +180           Unmeasured Urine Occurrence  3 x     Unmeasured Stool Occurrence  1 x            Nutrition/GI Proph/Bowel Reg:  Carb consistent diet, bowel regimen with Senokot and MiraLax    Physical Exam:   GENERAL APPEARANCE:  Well-developed, well-nourished adult male in no acute distress, facial hair unkempt  NEURO:  GCS 15, fully alert and oriented, no focal deficits  HEENT:  Normocephalic, posterior scalp wound is stable, some bloody drainage overnight and now staining his hair and pillow case  CV:  Regular rate and rhythm, no murmurs, intact distal pulses  LUNGS:  No cyanosis, normal respiratory effort, lungs clear bilaterally to auscultation  GI:  Abdomen is soft, nondistended, nontender  :  Normal external appearance, no indwelling catheter  MSK:  Extremities are symmetric, motion preserved in all extremities, strength preserved in all major muscle groups  SKIN:  Warm, dry, intact    Invasive Devices     Peripheral Intravenous Line            Peripheral IV 07/27/20 Right Arm 1 day                 Lab Results: BMP/CMP: No results found for: SODIUM, K, CL, CO2, ANIONGAP, BUN, CREATININE, GLUCOSE, CALCIUM, AST, ALT, ALKPHOS, PROT, BILITOT, EGFR and CBC: No results found for: WBC, HGB, HCT, MCV, PLT, ADJUSTEDWBC, MCH, MCHC, RDW, MPV, NRBC  Imaging/EKG Studies: Other: No new radiographic data in the prior 24 hours  Other Studies:  None  VTE Prophylaxis: Enoxaparin (Lovenox)

## 2020-07-28 NOTE — PLAN OF CARE
Problem: PAIN - ADULT  Goal: Verbalizes/displays adequate comfort level or baseline comfort level  Description  Interventions:  - Encourage patient to monitor pain and request assistance  - Assess pain using appropriate pain scale  - Administer analgesics based on type and severity of pain and evaluate response  - Implement non-pharmacological measures as appropriate and evaluate response  - Consider cultural and social influences on pain and pain management  - Notify physician/advanced practitioner if interventions unsuccessful or patient reports new pain  Outcome: Progressing     Problem: INFECTION - ADULT  Goal: Absence or prevention of progression during hospitalization  Description  INTERVENTIONS:  - Assess and monitor for signs and symptoms of infection  - Monitor lab/diagnostic results  - Monitor all insertion sites, i e  indwelling lines, tubes, and drains  - Monitor endotracheal if appropriate and nasal secretions for changes in amount and color  - [place] appropriate cooling/warming therapies per order  - Administer medications as ordered  - Instruct and encourage patient and family to use good hand hygiene technique  - Identify and instruct in appropriate isolation precautions for identified infection/condition  Outcome: Progressing     Problem: SAFETY ADULT  Goal: Patient will remain free of falls  Description  INTERVENTIONS:  - Assess patient frequently for physical needs  -  Identify cognitive and physical deficits and behaviors that affect risk of falls    -  [place] fall precautions as indicated by assessment   - Educate patient/family on patient safety including physical limitations  - Instruct patient to call for assistance with activity based on assessment  - Modify environment to reduce risk of injury  - Consider OT/PT consult to assist with strengthening/mobility  Outcome: Progressing  Goal: Maintain or return to baseline ADL function  Description  INTERVENTIONS:  -  Assess patient's ability to carry out ADLs; assess patient's baseline for ADL function and identify physical deficits which impact ability to perform ADLs (bathing, care of mouth/teeth, toileting, grooming, dressing, etc )  - Assess/evaluate cause of self-care deficits   - Assess range of motion  - Assess patient's mobility; develop plan if impaired  - Assess patient's need for assistive devices and provide as appropriate  - Encourage maximum independence but intervene and supervise when necessary  - Involve family in performance of ADLs  - Assess for home care needs following discharge   - Consider OT consult to assist with ADL evaluation and planning for discharge  - Provide patient education as appropriate  Outcome: Progressing  Goal: Maintain or return mobility status to optimal level  Description  INTERVENTIONS:  - Assess patient's baseline mobility status (ambulation, transfers, stairs, etc )    - Identify cognitive and physical deficits and behaviors that affect mobility  - Identify mobility aids required to assist with transfers and/or ambulation (gait belt, sit-to-stand, lift, walker, cane, etc )  - [place] fall precautions as indicated by assessment  - Record patient progress and toleration of activity level on Mobility SBAR; progress patient to next Phase/Stage  - Instruct patient to call for assistance with activity based on assessment  - Consider rehabilitation consult to assist with strengthening/weightbearing, etc   Outcome: Progressing     Problem: DISCHARGE PLANNING  Goal: Discharge to home or other facility with appropriate resources  Description  INTERVENTIONS:  - Identify barriers to discharge w/patient and caregiver  - Arrange for needed discharge resources and transportation as appropriate  - Identify discharge learning needs (meds, wound care, etc )  - Arrange for interpretive services to assist at discharge as needed  - Refer to Case Management Department for coordinating discharge planning if the patient needs post-hospital services based on physician/advanced practitioner order or complex needs related to functional status, cognitive ability, or social support system  Outcome: Progressing     Problem: Knowledge Deficit  Goal: Patient/family/caregiver demonstrates understanding of disease process, treatment plan, medications, and discharge instructions  Description  Complete learning assessment and assess knowledge base  Interventions:  - Provide teaching at level of understanding  - Provide teaching via preferred learning methods  Outcome: Progressing     Problem: Potential for Falls  Goal: Patient will remain free of falls  Description  INTERVENTIONS:  - Assess patient frequently for physical needs  -  Identify cognitive and physical deficits and behaviors that affect risk of falls    -  [place] fall precautions as indicated by assessment   - Educate patient/family on patient safety including physical limitations  - Instruct patient to call for assistance with activity based on assessment  - Modify environment to reduce risk of injury  - Consider OT/PT consult to assist with strengthening/mobility  Outcome: Progressing     Problem: SKIN/TISSUE INTEGRITY - ADULT  Goal: Skin integrity remains intact  Description  INTERVENTIONS  - Identify patients at risk for skin breakdown  - Assess and monitor skin integrity  - Assess and monitor nutrition and hydration status  - Monitor labs (i e  albumin)  - Assess for incontinence   - Turn and reposition patient  - Assist with mobility/ambulation  - Relieve pressure over bony prominences  - Avoid friction and shearing  - Provide appropriate hygiene as needed including keeping skin clean and dry  - Evaluate need for skin moisturizer/barrier cream  - Collaborate with interdisciplinary team (i e  Nutrition, Rehabilitation, etc )   - Patient/family teaching  Outcome: Progressing  Goal: Incision(s), wounds(s) or drain site(s) healing without S/S of infection  Description  INTERVENTIONS  - Assess and document risk factors for skin impairment   - Assess and document dressing, incision, wound bed, drain sites and surrounding tissue  - Consider nutrition services referral as needed  - Oral mucous membranes remain intact  - Provide patient/ family education  Outcome: Progressing  Goal: Oral mucous membranes remain intact  Description  INTERVENTIONS  - Assess oral mucosa and hygiene practices  - Implement preventative oral hygiene regimen  - Implement oral medicated treatments as ordered  - Initiate Nutrition services referral as needed  Outcome: Progressing     Problem: MUSCULOSKELETAL - ADULT  Goal: Maintain or return mobility to safest level of function  Description  INTERVENTIONS:  - Assess patient's ability to carry out ADLs; assess patient's baseline for ADL function and identify physical deficits which impact ability to perform ADLs (bathing, care of mouth/teeth, toileting, grooming, dressing, etc )  - Assess/evaluate cause of self-care deficits   - Assess range of motion  - Assess patient's mobility  - Assess patient's need for assistive devices and provide as appropriate  - Encourage maximum independence but intervene and supervise when necessary  - Involve family in performance of ADLs  - Assess for home care needs following discharge   - Consider OT consult to assist with ADL evaluation and planning for discharge  - Provide patient education as appropriate  Outcome: Progressing  Goal: Maintain proper alignment of affected body part  Description  INTERVENTIONS:  - Support, maintain and protect limb and body alignment  - Provide patient/ family with appropriate education  Outcome: Progressing     Problem: Prexisting or High Potential for Compromised Skin Integrity  Goal: Skin integrity is maintained or improved  Description  INTERVENTIONS:  - Identify patients at risk for skin breakdown  - Assess and monitor skin integrity  - Assess and monitor nutrition and hydration status  - Monitor labs   - Assess for incontinence   - Turn and reposition patient  - Assist with mobility/ambulation  - Relieve pressure over bony prominences  - Avoid friction and shearing  - Provide appropriate hygiene as needed including keeping skin clean and dry  - Evaluate need for skin moisturizer/barrier cream  - Collaborate with interdisciplinary team   - Patient/family teaching  - Consider wound care consult   Outcome: Progressing     Problem: NEUROSENSORY - ADULT  Goal: Achieves stable or improved neurological status  Description  INTERVENTIONS  - Monitor and report changes in neurological status  - Monitor vital signs such as temperature, blood pressure, glucose, and any other labs ordered   - Initiate measures to prevent increased intracranial pressure  - Monitor for seizure activity and implement precautions if appropriate      Outcome: Progressing  Goal: Achieves maximal functionality and self care  Description  INTERVENTIONS  - Monitor swallowing and airway patency with patient fatigue and changes in neurological status  - Encourage and assist patient to increase activity and self care     - Encourage visually impaired, hearing impaired and aphasic patients to use assistive/communication devices  Outcome: Progressing

## 2020-07-28 NOTE — ASSESSMENT & PLAN NOTE
(P) [phone] [id number]   - Restarted on home regimen Lantus 35 units  - Algorithm 5 SSI   - Increased to 13 units of Humalog t i d  With meals per endocrinology consult  - home amaryl and metformin resumed  Home Trulicity not on formulary  Patient states his BS run high regardless of all home medications and HbA1c today is 9 7%    -Consulted endocrine for further recommendations/adjustments for discharge, recs as above

## 2020-07-28 NOTE — PLAN OF CARE
Problem: OCCUPATIONAL THERAPY ADULT  Goal: Performs self-care activities at highest level of function for planned discharge setting  See evaluation for individualized goals  Description  Treatment Interventions: ADL retraining, Endurance training, Cognitive reorientation, Patient/family training, Compensatory technique education, Continued evaluation, Energy conservation, Activityengagement          See flowsheet documentation for full assessment, interventions and recommendations  Outcome: Progressing  Note:   Limitation: Decreased ADL status, Decreased Safe judgement during ADL, Decreased cognition, Decreased endurance, Decreased high-level ADLs, Decreased self-care trans  Prognosis: Good  Assessment: pt participated in am ot session and was seen focusing on ongoiing cognitive assessment, lb dressing and functional mobility / std toilet trnasfer  pt was able to perform all task with sba  pt crossed le's inorder to ease reach without bending, which he was not able to do yesterday  wilder/l and pt discussed options for item and meal transferportation  pt was noted to score 5 8 /6 0  OT Discharge Recommendation: Return to previous environment with social support  OT - OK to Discharge:  Yes  April A Storm, ARELI

## 2020-07-28 NOTE — PROGRESS NOTES
Staple Removal    There is a golf ball sized cephalohematoma underlying patient's laceration and large amounts of dried blood are overlying the staples  I cleaned the area thoroughly with hydrogen peroxide and it appears the wound edges are no longer approximated due to skin tension from hematoma  All 8 staples were removed and a large amount of old hematoma was expressed from the wound  No purulence or cellulitis  Wound was washed out and covered with a dry sterile gauze  Patient tolerated the procedure well

## 2020-07-28 NOTE — OCCUPATIONAL THERAPY NOTE
Occupational Therapy Treatment Note:       07/28/20 1110   Restrictions/Precautions   Braces or Orthoses TLSO   Other Precautions Fall Risk   ADL   LB Dressing Assistance 6  Modified independent   LB Dressing Comments seated pt was able to hieu doff slippers socks and pants  pt also doffed tlso without difficulty and was wearing appropraitely upon ot arrival   Toileting Comments mod I for toileting including clothing management   Transfers   Sit to Stand 6  Modified independent   Stand to Sit 6  Modified independent   Functional Mobility   Functional Mobility 6  Modified independent   Additional Comments   (rw, short distance to from bathroom)   Cognition   Overall Cognitive Status [facility]   Cognition Assessment Tools ACLS   Score 5 8   Activity Tolerance   Activity Tolerance Patient tolerated treatment well   Assessment   Assessment pt participated in am ot session and was seen focusing on ongoiing cognitive assessment, lb dressing and functional mobility / std toilet trnasfer  pt was able to perform all task with sba  pt crossed le's inorder to ease reach without bending, which he was not able to do yesterday  wilder/l and pt discussed options for item and meal transferportation  pt was noted to score 5 8 /6 0  Plan   Treatment Interventions ADL retraining;Functional transfer training;Equipment evaluation/education   Goal Expiration Date 08/06/20   OT Treatment Day 2   OT Frequency 3-5x/wk   Recommendation   OT Discharge Recommendation Return to previous environment with social support   OT - OK to Discharge Yes   5 8    Administered Leon [facility]g[place] Cognitive Level Screen (ACLS)  The person scored 5 8/6 0 indicating that they may live and work independently  Behavior:  Describes possible effects  Seeks advice to avoid problems  Seeks opinion of secondary effects of schedule changes  Is able to vary own pace  Grooming:   Independent in all grooming tasks  Plans out new actions when materials are available    May read instructions  Varies own pace  Dressing:  Selections are considered based on weight of clothing and temperature of the day  Varies pace to conform to time restraint  Bathing:  Independent in all routine bathing  May not read labels on products  May fail to consider passage of time or anticipate hazards of new environment  Walking/exercising: Follows verbal explanations to avoid injury  Varies pace  Eating:  Seeks information about properties of food  May read labels to comply with diet  Toileting:  Independently performs all toileting  Can explore a new environment to locate a bathroom without assistance  Medications:  Compares prescriptions in effects and cost   May seek advice from others re: medications  Use of adaptive equipment:  Generates a new plan of action in presence of adaptive equipment  Needs explanation of secondary effects like injury  Housekeeping:  Reads instructions to use a new product  Varies pace  May not consider the needs of others  Food preparation:  Plans new dishes  May anticipate menu requirements of up to 1 week  May use resources like newspaper ads for coupons  May follow a recipe and vary pace  Spending money:  May be able to plan out a new budget when all info is present  May need assistance for overall organization  May not consider long term financial planning  Shopping:  Plans for new purchases  Considers secondary effects of purchase like storage space  May not anticipate needs of others  Sets own pace  Laundry:  Plans out new solutions  May not consider the needs of others like sharing an apartment machine/[employer]  Traveling:  Plans new travel arrangements  May need assistance to plan overall organization  May be able to collaborate with others in making decisions  May fail to consider hazards  Driving:  May operate a motor vehicle

## 2020-07-28 NOTE — ASSESSMENT & PLAN NOTE
· Injuries as below   · PT/OT - rec post acute IP rehab but patient prefers to go home, understanding the risks  · Discharge home today  Patient declines home therapy or [facility]

## 2020-07-28 NOTE — PLAN OF CARE
Problem: PAIN - ADULT  Goal: Verbalizes/displays adequate comfort level or baseline comfort level  Description  Interventions:  - Encourage patient to monitor pain and request assistance  - Assess pain using appropriate pain scale  - Administer analgesics based on type and severity of pain and evaluate response  - Implement non-pharmacological measures as appropriate and evaluate response  - Consider cultural and social influences on pain and pain management  - Notify physician/advanced practitioner if interventions unsuccessful or patient reports new pain  Outcome: Progressing     Problem: INFECTION - ADULT  Goal: Absence or prevention of progression during hospitalization  Description  INTERVENTIONS:  - Assess and monitor for signs and symptoms of infection  - Monitor lab/diagnostic results  - Monitor all insertion sites, i e  indwelling lines, tubes, and drains  - Monitor endotracheal if appropriate and nasal secretions for changes in amount and color  - [place] appropriate cooling/warming therapies per order  - Administer medications as ordered  - Instruct and encourage patient and family to use good hand hygiene technique  - Identify and instruct in appropriate isolation precautions for identified infection/condition  Outcome: Progressing     Problem: SAFETY ADULT  Goal: Patient will remain free of falls  Description  INTERVENTIONS:  - Assess patient frequently for physical needs  -  Identify cognitive and physical deficits and behaviors that affect risk of falls    -  [place] fall precautions as indicated by assessment   - Educate patient/family on patient safety including physical limitations  - Instruct patient to call for assistance with activity based on assessment  - Modify environment to reduce risk of injury  - Consider OT/PT consult to assist with strengthening/mobility  Outcome: Progressing  Goal: Maintain or return to baseline ADL function  Description  INTERVENTIONS:  -  Assess patient's ability to carry out ADLs; assess patient's baseline for ADL function and identify physical deficits which impact ability to perform ADLs (bathing, care of mouth/teeth, toileting, grooming, dressing, etc )  - Assess/evaluate cause of self-care deficits   - Assess range of motion  - Assess patient's mobility; develop plan if impaired  - Assess patient's need for assistive devices and provide as appropriate  - Encourage maximum independence but intervene and supervise when necessary  - Involve family in performance of ADLs  - Assess for home care needs following discharge   - Consider OT consult to assist with ADL evaluation and planning for discharge  - Provide patient education as appropriate  Outcome: Progressing  Goal: Maintain or return mobility status to optimal level  Description  INTERVENTIONS:  - Assess patient's baseline mobility status (ambulation, transfers, stairs, etc )    - Identify cognitive and physical deficits and behaviors that affect mobility  - Identify mobility aids required to assist with transfers and/or ambulation (gait belt, sit-to-stand, lift, walker, cane, etc )  - [place] fall precautions as indicated by assessment  - Record patient progress and toleration of activity level on Mobility SBAR; progress patient to next Phase/Stage  - Instruct patient to call for assistance with activity based on assessment  - Consider rehabilitation consult to assist with strengthening/weightbearing, etc   Outcome: Progressing     Problem: DISCHARGE PLANNING  Goal: Discharge to home or other facility with appropriate resources  Description  INTERVENTIONS:  - Identify barriers to discharge w/patient and caregiver  - Arrange for needed discharge resources and transportation as appropriate  - Identify discharge learning needs (meds, wound care, etc )  - Arrange for interpretive services to assist at discharge as needed  - Refer to Case Management Department for coordinating discharge planning if the patient needs post-hospital services based on physician/advanced practitioner order or complex needs related to functional status, cognitive ability, or social support system  Outcome: Progressing     Problem: Knowledge Deficit  Goal: Patient/family/caregiver demonstrates understanding of disease process, treatment plan, medications, and discharge instructions  Description  Complete learning assessment and assess knowledge base  Interventions:  - Provide teaching at level of understanding  - Provide teaching via preferred learning methods  Outcome: Progressing     Problem: Potential for Falls  Goal: Patient will remain free of falls  Description  INTERVENTIONS:  - Assess patient frequently for physical needs  -  Identify cognitive and physical deficits and behaviors that affect risk of falls    -  [place] fall precautions as indicated by assessment   - Educate patient/family on patient safety including physical limitations  - Instruct patient to call for assistance with activity based on assessment  - Modify environment to reduce risk of injury  - Consider OT/PT consult to assist with strengthening/mobility  Outcome: Progressing     Problem: SKIN/TISSUE INTEGRITY - ADULT  Goal: Skin integrity remains intact  Description  INTERVENTIONS  - Identify patients at risk for skin breakdown  - Assess and monitor skin integrity  - Assess and monitor nutrition and hydration status  - Monitor labs (i e  albumin)  - Assess for incontinence   - Turn and reposition patient  - Assist with mobility/ambulation  - Relieve pressure over bony prominences  - Avoid friction and shearing  - Provide appropriate hygiene as needed including keeping skin clean and dry  - Evaluate need for skin moisturizer/barrier cream  - Collaborate with interdisciplinary team (i e  Nutrition, Rehabilitation, etc )   - Patient/family teaching  Outcome: Progressing  Goal: Incision(s), wounds(s) or drain site(s) healing without S/S of infection  Description  INTERVENTIONS  - Assess and document risk factors for skin impairment   - Assess and document dressing, incision, wound bed, drain sites and surrounding tissue  - Consider nutrition services referral as needed  - Oral mucous membranes remain intact  - Provide patient/ family education  Outcome: Progressing  Goal: Oral mucous membranes remain intact  Description  INTERVENTIONS  - Assess oral mucosa and hygiene practices  - Implement preventative oral hygiene regimen  - Implement oral medicated treatments as ordered  - Initiate Nutrition services referral as needed  Outcome: Progressing     Problem: MUSCULOSKELETAL - ADULT  Goal: Maintain or return mobility to safest level of function  Description  INTERVENTIONS:  - Assess patient's ability to carry out ADLs; assess patient's baseline for ADL function and identify physical deficits which impact ability to perform ADLs (bathing, care of mouth/teeth, toileting, grooming, dressing, etc )  - Assess/evaluate cause of self-care deficits   - Assess range of motion  - Assess patient's mobility  - Assess patient's need for assistive devices and provide as appropriate  - Encourage maximum independence but intervene and supervise when necessary  - Involve family in performance of ADLs  - Assess for home care needs following discharge   - Consider OT consult to assist with ADL evaluation and planning for discharge  - Provide patient education as appropriate  Outcome: Progressing  Goal: Maintain proper alignment of affected body part  Description  INTERVENTIONS:  - Support, maintain and protect limb and body alignment  - Provide patient/ family with appropriate education  Outcome: Progressing     Problem: NEUROSENSORY - ADULT  Goal: Achieves stable or improved neurological status  Description  INTERVENTIONS  - Monitor and report changes in neurological status  - Monitor vital signs such as temperature, blood pressure, glucose, and any other labs ordered   - Initiate measures to prevent increased intracranial pressure  - Monitor for seizure activity and implement precautions if appropriate      Outcome: Progressing  Goal: Achieves maximal functionality and self care  Description  INTERVENTIONS  - Monitor swallowing and airway patency with patient fatigue and changes in neurological status  - Encourage and assist patient to increase activity and self care     - Encourage visually impaired, hearing impaired and aphasic patients to use assistive/communication devices  Outcome: Progressing     Problem: Prexisting or High Potential for Compromised Skin Integrity  Goal: Skin integrity is maintained or improved  Description  INTERVENTIONS:  - Identify patients at risk for skin breakdown  - Assess and monitor skin integrity  - Assess and monitor nutrition and hydration status  - Monitor labs   - Assess for incontinence   - Turn and reposition patient  - Assist with mobility/ambulation  - Relieve pressure over bony prominences  - Avoid friction and shearing  - Provide appropriate hygiene as needed including keeping skin clean and dry  - Evaluate need for skin moisturizer/barrier cream  - Collaborate with interdisciplinary team   - Patient/family teaching  - Consider wound care consult   Outcome: Progressing

## 2020-07-28 NOTE — DISCHARGE SUMMARY
Discharge- Susan Aguirre 1960, 61 y o  male MRN: [id number]    Unit/Bed#: [facility] 604-01 Encounter: [id number]    Primary Care Provider: Wing Buenrostro DO   Date and time admitted to hospital: 7/22/2020 11:18 AM        Concussion without loss of consciousness  Assessment & Plan  - concussion education given at bedside  - patient informed that he needs continue to work with PT and OT  - will require outpatient follow-up  - continue p r n  Pain control  - no new visual or auditory deficits  - follow up with trauma in 2 weeks    Acute pain due to trauma  Assessment & Plan  · APS consult, recs appreciated    · Regimen as below:  · Tylenol 975 mg q8h [place]   · Oxycodone increased to 10/15 mg for mod/severe pain q4h prn  · Gabpentin 100 mg TID [place]   · Robaxin 500 mg q6h [place]  · Lidocaine patch to L spine   · Will discharge on oxycodone 5-10 mg po q4h prn as recommended by APS  · Suggest continued outpatient follow up with [facility] and pain     Type 2 diabetes mellitus ([facility] [address] )  Assessment & Plan  (P) [phone]   - Restarted on home regimen Lantus 35 units  - Algorithm 5 SSI   - Increased to 13 units of Humalog t i d  With meals per endocrinology consult  Will continue on discharge  - home amaryl and metformin resumed  Home Trulicity not on formulary  Patient states his BS run high regardless of all home medications and HbA1c is 9 7%  -Consulted endocrine for further recommendations/adjustments for discharge, recs as above  - recommend outpatient f/u with endocrinology and PCP    Pulmonary nodule  Assessment & Plan  -Right mid lung opacity or mass measuring approximately 3 7 cm, will need outpatient follow up CT as he has smoking h/o    Fall  Assessment & Plan  · Injuries as below   · PT/OT - rec post acute IP rehab but patient prefers to go home, understanding the risks  · Discharge home today  Patient declines home therapy or VNA       * Closed compression fracture of body of L1 vertebra (HCC)  Assessment & Plan  Status post fall from 5 ft, positive head trauma and questionable loss of consciousness with L1 SEP fracture - TLICS 1    · CT lumbar spine 07/22/2020:  Acute fracture through superior endplate of L1 with minimal loss of height  No significant bony retropulsion into the spinal canal      · No surgical intervention at this time   · TLSO brace when out of bed and head of bed greater than 45°  · Upright lumbar spine XR completed, reviewed by neurosurgery   · Multimodal pain regimen as below  · Mobilize as tolerated with assistance, PT / OT evaluation, must wear brace  · Neurosurgery follow up in 2 weeks as outpatient with repeat lumbar spine XR                 Resolved Problems  Date Reviewed: 7/28/2020    None          Admission Date:   Admission Orders (From admission, onward)     Ordered        07/24/20 1421  Inpatient Admission  Once         07/22/20 1622  Place in Observation  Once                     Admitting Diagnosis: Injury [T14 90XA]  Scalp laceration, initial encounter [S01 01XA]  Closed [facility] L1 [facility] [S32 010A]    HPI: As documented by Dr Jose Chong who evaluated the patient on admission, "Liliane Slaughter is a 61 y o  male history of alcoholism, hypertension, diabetes on insulin with chronic lower extremity neuropathy who presents with 5 ft fall from ladder with loss of consciousness  Complains of low back pain, sharp nature, moderate in severity, worse with movement relieved with rest   Patient is unsure what he fell onto whether it was grass or concrete, the last thing he remembers was waking up being put in the ambulance that his friend called for  Denies any vomiting, thinners, give focal deficits since the fall  Says he has been sober for approximately 3 years    Smokes about 2 packs per day      Denies chest pain, abdominal pain, shortness of breath, cough, fever, chills, vomiting, recent illness, known sick contacts  "    Procedures Performed:   Orders Placed This Encounter   Procedures    Laceration repair       Summary of Hospital Course:   Patient was placed on the trauma service with neurosurgery consultation due to an L1 fracture  They recommended non operative management in a TLSO brace  He did have a concussion as well asymptomatic with a headache, nausea and vomiting  He was compliant with cognitive rest and was given Tylenol and pain medications for symptomatic control  Ultimately his symptoms improved and he remained only with a mild headache  PT OT and cognitive evaluation were completed  He was recommended for inpatient rehab due to mobility difficulties however his mobility improved over time and he ultimately refused inpatient rehab  He did have a scalp wound stapled and this was well healed and stable removed prior to discharge  He was deemed medically stable for discharge on 07/28 20  On 07/27 due to elevated blood sugars endocrine was asked to evaluate the patient the increased his mealtime Humalog to 13 units t i d   Was continued on his home regimen of Lantus glimepiride and metformin inform to resume his home Trulicity in addition once he was at home  He was in agreement with this plan was medically stable for discharge  He will be discharged home today on 07/28/2020  He will follow up with Trauma in 2 weeks for concussion, he will follow up with Neurosurgery in 2 weeks with upright x-rays to monitor his spine injury, and he should follow up with his family doctor for blood sugar control and following of his diabetes in 2 weeks  Significant Findings, Care, Treatment and Services Provided:   Xr Chest Portable    Result Date: 7/22/2020  Impression: Right mid lung opacity or mass measuring approximately 3 7 cm  Follow-up enhanced chest CT recommended in this patient with smoking history  The study was marked in EPIC for significant notification   Workstation performed: [id number]     Xr Spine Lumbar 2 Or 3 Views Injury    Result Date: 7/23/2020  Impression: 1  Anterior wedge compression fracture again noted with slightly greater loss of height anteriorly compared to the CT of 7/22/2020  Workstation performed: [id number]     Ct Head Without Contrast    Result Date: 7/22/2020  Impression: Left parietal scalp hematoma  No acute intracranial hemorrhage or depressed calvarial fracture identified  Workstation performed: [id number]     Ct Spine Cervical Without Contrast    Result Date: 7/22/2020  Impression: No acute fracture or evidence for traumatic malalignment  I personally discussed this study with Dr Fuentes Multani on 7/22/2020 at 12:17 PM   Workstation performed: [id number]     Ct Spine Lumbar Without Contrast    Result Date: 7/22/2020  Impression: Acute fracture through the superior endplate of L1 with minimal loss of height  No significant bony retropulsion into the spinal canal  Degenerative changes of the lumbar spine, as described above  I personally discussed this study with Dr Fuentes Multani on 7/22/2020 at 12:17 PM  Workstation performed: [id number]     Ct Abdomen Pelvis W Contrast    Result Date: 7/22/2020  Impression: Unchanged appearance of the known mild superior endplate compression deformity of the L1 vertebral body without progressive loss of height  No other sites of injury are identified within the abdomen or the pelvis  Workstation performed: [id number]       Complications: none    Condition at Discharge: good         Discharge instructions/Information to patient and family:   See after visit summary for information provided to patient and family  Provisions for Follow-Up Care:  See after visit summary for information related to follow-up care and any pertinent home health orders  PCP: Jacinto Brar DO    Disposition: Home, patient refused rehab and wished to go home  Planned Readmission: No    Discharge Statement   I spent 30 minutes discharging the patient   This time was spent on the day of discharge  I had direct contact with the patient on the day of discharge  Additional documentation is required if more than 30 minutes were spent on discharge  Discharge Medications:  See after visit summary for reconciled discharge medications provided to patient and family

## 2020-07-29 NOTE — UTILIZATION REVIEW
Notification of Discharge  This is a Notification of Discharge from our facility [address]  Please be advised that this patient has been discharge from our facility  Below you will find the admission and discharge date and time including the patients disposition  PRESENTATION DATE: 7/22/2020 11:18 AM    IP ADMISSION DATE: 7/24/20 1422   DISCHARGE DATE: 7/28/2020  1:34 PM  DISPOSITION: Home/Self Care Home/Self Care   Admission Orders listed below:  Admission Orders (From admission, onward)     Ordered        07/24/20 1421  Inpatient Admission  Once         07/22/20 1622  Place in Observation  Once                   Please contact the UR Department if additional information is required to close this patient's authorization/case  Antonio Mejia  [facility] Utilization Review Department  Main: [phone] x carefully listen to the prompts  All voicemails are confidential   [email or web address]  Send all requests for admission clinical reviews, approved or denied determinations and any other requests to dedicated fax number below belonging to the campus where the patient is receiving treatment   List of dedicated fax numbers:  [address] DENIALS (Administrative/Medical Necessity) [phone]   [address] (Maternity/NICU/Pediatrics) [phone]   Sotero Cespedes [phone]   Edith Sanders [phone]   Harsha Briones [phone]   [address] [phone]   [facility]  [phone]   [address] [facility]  [address] [facility] [address] [facility] [phone]

## 2020-07-30 NOTE — TELEPHONE ENCOUNTER
7/30/20 PT CALLED BACK, CONFIRMED APPT, GAVE ADDRESS, AND ADVISED TO GET XRAYS PRIOR TO APPT, PT THINKS HE WILL TRY TO HAVE THEM DONE AT [facility]

## 2020-08-05 ENCOUNTER — OFFICE VISIT (OUTPATIENT)
Dept: ENDOCRINOLOGY | Facility: HOSPITAL | Age: 60
End: 2020-08-05
Payer: COMMERCIAL

## 2020-08-05 ENCOUNTER — HOSPITAL ENCOUNTER (OUTPATIENT)
Dept: RADIOLOGY | Facility: HOSPITAL | Age: 60
Discharge: HOME | End: 2020-08-05
Payer: COMMERCIAL

## 2020-08-05 VITALS
BODY MASS INDEX: 28.54 KG/M2 | WEIGHT: 234.4 LBS | HEIGHT: 76 IN | HEART RATE: 108 BPM | DIASTOLIC BLOOD PRESSURE: 88 MMHG | SYSTOLIC BLOOD PRESSURE: 134 MMHG | TEMPERATURE: 97.6 F

## 2020-08-05 DIAGNOSIS — S32.010A CLOSED COMPRESSION FRACTURE OF BODY OF L1 VERTEBRA (HCC): ICD-10-CM

## 2020-08-05 DIAGNOSIS — Z79.4 TYPE 2 DIABETES MELLITUS WITHOUT COMPLICATION, WITH LONG-TERM CURRENT USE OF INSULIN (HCC): Primary | ICD-10-CM

## 2020-08-05 DIAGNOSIS — E11.9 TYPE 2 DIABETES MELLITUS WITHOUT COMPLICATION, WITH LONG-TERM CURRENT USE OF INSULIN (HCC): Primary | ICD-10-CM
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
## 2020-08-05 DIAGNOSIS — M81.0 OSTEOPOROSIS, UNSPECIFIED OSTEOPOROSIS TYPE, UNSPECIFIED PATHOLOGICAL FRACTURE PRESENCE: ICD-10-CM

## 2020-08-05 DIAGNOSIS — E78.5 HYPERLIPIDEMIA, UNSPECIFIED HYPERLIPIDEMIA TYPE: ICD-10-CM

## 2020-08-05 DIAGNOSIS — I10 ESSENTIAL HYPERTENSION: ICD-10-CM

## 2020-08-05 PROCEDURE — 72100 X-RAY EXAM L-S SPINE 2/3 VWS: CPT

## 2020-08-05 PROCEDURE — 99214 OFFICE O/P EST MOD 30 MIN: CPT | Performed by: INTERNAL MEDICINE

## 2020-08-05 RX ORDER — BLOOD SUGAR DIAGNOSTIC
1 STRIP MISCELLANEOUS DAILY PRN
COMMUNITY
End: 2021-03-01

## 2020-08-05 RX ORDER — GLIMEPIRIDE 4 MG/1
TABLET ORAL
Refills: 0
Start: 2020-08-05 | End: 2021-01-14

## 2020-08-05 RX ORDER — HYDROCODONE BITARTRATE AND ACETAMINOPHEN 5; 325 MG/1; MG/1
2 TABLET ORAL EVERY 8 HOURS
COMMUNITY
End: 2020-10-09 | Stop reason: SDUPTHER

## 2020-08-05 RX ORDER — INSULIN LISPRO 100 [IU]/ML
INJECTION, SOLUTION INTRAVENOUS; SUBCUTANEOUS
Qty: 10 PEN | Refills: 5 | Status: SHIPPED | OUTPATIENT
Start: 2020-08-05 | End: 2021-03-22

## 2020-08-05 RX ORDER — DULAGLUTIDE 0.75 MG/.5ML
INJECTION, SOLUTION SUBCUTANEOUS
Start: 2020-08-05 | End: 2020-10-16 | Stop reason: SDUPTHER

## 2020-08-05 NOTE — PROGRESS NOTES
8/5/2020    Assessment/Plan      Diagnoses and all orders for this visit:    Type 2 diabetes mellitus without complication, with long-term current use of insulin (HCC)  -     Hemoglobin A1C; Future  -     Comprehensive metabolic panel; Future  -     CBC and differential; Future  -     Microalbumin / creatinine urine ratio; Future  -     TSH, 3rd generation; Future  -     Dulaglutide (Trulicity) 8 28 VW/8 1CX SOPN; 0 75 mg weekly  -     glimepiride (AMARYL) 4 mg tablet; 1 tab daily  -     insulin lispro (HumaLOG KwikPen) 100 units/mL injection pen; 15 units with each meal  -     Hemoglobin A1C  -     Comprehensive metabolic panel  -     CBC and differential  -     Microalbumin / creatinine urine ratio  -     TSH, 3rd generation    Essential hypertension    Hyperlipidemia, unspecified hyperlipidemia type  -     Lipid Panel with Direct LDL reflex; Future  -     Lipid Panel with Direct LDL reflex    Osteoporosis, unspecified osteoporosis type, unspecified pathological fracture presence  -     Testosterone, free, total- Lab Collect; Future  -     DXA bone density spine hip and pelvis; Future  -     Testosterone, free, total- Lab Collect    Closed compression fracture of body of L1 vertebra (HCC)    Other orders  -     Discontinue: Dulaglutide (TRULICITY SC); Inject under the skin  -     HYDROcodone-acetaminophen (NORCO) 5-325 mg per tablet; Take 2 tablets by mouth every 8 (eight) hours  -     ROSUVASTATIN CALCIUM PO; Take by mouth  -     Contour Next Test test strip; 1 each by Other route daily as needed Test 3 times daily        Assessment/Plan:  1  Type 2 diabetes:  Uncontrolled based on blood sugars in recent A1c  In the setting of concurrent sulfonylurea and insulin use, I suggested we  start to cut back is glimepiride to 4 mg once a day  I will increase Humalog to 15 units with each meal   Continue Trulicity 2 71 mg weekly, Lantus 35 units q h s , metformin 500 mg in the morning and 1000 mg in the evening    I provided a blood sugar log sheet and requested he send in blood sugars in 1-2 weeks for review  I offered him a medical nutrition therapy appointment which she declined  Discussed with him that since he is in pain and recovering from recent compression fracture, blood sugars may improve as pain levels decrease  In the meantime, we will need to make changes often to his regimen  Follow-up in 3 months  2  Vertebral compression fracture:  I would suggest we continue the secondary workup including a testosterone level which will be done prior to his next appointment  We will check a DEXA scan as well  Three hypertension:  Normotensive the office today on current regimen  4  Hyperlipidemia:  Check lipid panel before next appointment  CC: Diabetes Consult    History of Present Illness     HPI: Cristina Angel is a 61y o  year old male with type 2 diabetes for about 5-10 years  He is on oral agents and insulin at home and takes metformin 500 mg in the morning and 1000 mg in the evening, glimepiride 4 mg BID, Humalog 13 units with each meal, Lantus 35 units q h s , Trulicity  He denies any polyuria, polydipsia, nocturia and blurry vision  He denies nephropathy, retinopathy, heart attack, stroke and claudication but does admit to neuropathy  Hypoglycemic episodes: No   Encouraged him to keep fast acting carbohydrates nearby at all times  Reviewed the rule of 15  The patient's last eye exam was over 1 year ago and I encouraged him to update this  Blood Sugar/Glucometer/Pump/CGM review:  Glucometer was downloaded showing 0 6 readings per day in an average of 291  Blood sugars are generally elevated above 200 throughout the day  No hypoglycemia  For hypertension he is treated with lisinopril 10 mg daily  He is also managed on labetalol 100 mg twice a day and amlodipine 5 mg daily  For hyperlipidemia, he is treated with rosuvastatin      He was recently admitted to the hospital after a fall in suffering lumbar spine fracture  Review of Systems   Constitutional: Negative for fatigue  HENT: Negative for trouble swallowing and voice change  Eyes: Negative for visual disturbance  Respiratory: Negative for shortness of breath  Cardiovascular: Negative for palpitations and leg swelling  Gastrointestinal: Negative for abdominal pain, nausea and vomiting  Endocrine: Negative for polydipsia and polyuria  Musculoskeletal: Negative for arthralgias and myalgias  Skin: Negative for rash  Neurological: Negative for dizziness, tremors and weakness  Hematological: Negative for adenopathy  Psychiatric/Behavioral: Negative for agitation and confusion  Historical Information   Past Medical History:   Diagnosis Date    Alcoholism ([place] [address] )     Anxiety     Diabetes [place] [address] )     Diabetes mellitus [place] [address] )     High cholesterol      History reviewed  No pertinent surgical history    Social History   Social History     Substance and Sexual Activity   Alcohol Use Not Currently    Frequency: Never     Social History     Substance and Sexual Activity   Drug Use Yes    Types: Marijuana, Other    Comment: prescription pills (unprescribed) daily     Social History     Tobacco Use   Smoking Status Current Every Day Smoker    Packs/day: 2 00    Types: Cigarettes   Smokeless Tobacco Never Used     Family History:   Family History   Adopted: Yes       Meds/Allergies   Current Outpatient Medications   Medication Sig Dispense Refill    acetaminophen (TYLENOL) 325 mg tablet Take 3 tablets (975 mg total) by mouth every 8 (eight) hours 30 tablet 0    amLODIPine (NORVASC) 5 mg tablet Take 5 mg by mouth daily      calcium gluconate 500 mg tablet Take 1 tablet (500 mg total) by mouth 2 (two) times a day 60 tablet 0    cholecalciferol (VITAMIN D3) 250 MCG (02304 UT) capsule Take 5 capsules (50,000 Units total) by mouth 3 (three) times a week 45 capsule 0    Contour Next Test test strip 1 each by Other route daily as needed Test 3 times daily      gabapentin (NEURONTIN) 100 mg capsule Take 1 capsule (100 mg total) by mouth 3 (three) times a day 90 capsule 0    glimepiride (AMARYL) 4 mg tablet 1 tab daily  0    HYDROcodone-acetaminophen (NORCO) 5-325 mg per tablet Take 2 tablets by mouth every 8 (eight) hours      insulin glargine (LANTUS) 100 units/mL subcutaneous injection Inject 35 Units under the skin daily at bedtime      labetalol (NORMODYNE) 200 mg tablet Take 0 5 tablets (100 mg total) by mouth 2 (two) times a day  0    lisinopril (ZESTRIL) 10 mg tablet Take 10 mg by mouth daily  0    LORazepam (ATIVAN) 0 5 mg tablet   0    metFORMIN (GLUCOPHAGE) 1000 MG tablet Take 1 tablet (1,000 mg total) by mouth daily with dinner  0    metFORMIN (GLUCOPHAGE) 500 mg tablet Take 1 tablet (500 mg total) by mouth daily with breakfast  0    methocarbamol (ROBAXIN) 500 mg tablet Take 1 tablet (500 mg total) by mouth every 6 (six) hours 60 tablet 0    Needles & Syringes MISC by Does not apply route 3 (three) times a day before meals 90 each 0    oxyCODONE (ROXICODONE) 5 mg immediate release tablet 5 to 10 mg PO every 4 hours as needed for moderate to severe pain 30 tablet 0    polyethylene glycol (MIRALAX) 17 g packet Take 17 g by mouth daily as needed (constipation) 14 each 0    ROSUVASTATIN CALCIUM PO Take by mouth      Dulaglutide (Trulicity) 9 70 BW/5 4UD SOPN 0 75 mg weekly      insulin lispro (HumaLOG KwikPen) 100 units/mL injection pen 15 units with each meal 10 pen 5    senna-docusate sodium (SENOKOT S) 8 6-50 mg per tablet Take 1 tablet by mouth 2 (two) times a day  0     No current facility-administered medications for this visit  No Known Allergies    Objective   Vitals: Blood pressure 134/88, pulse (!) 108, temperature 97 6 °F (36 4 °C), height 6' 4" (1 93 m), weight 106 kg (234 lb 6 4 oz)    Invasive Devices     None                 Physical Exam   Constitutional: He is oriented to person, place, and time  He appears well-developed  No distress  HENT:   Head: Normocephalic and atraumatic  Eyes: Pupils are equal, round, and reactive to light  Conjunctivae are normal    Neck: Normal range of motion  Neck supple  No thyromegaly present  Cardiovascular: Normal rate and regular rhythm  Pulmonary/Chest: Effort normal and breath sounds normal  No respiratory distress  Abdominal: Soft  Bowel sounds are normal    Musculoskeletal: Normal range of motion  Neurological: He is alert and oriented to person, place, and time  He exhibits normal muscle tone  Skin: Skin is warm and dry  No rash noted  He is not diaphoretic  Psychiatric: His behavior is normal    Vitals reviewed  The history was obtained from the review of the chart and from the patient  Lab Results:    Most recent Alc is  Lab Results   Component Value Date    HGBA1C 9 7 (H) 07/27/2020           No components found for: HA1C  No components found for: GLU    Lab Results   Component Value Date    CREATININE 0 59 (L) 07/27/2020    CREATININE 0 66 07/25/2020    CREATININE 0 72 07/23/2020    BUN 10 07/27/2020    K 3 6 07/27/2020     07/27/2020    CO2 27 07/27/2020     eGFR   Date Value Ref Range Status   07/27/2020 111 ml/min/1 73sq m Final     No components found for: MALBCRER    No results found for: CHOL, HDL, TRIG, CHOLHDL    No results found for: ALT, AST, GGT, ALKPHOS, BILITOT    Lab Results   Component Value Date    FREET4 1 07 07/28/2020           Future Appointments   Date Time Provider Umair Valdez   8/7/2020 11:15 AM GRZEGORZ Begum [facility]   8/11/2020  2:15 PM MATTHEW [facility]       Portions of the record may have been created with voice recognition software  Occasional wrong word or "sound a like" substitutions may have occurred due to the inherent limitations of voice recognition software   Read the chart carefully and recognize, using context, where substitutions have occurred

## 2020-08-06 ENCOUNTER — TELEPHONE (OUTPATIENT)
Dept: NEUROSURGERY | Facility: CLINIC | Age: 60
End: 2020-08-06

## 2020-08-06 NOTE — TELEPHONE ENCOUNTER
COVID Pre-Visit Screening     1  Is this a family member screening? n  2  Have you traveled outside of your state in the past 2 weeks? n  3  Do you presently have a fever or flu-like symptoms? n  4  Do you have symptoms of an upper respiratory infection like runny nose, sore throat, or cough?n  5  Are you suffering from new headache that you have not had in the past?  n  6  Do you have/have you experienced any new shortness of breath recently? n  7  Do you have any new diarrhea, nausea or vomiting? n  8  Have you been in contact with anyone who has been sick or diagnosed with COVID-19? n  9  Do you have any new loss of taste or smell? n  10    Are you able to wear a mask without a valve for the entire visit? y

## 2020-08-07 ENCOUNTER — OFFICE VISIT (OUTPATIENT)
Dept: NEUROSURGERY | Facility: CLINIC | Age: 60
End: 2020-08-07
Payer: COMMERCIAL

## 2020-08-07 VITALS
HEIGHT: 76 IN | WEIGHT: 235 LBS | SYSTOLIC BLOOD PRESSURE: 114 MMHG | RESPIRATION RATE: 16 BRPM | HEART RATE: 82 BPM | BODY MASS INDEX: 28.62 KG/M2 | DIASTOLIC BLOOD PRESSURE: 94 MMHG | TEMPERATURE: 98.3 F

## 2020-08-07 DIAGNOSIS — S32.010A CLOSED COMPRESSION FRACTURE OF BODY OF L1 VERTEBRA (HCC): Primary | ICD-10-CM

## 2020-08-07 PROCEDURE — 99214 OFFICE O/P EST MOD 30 MIN: CPT | Performed by: PHYSICIAN ASSISTANT

## 2020-08-07 NOTE — PROGRESS NOTES
Neurosurgery Office Note  Liliane Slaughter 61 y o  male MRN: [id number]      Assessment/Plan     Closed compression fracture of body of L1 vertebra (HCC)  · Status post fall from 5 ft, positive head trauma and questionable loss of consciousness   · L1 SEP fracture - TLICS 1    Imaging:   X-ray lumbar spine 08/05/2020:  Again noted is mild increased loss of height compared to previous x-rays approximately 3 mm anteriorly  No evidence of bony retropulsion on x-ray  Alignment appears stable compared to prior  Plan:   TLSO brace when out of bed and head of bed greater than 45°   Patient states he was offered PT to come out to his home, but he declined as he had no need for it  He had tried PT in the past with his chronic back pain and found minimal help  Encouraged him to reconsider   Reviewed imaging with patient   Patient states that he was taking oxy when he was discharged, but then was taking his Vicodin for which he was taking chronically  I advised him we would not be dizziness pain medication at this time as he is not postoperative   Patient return to office approximately 4 weeks with repeat lumbar x-rays   All questions and concerns were addressed  Encouraged to call with questions or concerns          Diagnoses and all orders for this visit:    Closed compression fracture of body of L1 vertebra ([facility] [address] )  -     X-ray lumbar spine 2 or 3 views; Future            CHIEF COMPLAINT    Chief Complaint   Patient presents with    Follow-up    Back Pain       HISTORY    History of Present Illness     61y o  year old male     Who presents today for follow-up of a lumbar spine fracture status post fall off a ladder  Patient states he continues with moderate amount of pain rated as a 7/10  He states conservatively he has been using ice and heat with culture nasion with some good relief  He still has exacerbations of pain when ambulating or sitting up for extended periods of time    He states he has been more sedentary at home secondary to the fracture  Patient was taking oxycodone when discharged from the hospital but was supplementing with Vicodin for which she was taking for his chronic pain  Advised patient to follow-up for pain management with his PCP  He admits to mild low back pain that seems radiated to his hips but denies any leg radiculopathy, sensation changes, weakness, bowel or bladder incontinence  Does not endorse mild groin pain at times but variable and resolve spontaneously with rest   Advised patient not to drive while TLSO brace is in place  All questions or concerns were addressed at this time  See Discussion    REVIEW OF SYSTEMS    Review of Systems   Constitutional: Negative  HENT: Negative  Eyes: Negative  Respiratory: Negative  Cardiovascular: Negative  Gastrointestinal: Negative  Endocrine: Negative  Genitourinary: Negative  Musculoskeletal: Positive for back pain (non radiaiting)  Skin: Negative  Allergic/Immunologic: Negative  Neurological: Positive for weakness and numbness (Diabetic Neuropathy)  Hematological: Negative  Psychiatric/Behavioral: Negative  All other systems reviewed and are negative          Meds/Allergies     Current Outpatient Medications   Medication Sig Dispense Refill    acetaminophen (TYLENOL) 325 mg tablet Take 3 tablets (975 mg total) by mouth every 8 (eight) hours 30 tablet 0    amLODIPine (NORVASC) 5 mg tablet Take 5 mg by mouth daily      calcium gluconate 500 mg tablet Take 1 tablet (500 mg total) by mouth 2 (two) times a day 60 tablet 0    cholecalciferol (VITAMIN D3) 250 MCG (08544 UT) capsule Take 5 capsules (50,000 Units total) by mouth 3 (three) times a week 45 capsule 0    Contour Next Test test strip 1 each by Other route daily as needed Test 3 times daily      Dulaglutide (Trulicity) 8 64 CV/7 3IU SOPN 0 75 mg weekly      gabapentin (NEURONTIN) 100 mg capsule Take 1 capsule (100 mg total) by mouth 3 (three) times a day 90 capsule 0    glimepiride (AMARYL) 4 mg tablet 1 tab daily  0    HYDROcodone-acetaminophen (NORCO) 5-325 mg per tablet Take 2 tablets by mouth every 8 (eight) hours      insulin glargine (LANTUS) 100 units/mL subcutaneous injection Inject 35 Units under the skin daily at bedtime      insulin lispro (HumaLOG KwikPen) 100 units/mL injection pen 15 units with each meal 10 pen 5    labetalol (NORMODYNE) 200 mg tablet Take 0 5 tablets (100 mg total) by mouth 2 (two) times a day  0    lisinopril (ZESTRIL) 10 mg tablet Take 10 mg by mouth daily  0    LORazepam (ATIVAN) 0 5 mg tablet   0    metFORMIN (GLUCOPHAGE) 1000 MG tablet Take 1 tablet (1,000 mg total) by mouth daily with dinner  0    metFORMIN (GLUCOPHAGE) 500 mg tablet Take 1 tablet (500 mg total) by mouth daily with breakfast  0    methocarbamol (ROBAXIN) 500 mg tablet Take 1 tablet (500 mg total) by mouth every 6 (six) hours 60 tablet 0    Needles & Syringes MISC by Does not apply route 3 (three) times a day before meals 90 each 0    oxyCODONE (ROXICODONE) 5 mg immediate release tablet 5 to 10 mg PO every 4 hours as needed for moderate to severe pain 30 tablet 0    polyethylene glycol (MIRALAX) 17 g packet Take 17 g by mouth daily as needed (constipation) 14 each 0    ROSUVASTATIN CALCIUM PO Take by mouth      senna-docusate sodium (SENOKOT S) 8 6-50 mg per tablet Take 1 tablet by mouth 2 (two) times a day  0     No current facility-administered medications for this visit  No Known Allergies    PAST HISTORY    Past Medical History:   Diagnosis Date    [facility]ism ([facility] [address] )     Anxiety     [facility]     Diabetes mellitus ([facility] )     High cholesterol        History reviewed  No pertinent surgical history      Social History     Tobacco Use    Smoking status: Current Every Day Smoker     Packs/day: 2 00     Types: Cigarettes    Smokeless tobacco: Never Used   Substance Use Topics    Alcohol use: Not Currently Frequency: Never    Drug use: Yes     Types: Marijuana, Other     Comment: prescription pills (unprescribed) daily       Family History   Adopted: Yes         Above history personally reviewed  EXAM    Vitals:Blood pressure 114/94, pulse 82, temperature 98 3 °F (36 8 °C), temperature source Tympanic, resp  rate 16, height 6' 4" (1 93 m), weight 107 kg (235 lb)  ,Body mass index is 28 61 kg/m²  Physical Exam  Constitutional:       Comments: Slight disheveled appearance    HENT:      Head: Normocephalic and atraumatic  Eyes:      Extraocular Movements: Extraocular movements intact and EOM normal    Neck:      Musculoskeletal: Normal range of motion  Cardiovascular:      Rate and Rhythm: Normal rate  Pulmonary:      Effort: Pulmonary effort is normal    Musculoskeletal: Normal range of motion  General: Tenderness (midline lumbar spine ) present  No deformity  Skin:     General: Skin is warm and dry  Neurological:      General: No focal deficit present  Mental Status: He is alert and oriented to person, place, and time  Motor: No weakness  Gait: Gait is intact  Gait normal       Deep Tendon Reflexes:      Reflex Scores:       Patellar reflexes are 1+ on the right side and 1+ on the left side  Psychiatric:         Mood and Affect: Mood normal          Speech: Speech normal          Behavior: Behavior normal          Neurologic Exam     Mental Status   Oriented to person, place, and time  Follows 2 step commands  Attention: normal  Concentration: normal    Speech: speech is normal   Level of consciousness: alert    Cranial Nerves     CN III, IV, VI   Extraocular motions are normal      CN VII   Facial expression full, symmetric       CN VIII   CN VIII normal      Motor Exam   Muscle bulk: normal  Overall muscle tone: normal  Right arm tone: normal  Left arm tone: normal  Right leg tone: normal  Left leg tone: normal    Strength   Right deltoid: 5/5  Left deltoid: 5/5  Right biceps: 5/5  Left biceps: 5/5  Right triceps: 5/5  Left triceps: 5/5  Right wrist flexion: 5/5  Left wrist flexion: 5/5  Right wrist extension: 5/5  Left wrist extension: 5/5  Right iliopsoas: 5/5  Left iliopsoas: 5/5  Right quadriceps: 5/5  Left quadriceps: 5/5  Right hamstrin/5  Left hamstrin/5  Right anterior tibial: 5/5  Left anterior tibial: 5/5  Right gastroc: 5/5  Left gastroc: 5/5Mild pain inhibition with KE and HF in BLE, but still full strength      Sensory Exam   Light touch normal    DST intact      Gait, Coordination, and Reflexes     Gait  Gait: normal (slowed secondary to pain )    Tremor   Resting tremor: absent  Action tremor: absent    Reflexes   Right patellar: 1+  Left patellar: 1+        MEDICAL DECISION MAKING    Imaging Studies:     Xr Chest Portable    Result Date: 2020  Narrative: CHEST INDICATION:   fall from ladder 5 feet  COMPARISON:  None EXAM PERFORMED/VIEWS:  XR CHEST PORTABLE FINDINGS: Cardiomediastinal silhouette appears unremarkable  Right mid lung opacity or mass measuring approximately 3 7 cm  Follow-up enhanced chest CT recommended in this patient with smoking history  No pneumothorax or pleural effusion  Osseous structures appear within normal limits for patient age  Impression: Right mid lung opacity or mass measuring approximately 3 7 cm  Follow-up enhanced chest CT recommended in this patient with smoking history  The study was marked in EPIC for significant notification  Workstation performed: [id number]     Xr Spine Lumbar 2 Or 3 Views Injury    Result Date: 2020  Narrative: LUMBAR SPINE INDICATION:   L1 compression fracture  COMPARISON:  CT 2020 VIEWS:  XR SPINE LUMBAR 2 OR 3 VIEWS INJURY FINDINGS: There are 5 non rib bearing lumbar vertebral bodies  Again identified is a anterior wedge compression deformity involving superior endplate of L1 with approximately 35% loss of height anteriorly, slightly increased from the CT from 2020    There is no apparent osseous retropulsion  Vertebral body stature of the remaining lumbar vertebrae and visualized lower thoracic vertebral within normal limits  Straightening of lumbar lordosis may be due to position and/or spasm  Multilevel discogenic disease throughout the lumbar spine noted vertically at L4-5 and L5-S1 with degenerative facet arthrosis at the lower 3 lumbar levels bilaterally  The pedicles appear intact  Soft tissues are unremarkable  Vascular calcification present  Impression: 1  Anterior wedge compression fracture again noted with slightly greater loss of height anteriorly compared to the CT of 7/22/2020  Workstation performed: [id number]     Ct Head Without Contrast    Result Date: 7/22/2020  Narrative: CT BRAIN - WITHOUT CONTRAST INDICATION:   fall, HA, N/V  COMPARISON:  None  TECHNIQUE:  CT examination of the brain was performed  In addition to axial images, coronal 2D reformatted images were created and submitted for interpretation  Radiation dose length product (DLP) for this visit:  957 38 mGy-cm   This examination, like all CT scans performed in the [facility], was performed utilizing techniques to minimize radiation dose exposure, including the use of iterative  reconstruction and automated exposure control  IMAGE QUALITY:  Diagnostic  FINDINGS: PARENCHYMA:  No intracranial mass, mass effect or midline shift  No CT signs of acute infarction  No acute parenchymal hemorrhage  VENTRICLES AND EXTRA-AXIAL SPACES:  Normal for the patient's age  VISUALIZED ORBITS AND PARANASAL SINUSES:  No acute abnormality involving the orbits  Mild scattered sinus mucosal thickening is noted  No fluid levels are seen  CALVARIUM AND EXTRACRANIAL SOFT TISSUES:  There is a left parietal scalp hematoma and contusion  There is no underlying depressed calvarial fracture  Impression: Left parietal scalp hematoma    No acute intracranial hemorrhage or depressed calvarial fracture identified  Workstation performed: [id number]     Ct Spine Cervical Without Contrast    Result Date: 7/22/2020  Narrative: CT CERVICAL SPINE - WITHOUT CONTRAST INDICATION:   fall  COMPARISON:  None  TECHNIQUE:  CT examination of the cervical spine was performed without intravenous contrast   Contiguous axial images were obtained  Sagittal and coronal reconstructions were performed  Radiation dose length product (DLP) for this visit:  550 97 mGy-cm   This examination, like all CT scans performed in the [facility], was performed utilizing techniques to minimize radiation dose exposure, including the use of iterative  reconstruction and automated exposure control  IMAGE QUALITY:  Diagnostic  FINDINGS: ALIGNMENT:  There is no significant spondylolisthesis  There is reversal of the normal cervical lordosis  There is no evidence for traumatic malalignment  Atlantodental distance is preserved  Craniocervical junction is unremarkable  VERTEBRAL BODIES:  The vertebral body heights are maintained  No acute fractures are identified  DEGENERATIVE CHANGES:  There is multilevel canal stenosis and foraminal narrowing secondary to disc osteophyte complexes, uncovertebral spurring and facet arthrosis  PREVERTEBRAL AND PARASPINAL SOFT TISSUES:  No significant prevertebral or paravertebral soft tissue swelling  There are changes are noted of the aorta  THORACIC INLET:  Mild emphysema  Impression: No acute fracture or evidence for traumatic malalignment  I personally discussed this study with Dr Yasmine Bass on 7/22/2020 at 12:17 PM   Workstation performed: [id number]     Ct Spine Lumbar Without Contrast    Result Date: 7/22/2020  Narrative: CT LUMBAR SPINE INDICATION:   fall, pain  COMPARISON: None  TECHNIQUE:  Contiguous axial images through the lumbar spine were obtained  Sagittal and coronal reconstructions were performed  Radiation dose length product (DLP) for this visit:  1900 43 mGy-cm     This examination, like all CT scans performed in the [facility], was performed utilizing techniques to minimize radiation dose exposure, including the use of iterative reconstruction and automated exposure control  IMAGE QUALITY:  Diagnostic  FINDINGS: ALIGNMENT:  There are 5 lumbar type vertebral bodies  There is trace retrolisthesis of L3-L4 and L4-L5  VERTEBRAL BODIES:  There is an acute fracture through the superior endplate of L1  There is involvement of the anterior cortex of the L1 vertebral body  There is minimal loss of height  There is no significant bony retropulsion into the spinal canal   Bones appear osteopenic  DEGENERATIVE CHANGES: Lower Thoracic spine:  Normal lower thoracic disc spaces ] L1-2:  There is facet arthrosis  There is no significant canal stenosis or foraminal narrowing  L2-3:  There is a bulging annulus  There is facet arthrosis  There is no significant canal stenosis or foraminal narrowing  L3-4:  There is facet arthrosis  There is mild canal stenosis  There is no significant foraminal narrowing  L4-5:  There is disc space narrowing with vacuum disc phenomenon  There is a bulging annulus  There is facet arthrosis with ligamentum flavum thickening  There is moderate canal stenosis  There is endplate spurring  There is mild to moderate bilateral foraminal narrowing  L5-S1:  There is disc space narrowing  There is vacuum disc phenomenon  There is a bulging annulus  There is a possible left paracentral disc protrusion  There is endplate spurring  There is moderate left foraminal narrowing  There is severe right foraminal narrowing with impingement of the exiting L5 nerve root  PARASPINAL SOFT TISSUES:  There is mild prevertebral soft tissue swelling at L1  Impression: Acute fracture through the superior endplate of L1 with minimal loss of height    No significant bony retropulsion into the spinal canal  Degenerative changes of the lumbar spine, as described above  I personally discussed this study with Dr Ayah Randolph on 7/22/2020 at 12:17 PM  Workstation performed: [id number]     Ct Abdomen Pelvis W Contrast    Result Date: 7/22/2020  Narrative: CT ABDOMEN AND PELVIS WITH IV CONTRAST INDICATION:   fall from 5 feet  COMPARISON:  CT lumbar spine from earlier today  TECHNIQUE:  CT examination of the abdomen and pelvis was performed  Axial, sagittal, and coronal 2D reformatted images were created from the source data and submitted for interpretation  Radiation dose length product (DLP) for this visit:  707 97 mGy-cm   This examination, like all CT scans performed in the [address] [place] [address], was performed utilizing techniques to minimize radiation dose exposure, including the use of iterative  reconstruction and automated exposure control  IV Contrast:  100 mL of iohexol (OMNIPAQUE) Enteric Contrast:  Enteric contrast was not administered  FINDINGS: ABDOMEN LOWER CHEST:  No clinically significant abnormality identified in the visualized lower chest  LIVER/BILIARY TREE:  Unremarkable  GALLBLADDER:  No calcified gallstones  No pericholecystic inflammatory change  SPLEEN:  Unremarkable  PANCREAS:  Unremarkable  ADRENAL GLANDS:  Unremarkable  KIDNEYS/URETERS:  Unremarkable  No hydronephrosis  STOMACH AND BOWEL:  Unremarkable  APPENDIX:  No findings to suggest appendicitis  ABDOMINOPELVIC CAVITY:  No ascites  No pneumoperitoneum  No lymphadenopathy  VESSELS:  Unremarkable for patient's age  PELVIS REPRODUCTIVE ORGANS:  Unremarkable for patient's age  URINARY BLADDER:  Unremarkable  ABDOMINAL WALL/INGUINAL REGIONS:  Unremarkable  OSSEOUS STRUCTURES:  Known mild acute superior endplate compression deformity of L1 unchanged from 2 days earlier CT lumbar spine  No progressive loss of height since today's earlier study  No paraspinal hematoma       Impression: Unchanged appearance of the known mild superior endplate compression deformity of the L1 vertebral body without progressive loss of height  No other sites of injury are identified within the abdomen or the pelvis  Workstation performed: [id number]       I have personally reviewed pertinent reports     and I have personally reviewed pertinent films in PACS

## 2020-08-07 NOTE — PATIENT INSTRUCTIONS
Vertebral Compression Fracture   WHAT YOU NEED TO KNOW:   A vertebral compression fracture (VCF) is a break in a part of the vertebra  Vertebrae are the round, strong bones that form your spine  VCFs most often occur in the thoracic (middle) and lumbar (lower) areas of your spine  Fractures may be mild to severe  DISCHARGE INSTRUCTIONS:   Medicines: You may need any of the following:  · NSAIDs , such as ibuprofen, help decrease swelling, pain, and fever  This medicine is available with or without a doctor's order  NSAIDs can cause stomach bleeding or kidney problems in certain people  If you take blood thinner medicine, always ask if NSAIDs are safe for you  Always read the medicine label and follow directions  Do not give these medicines to children under 10months of age without direction from your child's healthcare provider  · Acetaminophen  decreases pain and fever  It is available without a doctor's order  Ask how much to take and how often to take it  Follow directions  Acetaminophen can cause liver damage if not taken correctly  · Prescription pain medicine  may be given  Ask your healthcare provider how to take this medicine safely  · Bisphosphonates and calcitonin  may be recommended to help your bones get stronger  They can decrease the pain of a VCF caused by osteoporosis, and decrease your risk for another fracture  · Take your medicine as directed  Contact your healthcare provider if you think your medicine is not helping or if you have side effects  Tell him or her if you are allergic to any medicine  Keep a list of the medicines, vitamins, and herbs you take  Include the amounts, and when and why you take them  Bring the list or the pill bottles to follow-up visits  Carry your medicine list with you in case of an emergency  Follow up with your healthcare provider as directed: You may need to return for x-rays or other tests   Write down your questions so you remember to ask them during your visits  Heat and ice:   · Apply ice  on your back for 15 to 20 minutes every hour or as directed  Use an ice pack, or put crushed ice in a plastic bag  Cover it with a towel  Ice helps prevent tissue damage and decreases swelling and pain  · Apply heat  on your back for 20 to 30 minutes every 2 hours for as many days as directed  Heat helps decrease pain and muscle spasms  Activity:   · Avoid activities that may make the pain worse, such as picking up heavy objects  When the pain decreases, begin normal, slow movements as directed by your healthcare provider  Your healthcare provider may have you do weight-bearing exercises such as walking  You may also do non-weight-bearing exercises such as swimming and bicycling  · You may need to use a walker or cane  Ask your healthcare provider for more information about how to use a cane or a walker  · When you  objects, bend at the hips and knees  Never bend from the waist only  Use bent knees and your leg muscles as you lift the object  While you lift the object, keep it close to your chest  Try not to twist or lift anything above your waist   Physical and occupational therapy:  Your healthcare provider may recommend physical and occupational therapy  A physical therapist teaches you exercises to help improve movement and strength, and to decrease pain  An occupational therapist teaches you skills to help with your daily activities  Manage pain during sleep:   · Do not sleep on a waterbed  Waterbeds do not provide good back support  · Sleep on a firm mattress  You may also put a ½ to 1-inch piece of plywood between the mattress and box spring  · Sleep on your back with a pillow under your knees  This will decrease pressure on your back  You may also sleep on your side with 1 or both of your knees bent and a pillow between them  It may also be helpful to sleep on your stomach with a pillow under you at waist level    Contact your healthcare provider if:   · You are not hungry, and you are losing weight  · You cannot sleep or rest because of back pain  · You have pain or swelling in your back that is getting worse, or does not go away  · You have questions or concerns about your condition or care  Return to the emergency department if:   · You feel lightheaded, short of breath, and have chest pain  · You cough up blood  · Your arm or leg feels warm, tender, and painful  It may look swollen and red  · You have new problems urinating or having bowel movements  · You have severe pain in your back after falling, bending forward, sneezing, or coughing strongly  · You suddenly cannot feel your legs  · You suddenly have trouble moving your arms or legs  © 2017 [address] Information is for End User's use only and may not be sold, redistributed or otherwise used for commercial purposes  All illustrations and images included in CareNotes® are the copyrighted property of [employer]  or [employer] Liao  The above information is an  only  It is not intended as medical advice for individual conditions or treatments  Talk to your doctor, nurse or pharmacist before following any medical regimen to see if it is safe and effective for you

## 2020-08-07 NOTE — ASSESSMENT & PLAN NOTE
· Status post fall from 5 ft, positive head trauma and questionable loss of consciousness   · L1 SEP fracture - TLICS 1    Imaging:   X-ray lumbar spine 08/05/2020:  Again noted is mild increased loss of height compared to previous x-rays approximately 3 mm anteriorly  No evidence of bony retropulsion on x-ray  Alignment appears stable compared to prior  Plan:   TLSO brace when out of bed and head of bed greater than 45°   Patient states he was offered PT to come out to his home, but he declined as he had no need for it  He had tried PT in the past with his chronic back pain and found minimal help  Encouraged him to reconsider   Reviewed imaging with patient   Patient states that he was taking oxy when he was discharged, but then was taking his Vicodin for which he was taking chronically  I advised him we would not be dizziness pain medication at this time as he is not postoperative   Patient return to office approximately 4 weeks with repeat lumbar x-rays   All questions and concerns were addressed  Encouraged to call with questions or concerns

## 2020-08-10 ENCOUNTER — TELEPHONE (OUTPATIENT)
Dept: SURGERY | Facility: CLINIC | Age: 60
End: 2020-08-10

## 2020-08-10 NOTE — TELEPHONE ENCOUNTER
Patient did not want to come to his Trauma f/u appointment  He states that he would see his PCP instead for follow up

## 2020-08-19 ENCOUNTER — TELEPHONE (OUTPATIENT)
Dept: ENDOCRINOLOGY | Facility: HOSPITAL | Age: 60
End: 2020-08-19

## 2020-08-19 DIAGNOSIS — Z79.4 TYPE 2 DIABETES MELLITUS WITHOUT COMPLICATION, WITH LONG-TERM CURRENT USE OF INSULIN (HCC): Primary | ICD-10-CM

## 2020-08-19 DIAGNOSIS — E11.9 TYPE 2 DIABETES MELLITUS WITHOUT COMPLICATION, WITH LONG-TERM CURRENT USE OF INSULIN (HCC): Primary | ICD-10-CM

## 2020-08-19 RX ORDER — INSULIN GLARGINE 100 [IU]/ML
INJECTION, SOLUTION SUBCUTANEOUS
Start: 2020-08-19 | End: 2022-06-30

## 2020-08-19 NOTE — TELEPHONE ENCOUNTER
I reviewed blood sugars supplied  Since stopping glimepiride at his appointment blood sugars are running slightly higher  I would increase his Lantus to 40 units daily  Continue the rest of his regimen as is  I would ask him to call in blood sugars again in 1-2 weeks for review for additional changes  I would ask him to call sooner with any concerns regarding low blood sugars

## 2020-09-03 ENCOUNTER — HOSPITAL ENCOUNTER (OUTPATIENT)
Dept: RADIOLOGY | Facility: HOSPITAL | Age: 60
Discharge: HOME | End: 2020-09-03
Payer: COMMERCIAL

## 2020-09-03 DIAGNOSIS — S32.010A CLOSED COMPRESSION FRACTURE OF BODY OF L1 VERTEBRA (HCC): ICD-10-CM

## 2020-09-03 PROCEDURE — 72100 X-RAY EXAM L-S SPINE 2/3 VWS: CPT

## 2020-09-04 ENCOUNTER — OFFICE VISIT (OUTPATIENT)
Dept: NEUROSURGERY | Facility: CLINIC | Age: 60
End: 2020-09-04
Payer: COMMERCIAL

## 2020-09-04 VITALS — WEIGHT: 235 LBS | HEIGHT: 76 IN | RESPIRATION RATE: 16 BRPM | TEMPERATURE: 98.1 F | BODY MASS INDEX: 28.62 KG/M2

## 2020-09-04 DIAGNOSIS — S32.010D CLOSED WEDGE COMPRESSION FRACTURE OF FIRST LUMBAR VERTEBRA WITH ROUTINE HEALING, SUBSEQUENT ENCOUNTER: Primary | ICD-10-CM

## 2020-09-04 PROCEDURE — 99213 OFFICE O/P EST LOW 20 MIN: CPT | Performed by: NURSE PRACTITIONER

## 2020-09-04 NOTE — PROGRESS NOTES
Neurosurgery Office Note  Arabella Lincoln 61 y o  male MRN: [id number]      Assessment/Plan     Closed compression fracture of body of L1 vertebra (HCC)  · Status post fall from 5 ft ladder, positive head trauma and questionable loss of consciousness on 7/22/2020  · L1 SEP fracture - TLICS 1   · Maintained in TLSO brace  · Endorsing some persistent midline lumbar pain  Nonfocal exam     Imaging:   X-ray lumbar spine 9/3/2020:  Mild loss of height compared to previous x-rays  Alignment appears stable compared to prior  Plan:   Referral placed to physical therapy as patient has history of chronic back pain even prior to injury   Reviewed imaging with patient   Patient return to office approximately 4 weeks with repeat lumbar x-rays due to persistent lumbar midline pain   All questions and concerns were addressed  Encouraged to call with questions or concerns  Diagnoses and all orders for this visit:    Closed wedge compression fracture of first lumbar vertebra with routine healing, subsequent encounter  -     XR spine lumbar 2 or 3 views injury; Future  -     Ambulatory referral to Physical Therapy; Future            CHIEF COMPLAINT    Chief Complaint   Patient presents with    Follow-up    Back Pain       HISTORY    History of Present Illness     61y o  year old male with a history of insulin-dependent diabetes, hypertension, cigarette smoking, chronic back pain, who presents for 6 week post hospital evaluation L1 superior endplate compression fracture that he sustained after falling 5 ft off a ladder  He has been maintained in a TLSO brace since then  He states he has been doing all right  He is managing the pain with 1 tab of Vicodin for times a day as well as muscle relaxer  He still continues to endorse some midline low back pain as well as some left-sided SI joint pain  He states this pain becomes worse with walking and movement    He denies any numbness or weakness anywhere he denies any bowel or bladder dysfunction  He also states he has been experiencing some headaches secondary to the head injury he sustained during the fall  He says he has not been exercising at all except to go to the grocery store  HPI    See Discussion    REVIEW OF SYSTEMS    Review of Systems   Constitutional: Negative  HENT: Negative  Eyes: Negative  Respiratory: Negative  Cardiovascular: Negative  Gastrointestinal: Negative  Endocrine: Negative  Genitourinary: Negative  Musculoskeletal: Positive for back pain (non radiaiting)  Skin: Negative  Allergic/Immunologic: Negative  Neurological: Positive for weakness and numbness (Diabetic Neuropathy)  Hematological: Negative  Psychiatric/Behavioral: Negative  All other systems reviewed and are negative          Meds/Allergies     Current Outpatient Medications   Medication Sig Dispense Refill    acetaminophen (TYLENOL) 325 mg tablet Take 3 tablets (975 mg total) by mouth every 8 (eight) hours 30 tablet 0    cholecalciferol (VITAMIN D3) 250 MCG (73875 UT) capsule Take 5 capsules (50,000 Units total) by mouth 3 (three) times a week 45 capsule 0    Contour Next Test test strip 1 each by Other route daily as needed Test 3 times daily      Dulaglutide (Trulicity) 2 77 EE/1 0LO SOPN 0 75 mg weekly      gabapentin (NEURONTIN) 100 mg capsule Take 1 capsule (100 mg total) by mouth 3 (three) times a day 90 capsule 0    glimepiride (AMARYL) 4 mg tablet 1 tab daily  0    HYDROcodone-acetaminophen (NORCO) 5-325 mg per tablet Take 2 tablets by mouth every 8 (eight) hours      insulin glargine (LANTUS) 100 units/mL subcutaneous injection 40 units qhs      insulin lispro (HumaLOG KwikPen) 100 units/mL injection pen 15 units with each meal 10 pen 5    labetalol (NORMODYNE) 200 mg tablet Take 0 5 tablets (100 mg total) by mouth 2 (two) times a day  0    lisinopril (ZESTRIL) 10 mg tablet Take 10 mg by mouth daily  0    LORazepam (ATIVAN) 0 5 mg tablet   0    metFORMIN (GLUCOPHAGE) 1000 MG tablet Take 1 tablet (1,000 mg total) by mouth daily with dinner  0    metFORMIN (GLUCOPHAGE) 500 mg tablet Take 1 tablet (500 mg total) by mouth daily with breakfast  0    methocarbamol (ROBAXIN) 500 mg tablet Take 1 tablet (500 mg total) by mouth every 6 (six) hours 60 tablet 0    Needles & Syringes MISC by Does not apply route 3 (three) times a day before meals 90 each 0    polyethylene glycol (MIRALAX) 17 g packet Take 17 g by mouth daily as needed (constipation) 14 each 0    ROSUVASTATIN CALCIUM PO Take by mouth      senna-docusate sodium (SENOKOT S) 8 6-50 mg per tablet Take 1 tablet by mouth 2 (two) times a day  0    amLODIPine (NORVASC) 5 mg tablet Take 5 mg by mouth daily      calcium gluconate 500 mg tablet Take 1 tablet (500 mg total) by mouth 2 (two) times a day (Patient not taking: Reported on 9/4/2020) 60 tablet 0    oxyCODONE (ROXICODONE) 5 mg immediate release tablet 5 to 10 mg PO every 4 hours as needed for moderate to severe pain (Patient not taking: Reported on 9/4/2020) 30 tablet 0     No current facility-administered medications for this visit  No Known Allergies    PAST HISTORY    Past Medical History:   Diagnosis Date    Alcoholism ([facility] )     Anxiety     Diabetes ([facility] )     Diabetes mellitus ([facility] )     High cholesterol        History reviewed  No pertinent surgical history  Social History     Tobacco Use    Smoking status: Current Every Day Smoker     Packs/day: 2 00     Types: Cigarettes    Smokeless tobacco: Never Used   Substance Use Topics    Alcohol use: Not Currently     Frequency: Never    Drug use: Yes     Types: Marijuana, Other     Comment: prescription pills (unprescribed) daily       Family History   Adopted: Yes         Above history personally reviewed  EXAM    Vitals:Temperature 98 1 °F (36 7 °C), temperature source Tympanic, resp   rate 16, height 6' 4" (1 93 m), weight 107 kg (235 lb) ,Body mass index is 28 61 kg/m²  Physical Exam  Constitutional:       General: He is not in acute distress  Appearance: He is well-developed  He is not diaphoretic  Eyes:      General:         Right eye: No discharge  Left eye: No discharge  Extraocular Movements: EOM normal       Conjunctiva/sclera: Conjunctivae normal       Pupils: Pupils are equal, round, and reactive to light  Neck:      Musculoskeletal: Normal range of motion and neck supple  Cardiovascular:      Rate and Rhythm: Normal rate and regular rhythm  Pulmonary:      Effort: Pulmonary effort is normal  No respiratory distress  Breath sounds: Normal breath sounds  Abdominal:      General: Bowel sounds are normal  There is no distension  Palpations: Abdomen is soft  Tenderness: There is no abdominal tenderness  Musculoskeletal: Normal range of motion  General: Tenderness present  Skin:     General: Skin is warm and dry  Neurological:      Mental Status: He is alert and oriented to person, place, and time  Cranial Nerves: No cranial nerve deficit  Sensory: No sensory deficit  Motor: No weakness  Coordination: Coordination normal  Finger-Nose-Finger Test normal       Gait: Gait normal       Deep Tendon Reflexes: Reflexes normal       Reflex Scores:       Patellar reflexes are 1+ on the right side and 1+ on the left side  Achilles reflexes are 1+ on the right side and 1+ on the left side  Psychiatric:         Speech: Speech normal          Behavior: Behavior normal          Thought Content: Thought content normal          Judgment: Judgment normal          Neurologic Exam     Mental Status   Oriented to person, place, and time  Oriented to person  Oriented to place  Oriented to time  Oriented to year, month and date  Registration: recalls 3 of 3 objects     Attention: normal  Concentration: normal    Speech: speech is normal   Level of consciousness: alert  Knowledge: good and consistent with education  Able to name object  Cranial Nerves     CN III, IV, VI   Pupils are equal, round, and reactive to light  Extraocular motions are normal    Right pupil: Size: 3 mm  Shape: regular  Reactivity: brisk  Consensual response: intact  Accommodation: intact  Left pupil: Size: 3 mm  Shape: regular  Reactivity: brisk  Consensual response: intact  Accommodation: intact  Nystagmus: none   Diplopia: none  Conjugate gaze: present    CN V   Right facial sensation deficit: none  Left facial sensation deficit: none    CN VII   Facial expression full, symmetric       CN VIII   Hearing: intact    CN IX, X   Palate: symmetric    CN XI   Right sternocleidomastoid strength: normal  Left sternocleidomastoid strength: normal  Right trapezius strength: normal  Left trapezius strength: normal    CN XII   Tongue: not atrophic  Fasciculations: absent  Tongue deviation: none    Motor Exam   Muscle bulk: normal  Overall muscle tone: normal  Right arm pronator drift: absent  Left arm pronator drift: absent    Strength   Right deltoid: 5/5  Left deltoid: 5/5  Right biceps: 5/5  Left biceps: 5/5  Right triceps: 5/5  Left triceps: 5/5  Right quadriceps: 5/5  Left quadriceps: 5/5  Right hamstrin/5  Left hamstrin/5  Right anterior tibial: 5/5  Left anterior tibial: 5/5  Right posterior tibial: 5/5  Left posterior tibial: 5/5  Right peroneal: 5/5  Left peroneal: 5/5  Right gastroc: 5/5  Left gastroc: 5/5    Sensory Exam   Light touch normal    Proprioception normal      Gait, Coordination, and Reflexes     Coordination   Finger to nose coordination: normal    Tremor   Resting tremor: absent  Intention tremor: absent  Action tremor: absent    Reflexes   Right patellar: 1+  Left patellar: 1+  Right achilles: 1+  Left achilles: 1+  Right ankle clonus: absent  Left ankle clonus: absent        MEDICAL DECISION MAKING    Imaging Studies:     Xr Spine Lumbar 2 Or 3 Views Injury    Result Date: 8/9/2020  Narrative: LUMBAR SPINE INDICATION:   S32 010A: Wedge compression fracture of first lumbar vertebra, initial encounter for closed fracture W19  XXXA: Unspecified fall, initial encounter  COMPARISON:  7/22/2020 VIEWS:  XR SPINE LUMBAR 2 OR 3 VIEWS INJURY Images: 3 FINDINGS: There are 5 non rib bearing lumbar vertebral bodies  Moderate superior endplate compression fracture of L1 demonstrating slightly greater loss of height anteriorly as before  No significant bony retropulsion  No new fractures  Mild retrolisthesis L4 on L5  Degenerative disc disease L4-5 and L5-S1  Marginal endplate osteophytes mid to upper lumbar and lower thoracic spine  The pedicles appear intact  Soft tissues are unremarkable  Impression: Moderate superior endplate compression fracture of L1 demonstrating slightly greater loss of height anteriorly as before  Workstation performed: [id number]       I have personally reviewed pertinent reports     and I have personally reviewed pertinent films in PACS

## 2020-09-04 NOTE — PATIENT INSTRUCTIONS
Follow up in 4 weeks with repeat x-ray  Continue to wear brace when upright and seated as instructed

## 2020-09-04 NOTE — ASSESSMENT & PLAN NOTE
· Status post fall from 5 ft ladder, positive head trauma and questionable loss of consciousness on 7/22/2020  · L1 SEP fracture - TLICS 1   · Maintained in TLSO brace  · Endorsing some persistent midline lumbar pain  Nonfocal exam     Imaging:   X-ray lumbar spine 9/3/2020:  Mild loss of height compared to previous x-rays  Alignment appears stable compared to prior  Plan:   Referral placed to physical therapy as patient has history of chronic back pain even prior to injury   Reviewed imaging with patient   Patient return to office approximately 4 weeks with repeat lumbar x-rays due to persistent lumbar midline pain   All questions and concerns were addressed  Encouraged to call with questions or concerns

## 2020-09-15 ENCOUNTER — EVALUATION (OUTPATIENT)
Dept: PHYSICAL THERAPY | Facility: CLINIC | Age: 60
End: 2020-09-15
Payer: COMMERCIAL

## 2020-09-15 DIAGNOSIS — M54.50 CHRONIC BILATERAL LOW BACK PAIN WITHOUT SCIATICA: Primary | ICD-10-CM

## 2020-09-15 DIAGNOSIS — G89.29 CHRONIC BILATERAL LOW BACK PAIN WITHOUT SCIATICA: Primary | ICD-10-CM

## 2020-09-15 DIAGNOSIS — M62.81 MUSCLE WEAKNESS (GENERALIZED): ICD-10-CM

## 2020-09-15 PROCEDURE — 97162 PT EVAL MOD COMPLEX 30 MIN: CPT | Performed by: PHYSICAL THERAPIST

## 2020-09-15 PROCEDURE — 97110 THERAPEUTIC EXERCISES: CPT | Performed by: PHYSICAL THERAPIST

## 2020-09-15 NOTE — PROGRESS NOTES
PT Evaluation     Today's date: 9/15/2020  Patient name: Lazarus Hsu  : 1960  MRN: [id number]  Referring provider: GRZEGORZ Ribeiro  Dx:   Encounter Diagnosis     ICD-10-CM    1  Chronic bilateral low back pain without sciatica  M54 5     G89 29    2  Muscle weakness (generalized)  M62 81                   Assessment  Assessment details: Lazarus Hsu is a 61 y o  male presenting to outpatient physical therapy at [address] [place] [address] with complaints of low back pain that began on 2020 when he fell off a ladder   He presents with decreased range of motion, decreased strength, limited flexibility, poor postural awareness, poor body mechanics, poor balance, decreased tolerance to activity and decreased functional mobility due to Chronic bilateral low back pain without sciatica  (primary encounter diagnosis), Muscle weakness (generalized)   Therapist discussed proper responses to exercises, HEP, safety with transfers and lifting techniques, and modalities  He would benefit from skilled PT services in order to address these deficits and reach maximum level of function   Thank you for the referral!  Impairments: abnormal coordination, abnormal gait, abnormal muscle firing, abnormal muscle tone, abnormal or restricted ROM, abnormal movement, activity intolerance, impaired balance, impaired physical strength, lacks appropriate home exercise program, pain with function, scapular dyskinesis, weight-bearing intolerance, poor posture  and poor body mechanics    Symptom irritability: moderateUnderstanding of Dx/Px/POC: good   Prognosis: good    Goals  STGs (in 4 weeks):  1  Pt will report having approximately a 50% improvement since I E   2  Pt will report having at most a 2/10 pain level with functional mobility  3  Pt will demonstrate increased trunk flexibility to at least 75% in all directions without onset of pain  LTGs (in 12 weeks):  1   Pt will report having at least a 75% improvement since I E   2  Pt will be able to preform functional mobility such as transfers from bed, chair and car without onset of pain  3  Pt will be able to turn, tip, rotate neck without onset of pain  Plan  Patient would benefit from: PT eval  Planned modality interventions: TENS, ultrasound and unattended electrical stimulation  Planned therapy interventions: manual therapy, joint mobilization, neuromuscular re-education, patient education, balance, therapeutic activities, stretching, strengthening, therapeutic exercise, home exercise program, gait training and flexibility  Frequency: 2x week  Plan of Care beginning date: 9/15/2020  Plan of Care expiration date: 2020  Treatment plan discussed with: patient        Subjective Evaluation    History of Present Illness  Date of onset: 2020  Mechanism of injury: trauma  Mechanism of injury: Pt is a 61year old male who presents s/p L1 closed compression fracture that is healing from when he fell off a ladder on 2020  He reports that he was in the hospital for a week  Imaging reveal healing  He reports that he is wearing his back brace (TLSO) with functional mobility  He reports that he does not remember hitting his head and that he might have lost consciousness  He reports that his back and neck hurt  Now referred to skilled OP PT     Quality of life: good    Pain  Current pain ratin  At best pain ratin  At worst pain ratin  Quality: discomfort, dull ache, pressure, squeezing, tight and sharp  Relieving factors: relaxation, rest, medications, ice and heat  Aggravating factors: walking, lifting and overhead activity (neck movement, bed transfers)  Progression: improved    Patient Goals  Patient goals for therapy: decreased pain, improved balance, increased motion, increased strength, independence with ADLs/IADLs and return to sport/leisure activities          Objective    Flowsheet Rows      Most Recent Value   PT/OT G-Codes   Current Score  47   Projected Score 60        Posture: Lumbar lordosis is decreased in standing       Lumbar AROM limitations:  (*=  Pain)  Lumbar flexion: 50%  Lumbar extension: 25%*  R side glide:  25%*  L side glide:  25%*    CROM:   Flexion: 50%  Extension: 25%  Lat Flexion: (R) 25% (L) 25%  Rotation: (R) 50% (L) 50%    Mechanical Asessment: pre-test symtpoms include discomfort, sharp  Repeated Extension in Standing (MELISSA): worse  Repeated Extension in Lying (REIL):  TBA    Strength:  Core strength: Upper abs: 3-/5 lower abs: 3-/5     Right  Left  Hip flexion:  4/5  4-/5  Knee ext  4/5  4-/5  Ankle DF  4/5  4/5  Ankle PF  4-/5  4-/5  Knee flex  4-/5  4-/5      Hip abduction  4-/5  3+/5  Hip adduction  3-/5  3-/5  Hip extension  3-/5  3-/5    Joint mobility: decreased lumbar PA glides    Tenderness/Palpation: left lumbar paraspinals, L glute med/piriformis, B UT/LS, B cervical paraspinals     Sensation: intact to light touch throughout BLE    Flexibility: decreased hamstring, glute, piriformis, hip flexor         Precautions: hx of low back pain    HEP: LTR, upper trap stretch, LS stretch    Specialty Daily Treatment Diary       Manual 9/15       SOR; TPR B UT/LS        UT, LS stretch                STM/TPR lumbar paraspinals        TPR piriformis        HS, Piriformis stretch                        Exercise Diary         MH: TAC; TAC c heel slides        MH: TAC c Marches        MH: TAC c SLR        MH: TB Bridges; Clamshells                PB LTR        PB DKTC                Sidelying H' ABD        Prone quad stretch c SOS                                2 way piriformis stretch        Seated HS stretch        3 way Forward Trunk Flex stretch                                Chin tucks        UT, LS, SCM stretch        TB Scap Retraction; B S' Ext        TB: B ER; B Horiz ABD        Seated Thoracic Ext c MB                                Modalities                CP        Estim           Skin checks performed pre and post application: intact

## 2020-09-17 ENCOUNTER — APPOINTMENT (OUTPATIENT)
Dept: PHYSICAL THERAPY | Facility: CLINIC | Age: 60
End: 2020-09-17
Payer: COMMERCIAL

## 2020-09-28 ENCOUNTER — OFFICE VISIT (OUTPATIENT)
Dept: PHYSICAL THERAPY | Facility: CLINIC | Age: 60
End: 2020-09-28
Payer: COMMERCIAL

## 2020-09-28 DIAGNOSIS — M54.50 CHRONIC BILATERAL LOW BACK PAIN WITHOUT SCIATICA: Primary | ICD-10-CM

## 2020-09-28 DIAGNOSIS — G89.29 CHRONIC BILATERAL LOW BACK PAIN WITHOUT SCIATICA: Primary | ICD-10-CM

## 2020-09-28 DIAGNOSIS — M62.81 MUSCLE WEAKNESS (GENERALIZED): ICD-10-CM

## 2020-09-28 PROCEDURE — 97140 MANUAL THERAPY 1/> REGIONS: CPT | Performed by: PHYSICAL THERAPIST

## 2020-09-28 PROCEDURE — 97110 THERAPEUTIC EXERCISES: CPT | Performed by: PHYSICAL THERAPIST

## 2020-09-28 PROCEDURE — 97112 NEUROMUSCULAR REEDUCATION: CPT | Performed by: PHYSICAL THERAPIST

## 2020-09-28 NOTE — PROGRESS NOTES
Daily Note     Today's date: 2020  Patient name: Liliane Slaughter  : 1960  MRN: [id number]  Referring provider: GRZEGORZ Soliman  Dx:   Encounter Diagnosis     ICD-10-CM    1  Chronic bilateral low back pain without sciatica  M54 5     G89 29    2  Muscle weakness (generalized)  M62 81                   Subjective: Patient reports no issues since IE  Objective: See treatment diary below      Assessment: Patient tolerated treatment well  He had no reports of increased pain throughout the session  Able to complete exercises as listed below  He would benefit from continued PT to enable him to progress his core strength, endurance, and functional ability  Plan: Continue per plan of care        Precautions: standard    HEP: seated HS stretch, piriformis stretch, TAC    Specialty Daily Treatment Diary     Manual 9/15 9/28      SOR; TPR B UT/LS  SK      UT, LS stretch  SK              STM/TPR lumbar paraspinals        TPR piriformis        HS, Piriformis stretch  SK                      Exercise Diary         MH: TAC; TAC c heel slides  5 sec x 10;   10x ea      MH: TAC c Marches  2 x 10 ea      MH: TAC c SLR  1 x 10 ea      MH: TB Bridges; Clamshells  1 x 10 Orange;  2 x 10 [place]              PB LTR        PB DKTC                Sidelying H' ABD        Prone quad stretch c SOS                                2 way piriformis stretch        Seated HS stretch  20sec x 3 ea      3 way Forward Trunk Flex stretch  Pball 5" hold 1x10 ea                              Chin tucks        UT, LS, SCM stretch  20sec x 3 ea      TB Scap Retraction; B S' Ext  Orange   2x10 ea      TB: B ER; B Horiz ABD  [place] 2x10 ea      Seated Thoracic Ext c MB                                Modalities        MH  10 min with table exercises      CP        Estim           Skin checks performed pre and post application: intact

## 2020-09-29 ENCOUNTER — HOSPITAL ENCOUNTER (OUTPATIENT)
Dept: RADIOLOGY | Facility: HOSPITAL | Age: 60
Discharge: HOME | End: 2020-09-29
Payer: COMMERCIAL

## 2020-09-29 DIAGNOSIS — S32.010D CLOSED WEDGE COMPRESSION FRACTURE OF FIRST LUMBAR VERTEBRA WITH ROUTINE HEALING, SUBSEQUENT ENCOUNTER: ICD-10-CM

## 2020-09-29 PROCEDURE — 72100 X-RAY EXAM L-S SPINE 2/3 VWS: CPT

## 2020-10-01 ENCOUNTER — TELEPHONE (OUTPATIENT)
Dept: NEUROSURGERY | Facility: CLINIC | Age: 60
End: 2020-10-01

## 2020-10-02 ENCOUNTER — OFFICE VISIT (OUTPATIENT)
Dept: NEUROSURGERY | Facility: CLINIC | Age: 60
End: 2020-10-02
Payer: COMMERCIAL

## 2020-10-02 VITALS
BODY MASS INDEX: 28.62 KG/M2 | DIASTOLIC BLOOD PRESSURE: 90 MMHG | TEMPERATURE: 98.5 F | WEIGHT: 235 LBS | HEIGHT: 76 IN | SYSTOLIC BLOOD PRESSURE: 140 MMHG | HEART RATE: 84 BPM | RESPIRATION RATE: 16 BRPM

## 2020-10-02 DIAGNOSIS — S32.010A CLOSED COMPRESSION FRACTURE OF BODY OF L1 VERTEBRA (HCC): Primary | ICD-10-CM

## 2020-10-02 PROCEDURE — 99213 OFFICE O/P EST LOW 20 MIN: CPT | Performed by: PHYSICIAN ASSISTANT

## 2020-10-05 ENCOUNTER — OFFICE VISIT (OUTPATIENT)
Dept: PHYSICAL THERAPY | Facility: CLINIC | Age: 60
End: 2020-10-05
Payer: COMMERCIAL

## 2020-10-05 DIAGNOSIS — M62.81 MUSCLE WEAKNESS (GENERALIZED): ICD-10-CM

## 2020-10-05 DIAGNOSIS — M54.50 CHRONIC BILATERAL LOW BACK PAIN WITHOUT SCIATICA: Primary | ICD-10-CM

## 2020-10-05 DIAGNOSIS — G89.29 CHRONIC BILATERAL LOW BACK PAIN WITHOUT SCIATICA: Primary | ICD-10-CM

## 2020-10-05 PROCEDURE — 97112 NEUROMUSCULAR REEDUCATION: CPT | Performed by: PHYSICAL THERAPIST

## 2020-10-05 PROCEDURE — 97140 MANUAL THERAPY 1/> REGIONS: CPT | Performed by: PHYSICAL THERAPIST

## 2020-10-05 PROCEDURE — 97110 THERAPEUTIC EXERCISES: CPT | Performed by: PHYSICAL THERAPIST

## 2020-10-09 DIAGNOSIS — S32.010A CLOSED COMPRESSION FRACTURE OF BODY OF L1 VERTEBRA (HCC): ICD-10-CM

## 2020-10-09 RX ORDER — METHOCARBAMOL 500 MG/1
500 TABLET, FILM COATED ORAL 2 TIMES DAILY
Qty: 60 TABLET | Refills: 0 | Status: SHIPPED | OUTPATIENT
Start: 2020-10-09 | End: 2020-11-09 | Stop reason: SDUPTHER

## 2020-10-09 RX ORDER — HYDROCODONE BITARTRATE AND ACETAMINOPHEN 5; 325 MG/1; MG/1
1 TABLET ORAL EVERY 6 HOURS
Qty: 120 TABLET | Refills: 0 | Status: SHIPPED | OUTPATIENT
Start: 2020-10-09 | End: 2020-11-09 | Stop reason: SDUPTHER

## 2020-10-12 ENCOUNTER — OFFICE VISIT (OUTPATIENT)
Dept: PHYSICAL THERAPY | Facility: CLINIC | Age: 60
End: 2020-10-12
Payer: COMMERCIAL

## 2020-10-12 DIAGNOSIS — G89.29 CHRONIC BILATERAL LOW BACK PAIN WITHOUT SCIATICA: Primary | ICD-10-CM

## 2020-10-12 DIAGNOSIS — M62.81 MUSCLE WEAKNESS (GENERALIZED): ICD-10-CM

## 2020-10-12 DIAGNOSIS — M54.50 CHRONIC BILATERAL LOW BACK PAIN WITHOUT SCIATICA: Primary | ICD-10-CM

## 2020-10-12 PROCEDURE — 97140 MANUAL THERAPY 1/> REGIONS: CPT | Performed by: PHYSICAL THERAPIST

## 2020-10-12 PROCEDURE — 97110 THERAPEUTIC EXERCISES: CPT | Performed by: PHYSICAL THERAPIST

## 2020-10-12 PROCEDURE — 97112 NEUROMUSCULAR REEDUCATION: CPT | Performed by: PHYSICAL THERAPIST

## 2020-10-16 ENCOUNTER — HOSPITAL ENCOUNTER (OUTPATIENT)
Dept: RADIOLOGY | Facility: HOSPITAL | Age: 60
Discharge: HOME | End: 2020-10-16
Payer: COMMERCIAL

## 2020-10-16 DIAGNOSIS — S32.010A CLOSED COMPRESSION FRACTURE OF BODY OF L1 VERTEBRA (HCC): ICD-10-CM

## 2020-10-16 DIAGNOSIS — Z79.4 TYPE 2 DIABETES MELLITUS WITH OTHER SPECIFIED COMPLICATION, WITH LONG-TERM CURRENT USE OF INSULIN (HCC): Primary | ICD-10-CM

## 2020-10-16 DIAGNOSIS — E11.69 TYPE 2 DIABETES MELLITUS WITH OTHER SPECIFIED COMPLICATION, WITH LONG-TERM CURRENT USE OF INSULIN (HCC): Primary | ICD-10-CM

## 2020-10-16 PROCEDURE — 72114 X-RAY EXAM L-S SPINE BENDING: CPT

## 2020-10-19 ENCOUNTER — OFFICE VISIT (OUTPATIENT)
Dept: PHYSICAL THERAPY | Facility: CLINIC | Age: 60
End: 2020-10-19
Payer: COMMERCIAL

## 2020-10-19 DIAGNOSIS — M62.81 MUSCLE WEAKNESS (GENERALIZED): ICD-10-CM

## 2020-10-19 DIAGNOSIS — M54.50 CHRONIC BILATERAL LOW BACK PAIN WITHOUT SCIATICA: Primary | ICD-10-CM

## 2020-10-19 DIAGNOSIS — G89.29 CHRONIC BILATERAL LOW BACK PAIN WITHOUT SCIATICA: Primary | ICD-10-CM

## 2020-10-19 PROCEDURE — 97110 THERAPEUTIC EXERCISES: CPT

## 2020-10-19 PROCEDURE — 97140 MANUAL THERAPY 1/> REGIONS: CPT

## 2020-10-19 PROCEDURE — 97112 NEUROMUSCULAR REEDUCATION: CPT

## 2020-10-20 ENCOUNTER — OFFICE VISIT (OUTPATIENT)
Dept: NEUROSURGERY | Facility: CLINIC | Age: 60
End: 2020-10-20
Payer: COMMERCIAL

## 2020-10-20 VITALS
BODY MASS INDEX: 30.4 KG/M2 | SYSTOLIC BLOOD PRESSURE: 176 MMHG | TEMPERATURE: 97.5 F | HEART RATE: 84 BPM | HEIGHT: 76 IN | DIASTOLIC BLOOD PRESSURE: 99 MMHG | WEIGHT: 249.6 LBS | RESPIRATION RATE: 18 BRPM

## 2020-10-20 DIAGNOSIS — S32.010A CLOSED COMPRESSION FRACTURE OF BODY OF L1 VERTEBRA (HCC): Primary | ICD-10-CM

## 2020-10-20 PROCEDURE — 99213 OFFICE O/P EST LOW 20 MIN: CPT | Performed by: PHYSICIAN ASSISTANT

## 2020-10-21 ENCOUNTER — TELEPHONE (OUTPATIENT)
Dept: INTERNAL MEDICINE CLINIC | Facility: CLINIC | Age: 60
End: 2020-10-21

## 2020-10-21 DIAGNOSIS — F41.9 ANXIETY: Primary | ICD-10-CM

## 2020-10-21 RX ORDER — LORAZEPAM 0.5 MG/1
TABLET ORAL
Refills: 0 | Status: CANCELLED | OUTPATIENT
Start: 2020-10-21

## 2020-10-21 RX ORDER — LORAZEPAM 0.5 MG/1
1 TABLET ORAL 2 TIMES DAILY
Qty: 120 TABLET | Refills: 3 | Status: SHIPPED | OUTPATIENT
Start: 2020-10-21 | End: 2021-02-22 | Stop reason: SDUPTHER

## 2020-10-26 ENCOUNTER — APPOINTMENT (OUTPATIENT)
Dept: PHYSICAL THERAPY | Facility: CLINIC | Age: 60
End: 2020-10-26
Payer: COMMERCIAL

## 2020-11-02 ENCOUNTER — OFFICE VISIT (OUTPATIENT)
Dept: PHYSICAL THERAPY | Facility: CLINIC | Age: 60
End: 2020-11-02
Payer: COMMERCIAL

## 2020-11-02 DIAGNOSIS — M62.81 MUSCLE WEAKNESS (GENERALIZED): ICD-10-CM

## 2020-11-02 DIAGNOSIS — M54.50 CHRONIC BILATERAL LOW BACK PAIN WITHOUT SCIATICA: Primary | ICD-10-CM

## 2020-11-02 DIAGNOSIS — G89.29 CHRONIC BILATERAL LOW BACK PAIN WITHOUT SCIATICA: Primary | ICD-10-CM

## 2020-11-02 PROCEDURE — 97112 NEUROMUSCULAR REEDUCATION: CPT | Performed by: PHYSICAL THERAPIST

## 2020-11-02 PROCEDURE — 97110 THERAPEUTIC EXERCISES: CPT | Performed by: PHYSICAL THERAPIST

## 2020-11-03 ENCOUNTER — TELEPHONE (OUTPATIENT)
Dept: INTERNAL MEDICINE CLINIC | Facility: CLINIC | Age: 60
End: 2020-11-03

## 2020-11-09 ENCOUNTER — OFFICE VISIT (OUTPATIENT)
Dept: PHYSICAL THERAPY | Facility: CLINIC | Age: 60
End: 2020-11-09
Payer: COMMERCIAL

## 2020-11-09 ENCOUNTER — TRANSCRIBE ORDERS (OUTPATIENT)
Dept: ADMINISTRATIVE | Facility: HOSPITAL | Age: 60
End: 2020-11-09

## 2020-11-09 DIAGNOSIS — M62.81 MUSCLE WEAKNESS (GENERALIZED): ICD-10-CM

## 2020-11-09 DIAGNOSIS — G89.29 CHRONIC BILATERAL LOW BACK PAIN WITHOUT SCIATICA: Primary | ICD-10-CM

## 2020-11-09 DIAGNOSIS — M54.50 CHRONIC BILATERAL LOW BACK PAIN WITHOUT SCIATICA: Primary | ICD-10-CM

## 2020-11-09 DIAGNOSIS — R91.1 LUNG NODULE: Primary | ICD-10-CM

## 2020-11-09 DIAGNOSIS — S32.010A CLOSED COMPRESSION FRACTURE OF BODY OF L1 VERTEBRA (HCC): ICD-10-CM

## 2020-11-09 PROCEDURE — 97112 NEUROMUSCULAR REEDUCATION: CPT

## 2020-11-09 PROCEDURE — 97110 THERAPEUTIC EXERCISES: CPT

## 2020-11-09 RX ORDER — HYDROCODONE BITARTRATE AND ACETAMINOPHEN 5; 325 MG/1; MG/1
1 TABLET ORAL EVERY 6 HOURS
Qty: 120 TABLET | Refills: 0 | Status: SHIPPED | OUTPATIENT
Start: 2020-11-09 | End: 2020-12-07 | Stop reason: SDUPTHER

## 2020-11-09 RX ORDER — METHOCARBAMOL 500 MG/1
500 TABLET, FILM COATED ORAL 2 TIMES DAILY
Qty: 60 TABLET | Refills: 0 | Status: SHIPPED | OUTPATIENT
Start: 2020-11-09 | End: 2020-12-07 | Stop reason: SDUPTHER

## 2020-11-12 ENCOUNTER — TELEPHONE (OUTPATIENT)
Dept: INTERNAL MEDICINE CLINIC | Facility: CLINIC | Age: 60
End: 2020-11-12

## 2020-11-14 ENCOUNTER — HOSPITAL ENCOUNTER (OUTPATIENT)
Dept: CT IMAGING | Facility: HOSPITAL | Age: 60
Discharge: HOME | End: 2020-11-14
Attending: INTERNAL MEDICINE
Payer: COMMERCIAL

## 2020-11-14 DIAGNOSIS — R91.1 LUNG NODULE: ICD-10-CM

## 2020-11-14 PROCEDURE — 71250 CT THORAX DX C-: CPT

## 2020-11-14 PROCEDURE — G1004 CDSM NDSC: HCPCS

## 2020-11-16 ENCOUNTER — OFFICE VISIT (OUTPATIENT)
Dept: PHYSICAL THERAPY | Facility: CLINIC | Age: 60
End: 2020-11-16
Payer: COMMERCIAL

## 2020-11-16 ENCOUNTER — OFFICE VISIT (OUTPATIENT)
Dept: ENDOCRINOLOGY | Facility: HOSPITAL | Age: 60
End: 2020-11-16
Payer: COMMERCIAL

## 2020-11-16 ENCOUNTER — TELEPHONE (OUTPATIENT)
Dept: ADMINISTRATIVE | Facility: OTHER | Age: 60
End: 2020-11-16

## 2020-11-16 VITALS
SYSTOLIC BLOOD PRESSURE: 148 MMHG | DIASTOLIC BLOOD PRESSURE: 100 MMHG | BODY MASS INDEX: 30.13 KG/M2 | WEIGHT: 247.4 LBS | HEIGHT: 76 IN | HEART RATE: 101 BPM | TEMPERATURE: 98.4 F

## 2020-11-16 DIAGNOSIS — Z79.4 TYPE 2 DIABETES MELLITUS WITH OTHER SPECIFIED COMPLICATION, WITH LONG-TERM CURRENT USE OF INSULIN (HCC): Primary | ICD-10-CM

## 2020-11-16 DIAGNOSIS — E11.69 TYPE 2 DIABETES MELLITUS WITH OTHER SPECIFIED COMPLICATION, WITH LONG-TERM CURRENT USE OF INSULIN (HCC): Primary | ICD-10-CM

## 2020-11-16 DIAGNOSIS — M54.50 CHRONIC BILATERAL LOW BACK PAIN WITHOUT SCIATICA: Primary | ICD-10-CM

## 2020-11-16 DIAGNOSIS — G89.29 CHRONIC BILATERAL LOW BACK PAIN WITHOUT SCIATICA: Primary | ICD-10-CM

## 2020-11-16 DIAGNOSIS — M62.81 MUSCLE WEAKNESS (GENERALIZED): ICD-10-CM

## 2020-11-16 PROCEDURE — 97112 NEUROMUSCULAR REEDUCATION: CPT

## 2020-11-16 PROCEDURE — 99214 OFFICE O/P EST MOD 30 MIN: CPT | Performed by: PHYSICIAN ASSISTANT

## 2020-11-16 PROCEDURE — 97110 THERAPEUTIC EXERCISES: CPT

## 2020-11-30 ENCOUNTER — APPOINTMENT (OUTPATIENT)
Dept: PHYSICAL THERAPY | Facility: CLINIC | Age: 60
End: 2020-11-30
Payer: COMMERCIAL

## 2020-11-30 ENCOUNTER — TELEPHONE (OUTPATIENT)
Dept: INTERNAL MEDICINE CLINIC | Facility: CLINIC | Age: 60
End: 2020-11-30

## 2020-12-02 ENCOUNTER — TELEPHONE (OUTPATIENT)
Dept: ENDOCRINOLOGY | Facility: HOSPITAL | Age: 60
End: 2020-12-02

## 2020-12-07 DIAGNOSIS — S32.010A CLOSED COMPRESSION FRACTURE OF BODY OF L1 VERTEBRA (HCC): ICD-10-CM

## 2020-12-07 RX ORDER — HYDROCODONE BITARTRATE AND ACETAMINOPHEN 5; 325 MG/1; MG/1
1 TABLET ORAL EVERY 6 HOURS
Qty: 120 TABLET | Refills: 0 | Status: SHIPPED | OUTPATIENT
Start: 2020-12-07 | End: 2021-01-05 | Stop reason: SDUPTHER

## 2020-12-07 RX ORDER — METHOCARBAMOL 500 MG/1
500 TABLET, FILM COATED ORAL 2 TIMES DAILY
Qty: 60 TABLET | Refills: 0 | Status: SHIPPED | OUTPATIENT
Start: 2020-12-07 | End: 2021-01-05 | Stop reason: SDUPTHER

## 2020-12-14 DIAGNOSIS — I10 ESSENTIAL HYPERTENSION: Primary | ICD-10-CM

## 2020-12-14 RX ORDER — LISINOPRIL 10 MG/1
TABLET ORAL
Qty: 30 TABLET | Refills: 3 | Status: SHIPPED | OUTPATIENT
Start: 2020-12-14 | End: 2021-04-14

## 2020-12-19 DIAGNOSIS — S32.010A CLOSED COMPRESSION FRACTURE OF BODY OF L1 VERTEBRA (HCC): ICD-10-CM

## 2020-12-21 DIAGNOSIS — M47.816 LUMBAR SPONDYLOSIS: Primary | ICD-10-CM

## 2020-12-21 RX ORDER — LABETALOL 200 MG/1
TABLET, FILM COATED ORAL
Qty: 60 TABLET | Refills: 3 | Status: SHIPPED | OUTPATIENT
Start: 2020-12-21 | End: 2021-04-22

## 2020-12-21 RX ORDER — GABAPENTIN 300 MG/1
CAPSULE ORAL
Qty: 30 CAPSULE | Refills: 3 | Status: SHIPPED | OUTPATIENT
Start: 2020-12-21 | End: 2021-04-22

## 2020-12-28 DIAGNOSIS — E78.5 HYPERLIPIDEMIA, UNSPECIFIED HYPERLIPIDEMIA TYPE: Primary | ICD-10-CM

## 2020-12-28 RX ORDER — ROSUVASTATIN CALCIUM 5 MG/1
TABLET, COATED ORAL
Qty: 30 TABLET | Refills: 3 | Status: SHIPPED | OUTPATIENT
Start: 2020-12-28 | End: 2021-05-03

## 2021-01-05 ENCOUNTER — OFFICE VISIT (OUTPATIENT)
Dept: INTERNAL MEDICINE CLINIC | Facility: CLINIC | Age: 61
End: 2021-01-05
Payer: COMMERCIAL

## 2021-01-05 VITALS
WEIGHT: 256 LBS | TEMPERATURE: 98.4 F | SYSTOLIC BLOOD PRESSURE: 148 MMHG | DIASTOLIC BLOOD PRESSURE: 80 MMHG | BODY MASS INDEX: 31.17 KG/M2 | HEIGHT: 76 IN

## 2021-01-05 DIAGNOSIS — G89.29 CHRONIC BILATERAL LOW BACK PAIN WITHOUT SCIATICA: Primary | ICD-10-CM

## 2021-01-05 DIAGNOSIS — I10 ESSENTIAL HYPERTENSION: ICD-10-CM

## 2021-01-05 DIAGNOSIS — M54.50 CHRONIC BILATERAL LOW BACK PAIN WITHOUT SCIATICA: Primary | ICD-10-CM

## 2021-01-05 DIAGNOSIS — M47.816 LUMBAR SPONDYLOSIS: ICD-10-CM

## 2021-01-05 DIAGNOSIS — S32.010A CLOSED COMPRESSION FRACTURE OF BODY OF L1 VERTEBRA (HCC): ICD-10-CM

## 2021-01-05 DIAGNOSIS — R91.1 PULMONARY NODULE: ICD-10-CM

## 2021-01-05 PROCEDURE — 3077F SYST BP >= 140 MM HG: CPT | Performed by: INTERNAL MEDICINE

## 2021-01-05 PROCEDURE — 99214 OFFICE O/P EST MOD 30 MIN: CPT | Performed by: INTERNAL MEDICINE

## 2021-01-05 PROCEDURE — 3008F BODY MASS INDEX DOCD: CPT | Performed by: INTERNAL MEDICINE

## 2021-01-05 PROCEDURE — 3079F DIAST BP 80-89 MM HG: CPT | Performed by: INTERNAL MEDICINE

## 2021-01-05 RX ORDER — HYDROCODONE BITARTRATE AND ACETAMINOPHEN 5; 325 MG/1; MG/1
1 TABLET ORAL EVERY 6 HOURS
Qty: 120 TABLET | Refills: 0 | Status: SHIPPED | OUTPATIENT
Start: 2021-01-05 | End: 2021-02-04 | Stop reason: SDUPTHER

## 2021-01-05 RX ORDER — METHOCARBAMOL 500 MG/1
500 TABLET, FILM COATED ORAL 2 TIMES DAILY
Qty: 60 TABLET | Refills: 3 | Status: SHIPPED | OUTPATIENT
Start: 2021-01-05 | End: 2021-05-11

## 2021-01-05 NOTE — PROGRESS NOTES
Assessment/Plan:    No problem-specific Assessment & Plan notes found for this encounter  Diagnoses and all orders for this visit:    Chronic bilateral low back pain without sciatica    Essential hypertension    Lumbar spondylosis    Pulmonary nodule    Closed compression fracture of body of L1 vertebra (HCC)  -     methocarbamol (ROBAXIN) 500 mg tablet; Take 1 tablet (500 mg total) by mouth 2 (two) times a day  -     HYDROcodone-acetaminophen (NORCO) 5-325 mg per tablet; Take 1 tablet by mouth every 6 (six) hoursMax Daily Amount: 4 tablets          Subjective:      Patient ID: Franchesca Ryan is a 61 y o  male  Pain meds help adl  Keeps secure and stored  No oversedation  seeing endocrinology  Ct lung screenint 11/2020   Repeat 1 year  Still smoking      The following portions of the patient's history were reviewed and updated as appropriate: allergies, current medications, past family history, past medical history, past social history, past surgical history, and problem list     Review of Systems   Constitutional: Negative for activity change and fatigue  HENT: Negative for ear discharge, ear pain, rhinorrhea and sore throat  Eyes: Negative for pain and visual disturbance  Respiratory: Negative for cough and shortness of breath  Cardiovascular: Negative for chest pain and leg swelling  Gastrointestinal: Negative for abdominal pain, constipation and diarrhea  Endocrine: Negative for cold intolerance and polyuria  Genitourinary: Negative for flank pain and hematuria  Musculoskeletal: Negative for back pain and joint swelling  Skin: Negative for pallor and wound  Neurological: Negative for dizziness, seizures and speech difficulty  Psychiatric/Behavioral: Negative for confusion and hallucinations  Objective:      Ht 6' 4" (1 93 m)   Wt 116 kg (256 lb)   BMI 31 16 kg/m²          Physical Exam  Vitals signs and nursing note reviewed     Constitutional:       General: He is not in acute distress  Appearance: Normal appearance  He is not ill-appearing  HENT:      Head: Normocephalic  Right Ear: External ear normal  There is no impacted cerumen  Left Ear: External ear normal  There is no impacted cerumen  Nose: No congestion or rhinorrhea  Mouth/Throat:      Pharynx: No posterior oropharyngeal erythema  Eyes:      General: No scleral icterus  Right eye: No discharge  Left eye: No discharge  Neck:      Musculoskeletal: No neck rigidity  Vascular: No carotid bruit  Cardiovascular:      Rate and Rhythm: Normal rate and regular rhythm  Heart sounds: Normal heart sounds  No murmur  No friction rub  No gallop  Pulmonary:      Breath sounds: No wheezing or rhonchi  Abdominal:      General: There is no distension  Tenderness: There is no abdominal tenderness  There is no guarding  Musculoskeletal:         General: No swelling  Right lower leg: No edema  Left lower leg: No edema  Lymphadenopathy:      Cervical: No cervical adenopathy  Skin:     Coloration: Skin is not jaundiced  Neurological:      Mental Status: He is alert  Cranial Nerves: No cranial nerve deficit  Motor: No weakness        Coordination: Coordination normal    Psychiatric:         Mood and Affect: Mood normal

## 2021-01-13 DIAGNOSIS — E11.9 TYPE 2 DIABETES MELLITUS WITHOUT COMPLICATION, WITH LONG-TERM CURRENT USE OF INSULIN (HCC): ICD-10-CM

## 2021-01-13 DIAGNOSIS — Z79.4 TYPE 2 DIABETES MELLITUS WITHOUT COMPLICATION, WITH LONG-TERM CURRENT USE OF INSULIN (HCC): ICD-10-CM

## 2021-01-14 RX ORDER — GLIMEPIRIDE 4 MG/1
TABLET ORAL
Qty: 60 TABLET | Refills: 3 | Status: SHIPPED | OUTPATIENT
Start: 2021-01-14 | End: 2021-09-15

## 2021-02-04 DIAGNOSIS — S32.010A CLOSED COMPRESSION FRACTURE OF BODY OF L1 VERTEBRA (HCC): ICD-10-CM

## 2021-02-04 RX ORDER — HYDROCODONE BITARTRATE AND ACETAMINOPHEN 5; 325 MG/1; MG/1
1 TABLET ORAL EVERY 6 HOURS
Qty: 120 TABLET | Refills: 0 | Status: SHIPPED | OUTPATIENT
Start: 2021-02-04 | End: 2021-02-11

## 2021-02-10 DIAGNOSIS — S32.010A CLOSED COMPRESSION FRACTURE OF BODY OF L1 VERTEBRA (HCC): ICD-10-CM

## 2021-02-11 RX ORDER — HYDROCODONE BITARTRATE AND ACETAMINOPHEN 5; 325 MG/1; MG/1
TABLET ORAL
Qty: 120 TABLET | Refills: 0 | Status: SHIPPED | OUTPATIENT
Start: 2021-02-11 | End: 2021-04-02 | Stop reason: SDUPTHER

## 2021-02-12 DIAGNOSIS — E11.9 TYPE 2 DIABETES MELLITUS WITHOUT COMPLICATION, WITHOUT LONG-TERM CURRENT USE OF INSULIN (HCC): Primary | ICD-10-CM

## 2021-02-12 RX ORDER — INSULIN GLARGINE 100 [IU]/ML
INJECTION, SOLUTION SUBCUTANEOUS
Qty: 15 ML | Refills: 3 | Status: SHIPPED | OUTPATIENT
Start: 2021-02-12 | End: 2021-04-30 | Stop reason: SDUPTHER

## 2021-02-22 DIAGNOSIS — F41.9 ANXIETY: ICD-10-CM

## 2021-02-22 RX ORDER — LORAZEPAM 0.5 MG/1
1 TABLET ORAL 2 TIMES DAILY
Qty: 120 TABLET | Refills: 3 | Status: SHIPPED | OUTPATIENT
Start: 2021-02-22 | End: 2021-06-16

## 2021-02-23 ENCOUNTER — OFFICE VISIT (OUTPATIENT)
Dept: ENDOCRINOLOGY | Facility: HOSPITAL | Age: 61
End: 2021-02-23
Payer: COMMERCIAL

## 2021-02-23 VITALS
WEIGHT: 254 LBS | DIASTOLIC BLOOD PRESSURE: 78 MMHG | SYSTOLIC BLOOD PRESSURE: 132 MMHG | HEIGHT: 76 IN | BODY MASS INDEX: 30.93 KG/M2 | HEART RATE: 104 BPM

## 2021-02-23 DIAGNOSIS — I10 ESSENTIAL HYPERTENSION: ICD-10-CM

## 2021-02-23 DIAGNOSIS — E11.69 TYPE 2 DIABETES MELLITUS WITH OTHER SPECIFIED COMPLICATION, WITH LONG-TERM CURRENT USE OF INSULIN (HCC): Primary | ICD-10-CM

## 2021-02-23 DIAGNOSIS — E78.5 HYPERLIPIDEMIA, UNSPECIFIED HYPERLIPIDEMIA TYPE: ICD-10-CM

## 2021-02-23 DIAGNOSIS — Z79.4 TYPE 2 DIABETES MELLITUS WITH OTHER SPECIFIED COMPLICATION, WITH LONG-TERM CURRENT USE OF INSULIN (HCC): Primary | ICD-10-CM

## 2021-02-23 DIAGNOSIS — S32.010A CLOSED COMPRESSION FRACTURE OF BODY OF L1 VERTEBRA (HCC): ICD-10-CM

## 2021-02-23 LAB — SL AMB POCT HEMOGLOBIN AIC: 9.4 (ref ?–6.5)

## 2021-02-23 PROCEDURE — 99214 OFFICE O/P EST MOD 30 MIN: CPT | Performed by: PHYSICIAN ASSISTANT

## 2021-02-23 PROCEDURE — 3008F BODY MASS INDEX DOCD: CPT | Performed by: PHYSICIAN ASSISTANT

## 2021-02-23 PROCEDURE — 3046F HEMOGLOBIN A1C LEVEL >9.0%: CPT | Performed by: PHYSICIAN ASSISTANT

## 2021-02-23 PROCEDURE — 3078F DIAST BP <80 MM HG: CPT | Performed by: PHYSICIAN ASSISTANT

## 2021-02-23 PROCEDURE — 83036 HEMOGLOBIN GLYCOSYLATED A1C: CPT | Performed by: PHYSICIAN ASSISTANT

## 2021-02-23 PROCEDURE — 3075F SYST BP GE 130 - 139MM HG: CPT | Performed by: PHYSICIAN ASSISTANT

## 2021-02-23 RX ORDER — DULAGLUTIDE 1.5 MG/.5ML
1.5 INJECTION, SOLUTION SUBCUTANEOUS WEEKLY
Qty: 4 PEN | Refills: 5 | Status: SHIPPED | OUTPATIENT
Start: 2021-02-23 | End: 2021-05-27

## 2021-02-23 RX ORDER — SILDENAFIL 100 MG/1
TABLET, FILM COATED ORAL
COMMUNITY
Start: 2021-01-21 | End: 2022-02-14

## 2021-02-23 NOTE — PATIENT INSTRUCTIONS
Continue to monitor diet, maintain physical activity  Make sure to drink plenty water throughout the day      Continue with metformin, glimepiride  Increased Trulicity to 1 5 mg weekly      Continue with Humalog 15 units with meals      Continue Lantus to 25 units at night      Check blood sugar 2-4 times a day  Send in blood sugar logs in 2 weeks      Schedule an appointment to get a diabetic eye exam completed      Continue with blood pressure and cholesterol medications      Get initial lab work completed at earliest convenience, will also get lab work prior to next office visit      Get DEXA scan completed      Follow-up in 3 months

## 2021-02-23 NOTE — PROGRESS NOTES
Gwendlyn Fabry 61 y o  male MRN: [id number]    Encounter: [id number]      Assessment/Plan     Assessment: This is a 61y o -year-old male with type 2 diabetes with hypertension and hyperlipidemia, and vertebral compression fracture  Plan:  1  Type 2 diabetes: hemoglobin A1c was obtained in the office today and was 9 4  This is a slight improvement, but still above goal   At this time will continue with current dose of Lantus and Humalog  Continue with current dose of glimepiride and metformin  Will increase Trulicity to 1 5 mg weekly  Discussed the importance of checking blood sugar to 4 times a day, and sending in blood sugars into the office so we can make adjustments to his medications to help better control his blood sugars  Is up-to-date on diabetic foot exam   Gave referral for Ophthalmology for diabetic eye exam   Follow-up in 3 months with lab work completed prior to visit        2  Hypertension:  Normotensive in the office today  Continue with current medications      3  Hyperlipidemia:  No recent lab work in the system  At this time continue with Crestor      4  Vertebral compression fracture:  No lab work or DEXA scan completed recently  Discussed the importance of getting this completed to further evaluate for osteoporosis so appropriate treatment can be done        CC:  Type 2 diabetes follow-up    History of Present Illness     HPI:  Eugenia Beverley a 61year old male with type 2 diabetes for about 5-10 years   He is on oral agents and insulin at home and takes metformin 500 mg in the morning and 1000 mg in the evening, glimepiride 4 mg daily, Humalog 15 units with each meal, Lantus 27 units in the evening, Trulicity 8 26 mg  Has been on the trulicity for about 1 year  He denies any polyuria, polydipsia, nocturia and blurry vision   He denies nephropathy, retinopathy, heart attack, stroke and claudication but does admit to neuropathy with symptoms worse at night    Has been prescribed gabapentin by his PCP  Medication has been not helping out with his neuropathy symptoms  Has not followed up with a podiatrist         Hypoglycemic episodes:  Occasionally has episodes of low blood sugar during the day  Typically occurs if he has a small lunch, and does a lot of physical activity in the afternoon      The patient's last eye exam was over 1 year ago and I encouraged him to update this  Last diabetic foot exam was completed November 2020       Blood Sugar/Glucometer/Pump/CGM review:  Did not bring glucometer with him today  States that his blood sugars can vary between 100 and 300      For hypertension he is treated with lisinopril 10 mg daily  [facility] is also managed on labetalol 100 mg twice a day and amlodipine 5 mg daily   For hyperlipidemia, he is treated with rosuvastatin      He was recently admitted to the hospital after a fall in suffering lumbar spine fracture  Continues to have back pain  Review of Systems   Constitutional: Negative for activity change, appetite change, fatigue and unexpected weight change  HENT: Negative for trouble swallowing  Eyes: Negative for visual disturbance  Respiratory: Negative for chest tightness and shortness of breath  Cardiovascular: Negative for chest pain, palpitations and leg swelling  Gastrointestinal: Negative for abdominal pain, diarrhea, nausea and vomiting  Endocrine: Negative for cold intolerance, heat intolerance, polydipsia, polyphagia and polyuria  Genitourinary: Negative for frequency  Musculoskeletal: Positive for back pain and myalgias (in the back)  Skin: Negative for rash and wound  Neurological: Positive for numbness (bilateral feet, worse at night )  Negative for dizziness, weakness, light-headedness and headaches  Psychiatric/Behavioral: Negative for dysphoric mood and sleep disturbance  The patient is not nervous/anxious          Historical Information   Past Medical History:   Diagnosis Date    Alcoholism ([facility] )     Anxiety     Atrial fibrillation ([facility] )     Diabetes ([place] [address] )     Diabetes mellitus, type 2 ([place] [address] )     Diverticulitis     ED (erectile dysfunction)     High cholesterol     Hyperlipidemia     Hypertension     LVH     L1 vertebral fracture (HCC)     Lung mass     Neuropathy     Nocturia     Osteoarthritis     Paroxysmal atrial fibrillation ([facility] )     Pilar cyst     Shingles     Smoker     Spinal stenosis, lumbar      No past surgical history on file    Social History   Social History     Substance and Sexual Activity   Alcohol Use Not Currently    Frequency: Never     Social History     Substance and Sexual Activity   Drug Use Yes    Types: Marijuana, Other    Comment: prescription pills (unprescribed) daily     Social History     Tobacco Use   Smoking Status Current Every Day Smoker    Packs/day: 2 00    Types: Cigarettes   Smokeless Tobacco Never Used     Family History:   Family History   Adopted: Yes       Meds/Allergies   Current Outpatient Medications   Medication Sig Dispense Refill    acetaminophen (TYLENOL) 325 mg tablet Take 3 tablets (975 mg total) by mouth every 8 (eight) hours 30 tablet 0    amLODIPine (NORVASC) 5 mg tablet Take 5 mg by mouth daily      cholecalciferol (VITAMIN D3) 250 MCG (16787 UT) capsule Take 5 capsules (50,000 Units total) by mouth 3 (three) times a week 45 capsule 0    Contour Next Test test strip 1 each by Other route daily as needed Test 3 times daily      Dulaglutide 0 75 MG/0 5ML SOPN Inject 0 5 mL (0 75 mg total) under the skin once a week 2 mL 5    gabapentin (NEURONTIN) 100 mg capsule Take 1 capsule (100 mg total) by mouth 3 (three) times a day 90 capsule 0    gabapentin (NEURONTIN) 300 mg capsule take 1 capsule by mouth at bedtime 30 capsule 3    glimepiride (AMARYL) 4 mg tablet take 1 tablet by mouth twice a day 60 tablet 3    HYDROcodone-acetaminophen (NORCO) 5-325 mg per tablet TAKE 1 TABLET BY MOUTH EVERY 6 (SIX) HOURS -NOT TO EXCEED 4 TABLETS DAILY - MAY CAUSE DROWSINESS - AVOID ALCOHOL & ALCOHOL CONTAINING PRODUCTS 120 tablet 0    insulin glargine (LANTUS) 100 units/mL subcutaneous injection 40 units qhs (Patient taking differently: 20 units qhs)      insulin lispro (HumaLOG KwikPen) 100 units/mL injection pen 15 units with each meal 10 pen 5    labetalol (NORMODYNE) 200 mg tablet take 1 tablet by mouth twice a day 60 tablet 3    Lantus SoloStar 100 units/mL injection pen INJECT 40 UNITS SUBCUTANEOUSLY AT BEDTIME 15 mL 3    lisinopril (ZESTRIL) 10 mg tablet TAKE 1 TABLET BY MOUTH ONCE DAILY 30 tablet 3    LORazepam (ATIVAN) 0 5 mg tablet   0    LORazepam (ATIVAN) 0 5 mg tablet Take 2 tablets (1 mg total) by mouth 2 (two) times a day 120 tablet 3    metFORMIN (GLUCOPHAGE) 1000 MG tablet Take 1 tablet (1,000 mg total) by mouth daily with dinner  0    metFORMIN (GLUCOPHAGE) 500 mg tablet Take 1 tablet (500 mg total) by mouth daily with breakfast  0    methocarbamol (ROBAXIN) 500 mg tablet Take 1 tablet (500 mg total) by mouth 2 (two) times a day 60 tablet 3    Needles & Syringes MISC by Does not apply route 3 (three) times a day before meals 90 each 0    polyethylene glycol (MIRALAX) 17 g packet Take 17 g by mouth daily as needed (constipation) 14 each 0    rosuvastatin (CRESTOR) 5 mg tablet take 1 tablet by mouth at bedtime 30 tablet 3    ROSUVASTATIN CALCIUM PO Take by mouth       No current facility-administered medications for this visit  No Known Allergies    Objective   Vitals: There were no vitals taken for this visit  Physical Exam  Vitals signs and nursing note reviewed  Constitutional:       General: He is not in acute distress  Appearance: Normal appearance  HENT:      Head: Normocephalic and atraumatic  Eyes:      General: No scleral icterus  Pupils: Pupils are equal, round, and reactive to light  Cardiovascular:      Rate and Rhythm: Normal rate and regular rhythm  Heart sounds: No murmur  Pulmonary:      Effort: Pulmonary effort is normal  No respiratory distress  Breath sounds: Normal breath sounds  No wheezing  Musculoskeletal:      Right lower leg: No edema  Left lower leg: No edema  Lymphadenopathy:      Cervical: No cervical adenopathy  Skin:     General: Skin is warm and dry  Neurological:      Mental Status: He is alert and oriented to person, place, and time  Mental status is at baseline  Sensory: No sensory deficit  Psychiatric:         Mood and Affect: Mood normal          Behavior: Behavior normal          Thought Content: Thought content normal          The history was obtained from the review of the chart, patient      Lab Results:   Lab Results   Component Value Date/Time    Hemoglobin A1C 9 7 (H) 07/27/2020 06:09 AM    WBC 10 85 (H) 07/27/2020 06:09 AM    WBC 9 11 07/25/2020 04:37 AM    WBC 13 98 (H) 07/23/2020 06:16 AM    Hemoglobin 13 6 07/27/2020 06:09 AM    Hemoglobin 14 0 07/25/2020 04:37 AM    Hemoglobin 14 8 07/23/2020 06:16 AM    Hematocrit 39 9 07/27/2020 06:09 AM    Hematocrit 41 5 07/25/2020 04:37 AM    Hematocrit 43 8 07/23/2020 06:16 AM    MCV 89 07/27/2020 06:09 AM    MCV 90 07/25/2020 04:37 AM    MCV 89 07/23/2020 06:16 AM    Platelets 170 89/79/1406 06:09 AM    Platelets 820 65/95/5262 04:37 AM    Platelets 017 69/14/5657 06:16 AM    BUN 10 07/27/2020 06:09 AM    BUN 13 07/25/2020 04:37 AM    BUN 19 07/23/2020 06:16 AM    Potassium 3 6 07/27/2020 06:09 AM    Potassium 3 3 (L) 07/25/2020 04:37 AM    Potassium 3 8 07/23/2020 06:16 AM    Chloride 102 07/27/2020 06:09 AM    Chloride 100 07/25/2020 04:37 AM    Chloride 103 07/23/2020 06:16 AM    CO2 27 07/27/2020 06:09 AM    CO2 28 07/25/2020 04:37 AM    CO2 25 07/23/2020 06:16 AM    Creatinine 0 59 (L) 07/27/2020 06:09 AM    Creatinine 0 66 07/25/2020 04:37 AM    Creatinine 0 72 07/23/2020 06:16 AM    Albumin 3 4 (L) 07/27/2020 06:09 AM       Portions of the record may have been created with voice recognition software  Occasional wrong word or "sound a like" substitutions may have occurred due to the inherent limitations of voice recognition software  Read the chart carefully and recognize, using context, where substitutions have occurred

## 2021-02-27 DIAGNOSIS — E11.9 TYPE 2 DIABETES MELLITUS WITHOUT COMPLICATION, WITHOUT LONG-TERM CURRENT USE OF INSULIN (HCC): Primary | ICD-10-CM

## 2021-03-01 RX ORDER — BLOOD SUGAR DIAGNOSTIC
STRIP MISCELLANEOUS
Qty: 100 EACH | Refills: 3 | Status: SHIPPED | OUTPATIENT
Start: 2021-03-01

## 2021-03-21 DIAGNOSIS — E11.9 TYPE 2 DIABETES MELLITUS WITHOUT COMPLICATION, WITH LONG-TERM CURRENT USE OF INSULIN (HCC): ICD-10-CM

## 2021-03-21 DIAGNOSIS — Z79.4 TYPE 2 DIABETES MELLITUS WITHOUT COMPLICATION, WITH LONG-TERM CURRENT USE OF INSULIN (HCC): ICD-10-CM

## 2021-03-22 RX ORDER — INSULIN LISPRO 100 [IU]/ML
INJECTION, SOLUTION INTRAVENOUS; SUBCUTANEOUS
Qty: 15 ML | Refills: 2 | Status: SHIPPED | OUTPATIENT
Start: 2021-03-22 | End: 2021-07-09

## 2021-04-02 ENCOUNTER — OFFICE VISIT (OUTPATIENT)
Dept: INTERNAL MEDICINE CLINIC | Facility: CLINIC | Age: 61
End: 2021-04-02
Payer: COMMERCIAL

## 2021-04-02 VITALS
SYSTOLIC BLOOD PRESSURE: 120 MMHG | HEIGHT: 76 IN | RESPIRATION RATE: 14 BRPM | BODY MASS INDEX: 31.29 KG/M2 | DIASTOLIC BLOOD PRESSURE: 70 MMHG | TEMPERATURE: 97.8 F | WEIGHT: 257 LBS | HEART RATE: 78 BPM

## 2021-04-02 DIAGNOSIS — M54.50 CHRONIC BILATERAL LOW BACK PAIN WITHOUT SCIATICA: ICD-10-CM

## 2021-04-02 DIAGNOSIS — Z79.4 TYPE 2 DIABETES MELLITUS WITH OTHER SPECIFIED COMPLICATION, WITH LONG-TERM CURRENT USE OF INSULIN (HCC): Primary | ICD-10-CM

## 2021-04-02 DIAGNOSIS — S32.010A CLOSED COMPRESSION FRACTURE OF BODY OF L1 VERTEBRA (HCC): ICD-10-CM

## 2021-04-02 DIAGNOSIS — M47.816 LUMBAR SPONDYLOSIS: ICD-10-CM

## 2021-04-02 DIAGNOSIS — S30.1XXA CONTUSION OF ABDOMINAL WALL, INITIAL ENCOUNTER: ICD-10-CM

## 2021-04-02 DIAGNOSIS — I10 ESSENTIAL HYPERTENSION: ICD-10-CM

## 2021-04-02 DIAGNOSIS — G89.29 CHRONIC BILATERAL LOW BACK PAIN WITHOUT SCIATICA: ICD-10-CM

## 2021-04-02 DIAGNOSIS — E11.69 TYPE 2 DIABETES MELLITUS WITH OTHER SPECIFIED COMPLICATION, WITH LONG-TERM CURRENT USE OF INSULIN (HCC): Primary | ICD-10-CM

## 2021-04-02 PROCEDURE — 3074F SYST BP LT 130 MM HG: CPT | Performed by: INTERNAL MEDICINE

## 2021-04-02 PROCEDURE — 3008F BODY MASS INDEX DOCD: CPT | Performed by: INTERNAL MEDICINE

## 2021-04-02 PROCEDURE — 3078F DIAST BP <80 MM HG: CPT | Performed by: INTERNAL MEDICINE

## 2021-04-02 PROCEDURE — 99214 OFFICE O/P EST MOD 30 MIN: CPT | Performed by: INTERNAL MEDICINE

## 2021-04-02 RX ORDER — HYDROCODONE BITARTRATE AND ACETAMINOPHEN 5; 325 MG/1; MG/1
1 TABLET ORAL EVERY 6 HOURS PRN
Qty: 120 TABLET | Refills: 0 | Status: SHIPPED | OUTPATIENT
Start: 2021-04-02 | End: 2021-04-30 | Stop reason: SDUPTHER

## 2021-04-02 NOTE — PROGRESS NOTES
Assessment/Plan:    No problem-specific Assessment & Plan notes found for this encounter  Diagnoses and all orders for this visit:    Type 2 diabetes mellitus with other specified complication, with long-term current use of insulin (HCC)    Essential hypertension    Lumbar spondylosis    Chronic bilateral low back pain without sciatica          Subjective:      Patient ID: Kimberly Lloyd is a 61 y o  male  Doing well  bs 130  Following  With endo  On chronic opiates  hels  With adl   Keeps stored  No oversedation   Pain  Worse without  Discussed smoking  Cramp  When leans  l upper abd--rubbing helps      The following portions of the patient's history were reviewed and updated as appropriate: allergies, current medications, past family history, past medical history, past social history, past surgical history, and problem list     Review of Systems   Constitutional: Negative for activity change and fatigue  HENT: Negative for ear discharge, ear pain, rhinorrhea and sore throat  Eyes: Negative for pain and visual disturbance  Respiratory: Negative for cough and shortness of breath  Cardiovascular: Negative for chest pain and leg swelling  Gastrointestinal: Negative for abdominal pain, constipation and diarrhea  Endocrine: Negative for cold intolerance and polyuria  Genitourinary: Negative for flank pain and hematuria  Musculoskeletal: Negative for back pain and joint swelling  Skin: Negative for pallor and wound  Neurological: Negative for dizziness, seizures and speech difficulty  Psychiatric/Behavioral: Negative for confusion and hallucinations  Objective:      Ht 6' 4" (1 93 m)   Wt 117 kg (257 lb)   BMI 31 28 kg/m²          Physical Exam  Vitals signs and nursing note reviewed  Constitutional:       General: He is not in acute distress  Appearance: Normal appearance  He is not ill-appearing  HENT:      Head: Normocephalic        Right Ear: External ear normal  There is no impacted cerumen  Left Ear: External ear normal  There is no impacted cerumen  Nose: No congestion or rhinorrhea  Mouth/Throat:      Pharynx: No posterior oropharyngeal erythema  Eyes:      General: No scleral icterus  Right eye: No discharge  Left eye: No discharge  Neck:      Musculoskeletal: No neck rigidity  Vascular: No carotid bruit  Cardiovascular:      Rate and Rhythm: Normal rate and regular rhythm  Heart sounds: Normal heart sounds  No murmur  No friction rub  No gallop  Pulmonary:      Breath sounds: No wheezing or rhonchi  Abdominal:      General: There is no distension  Tenderness: There is no abdominal tenderness  There is no guarding  Musculoskeletal:         General: No swelling  Right lower leg: No edema  Left lower leg: No edema  Lymphadenopathy:      Cervical: No cervical adenopathy  Skin:     Coloration: Skin is not jaundiced  Neurological:      Mental Status: He is alert  Cranial Nerves: No cranial nerve deficit  Motor: No weakness        Coordination: Coordination normal    Psychiatric:         Mood and Affect: Mood normal

## 2021-04-11 ENCOUNTER — HOSPITAL ENCOUNTER (OUTPATIENT)
Dept: ULTRASOUND IMAGING | Facility: HOSPITAL | Age: 61
Discharge: HOME | End: 2021-04-11
Attending: INTERNAL MEDICINE
Payer: COMMERCIAL

## 2021-04-11 DIAGNOSIS — S30.1XXA CONTUSION OF ABDOMINAL WALL, INITIAL ENCOUNTER: ICD-10-CM

## 2021-04-11 PROCEDURE — 76705 ECHO EXAM OF ABDOMEN: CPT

## 2021-04-13 ENCOUNTER — IMMUNIZATIONS (OUTPATIENT)
Dept: FAMILY MEDICINE CLINIC | Facility: HOSPITAL | Age: 61
End: 2021-04-13

## 2021-04-13 DIAGNOSIS — Z23 ENCOUNTER FOR IMMUNIZATION: Primary | ICD-10-CM

## 2021-04-13 DIAGNOSIS — I10 ESSENTIAL HYPERTENSION: ICD-10-CM

## 2021-04-13 PROCEDURE — 0001A SARS-COV-2 / COVID-19 MRNA VACCINE (PFIZER-BIONTECH) 30 MCG: CPT

## 2021-04-13 PROCEDURE — 91300 SARS-COV-2 / COVID-19 MRNA VACCINE (PFIZER-BIONTECH) 30 MCG: CPT

## 2021-04-14 PROCEDURE — 4010F ACE/ARB THERAPY RXD/TAKEN: CPT | Performed by: INTERNAL MEDICINE

## 2021-04-14 RX ORDER — LISINOPRIL 10 MG/1
TABLET ORAL
Qty: 30 TABLET | Refills: 3 | Status: SHIPPED | OUTPATIENT
Start: 2021-04-14 | End: 2021-11-15

## 2021-04-19 DIAGNOSIS — F41.9 ANXIETY: ICD-10-CM

## 2021-04-19 RX ORDER — LORAZEPAM 0.5 MG/1
TABLET ORAL
Refills: 0 | Status: CANCELLED | OUTPATIENT
Start: 2021-04-19

## 2021-04-19 RX ORDER — LORAZEPAM 0.5 MG/1
1 TABLET ORAL 2 TIMES DAILY
Qty: 120 TABLET | Refills: 3 | Status: CANCELLED | OUTPATIENT
Start: 2021-04-19 | End: 2021-05-19

## 2021-04-21 DIAGNOSIS — M47.816 LUMBAR SPONDYLOSIS: ICD-10-CM

## 2021-04-21 DIAGNOSIS — S32.010A CLOSED COMPRESSION FRACTURE OF BODY OF L1 VERTEBRA (HCC): ICD-10-CM

## 2021-04-22 RX ORDER — GABAPENTIN 300 MG/1
CAPSULE ORAL
Qty: 30 CAPSULE | Refills: 3 | Status: SHIPPED | OUTPATIENT
Start: 2021-04-22 | End: 2021-08-10

## 2021-04-22 RX ORDER — LABETALOL 200 MG/1
TABLET, FILM COATED ORAL
Qty: 60 TABLET | Refills: 3 | Status: SHIPPED | OUTPATIENT
Start: 2021-04-22 | End: 2021-08-20

## 2021-04-30 DIAGNOSIS — S32.010A CLOSED COMPRESSION FRACTURE OF BODY OF L1 VERTEBRA (HCC): ICD-10-CM

## 2021-04-30 DIAGNOSIS — E11.9 TYPE 2 DIABETES MELLITUS WITHOUT COMPLICATION, WITHOUT LONG-TERM CURRENT USE OF INSULIN (HCC): ICD-10-CM

## 2021-04-30 RX ORDER — HYDROCODONE BITARTRATE AND ACETAMINOPHEN 5; 325 MG/1; MG/1
1 TABLET ORAL EVERY 6 HOURS PRN
Qty: 120 TABLET | Refills: 0 | Status: SHIPPED | OUTPATIENT
Start: 2021-04-30 | End: 2021-05-28 | Stop reason: SDUPTHER

## 2021-04-30 RX ORDER — INSULIN GLARGINE 100 [IU]/ML
40 INJECTION, SOLUTION SUBCUTANEOUS
Qty: 15 ML | Refills: 3 | Status: SHIPPED | OUTPATIENT
Start: 2021-04-30 | End: 2022-03-30

## 2021-05-02 DIAGNOSIS — E78.5 HYPERLIPIDEMIA, UNSPECIFIED HYPERLIPIDEMIA TYPE: ICD-10-CM

## 2021-05-03 RX ORDER — ROSUVASTATIN CALCIUM 5 MG/1
TABLET, COATED ORAL
Qty: 30 TABLET | Refills: 3 | Status: SHIPPED | OUTPATIENT
Start: 2021-05-03 | End: 2021-09-13

## 2021-05-04 ENCOUNTER — IMMUNIZATIONS (OUTPATIENT)
Dept: FAMILY MEDICINE CLINIC | Facility: HOSPITAL | Age: 61
End: 2021-05-04

## 2021-05-04 DIAGNOSIS — Z23 ENCOUNTER FOR IMMUNIZATION: Primary | ICD-10-CM

## 2021-05-04 PROCEDURE — 0002A SARS-COV-2 / COVID-19 MRNA VACCINE (PFIZER-BIONTECH) 30 MCG: CPT

## 2021-05-04 PROCEDURE — 91300 SARS-COV-2 / COVID-19 MRNA VACCINE (PFIZER-BIONTECH) 30 MCG: CPT

## 2021-05-11 DIAGNOSIS — S32.010A CLOSED COMPRESSION FRACTURE OF BODY OF L1 VERTEBRA (HCC): ICD-10-CM

## 2021-05-11 RX ORDER — METHOCARBAMOL 500 MG/1
TABLET, FILM COATED ORAL
Qty: 60 TABLET | Refills: 3 | Status: SHIPPED | OUTPATIENT
Start: 2021-05-11 | End: 2021-09-13

## 2021-05-22 LAB
ALBUMIN SERPL-MCNC: 4.4 G/DL (ref 3.8–4.9)
ALBUMIN/CREAT UR: 140 MG/G CREAT (ref 0–29)
ALBUMIN/GLOB SERPL: 1.6 {RATIO} (ref 1.2–2.2)
ALP SERPL-CCNC: 100 IU/L (ref 48–121)
ALT SERPL-CCNC: 22 IU/L (ref 0–44)
AST SERPL-CCNC: 17 IU/L (ref 0–40)
BILIRUB SERPL-MCNC: 0.3 MG/DL (ref 0–1.2)
BUN SERPL-MCNC: 13 MG/DL (ref 8–27)
BUN/CREAT SERPL: 16 (ref 10–24)
CALCIUM SERPL-MCNC: 10 MG/DL (ref 8.6–10.2)
CHLORIDE SERPL-SCNC: 100 MMOL/L (ref 96–106)
CHOLEST SERPL-MCNC: 172 MG/DL (ref 100–199)
CHOLEST/HDLC SERPL: 5.2 RATIO (ref 0–5)
CO2 SERPL-SCNC: 25 MMOL/L (ref 20–29)
CREAT SERPL-MCNC: 0.81 MG/DL (ref 0.76–1.27)
CREAT UR-MCNC: 155.6 MG/DL
EST. AVERAGE GLUCOSE BLD GHB EST-MCNC: 200 MG/DL
GLOBULIN SER-MCNC: 2.7 G/DL (ref 1.5–4.5)
GLUCOSE SERPL-MCNC: 44 MG/DL (ref 65–99)
HBA1C MFR BLD: 8.6 % (ref 4.8–5.6)
HDLC SERPL-MCNC: 33 MG/DL
LDLC SERPL CALC-MCNC: 116 MG/DL (ref 0–99)
MICROALBUMIN UR-MCNC: 218.2 UG/ML
POTASSIUM SERPL-SCNC: 4.1 MMOL/L (ref 3.5–5.2)
PROT SERPL-MCNC: 7.1 G/DL (ref 6–8.5)
SL AMB EGFR AFRICAN AMERICAN: 112 ML/MIN/1.73
SL AMB EGFR NON AFRICAN AMERICAN: 97 ML/MIN/1.73
SL AMB VLDL CHOLESTEROL CALC: 23 MG/DL (ref 5–40)
SODIUM SERPL-SCNC: 140 MMOL/L (ref 134–144)
TRIGL SERPL-MCNC: 125 MG/DL (ref 0–149)
TSH SERPL DL<=0.005 MIU/L-ACNC: 1.47 UIU/ML (ref 0.45–4.5)

## 2021-05-22 PROCEDURE — 3052F HG A1C>EQUAL 8.0%<EQUAL 9.0%: CPT | Performed by: PHYSICIAN ASSISTANT

## 2021-05-22 PROCEDURE — 3060F POS MICROALBUMINURIA REV: CPT | Performed by: PHYSICIAN ASSISTANT

## 2021-05-27 ENCOUNTER — TELEPHONE (OUTPATIENT)
Dept: ENDOCRINOLOGY | Facility: HOSPITAL | Age: 61
End: 2021-05-27

## 2021-05-27 ENCOUNTER — OFFICE VISIT (OUTPATIENT)
Dept: ENDOCRINOLOGY | Facility: HOSPITAL | Age: 61
End: 2021-05-27
Payer: COMMERCIAL

## 2021-05-27 VITALS
DIASTOLIC BLOOD PRESSURE: 70 MMHG | HEART RATE: 96 BPM | SYSTOLIC BLOOD PRESSURE: 114 MMHG | WEIGHT: 248.4 LBS | BODY MASS INDEX: 30.25 KG/M2 | HEIGHT: 76 IN

## 2021-05-27 DIAGNOSIS — Z79.4 TYPE 2 DIABETES MELLITUS WITHOUT COMPLICATION, WITH LONG-TERM CURRENT USE OF INSULIN (HCC): Primary | ICD-10-CM

## 2021-05-27 DIAGNOSIS — E11.9 TYPE 2 DIABETES MELLITUS WITHOUT COMPLICATION, WITH LONG-TERM CURRENT USE OF INSULIN (HCC): Primary | ICD-10-CM

## 2021-05-27 PROCEDURE — 3078F DIAST BP <80 MM HG: CPT | Performed by: PHYSICIAN ASSISTANT

## 2021-05-27 PROCEDURE — 3008F BODY MASS INDEX DOCD: CPT | Performed by: PHYSICIAN ASSISTANT

## 2021-05-27 PROCEDURE — 99214 OFFICE O/P EST MOD 30 MIN: CPT | Performed by: PHYSICIAN ASSISTANT

## 2021-05-27 PROCEDURE — 3074F SYST BP LT 130 MM HG: CPT | Performed by: PHYSICIAN ASSISTANT

## 2021-05-27 RX ORDER — DULAGLUTIDE 3 MG/.5ML
3 INJECTION, SOLUTION SUBCUTANEOUS WEEKLY
Qty: 2 ML | Refills: 5 | Status: SHIPPED | OUTPATIENT
Start: 2021-05-27 | End: 2021-11-17 | Stop reason: SDUPTHER

## 2021-05-27 NOTE — PROGRESS NOTES
Zackary Baltazar 61 y o  male MRN: [id number]    Encounter: [id number]      Assessment/Plan     Assessment: This is a 61y o -year-old male with type 2 diabetes with hypertension and hyperlipidemia, and vertebral compression fracture  Plan:  1  Type 2 diabetes: Most recent hemoglobin A1C has decreased to 8 6  This is an improvement, but still above goal   At this time will continue with current dose of Lantus and Humalog with sliding scale  Continue with current dose of glimepiride and metformin  Will increase Trulicity to 3 0 mg weekly  Continue checking blood sugar to 4 times a day  Is up-to-date on diabetic foot exam   Get eye exam completed  He is a type 2 diabetic utilizing 4 insulin injection a day  He is checking his blood sugar 4 times a day  He does utilize a sliding scale for his insulin  Is also having episodes of hypoglycemic unawareness  Because of this, I would find it beneficial for him to utilize a Dexcom continuous glucose monitoring for his blood sugar levels  Follow-up in 3 months with lab work completed prior to visit      2  Hypertension:  Normotensive in the office today  Kidney function is stable with normal like joints  Continue with current medications  Repeat CMP prior to next office visit      3  Hyperlipidemia:  LDL cholesterol slightly elevated, but rest of the lipid panel was looking good   At this time continue with Crestor  Will continue monitor at this time      4  Vertebral compression fracture:  DEXA scan has yet to be completed  Unable to start treatment if needed since this is not done    Did discuss concerns of other fractures in the future if osteoporosis is severe       CC: type 2 diabetes follow-up    History of Present Illness     HPI:  Thee Gold a 61year old male with type 2 diabetes for about 5-10 years   He is on oral agents and insulin at home and takes metformin 500 mg in the morning and 1000 mg in the evening, glimepiride 4 mg daily, Humalog 15 units with each meal, Lantus 27 units in the evening, Trulicity 1 5 mg  Has been on the trulicity for about 1 year  He denies any polyuria, polydipsia, nocturia and blurry vision   He denies nephropathy, retinopathy, heart attack, stroke and claudication but does admit to neuropathy with symptoms worse at night   Has been prescribed gabapentin by his PCP  Medication has been not helping out with his neuropathy symptoms   Has not followed up with a podiatrist         Hypoglycemic episodes:  Occasionally has episodes of low blood sugar during the day  Typically occurs if he has a small lunch, and does a lot of physical activity in the afternoon  Lab work did show that he was having hypoglycemia, but denies any symptoms at that time      The patient's last eye exam was over 1 year ago and I encouraged him to update this  Last diabetic foot exam was completed November 2020       Blood Sugar/Glucometer/Pump/CGM review:  Did not bring glucometer to office today or blood sugar logs  Does check blood sugar 4 times a day  Has a wide variability of blood sugars, and can range anywhere from       For hypertension he is treated with lisinopril 10 mg daily  Donald Shell is also managed on labetalol 100 mg twice a day and amlodipine 5 mg daily   For hyperlipidemia, he is treated with rosuvastatin        Had a fall from a ladder about 1 year ago  Was noted to have an L1 compression fracture at that time  Has had serial imaging since, and seems to be stable  Was recommended to get a DEXA scan completed for further evaluation, but has yet to get that done  Review of Systems   Constitutional: Negative for activity change, appetite change, fatigue and unexpected weight change  HENT: Negative for trouble swallowing  Eyes: Negative for visual disturbance  Respiratory: Negative for chest tightness and shortness of breath  Cardiovascular: Negative for chest pain, palpitations and leg swelling     Gastrointestinal: Negative for abdominal pain, diarrhea, nausea and vomiting  Endocrine: Negative for cold intolerance, heat intolerance, polydipsia, polyphagia and polyuria  Genitourinary: Negative for frequency  Musculoskeletal: Positive for back pain and myalgias (in the back)  Skin: Negative for rash and wound  Neurological: Positive for numbness (bilateral feet, worse at night )  Negative for dizziness, weakness, light-headedness and headaches  Psychiatric/Behavioral: Negative for dysphoric mood and sleep disturbance  The patient is not nervous/anxious  Historical Information   Past Medical History:   Diagnosis Date    Alcoholism ([place] [address] )     Anxiety     Atrial fibrillation (HCC)     Diabetes ([place] [address] )     Diabetes mellitus, type 2 ([place] [address] )     Diverticulitis     ED (erectile dysfunction)     High cholesterol     Hyperlipidemia     Hypertension     LVH     [facility])     Lung mass     Neuropathy     Nocturia     Osteoarthritis     Paroxysmal atrial fibrillation ([place] [address] )     Pilar cyst     Shingles     Smoker     Spinal stenosis, lumbar      No past surgical history on file    Social History   Social History     Substance and Sexual Activity   Alcohol Use Not Currently    Frequency: Never     Social History     Substance and Sexual Activity   Drug Use Yes    Types: Marijuana, Other    Comment: prescription pills (unprescribed) daily     Social History     Tobacco Use   Smoking Status Current Every Day Smoker    Packs/day: 2 00    Types: Cigarettes   Smokeless Tobacco Never Used     Family History:   Family History   Adopted: Yes       Meds/Allergies   Current Outpatient Medications   Medication Sig Dispense Refill    acetaminophen (TYLENOL) 325 mg tablet Take 3 tablets (975 mg total) by mouth every 8 (eight) hours 30 tablet 0    amLODIPine (NORVASC) 5 mg tablet Take 5 mg by mouth daily      cholecalciferol (VITAMIN D3) 250 MCG (90894 UT) capsule Take 5 capsules (50,000 Units total) by mouth 3 (three) times a week 45 capsule 0    Contour Next Test test strip TEST 4 TIMES A  each 3    Dulaglutide (Trulicity) 1 5 GL/5 5MK SOPN Inject 0 5 mL (1 5 mg total) under the skin once a week 4 pen 5    gabapentin (NEURONTIN) 100 mg capsule Take 1 capsule (100 mg total) by mouth 3 (three) times a day (Patient not taking: Reported on 2/23/2021) 90 capsule 0    gabapentin (NEURONTIN) 300 mg capsule take 1 capsule by mouth at bedtime 30 capsule 3    glimepiride (AMARYL) 4 mg tablet take 1 tablet by mouth twice a day 60 tablet 3    HYDROcodone-acetaminophen (NORCO) 5-325 mg per tablet Take 1 tablet by mouth every 6 (six) hours as needed for painMax Daily Amount: 4 tablets 120 tablet 0    insulin glargine (Lantus SoloStar) 100 units/mL injection pen Inject 40 Units under the skin daily at bedtime 15 mL 3    insulin glargine (LANTUS) 100 units/mL subcutaneous injection 40 units qhs (Patient taking differently: 26-27 units qhs)      insulin lispro (HumaLOG) 100 units/mL injection pen USE 15 UNITS 3 TIMES DAILY WITH EACH MEAL 15 mL 2    labetalol (NORMODYNE) 200 mg tablet take 1 tablet by mouth twice a day 60 tablet 3    lisinopril (ZESTRIL) 10 mg tablet TAKE 1 TABLET BY MOUTH ONCE DAILY 30 tablet 3    LORazepam (ATIVAN) 0 5 mg tablet   0    LORazepam (ATIVAN) 0 5 mg tablet Take 2 tablets (1 mg total) by mouth 2 (two) times a day 120 tablet 3    metFORMIN (GLUCOPHAGE) 1000 MG tablet Take 1 tablet (1,000 mg total) by mouth daily with dinner  0    metFORMIN (GLUCOPHAGE) 500 mg tablet Take 1 tablet (500 mg total) by mouth daily with breakfast  0    methocarbamol (ROBAXIN) 500 mg tablet TAKE 1 TABLET TWICE A DAY - MAY CAUSE DROWSINESS 60 tablet 3    Needles & Syringes MISC by Does not apply route 3 (three) times a day before meals 90 each 0    polyethylene glycol (MIRALAX) 17 g packet Take 17 g by mouth daily as needed (constipation) 14 each 0    rosuvastatin (CRESTOR) 5 mg tablet take 1 tablet by mouth at bedtime 30 tablet 3    ROSUVASTATIN CALCIUM PO Take by mouth      sildenafil (VIAGRA) 100 mg tablet take 1 tablet by mouth if needed       No current facility-administered medications for this visit  No Known Allergies    Objective   Vitals: There were no vitals taken for this visit  Physical Exam  Vitals signs and nursing note reviewed  Constitutional:       General: He is not in acute distress  Appearance: Normal appearance  He is not diaphoretic  HENT:      Head: Normocephalic and atraumatic  Eyes:      General: No scleral icterus  Extraocular Movements: Extraocular movements intact  Conjunctiva/sclera: Conjunctivae normal       Pupils: Pupils are equal, round, and reactive to light  Neck:      Musculoskeletal: Normal range of motion  Cardiovascular:      Rate and Rhythm: Normal rate and regular rhythm  Heart sounds: No murmur  Pulmonary:      Effort: Pulmonary effort is normal  No respiratory distress  Breath sounds: Normal breath sounds  No wheezing  Musculoskeletal:      Right lower leg: No edema  Left lower leg: No edema  Lymphadenopathy:      Cervical: No cervical adenopathy  Skin:     General: Skin is warm and dry  Neurological:      Mental Status: He is alert and oriented to person, place, and time  Mental status is at baseline  Sensory: No sensory deficit  Motor: No tremor  Deep Tendon Reflexes:      Reflex Scores:       Patellar reflexes are 2+ on the right side and 2+ on the left side  Psychiatric:         Mood and Affect: Mood normal          Behavior: Behavior normal          Thought Content: Thought content normal          The history was obtained from the review of the chart, patient      Lab Results:   Lab Results   Component Value Date/Time    Hemoglobin A1C 8 6 (H) 05/21/2021 08:40 AM    Hemoglobin A1C 9 4 (A) 02/23/2021 01:08 PM    Hemoglobin A1C 9 7 (H) 07/27/2020 06:09 AM    WBC 10 85 (H) 07/27/2020 06:09 AM    WBC 9 11 07/25/2020 04:37 AM    WBC 13 98 (H) 07/23/2020 06:16 AM    Hemoglobin 13 6 07/27/2020 06:09 AM    Hemoglobin 14 0 07/25/2020 04:37 AM    Hemoglobin 14 8 07/23/2020 06:16 AM    Hematocrit 39 9 07/27/2020 06:09 AM    Hematocrit 41 5 07/25/2020 04:37 AM    Hematocrit 43 8 07/23/2020 06:16 AM    MCV 89 07/27/2020 06:09 AM    MCV 90 07/25/2020 04:37 AM    MCV 89 07/23/2020 06:16 AM    Platelets 473 23/19/6360 06:09 AM    Platelets 836 80/53/8146 04:37 AM    Platelets 790 96/69/1044 06:16 AM    BUN 13 05/21/2021 08:40 AM    BUN 10 07/27/2020 06:09 AM    BUN 13 07/25/2020 04:37 AM    BUN 19 07/23/2020 06:16 AM    Potassium 4 1 05/21/2021 08:40 AM    Potassium 3 6 07/27/2020 06:09 AM    Potassium 3 3 (L) 07/25/2020 04:37 AM    Potassium 3 8 07/23/2020 06:16 AM    Chloride 100 05/21/2021 08:40 AM    Chloride 102 07/27/2020 06:09 AM    Chloride 100 07/25/2020 04:37 AM    Chloride 103 07/23/2020 06:16 AM    CO2 25 05/21/2021 08:40 AM    CO2 27 07/27/2020 06:09 AM    CO2 28 07/25/2020 04:37 AM    CO2 25 07/23/2020 06:16 AM    Creatinine 0 81 05/21/2021 08:40 AM    Creatinine 0 59 (L) 07/27/2020 06:09 AM    Creatinine 0 66 07/25/2020 04:37 AM    Creatinine 0 72 07/23/2020 06:16 AM    AST 17 05/21/2021 08:40 AM    ALT 22 05/21/2021 08:40 AM    Albumin 4 4 05/21/2021 08:40 AM    Albumin 3 4 (L) 07/27/2020 06:09 AM    Globulin, Total 2 7 05/21/2021 08:40 AM    HDL 33 (L) 05/21/2021 08:40 AM    Triglycerides 125 05/21/2021 08:40 AM       Portions of the record may have been created with voice recognition software  Occasional wrong word or "sound a like" substitutions may have occurred due to the inherent limitations of voice recognition software  Read the chart carefully and recognize, using context, where substitutions have occurred

## 2021-05-27 NOTE — PATIENT INSTRUCTIONS
Continue to monitor diet, maintain physical activity   Make sure to drink plenty water throughout the day      Continue with metformin, glimepiride      Increased Trulicity to 3 0 mg weekly      Continue with Humalog 15 units with meals      Continue Lantus to 25 units at night      Check blood sugar 2-4 times a day  Send in blood sugar logs in 2 weeks      Schedule an appointment to get a diabetic eye exam completed      Continue with blood pressure and cholesterol medications      Get initial lab work completed at earliest convenience, will also get lab work prior to next office visit      Get DEXA scan completed      Follow-up in 3 months

## 2021-05-28 DIAGNOSIS — S32.010A CLOSED COMPRESSION FRACTURE OF BODY OF L1 VERTEBRA (HCC): ICD-10-CM

## 2021-05-28 RX ORDER — HYDROCODONE BITARTRATE AND ACETAMINOPHEN 5; 325 MG/1; MG/1
1 TABLET ORAL EVERY 6 HOURS PRN
Qty: 120 TABLET | Refills: 0 | Status: SHIPPED | OUTPATIENT
Start: 2021-05-28 | End: 2021-06-25 | Stop reason: SDUPTHER

## 2021-06-15 ENCOUNTER — DOCUMENTATION (OUTPATIENT)
Dept: ENDOCRINOLOGY | Facility: HOSPITAL | Age: 61
End: 2021-06-15

## 2021-06-16 DIAGNOSIS — F41.9 ANXIETY: ICD-10-CM

## 2021-06-16 RX ORDER — LORAZEPAM 0.5 MG/1
TABLET ORAL
Qty: 120 TABLET | Refills: 3 | Status: SHIPPED | OUTPATIENT
Start: 2021-06-16 | End: 2021-10-11 | Stop reason: SDUPTHER

## 2021-06-25 ENCOUNTER — TELEPHONE (OUTPATIENT)
Dept: INTERNAL MEDICINE CLINIC | Facility: CLINIC | Age: 61
End: 2021-06-25

## 2021-06-25 DIAGNOSIS — S32.010A CLOSED COMPRESSION FRACTURE OF BODY OF L1 VERTEBRA (HCC): ICD-10-CM

## 2021-06-25 RX ORDER — HYDROCODONE BITARTRATE AND ACETAMINOPHEN 5; 325 MG/1; MG/1
1 TABLET ORAL EVERY 6 HOURS PRN
Qty: 120 TABLET | Refills: 0 | Status: SHIPPED | OUTPATIENT
Start: 2021-06-25 | End: 2021-07-22 | Stop reason: SDUPTHER

## 2021-07-08 DIAGNOSIS — E11.9 TYPE 2 DIABETES MELLITUS WITHOUT COMPLICATION, WITH LONG-TERM CURRENT USE OF INSULIN (HCC): ICD-10-CM

## 2021-07-08 DIAGNOSIS — Z79.4 TYPE 2 DIABETES MELLITUS WITHOUT COMPLICATION, WITH LONG-TERM CURRENT USE OF INSULIN (HCC): ICD-10-CM

## 2021-07-09 RX ORDER — INSULIN LISPRO 100 [IU]/ML
INJECTION, SOLUTION INTRAVENOUS; SUBCUTANEOUS
Qty: 15 ML | Refills: 2 | Status: SHIPPED | OUTPATIENT
Start: 2021-07-09 | End: 2021-12-30

## 2021-07-22 ENCOUNTER — OFFICE VISIT (OUTPATIENT)
Dept: INTERNAL MEDICINE CLINIC | Facility: CLINIC | Age: 61
End: 2021-07-22
Payer: COMMERCIAL

## 2021-07-22 VITALS — WEIGHT: 249 LBS | HEIGHT: 76 IN | BODY MASS INDEX: 30.32 KG/M2

## 2021-07-22 DIAGNOSIS — E11.69 TYPE 2 DIABETES MELLITUS WITH OTHER SPECIFIED COMPLICATION, WITH LONG-TERM CURRENT USE OF INSULIN (HCC): ICD-10-CM

## 2021-07-22 DIAGNOSIS — Z79.4 TYPE 2 DIABETES MELLITUS WITH OTHER SPECIFIED COMPLICATION, WITH LONG-TERM CURRENT USE OF INSULIN (HCC): ICD-10-CM

## 2021-07-22 DIAGNOSIS — I10 ESSENTIAL HYPERTENSION: ICD-10-CM

## 2021-07-22 DIAGNOSIS — G89.29 CHRONIC BILATERAL LOW BACK PAIN WITHOUT SCIATICA: Primary | ICD-10-CM

## 2021-07-22 DIAGNOSIS — S32.010A CLOSED COMPRESSION FRACTURE OF BODY OF L1 VERTEBRA (HCC): ICD-10-CM

## 2021-07-22 DIAGNOSIS — M54.50 CHRONIC BILATERAL LOW BACK PAIN WITHOUT SCIATICA: Primary | ICD-10-CM

## 2021-07-22 PROBLEM — S05.00XA CORNEAL ABRASION: Status: ACTIVE | Noted: 2021-07-22

## 2021-07-22 PROCEDURE — 3008F BODY MASS INDEX DOCD: CPT | Performed by: INTERNAL MEDICINE

## 2021-07-22 PROCEDURE — 99214 OFFICE O/P EST MOD 30 MIN: CPT | Performed by: INTERNAL MEDICINE

## 2021-07-22 PROCEDURE — 4004F PT TOBACCO SCREEN RCVD TLK: CPT | Performed by: INTERNAL MEDICINE

## 2021-07-22 RX ORDER — HYDROCODONE BITARTRATE AND ACETAMINOPHEN 5; 325 MG/1; MG/1
1 TABLET ORAL EVERY 6 HOURS PRN
Qty: 120 TABLET | Refills: 0 | Status: SHIPPED | OUTPATIENT
Start: 2021-07-22 | End: 2021-08-20 | Stop reason: SDUPTHER

## 2021-07-22 RX ORDER — PEN NEEDLE, DIABETIC 31 GX5/16"
NEEDLE, DISPOSABLE MISCELLANEOUS 2 TIMES DAILY
COMMUNITY
Start: 2021-07-12

## 2021-07-22 NOTE — PROGRESS NOTES
BMI Counseling: There is no height or weight on file to calculate BMI  The BMI is above normal  Nutrition recommendations include decreasing portion sizes  Exercise recommendations include exercising 3-5 times per week  Patient referred to PCP due to patient being overweight  Tobacco Cessation Counseling: Tobacco cessation counseling was provided  The patient is sincerely urged to quit consumption of tobacco  He is not ready to quit tobacco        Assessment/Plan:    No problem-specific Assessment & Plan notes found for this encounter  Diagnoses and all orders for this visit:    Chronic bilateral low back pain without sciatica    Other orders  -     B-D UF III MINI PEN NEEDLES 31G X 5 MM MISC; 2 (two) times a day          Subjective:      Patient ID: Tiffany Domingo is a 61 y o  male  Followed  With endo 2/58/87  trulicity  Inc  And has g6  On chronic opiates  helps  With adl   Keeps stored  No oversedation   Pain  Worse without  Discussed smoking  Cramp  When leans  l upper abd--rubbing helps  Discussed g6 alarms      The following portions of the patient's history were reviewed and updated as appropriate: allergies, current medications, past family history, past medical history, past social history, past surgical history, and problem list     Review of Systems   Constitutional: Negative for activity change and fatigue  HENT: Negative for ear discharge, ear pain, rhinorrhea and sore throat  Eyes: Negative for pain and visual disturbance  Respiratory: Negative for cough and shortness of breath  Cardiovascular: Negative for chest pain and leg swelling  Gastrointestinal: Negative for abdominal pain, constipation and diarrhea  Endocrine: Negative for cold intolerance and polyuria  Genitourinary: Negative for flank pain and hematuria  Musculoskeletal: Negative for back pain and joint swelling  Skin: Negative for pallor and wound     Neurological: Negative for dizziness, seizures and speech difficulty  Psychiatric/Behavioral: Negative for confusion and hallucinations  Objective:      Ht 6' 4" (1 93 m)   Wt 113 kg (249 lb)   BMI 30 31 kg/m²          Physical Exam  Vitals and nursing note reviewed  Constitutional:       General: He is not in acute distress  Appearance: Normal appearance  He is not ill-appearing  HENT:      Head: Normocephalic  Right Ear: External ear normal  There is no impacted cerumen  Left Ear: External ear normal  There is no impacted cerumen  Nose: No congestion or rhinorrhea  Mouth/Throat:      Pharynx: No posterior oropharyngeal erythema  Eyes:      General: No scleral icterus  Right eye: No discharge  Left eye: No discharge  Neck:      Vascular: No carotid bruit  Cardiovascular:      Rate and Rhythm: Normal rate and regular rhythm  Heart sounds: Normal heart sounds  No murmur heard  No friction rub  No gallop  Pulmonary:      Breath sounds: No wheezing or rhonchi  Abdominal:      General: There is no distension  Tenderness: There is no abdominal tenderness  There is no guarding  Musculoskeletal:         General: No swelling  Cervical back: No rigidity  Right lower leg: No edema  Left lower leg: No edema  Lymphadenopathy:      Cervical: No cervical adenopathy  Skin:     Coloration: Skin is not jaundiced  Neurological:      Mental Status: He is alert  Cranial Nerves: No cranial nerve deficit  Motor: No weakness        Coordination: Coordination normal    Psychiatric:         Mood and Affect: Mood normal

## 2021-08-10 DIAGNOSIS — M47.816 LUMBAR SPONDYLOSIS: ICD-10-CM

## 2021-08-10 RX ORDER — GABAPENTIN 300 MG/1
CAPSULE ORAL
Qty: 30 CAPSULE | Refills: 3 | Status: SHIPPED | OUTPATIENT
Start: 2021-08-10 | End: 2021-12-17

## 2021-08-20 DIAGNOSIS — S32.010A CLOSED COMPRESSION FRACTURE OF BODY OF L1 VERTEBRA (HCC): ICD-10-CM

## 2021-08-20 RX ORDER — HYDROCODONE BITARTRATE AND ACETAMINOPHEN 5; 325 MG/1; MG/1
1 TABLET ORAL EVERY 6 HOURS PRN
Qty: 120 TABLET | Refills: 0 | Status: SHIPPED | OUTPATIENT
Start: 2021-08-20 | End: 2021-09-20 | Stop reason: SDUPTHER

## 2021-08-30 ENCOUNTER — OFFICE VISIT (OUTPATIENT)
Dept: ENDOCRINOLOGY | Facility: HOSPITAL | Age: 61
End: 2021-08-30
Payer: COMMERCIAL

## 2021-08-30 VITALS
HEART RATE: 90 BPM | DIASTOLIC BLOOD PRESSURE: 80 MMHG | WEIGHT: 250 LBS | HEIGHT: 76 IN | BODY MASS INDEX: 30.44 KG/M2 | SYSTOLIC BLOOD PRESSURE: 124 MMHG

## 2021-08-30 DIAGNOSIS — M81.0 OSTEOPOROSIS, UNSPECIFIED OSTEOPOROSIS TYPE, UNSPECIFIED PATHOLOGICAL FRACTURE PRESENCE: ICD-10-CM

## 2021-08-30 DIAGNOSIS — Z79.4 TYPE 2 DIABETES MELLITUS WITH OTHER SPECIFIED COMPLICATION, WITH LONG-TERM CURRENT USE OF INSULIN (HCC): Primary | ICD-10-CM

## 2021-08-30 DIAGNOSIS — E11.69 TYPE 2 DIABETES MELLITUS WITH OTHER SPECIFIED COMPLICATION, WITH LONG-TERM CURRENT USE OF INSULIN (HCC): Primary | ICD-10-CM

## 2021-08-30 PROCEDURE — 3079F DIAST BP 80-89 MM HG: CPT | Performed by: PHYSICIAN ASSISTANT

## 2021-08-30 PROCEDURE — 3008F BODY MASS INDEX DOCD: CPT | Performed by: PHYSICIAN ASSISTANT

## 2021-08-30 PROCEDURE — 99214 OFFICE O/P EST MOD 30 MIN: CPT | Performed by: PHYSICIAN ASSISTANT

## 2021-08-30 PROCEDURE — 3074F SYST BP LT 130 MM HG: CPT | Performed by: PHYSICIAN ASSISTANT

## 2021-08-30 PROCEDURE — 4004F PT TOBACCO SCREEN RCVD TLK: CPT | Performed by: PHYSICIAN ASSISTANT

## 2021-08-30 PROCEDURE — 95251 CONT GLUC MNTR ANALYSIS I&R: CPT | Performed by: PHYSICIAN ASSISTANT

## 2021-08-30 NOTE — PROGRESS NOTES
Shelly Lord 61 y o  male MRN: [id number]    Encounter: [id number]      Assessment/Plan     Assessment: This is a 61y o -year-old male with  Type 2 diabetes with hypertension and hyperlipidemia, and vertebral compression fracture  Plan:  1  Type 2 diabetes, insulin requiring: a no lab work was completed prior to this office visit  He is now utilizing a Dexcom, and his average glucose level is 177  He is having some hypoglycemia after meals, would recommend decreasing his Humalog to 10 units daily  He is having overnight hyperglycemia, would like to increase his Lantus to 28 units  Continue with Trulicity 3 mg weekly, metformin 500 mg in the morning and 1000 mg in the evening, and glimepiride 4 mg daily  He is a type 2 diabetic currently on for insulin injections a day with a sliding scale based on blood sugars  He is utilizing the Dexcom continuous glucose monitoring system to help improve his blood sugars  Recommend having a download in 2 weeks  Follow-up in 3 months with lab work completed prior to visit      2  Hypertension:  Normotensive in the office today  Kidney function is stable with normal like joints   Continue with current medications  Repeat CMP prior to next office visit      3  Hyperlipidemia:  LDL cholesterol slightly elevated, but rest of the lipid panel was looking good   At this time continue with Crestor  Recheck lipid panel prior to next office visit        4  Vertebral compression fracture: Once again discussed with him the concerns with osteoporosis it due to his fall  Would recommend getting a DEXA scan completed to see will kind of treatment would need to be done    Will contact once results are in       CC:  Type 2 diabetes follow-up    History of Present Illness     HPI:  Toby Lomeli a 61year old male with type 2 diabetes for about 5-10 years   He is on oral agents and insulin at home and takes metformin 500 mg in the morning and 1000 mg in the evening, glimepiride 4 mg daily, Humalog 15 units with each meal, Lantus 27 units in the evening, Trulicity 1 5 mg  He is currently utilizing sliding scale with his Humalog depending on where his blood sugar is with meals  Has been on the trulicity for about 1 year  He denies any polyuria, polydipsia, nocturia and blurry vision   He denies nephropathy, retinopathy, heart attack, stroke and claudication but does admit to neuropathy with symptoms worse at night   Has not followed up with a podiatrist         Hypoglycemic episodes:  May have episodes of hypoglycemia after meals depending on activity level and what he eats      The patient's last eye exam was over 1 year ago and I encouraged him to update this   Last diabetic foot exam was completed November 2020       Blood Sugar/Glucometer/Pump/CGM review:    Utilizes Dexcom continuous glucose monitoring system  Average glucose levels 177 with a standard deviation of 41  He is within target range 51% time, above target range 48% of time, blood sugar range 1% of the time  Will have hypoglycemia overnight, typically trends down after breakfast, goes up a little bit after lunch, but trends down nicely, and then increases after dinner      For hypertension he is treated with lisinopril 10 mg daily  Joe Angel is also managed on labetalol 100 mg twice a day and amlodipine 5 mg daily   For hyperlipidemia, he is treated with rosuvastatin        Had a fall from a ladder about 1 year ago  Was noted to have an L1 compression fracture at that time  Has had serial imaging since, and seems to be stable  Was recommended to get a DEXA scan completed for further evaluation, but has yet to get that done  Review of Systems   Constitutional: Negative for activity change, appetite change, fatigue and unexpected weight change  HENT: Negative for trouble swallowing  Eyes: Negative for visual disturbance  Respiratory: Negative for chest tightness and shortness of breath      Cardiovascular: Negative for chest pain, palpitations and leg swelling  Gastrointestinal: Negative for abdominal pain, diarrhea, nausea and vomiting  Endocrine: Negative for cold intolerance, heat intolerance, polydipsia, polyphagia and polyuria  Genitourinary: Negative for frequency  Musculoskeletal: Positive for back pain and myalgias (in the back)  Skin: Negative for rash and wound  Neurological: Positive for numbness (bilateral feet, worse at night )  Negative for dizziness, weakness, light-headedness and headaches  Psychiatric/Behavioral: Negative for dysphoric mood and sleep disturbance  The patient is not nervous/anxious  Historical Information   Past Medical History:   Diagnosis Date    Alcoholism ([place] [address] )     Anxiety     Atrial fibrillation (HCC)     Diabetes ([place] [address] )     Diabetes mellitus, type 2 ([place] [address] )     Diverticulitis     ED (erectile dysfunction)     High cholesterol     Hyperlipidemia     Hypertension     LVH     L1 vertebral fracture (HCC)     Lung mass     Neuropathy     Nocturia     Osteoarthritis     Paroxysmal atrial fibrillation ([place] [address] )     Pilar cyst     Shingles     Smoker     Spinal stenosis, lumbar      No past surgical history on file    Social History   Social History     Substance and Sexual Activity   Alcohol Use Not Currently     Social History     Substance and Sexual Activity   Drug Use Yes    Types: Marijuana, Other    Comment: prescription pills (unprescribed) daily     Social History     Tobacco Use   Smoking Status Current Every Day Smoker    Packs/day: 2 00    Types: Cigarettes   Smokeless Tobacco Never Used     Family History:   Family History   Adopted: Yes       Meds/Allergies   Current Outpatient Medications   Medication Sig Dispense Refill    B-D UF III MINI PEN NEEDLES 31G X 5 MM MISC 2 (two) times a day      Contour Next Test test strip TEST 4 TIMES A  each 3    Needles & Syringes MISC by Does not apply route 3 (three) times a day before meals 90 each 0    acetaminophen (TYLENOL) 325 mg tablet Take 3 tablets (975 mg total) by mouth every 8 (eight) hours 30 tablet 0    amLODIPine (NORVASC) 5 mg tablet Take 5 mg by mouth daily      cholecalciferol (VITAMIN D3) 250 MCG (13848 UT) capsule Take 5 capsules (50,000 Units total) by mouth 3 (three) times a week 45 capsule 0    Dulaglutide (Trulicity) 3 DP/9 3ZL SOPN Inject 0 5 mL (3 mg total) under the skin once a week 2 mL 5    gabapentin (NEURONTIN) 100 mg capsule Take 1 capsule (100 mg total) by mouth 3 (three) times a day (Patient not taking: Reported on 2/23/2021) 90 capsule 0    gabapentin (NEURONTIN) 300 mg capsule take 1 capsule by mouth at bedtime 30 capsule 3    glimepiride (AMARYL) 4 mg tablet take 1 tablet by mouth twice a day 60 tablet 3    HYDROcodone-acetaminophen (NORCO) 5-325 mg per tablet Take 1 tablet by mouth every 6 (six) hours as needed for painMax Daily Amount: 4 tablets 120 tablet 0    insulin glargine (Lantus SoloStar) 100 units/mL injection pen Inject 40 Units under the skin daily at bedtime (Patient taking differently: Inject 27 Units under the skin daily at bedtime ) 15 mL 3    insulin glargine (LANTUS) 100 units/mL subcutaneous injection 40 units qhs (Patient not taking: Reported on 5/27/2021)      insulin lispro (HumaLOG) 100 units/mL injection pen USE 15 UNITS 3 TIMES DAILY WITH EACH MEAL 15 mL 2    labetalol (NORMODYNE) 200 mg tablet take 1 tablet by mouth twice a day 60 tablet 3    lisinopril (ZESTRIL) 10 mg tablet TAKE 1 TABLET BY MOUTH ONCE DAILY 30 tablet 3    LORazepam (ATIVAN) 0 5 mg tablet   0    LORazepam (ATIVAN) 0 5 mg tablet TAKE TWO(2) TABLETS TWICE A DAY - MAY CAUSE DROWSINESS 120 tablet 3    metFORMIN (GLUCOPHAGE) 1000 MG tablet Take 1 tablet (1,000 mg total) by mouth daily with dinner  0    metFORMIN (GLUCOPHAGE) 500 mg tablet Take 1 tablet (500 mg total) by mouth daily with breakfast  0    methocarbamol (ROBAXIN) 500 mg tablet TAKE 1 TABLET TWICE A DAY - MAY CAUSE DROWSINESS 60 tablet 3    polyethylene glycol (MIRALAX) 17 g packet Take 17 g by mouth daily as needed (constipation) 14 each 0    rosuvastatin (CRESTOR) 5 mg tablet take 1 tablet by mouth at bedtime 30 tablet 3    ROSUVASTATIN CALCIUM PO Take by mouth      sildenafil (VIAGRA) 100 mg tablet take 1 tablet by mouth if needed       No current facility-administered medications for this visit  No Known Allergies    Objective   Vitals: Height 6' 4" (1 93 m)  Physical Exam  Vitals and nursing note reviewed  Constitutional:       General: He is not in acute distress  Appearance: Normal appearance  HENT:      Head: Normocephalic and atraumatic  Eyes:      General: No scleral icterus  Pupils: Pupils are equal, round, and reactive to light  Cardiovascular:      Rate and Rhythm: Normal rate and regular rhythm  Heart sounds: No murmur heard  Pulmonary:      Effort: Pulmonary effort is normal  No respiratory distress  Breath sounds: Normal breath sounds  No wheezing  Musculoskeletal:      Right lower leg: No edema  Left lower leg: No edema  Lymphadenopathy:      Cervical: No cervical adenopathy  Skin:     General: Skin is warm and dry  Neurological:      Mental Status: He is alert and oriented to person, place, and time  Sensory: No sensory deficit  Psychiatric:         Mood and Affect: Mood normal          Behavior: Behavior normal          Thought Content: Thought content normal          The history was obtained from the review of the chart, patient      Lab Results:   Lab Results   Component Value Date/Time    Hemoglobin A1C 8 6 (H) 05/21/2021 08:40 AM    Hemoglobin A1C 9 4 (A) 02/23/2021 01:08 PM    BUN 13 05/21/2021 08:40 AM    Potassium 4 1 05/21/2021 08:40 AM    Chloride 100 05/21/2021 08:40 AM    CO2 25 05/21/2021 08:40 AM    Creatinine 0 81 05/21/2021 08:40 AM    AST 17 05/21/2021 08:40 AM    ALT 22 05/21/2021 08:40 AM    Albumin 4 4 05/21/2021 08:40 AM Globulin, Total 2 7 05/21/2021 08:40 AM    HDL 33 (L) 05/21/2021 08:40 AM    Triglycerides 125 05/21/2021 08:40 AM           Portions of the record may have been created with voice recognition software  Occasional wrong word or "sound a like" substitutions may have occurred due to the inherent limitations of voice recognition software  Read the chart carefully and recognize, using context, where substitutions have occurred

## 2021-08-30 NOTE — PATIENT INSTRUCTIONS
Continue to monitor diet, maintain physical activity   Make sure to drink plenty water throughout the day      Continue with metformin, glimepiride, Trulicity to 3 0 mg weekly      Continue with Humalog 10 units with meals      Increase Lantus to 28 units at night      Continue using Dexcom to monitor blood sugar      Schedule an appointment to get a diabetic eye exam completed      Continue with blood pressure and cholesterol medications      Get initial lab work completed at earliest convenience, will also get lab work prior to next office visit      Get DEXA scan completed      Follow-up in 3 months

## 2021-09-12 DIAGNOSIS — E78.5 HYPERLIPIDEMIA, UNSPECIFIED HYPERLIPIDEMIA TYPE: ICD-10-CM

## 2021-09-13 DIAGNOSIS — S32.010A CLOSED COMPRESSION FRACTURE OF BODY OF L1 VERTEBRA (HCC): ICD-10-CM

## 2021-09-13 RX ORDER — ROSUVASTATIN CALCIUM 5 MG/1
TABLET, COATED ORAL
Qty: 30 TABLET | Refills: 3 | Status: SHIPPED | OUTPATIENT
Start: 2021-09-13 | End: 2022-01-24

## 2021-09-13 RX ORDER — METHOCARBAMOL 500 MG/1
TABLET, FILM COATED ORAL
Qty: 60 TABLET | Refills: 3 | Status: SHIPPED | OUTPATIENT
Start: 2021-09-13 | End: 2022-02-11 | Stop reason: SDUPTHER

## 2021-09-15 DIAGNOSIS — E11.9 TYPE 2 DIABETES MELLITUS WITHOUT COMPLICATION, WITH LONG-TERM CURRENT USE OF INSULIN (HCC): ICD-10-CM

## 2021-09-15 DIAGNOSIS — Z79.4 TYPE 2 DIABETES MELLITUS WITHOUT COMPLICATION, WITH LONG-TERM CURRENT USE OF INSULIN (HCC): ICD-10-CM

## 2021-09-15 RX ORDER — GLIMEPIRIDE 4 MG/1
TABLET ORAL
Qty: 60 TABLET | Refills: 3 | Status: SHIPPED | OUTPATIENT
Start: 2021-09-15 | End: 2022-05-16

## 2021-09-18 ENCOUNTER — TELEPHONE (OUTPATIENT)
Dept: INTERNAL MEDICINE CLINIC | Facility: CLINIC | Age: 61
End: 2021-09-18

## 2021-09-18 DIAGNOSIS — I10 ESSENTIAL HYPERTENSION: Primary | ICD-10-CM

## 2021-09-18 RX ORDER — AMLODIPINE BESYLATE 5 MG/1
5 TABLET ORAL DAILY
Qty: 30 TABLET | Refills: 4 | Status: SHIPPED | OUTPATIENT
Start: 2021-09-18 | End: 2022-01-17

## 2021-09-20 DIAGNOSIS — S32.010A CLOSED COMPRESSION FRACTURE OF BODY OF L1 VERTEBRA (HCC): ICD-10-CM

## 2021-09-20 RX ORDER — HYDROCODONE BITARTRATE AND ACETAMINOPHEN 5; 325 MG/1; MG/1
1 TABLET ORAL EVERY 6 HOURS PRN
Qty: 120 TABLET | Refills: 0 | Status: SHIPPED | OUTPATIENT
Start: 2021-09-20 | End: 2021-10-18 | Stop reason: SDUPTHER

## 2021-10-11 DIAGNOSIS — F41.9 ANXIETY: ICD-10-CM

## 2021-10-11 RX ORDER — LORAZEPAM 0.5 MG/1
1 TABLET ORAL 2 TIMES DAILY
Qty: 120 TABLET | Refills: 3 | Status: SHIPPED | OUTPATIENT
Start: 2021-10-11 | End: 2022-02-14

## 2021-10-15 ENCOUNTER — OFFICE VISIT (OUTPATIENT)
Dept: INTERNAL MEDICINE CLINIC | Facility: CLINIC | Age: 61
End: 2021-10-15
Payer: COMMERCIAL

## 2021-10-15 VITALS
HEART RATE: 74 BPM | TEMPERATURE: 97.4 F | DIASTOLIC BLOOD PRESSURE: 70 MMHG | SYSTOLIC BLOOD PRESSURE: 110 MMHG | RESPIRATION RATE: 12 BRPM | WEIGHT: 245 LBS | BODY MASS INDEX: 33.18 KG/M2 | HEIGHT: 72 IN | OXYGEN SATURATION: 98 %

## 2021-10-15 DIAGNOSIS — E78.5 HYPERLIPIDEMIA, UNSPECIFIED HYPERLIPIDEMIA TYPE: ICD-10-CM

## 2021-10-15 DIAGNOSIS — I10 ESSENTIAL HYPERTENSION: ICD-10-CM

## 2021-10-15 DIAGNOSIS — Z00.00 ANNUAL PHYSICAL EXAM: Primary | ICD-10-CM

## 2021-10-15 PROCEDURE — 4004F PT TOBACCO SCREEN RCVD TLK: CPT | Performed by: INTERNAL MEDICINE

## 2021-10-15 PROCEDURE — 99396 PREV VISIT EST AGE 40-64: CPT | Performed by: INTERNAL MEDICINE

## 2021-10-15 PROCEDURE — 3725F SCREEN DEPRESSION PERFORMED: CPT | Performed by: INTERNAL MEDICINE

## 2021-10-15 PROCEDURE — 3074F SYST BP LT 130 MM HG: CPT | Performed by: INTERNAL MEDICINE

## 2021-10-15 PROCEDURE — 3078F DIAST BP <80 MM HG: CPT | Performed by: INTERNAL MEDICINE

## 2021-10-15 PROCEDURE — 3008F BODY MASS INDEX DOCD: CPT | Performed by: INTERNAL MEDICINE

## 2021-10-18 DIAGNOSIS — S32.010A CLOSED COMPRESSION FRACTURE OF BODY OF L1 VERTEBRA (HCC): ICD-10-CM

## 2021-10-18 RX ORDER — HYDROCODONE BITARTRATE AND ACETAMINOPHEN 5; 325 MG/1; MG/1
1 TABLET ORAL EVERY 6 HOURS PRN
Qty: 120 TABLET | Refills: 0 | Status: SHIPPED | OUTPATIENT
Start: 2021-10-18 | End: 2021-11-17 | Stop reason: SDUPTHER

## 2021-10-25 ENCOUNTER — DOCUMENTATION (OUTPATIENT)
Dept: ENDOCRINOLOGY | Facility: HOSPITAL | Age: 61
End: 2021-10-25

## 2021-10-29 DIAGNOSIS — S32.010A CLOSED COMPRESSION FRACTURE OF BODY OF L1 VERTEBRA (HCC): ICD-10-CM

## 2021-11-04 LAB
ALBUMIN SERPL-MCNC: 4.4 G/DL (ref 3.8–4.8)
ALBUMIN/GLOB SERPL: 1.8 {RATIO} (ref 1.2–2.2)
ALP SERPL-CCNC: 88 IU/L (ref 44–121)
ALT SERPL-CCNC: 22 IU/L (ref 0–44)
AST SERPL-CCNC: 17 IU/L (ref 0–40)
BILIRUB SERPL-MCNC: 0.3 MG/DL (ref 0–1.2)
BUN SERPL-MCNC: 13 MG/DL (ref 8–27)
BUN/CREAT SERPL: 17 (ref 10–24)
CALCIUM SERPL-MCNC: 9.3 MG/DL (ref 8.6–10.2)
CHLORIDE SERPL-SCNC: 101 MMOL/L (ref 96–106)
CHOLEST SERPL-MCNC: 153 MG/DL (ref 100–199)
CHOLEST/HDLC SERPL: 4.4 RATIO (ref 0–5)
CO2 SERPL-SCNC: 24 MMOL/L (ref 20–29)
CREAT SERPL-MCNC: 0.77 MG/DL (ref 0.76–1.27)
EST. AVERAGE GLUCOSE BLD GHB EST-MCNC: 194 MG/DL
GLOBULIN SER-MCNC: 2.5 G/DL (ref 1.5–4.5)
GLUCOSE SERPL-MCNC: 136 MG/DL (ref 65–99)
HBA1C MFR BLD: 8.4 % (ref 4.8–5.6)
HDLC SERPL-MCNC: 35 MG/DL
LDLC SERPL CALC-MCNC: 102 MG/DL (ref 0–99)
POTASSIUM SERPL-SCNC: 3.9 MMOL/L (ref 3.5–5.2)
PROT SERPL-MCNC: 6.9 G/DL (ref 6–8.5)
SL AMB EGFR AFRICAN AMERICAN: 113 ML/MIN/1.73
SL AMB EGFR NON AFRICAN AMERICAN: 98 ML/MIN/1.73
SL AMB VLDL CHOLESTEROL CALC: 16 MG/DL (ref 5–40)
SODIUM SERPL-SCNC: 139 MMOL/L (ref 134–144)
TRIGL SERPL-MCNC: 83 MG/DL (ref 0–149)

## 2021-11-04 PROCEDURE — 3052F HG A1C>EQUAL 8.0%<EQUAL 9.0%: CPT | Performed by: INTERNAL MEDICINE

## 2021-11-14 DIAGNOSIS — I10 ESSENTIAL HYPERTENSION: ICD-10-CM

## 2021-11-15 PROCEDURE — 4010F ACE/ARB THERAPY RXD/TAKEN: CPT | Performed by: INTERNAL MEDICINE

## 2021-11-15 RX ORDER — LISINOPRIL 10 MG/1
TABLET ORAL
Qty: 30 TABLET | Refills: 3 | Status: SHIPPED | OUTPATIENT
Start: 2021-11-15 | End: 2022-05-16

## 2021-11-17 DIAGNOSIS — E11.9 TYPE 2 DIABETES MELLITUS WITHOUT COMPLICATION, WITH LONG-TERM CURRENT USE OF INSULIN (HCC): ICD-10-CM

## 2021-11-17 DIAGNOSIS — S32.010A CLOSED COMPRESSION FRACTURE OF BODY OF L1 VERTEBRA (HCC): ICD-10-CM

## 2021-11-17 DIAGNOSIS — Z79.4 TYPE 2 DIABETES MELLITUS WITHOUT COMPLICATION, WITH LONG-TERM CURRENT USE OF INSULIN (HCC): ICD-10-CM

## 2021-11-17 RX ORDER — HYDROCODONE BITARTRATE AND ACETAMINOPHEN 5; 325 MG/1; MG/1
1 TABLET ORAL EVERY 6 HOURS PRN
Qty: 120 TABLET | Refills: 0 | Status: SHIPPED | OUTPATIENT
Start: 2021-11-17 | End: 2021-12-15 | Stop reason: SDUPTHER

## 2021-11-17 RX ORDER — DULAGLUTIDE 3 MG/.5ML
3 INJECTION, SOLUTION SUBCUTANEOUS WEEKLY
Qty: 2 ML | Refills: 5 | Status: SHIPPED | OUTPATIENT
Start: 2021-11-17 | End: 2022-05-11

## 2021-12-02 ENCOUNTER — OFFICE VISIT (OUTPATIENT)
Dept: ENDOCRINOLOGY | Facility: HOSPITAL | Age: 61
End: 2021-12-02
Payer: COMMERCIAL

## 2021-12-02 VITALS
DIASTOLIC BLOOD PRESSURE: 88 MMHG | HEART RATE: 95 BPM | WEIGHT: 245.8 LBS | HEIGHT: 72 IN | SYSTOLIC BLOOD PRESSURE: 146 MMHG | BODY MASS INDEX: 33.29 KG/M2

## 2021-12-02 DIAGNOSIS — M81.0 OSTEOPOROSIS, UNSPECIFIED OSTEOPOROSIS TYPE, UNSPECIFIED PATHOLOGICAL FRACTURE PRESENCE: ICD-10-CM

## 2021-12-02 DIAGNOSIS — Z79.4 TYPE 2 DIABETES MELLITUS WITH OTHER SPECIFIED COMPLICATION, WITH LONG-TERM CURRENT USE OF INSULIN (HCC): Primary | ICD-10-CM

## 2021-12-02 DIAGNOSIS — E11.69 TYPE 2 DIABETES MELLITUS WITH OTHER SPECIFIED COMPLICATION, WITH LONG-TERM CURRENT USE OF INSULIN (HCC): Primary | ICD-10-CM

## 2021-12-02 DIAGNOSIS — I10 ESSENTIAL HYPERTENSION: ICD-10-CM

## 2021-12-02 DIAGNOSIS — E78.5 HYPERLIPIDEMIA, UNSPECIFIED HYPERLIPIDEMIA TYPE: ICD-10-CM

## 2021-12-02 PROCEDURE — 3077F SYST BP >= 140 MM HG: CPT | Performed by: PHYSICIAN ASSISTANT

## 2021-12-02 PROCEDURE — 99214 OFFICE O/P EST MOD 30 MIN: CPT | Performed by: PHYSICIAN ASSISTANT

## 2021-12-02 PROCEDURE — 4004F PT TOBACCO SCREEN RCVD TLK: CPT | Performed by: PHYSICIAN ASSISTANT

## 2021-12-02 PROCEDURE — 3079F DIAST BP 80-89 MM HG: CPT | Performed by: PHYSICIAN ASSISTANT

## 2021-12-02 PROCEDURE — 3008F BODY MASS INDEX DOCD: CPT | Performed by: PHYSICIAN ASSISTANT

## 2021-12-15 DIAGNOSIS — S32.010A CLOSED COMPRESSION FRACTURE OF BODY OF L1 VERTEBRA (HCC): ICD-10-CM

## 2021-12-15 RX ORDER — HYDROCODONE BITARTRATE AND ACETAMINOPHEN 5; 325 MG/1; MG/1
1 TABLET ORAL EVERY 6 HOURS PRN
Qty: 120 TABLET | Refills: 0 | Status: SHIPPED | OUTPATIENT
Start: 2021-12-15 | End: 2022-01-13 | Stop reason: SDUPTHER

## 2021-12-16 DIAGNOSIS — M47.816 LUMBAR SPONDYLOSIS: ICD-10-CM

## 2021-12-17 DIAGNOSIS — S32.010A CLOSED COMPRESSION FRACTURE OF BODY OF L1 VERTEBRA (HCC): ICD-10-CM

## 2021-12-17 RX ORDER — LABETALOL 200 MG/1
TABLET, FILM COATED ORAL
Qty: 60 TABLET | Refills: 3 | Status: SHIPPED | OUTPATIENT
Start: 2021-12-17 | End: 2022-04-18

## 2021-12-17 RX ORDER — GABAPENTIN 300 MG/1
CAPSULE ORAL
Qty: 30 CAPSULE | Refills: 3 | Status: SHIPPED | OUTPATIENT
Start: 2021-12-17 | End: 2022-04-18

## 2022-01-05 ENCOUNTER — TELEPHONE (OUTPATIENT)
Dept: ENDOCRINOLOGY | Facility: HOSPITAL | Age: 62
End: 2022-01-05

## 2022-01-05 NOTE — TELEPHONE ENCOUNTER
Received call from patient's pharmacy  They cannot get insulin lispro, they would like to know if it is okay to fill Apidra which they have in stock?

## 2022-01-13 ENCOUNTER — OFFICE VISIT (OUTPATIENT)
Dept: INTERNAL MEDICINE CLINIC | Facility: CLINIC | Age: 62
End: 2022-01-13
Payer: COMMERCIAL

## 2022-01-13 VITALS
TEMPERATURE: 97.6 F | WEIGHT: 243 LBS | BODY MASS INDEX: 32.91 KG/M2 | HEIGHT: 72 IN | HEART RATE: 74 BPM | SYSTOLIC BLOOD PRESSURE: 120 MMHG | DIASTOLIC BLOOD PRESSURE: 74 MMHG

## 2022-01-13 DIAGNOSIS — M47.816 LUMBAR SPONDYLOSIS: Primary | ICD-10-CM

## 2022-01-13 DIAGNOSIS — M48.061 SPINAL STENOSIS OF LUMBAR REGION WITHOUT NEUROGENIC CLAUDICATION: ICD-10-CM

## 2022-01-13 DIAGNOSIS — S32.010A CLOSED COMPRESSION FRACTURE OF BODY OF L1 VERTEBRA (HCC): ICD-10-CM

## 2022-01-13 DIAGNOSIS — I10 ESSENTIAL HYPERTENSION: ICD-10-CM

## 2022-01-13 PROCEDURE — 99213 OFFICE O/P EST LOW 20 MIN: CPT | Performed by: INTERNAL MEDICINE

## 2022-01-13 PROCEDURE — 3008F BODY MASS INDEX DOCD: CPT | Performed by: INTERNAL MEDICINE

## 2022-01-13 PROCEDURE — 4004F PT TOBACCO SCREEN RCVD TLK: CPT | Performed by: INTERNAL MEDICINE

## 2022-01-13 PROCEDURE — 3074F SYST BP LT 130 MM HG: CPT | Performed by: INTERNAL MEDICINE

## 2022-01-13 PROCEDURE — 3078F DIAST BP <80 MM HG: CPT | Performed by: INTERNAL MEDICINE

## 2022-01-13 RX ORDER — INSULIN GLULISINE 100 [IU]/ML
INJECTION, SOLUTION SUBCUTANEOUS
COMMUNITY
Start: 2022-01-04 | End: 2022-04-08

## 2022-01-13 RX ORDER — HYDROCODONE BITARTRATE AND ACETAMINOPHEN 5; 325 MG/1; MG/1
1 TABLET ORAL EVERY 6 HOURS PRN
Qty: 120 TABLET | Refills: 0 | Status: SHIPPED | OUTPATIENT
Start: 2022-01-13 | End: 2022-02-11 | Stop reason: SDUPTHER

## 2022-01-13 NOTE — PROGRESS NOTES
BMI Counseling: There is no height or weight on file to calculate BMI  The BMI is above normal  Nutrition recommendations include decreasing portion sizes  Exercise recommendations include exercising 3-5 times per week  Patient referred to PCP  Rationale for BMI follow-up plan is due to patient being overweight or obese  Assessment/Plan:    No problem-specific Assessment & Plan notes found for this encounter  Diagnoses and all orders for this visit:    Lumbar spondylosis    Spinal stenosis of lumbar region without neurogenic claudication    Other orders  -     Apidra SoloStar 100 units/mL injection pen          Subjective:      Patient ID: Aimee Aguilar is a 64 y o  male  Doing well  Follows with endocrinology  Insulin  Changed  Discussed smoking  Pain controlled  No oversedation  Keeps meds stored  Cannot do adl without      The following portions of the patient's history were reviewed and updated as appropriate: allergies, current medications, past family history, past medical history, past social history, past surgical history, and problem list     Review of Systems   Constitutional: Negative for activity change and fatigue  HENT: Negative for ear discharge, ear pain, rhinorrhea and sore throat  Eyes: Negative for pain and visual disturbance  Respiratory: Negative for cough and shortness of breath  Cardiovascular: Negative for chest pain and leg swelling  Gastrointestinal: Negative for abdominal pain, constipation and diarrhea  Endocrine: Negative for cold intolerance and polyuria  Genitourinary: Negative for flank pain and hematuria  Musculoskeletal: Negative for back pain and joint swelling  Skin: Negative for pallor and wound  Neurological: Negative for dizziness, seizures and speech difficulty  Psychiatric/Behavioral: Negative for confusion and hallucinations           Objective:      Ht 6' (1 829 m)   Wt 110 kg (243 lb)   BMI 32 96 kg/m²          Physical Exam  Vitals and nursing note reviewed  Constitutional:       General: He is not in acute distress  Appearance: Normal appearance  He is not ill-appearing  HENT:      Head: Normocephalic  Right Ear: External ear normal  There is no impacted cerumen  Left Ear: External ear normal  There is no impacted cerumen  Nose: No congestion or rhinorrhea  Mouth/Throat:      Pharynx: No posterior oropharyngeal erythema  Eyes:      General: No scleral icterus  Right eye: No discharge  Left eye: No discharge  Neck:      Vascular: No carotid bruit  Cardiovascular:      Rate and Rhythm: Normal rate and regular rhythm  Heart sounds: Normal heart sounds  No murmur heard  No friction rub  No gallop  Pulmonary:      Breath sounds: No wheezing or rhonchi  Abdominal:      General: There is no distension  Tenderness: There is no abdominal tenderness  There is no guarding  Musculoskeletal:         General: No swelling  Cervical back: No rigidity  Right lower leg: No edema  Left lower leg: No edema  Lymphadenopathy:      Cervical: No cervical adenopathy  Skin:     Coloration: Skin is not jaundiced  Neurological:      Mental Status: He is alert  Cranial Nerves: No cranial nerve deficit  Motor: No weakness        Coordination: Coordination normal    Psychiatric:         Mood and Affect: Mood normal

## 2022-01-16 DIAGNOSIS — I10 ESSENTIAL HYPERTENSION: ICD-10-CM

## 2022-01-17 RX ORDER — AMLODIPINE BESYLATE 5 MG/1
TABLET ORAL
Qty: 30 TABLET | Refills: 5 | Status: SHIPPED | OUTPATIENT
Start: 2022-01-17

## 2022-01-24 DIAGNOSIS — E78.5 HYPERLIPIDEMIA, UNSPECIFIED HYPERLIPIDEMIA TYPE: ICD-10-CM

## 2022-01-24 RX ORDER — ROSUVASTATIN CALCIUM 5 MG/1
TABLET, COATED ORAL
Qty: 30 TABLET | Refills: 3 | Status: SHIPPED | OUTPATIENT
Start: 2022-01-24 | End: 2022-05-16

## 2022-02-11 DIAGNOSIS — S32.010A CLOSED COMPRESSION FRACTURE OF BODY OF L1 VERTEBRA (HCC): ICD-10-CM

## 2022-02-11 RX ORDER — HYDROCODONE BITARTRATE AND ACETAMINOPHEN 5; 325 MG/1; MG/1
1 TABLET ORAL EVERY 6 HOURS PRN
Qty: 120 TABLET | Refills: 0 | Status: SHIPPED | OUTPATIENT
Start: 2022-02-11 | End: 2022-03-10 | Stop reason: SDUPTHER

## 2022-02-11 RX ORDER — METHOCARBAMOL 500 MG/1
500 TABLET, FILM COATED ORAL 2 TIMES DAILY PRN
Qty: 60 TABLET | Refills: 3 | Status: SHIPPED | OUTPATIENT
Start: 2022-02-11 | End: 2022-06-27

## 2022-02-14 DIAGNOSIS — F41.9 ANXIETY: ICD-10-CM

## 2022-02-14 DIAGNOSIS — N52.9 ERECTILE DYSFUNCTION, UNSPECIFIED ERECTILE DYSFUNCTION TYPE: Primary | ICD-10-CM

## 2022-02-14 RX ORDER — SILDENAFIL 100 MG/1
TABLET, FILM COATED ORAL
Qty: 10 TABLET | Refills: 3 | Status: SHIPPED | OUTPATIENT
Start: 2022-02-14

## 2022-02-14 RX ORDER — LORAZEPAM 0.5 MG/1
TABLET ORAL
Qty: 120 TABLET | Refills: 3 | Status: SHIPPED | OUTPATIENT
Start: 2022-02-14 | End: 2022-06-10

## 2022-02-14 NOTE — TELEPHONE ENCOUNTER
Requested medication(s) are due for refill today: Yes  Patient has already received a courtesy refill: No  Other reason request has been forwarded to provider: need auth

## 2022-03-03 ENCOUNTER — OFFICE VISIT (OUTPATIENT)
Dept: ENDOCRINOLOGY | Facility: HOSPITAL | Age: 62
End: 2022-03-03
Payer: COMMERCIAL

## 2022-03-03 VITALS
HEIGHT: 72 IN | SYSTOLIC BLOOD PRESSURE: 142 MMHG | HEART RATE: 98 BPM | OXYGEN SATURATION: 96 % | WEIGHT: 243 LBS | BODY MASS INDEX: 32.91 KG/M2 | DIASTOLIC BLOOD PRESSURE: 88 MMHG

## 2022-03-03 DIAGNOSIS — I10 ESSENTIAL HYPERTENSION: ICD-10-CM

## 2022-03-03 DIAGNOSIS — E78.5 HYPERLIPIDEMIA, UNSPECIFIED HYPERLIPIDEMIA TYPE: ICD-10-CM

## 2022-03-03 DIAGNOSIS — Z79.4 TYPE 2 DIABETES MELLITUS WITH OTHER SPECIFIED COMPLICATION, WITH LONG-TERM CURRENT USE OF INSULIN (HCC): Primary | ICD-10-CM

## 2022-03-03 DIAGNOSIS — E11.69 TYPE 2 DIABETES MELLITUS WITH OTHER SPECIFIED COMPLICATION, WITH LONG-TERM CURRENT USE OF INSULIN (HCC): Primary | ICD-10-CM

## 2022-03-03 PROCEDURE — 99214 OFFICE O/P EST MOD 30 MIN: CPT | Performed by: PHYSICIAN ASSISTANT

## 2022-03-03 PROCEDURE — 3079F DIAST BP 80-89 MM HG: CPT | Performed by: PHYSICIAN ASSISTANT

## 2022-03-03 PROCEDURE — 3008F BODY MASS INDEX DOCD: CPT | Performed by: PHYSICIAN ASSISTANT

## 2022-03-03 PROCEDURE — 3077F SYST BP >= 140 MM HG: CPT | Performed by: PHYSICIAN ASSISTANT

## 2022-03-03 PROCEDURE — 4004F PT TOBACCO SCREEN RCVD TLK: CPT | Performed by: PHYSICIAN ASSISTANT

## 2022-03-03 NOTE — PROGRESS NOTES
Carolyn Martinez 64 y o  male MRN: [id number]    Encounter: [id number]      Assessment/Plan     Assessment: This is a 64y o -year-old male with type 2 diabetes with hypertension wrist and hyperlipidemia, and vertebral compression fracture  Plan:  1  Type 2 diabetes, insulin requiring:  No recent lab work, and previous A1c was 8 4  Dexcom download looking much better than last office visit  At this time would like to increase his Lantus to 33 units  He will continue with Humalog 10 units with each meal   He will decrease it to 5 units if he is increasing physical activity after meal   Continue to utilize Dexcom monitor glucose  Contact the office with any concerns or questions  Follow-up in 3 months with lab work completed prior to visit      2  Hypertension:  Blood pressure slightly elevated today  Typically within normal range in the office   Kidney function is stable with normal like joints   Continue with current medications   Repeat CMP prior to next office visit      3  Hyperlipidemia:  Previous LDL cholesterol was slightly elevated  Continue current medications  Repeat lipid panel prior to next office visit      4  Vertebral compression fracture:  Once again discussed with him the concerns with osteoporosis it due to his fall   Would recommend getting a DEXA scan completed to see will kind of treatment would need to be done  Daniel Covington contact once results are in      CC:  Type 2 diabetes follow-up    History of Present Illness     HPI:  Scott Garcia a 61year old male with type 2 diabetes for about 5-10 years   He is on oral agents and insulin at home and takes metformin 500 mg in the morning and 1000 mg in the evening, glimepiride 4 mg daily, Humalog 10 units with each meal, Lantus 30 units in the evening, Trulicity 3 mg   He is currently utilizing sliding scale with his Humalog depending on where his blood sugar is with meals   He denies any polyuria, polydipsia, nocturia and blurry vision   He denies nephropathy, retinopathy, heart attack, stroke and claudication but does admit to neuropathy with symptoms worse at night   Has not followed up with a podiatrist   Has been working on dietary changes to help with blood sugar numbers  No changes in physical activity, but states when he is doing a lot of physical activity, such as moving firewood, his blood sugar may go low        Hypoglycemic episodes:  May have episodes of hypoglycemia after meals depending on activity level and what he eats      The patient's last eye exam was over 1 year ago and I encouraged him to update this  States he has an appointment tomorrow   Last diabetic foot exam was completed October 2021       Blood Sugar/Glucometer/Pump/CGM review:  Is waiting on a new transmitter for his Dexcom, and last download is from February 6 to February 18, 2022  Average glucose level was 198 with a standard deviation of 46  He is in target range 34% time, above target range 66% of the time  Blood sugars are slightly high on average throughout the day and increases around dinner time peaks rate before bed and then starts trending down      For hypertension he is treated with lisinopril 10 mg daily  Jacky Jackson is also managed on labetalol 100 mg twice a day and amlodipine 5 mg daily   For hyperlipidemia, he is treated with rosuvastatin        Had a fall from a ladder about 1 year ago  Nika Templeton noted to have an L1 compression fracture at that time  Prerna Lizama had serial imaging since, and seems to be stable   Was recommended to get a DEXA scan completed for further evaluation, but has yet to get that done  Review of Systems   Constitutional: Negative for activity change, appetite change, fatigue and unexpected weight change  HENT: Negative for trouble swallowing  Eyes: Negative for visual disturbance  Respiratory: Negative for chest tightness and shortness of breath  Cardiovascular: Negative for chest pain, palpitations and leg swelling  Gastrointestinal: Negative for abdominal pain, diarrhea, nausea and vomiting  Endocrine: Negative for cold intolerance, heat intolerance, polydipsia, polyphagia and polyuria  Genitourinary: Negative for frequency  Musculoskeletal: Positive for back pain and myalgias (in the back)  Skin: Negative for rash and wound  Neurological: Positive for numbness (bilateral feet, worse at night )  Negative for dizziness, weakness, light-headedness and headaches  Psychiatric/Behavioral: Negative for dysphoric mood and sleep disturbance  The patient is not nervous/anxious  Historical Information   Past Medical History:   Diagnosis Date    Alcoholism ([place] [address] )     Anxiety     Atrial fibrillation (HCC)     Diabetes ([place] [address] )     Diabetes mellitus, type 2 ([place] [address] )     Diverticulitis     ED (erectile dysfunction)     High cholesterol     Hyperlipidemia     Hypertension     LVH     [facility]     Lung mass     Neuropathy     Nocturia     Osteoarthritis     Paroxysmal atrial fibrillation ([place] [address] )     Pilar cyst     Shingles     Smoker     Spinal stenosis, lumbar      No past surgical history on file    Social History   Social History     Substance and Sexual Activity   Alcohol Use Not Currently     Social History     Substance and Sexual Activity   Drug Use Yes    Types: Marijuana, Other    Comment: prescription pills (unprescribed) daily     Social History     Tobacco Use   Smoking Status Current Every Day Smoker    Packs/day: 2 00    Types: Cigarettes   Smokeless Tobacco Never Used     Family History:   Family History   Adopted: Yes       Meds/Allergies   Current Outpatient Medications   Medication Sig Dispense Refill    acetaminophen (TYLENOL) 325 mg tablet Take 3 tablets (975 mg total) by mouth every 8 (eight) hours 30 tablet 0    amLODIPine (NORVASC) 5 mg tablet TAKE 1 TABLET DAILY -MAY CAUSE DIZZINESS OR LIGHTHEADEDNESS- 30 tablet 5    Apidra SoloStar 100 units/mL injection pen  B-D UF III MINI PEN NEEDLES 31G X 5 MM MISC 2 (two) times a day      cholecalciferol (VITAMIN D3) 250 MCG (16970 UT) capsule Take 5 capsules (50,000 Units total) by mouth 3 (three) times a week (Patient not taking: Reported on 12/2/2021 ) 45 capsule 0    Contour Next Test test strip TEST 4 TIMES A  each 3    Dulaglutide (Trulicity) 3 HF/6 1YB SOPN Inject 0 5 mL (3 mg total) under the skin once a week 2 mL 5    gabapentin (NEURONTIN) 100 mg capsule Take 1 capsule (100 mg total) by mouth 3 (three) times a day 90 capsule 0    gabapentin (NEURONTIN) 300 mg capsule take 1 capsule by mouth at bedtime 30 capsule 3    glimepiride (AMARYL) 4 mg tablet take 1 tablet by mouth twice a day (Patient taking differently: take 1 tablet by mouth once daily ) 60 tablet 3    HYDROcodone-acetaminophen (NORCO) 5-325 mg per tablet Take 1 tablet by mouth every 6 (six) hours as needed for pain Max Daily Amount: 4 tablets 120 tablet 0    insulin glargine (Lantus SoloStar) 100 units/mL injection pen Inject 40 Units under the skin daily at bedtime (Patient taking differently: Inject 28 Units under the skin daily at bedtime  ) 15 mL 3    insulin glargine (LANTUS) 100 units/mL subcutaneous injection 40 units qhs (Patient not taking: Reported on 5/27/2021)      insulin lispro (HumaLOG) 100 units/mL injection pen USE 10 UNITS 3 TIMES DAILY WITH EACH MEAL 15 mL 2    labetalol (NORMODYNE) 200 mg tablet take 1 tablet by mouth twice a day 60 tablet 3    lisinopril (ZESTRIL) 10 mg tablet TAKE 1 TABLET BY MOUTH ONCE DAILY 30 tablet 3    LORazepam (ATIVAN) 0 5 mg tablet   0    LORazepam (ATIVAN) 0 5 mg tablet TAKE TWO(2) TABLETS TWICE A DAY - MAY CAUSE DROWSINESS 120 tablet 3    metFORMIN (GLUCOPHAGE) 1000 MG tablet Take 1 tablet (1,000 mg total) by mouth daily with dinner (Patient not taking: Reported on 12/2/2021 )  0    metFORMIN (GLUCOPHAGE) 500 mg tablet take 1 tablet by mouth three times a day 270 tablet 3    methocarbamol (ROBAXIN) 500 mg tablet Take 1 tablet (500 mg total) by mouth 2 (two) times a day as needed for muscle spasms 60 tablet 3    Needles & Syringes MISC by Does not apply route 3 (three) times a day before meals 90 each 0    polyethylene glycol (MIRALAX) 17 g packet Take 17 g by mouth daily as needed (constipation) 14 each 0    rosuvastatin (CRESTOR) 5 mg tablet take 1 tablet by mouth at bedtime 30 tablet 3    ROSUVASTATIN CALCIUM PO Take by mouth      sildenafil (VIAGRA) 100 mg tablet take 1 tablet by mouth if needed 10 tablet 3     No current facility-administered medications for this visit  No Known Allergies    Objective   Vitals: There were no vitals taken for this visit  Physical Exam  Vitals and nursing note reviewed  Constitutional:       General: He is not in acute distress  Appearance: Normal appearance  He is not diaphoretic  HENT:      Head: Normocephalic and atraumatic  Eyes:      General: No scleral icterus  Extraocular Movements: Extraocular movements intact  Conjunctiva/sclera: Conjunctivae normal       Pupils: Pupils are equal, round, and reactive to light  Cardiovascular:      Rate and Rhythm: Normal rate and regular rhythm  Heart sounds: No murmur heard  Pulmonary:      Effort: Pulmonary effort is normal  No respiratory distress  Breath sounds: Normal breath sounds  No wheezing  Musculoskeletal:      Cervical back: Normal range of motion  Right lower leg: No edema  Left lower leg: No edema  Lymphadenopathy:      Cervical: No cervical adenopathy  Skin:     General: Skin is warm and dry  Neurological:      Mental Status: He is alert and oriented to person, place, and time  Mental status is at baseline  Sensory: No sensory deficit  Gait: Gait normal    Psychiatric:         Mood and Affect: Mood normal          Behavior: Behavior normal          Thought Content:  Thought content normal          The history was obtained from the review of the chart, patient  Lab Results:   Lab Results   Component Value Date/Time    Hemoglobin A1C 8 4 (H) 11/03/2021 08:37 AM    Hemoglobin A1C 8 6 (H) 05/21/2021 08:40 AM    BUN 13 11/03/2021 08:37 AM    BUN 13 05/21/2021 08:40 AM    Potassium 3 9 11/03/2021 08:37 AM    Potassium 4 1 05/21/2021 08:40 AM    Chloride 101 11/03/2021 08:37 AM    Chloride 100 05/21/2021 08:40 AM    CO2 24 11/03/2021 08:37 AM    CO2 25 05/21/2021 08:40 AM    Creatinine 0 77 11/03/2021 08:37 AM    Creatinine 0 81 05/21/2021 08:40 AM    AST 17 11/03/2021 08:37 AM    AST 17 05/21/2021 08:40 AM    ALT 22 11/03/2021 08:37 AM    ALT 22 05/21/2021 08:40 AM    Albumin 4 4 11/03/2021 08:37 AM    Albumin 4 4 05/21/2021 08:40 AM    Globulin, Total 2 5 11/03/2021 08:37 AM    Globulin, Total 2 7 05/21/2021 08:40 AM    HDL 35 (L) 11/03/2021 08:37 AM    HDL 33 (L) 05/21/2021 08:40 AM    Triglycerides 83 11/03/2021 08:37 AM    Triglycerides 125 05/21/2021 08:40 AM         Portions of the record may have been created with voice recognition software  Occasional wrong word or "sound a like" substitutions may have occurred due to the inherent limitations of voice recognition software  Read the chart carefully and recognize, using context, where substitutions have occurred

## 2022-03-03 NOTE — PATIENT INSTRUCTIONS
Continue to monitor diet, maintain physical activity   Make sure to drink plenty water throughout the day      Continue with metformin, glimepiride, Trulicity DO 5 2 ZG weekly      Continue with Humalog 10 units with meals  Take 5 units if going to be active after a meal      Increase Lantus to 33 units at night      Continue using Dexcom to monitor blood sugar      Schedule an appointment to get a diabetic eye exam completed      Continue with blood pressure and cholesterol medications      Follow-up in 3 months

## 2022-03-04 LAB
LEFT EYE DIABETIC RETINOPATHY: NORMAL
RIGHT EYE DIABETIC RETINOPATHY: NORMAL

## 2022-03-04 PROCEDURE — 2022F DILAT RTA XM EVC RTNOPTHY: CPT | Performed by: PHYSICIAN ASSISTANT

## 2022-03-07 ENCOUNTER — VBI (OUTPATIENT)
Dept: ADMINISTRATIVE | Facility: OTHER | Age: 62
End: 2022-03-07

## 2022-03-07 NOTE — TELEPHONE ENCOUNTER
Upon review of the In Basket request we were able to locate, review, and update the patient chart as requested for Diabetic Eye Exam     Any additional questions or concerns should be emailed to the Practice Liaisons via [email or web address] email, please do not reply via In Basket      Thank you  Estevan Newell

## 2022-03-10 DIAGNOSIS — S32.010A CLOSED COMPRESSION FRACTURE OF BODY OF L1 VERTEBRA (HCC): ICD-10-CM

## 2022-03-10 RX ORDER — HYDROCODONE BITARTRATE AND ACETAMINOPHEN 5; 325 MG/1; MG/1
1 TABLET ORAL EVERY 6 HOURS PRN
Qty: 120 TABLET | Refills: 0 | Status: SHIPPED | OUTPATIENT
Start: 2022-03-10 | End: 2022-04-08 | Stop reason: SDUPTHER

## 2022-03-30 DIAGNOSIS — E11.9 TYPE 2 DIABETES MELLITUS WITHOUT COMPLICATION, WITHOUT LONG-TERM CURRENT USE OF INSULIN (HCC): ICD-10-CM

## 2022-03-30 RX ORDER — INSULIN GLARGINE 100 [IU]/ML
INJECTION, SOLUTION SUBCUTANEOUS
Qty: 15 ML | Refills: 3 | Status: SHIPPED | OUTPATIENT
Start: 2022-03-30

## 2022-04-08 ENCOUNTER — OFFICE VISIT (OUTPATIENT)
Dept: INTERNAL MEDICINE CLINIC | Facility: CLINIC | Age: 62
End: 2022-04-08
Payer: COMMERCIAL

## 2022-04-08 VITALS
WEIGHT: 243 LBS | HEIGHT: 72 IN | TEMPERATURE: 97 F | DIASTOLIC BLOOD PRESSURE: 70 MMHG | HEART RATE: 70 BPM | OXYGEN SATURATION: 96 % | SYSTOLIC BLOOD PRESSURE: 130 MMHG | BODY MASS INDEX: 32.91 KG/M2

## 2022-04-08 DIAGNOSIS — E11.69 TYPE 2 DIABETES MELLITUS WITH OTHER SPECIFIED COMPLICATION, WITH LONG-TERM CURRENT USE OF INSULIN (HCC): ICD-10-CM

## 2022-04-08 DIAGNOSIS — A63.0 WARTS, GENITAL: ICD-10-CM

## 2022-04-08 DIAGNOSIS — I10 ESSENTIAL HYPERTENSION: ICD-10-CM

## 2022-04-08 DIAGNOSIS — M48.061 SPINAL STENOSIS OF LUMBAR REGION WITHOUT NEUROGENIC CLAUDICATION: Primary | ICD-10-CM

## 2022-04-08 DIAGNOSIS — Z23 NEED FOR SHINGLES VACCINE: ICD-10-CM

## 2022-04-08 DIAGNOSIS — S32.010A CLOSED COMPRESSION FRACTURE OF BODY OF L1 VERTEBRA (HCC): ICD-10-CM

## 2022-04-08 DIAGNOSIS — Z79.4 TYPE 2 DIABETES MELLITUS WITH OTHER SPECIFIED COMPLICATION, WITH LONG-TERM CURRENT USE OF INSULIN (HCC): ICD-10-CM

## 2022-04-08 PROCEDURE — 3075F SYST BP GE 130 - 139MM HG: CPT | Performed by: INTERNAL MEDICINE

## 2022-04-08 PROCEDURE — 99214 OFFICE O/P EST MOD 30 MIN: CPT | Performed by: INTERNAL MEDICINE

## 2022-04-08 PROCEDURE — 3008F BODY MASS INDEX DOCD: CPT | Performed by: INTERNAL MEDICINE

## 2022-04-08 PROCEDURE — 3078F DIAST BP <80 MM HG: CPT | Performed by: INTERNAL MEDICINE

## 2022-04-08 PROCEDURE — 4004F PT TOBACCO SCREEN RCVD TLK: CPT | Performed by: INTERNAL MEDICINE

## 2022-04-08 RX ORDER — HYDROCODONE BITARTRATE AND ACETAMINOPHEN 5; 325 MG/1; MG/1
1 TABLET ORAL EVERY 6 HOURS PRN
Qty: 120 TABLET | Refills: 0 | Status: SHIPPED | OUTPATIENT
Start: 2022-04-08 | End: 2022-05-06 | Stop reason: SDUPTHER

## 2022-04-08 RX ORDER — ZOSTER VACCINE RECOMBINANT, ADJUVANTED 50 MCG/0.5
0.5 KIT INTRAMUSCULAR ONCE
Qty: 1 EACH | Refills: 1 | Status: SHIPPED | OUTPATIENT
Start: 2022-04-08 | End: 2022-04-08

## 2022-04-08 NOTE — PROGRESS NOTES
Assessment/Plan:    No problem-specific Assessment & Plan notes found for this encounter  Diagnoses and all orders for this visit:    Spinal stenosis of lumbar region without neurogenic claudication  Comments:  adl and pain controlled with meds  keeps meds secure  no oversedation reported  worse without meds    Type 2 diabetes mellitus with other specified complication, with long-term current use of insulin ([address] )  Comments:  follows endo  labs ordered 3/2022    Essential hypertension    Warts, genital    Need for shingles vaccine  -     Zoster Vac Recomb Adjuvanted (Shingrix) 50 MCG/0 5ML SUSR; Inject 0 5 mL into a muscle once for 1 dose Repeat dose in 2 to 6 months          Subjective:      Patient ID: Red Gomez is a 64 y o  male  HPI    The following portions of the patient's history were reviewed and updated as appropriate: allergies, current medications, past family history, past medical history, past social history, past surgical history, and problem list     Review of Systems   Constitutional: Negative for activity change and fatigue  HENT: Negative for ear discharge, ear pain, rhinorrhea and sore throat  Eyes: Negative for pain and visual disturbance  Respiratory: Negative for cough and shortness of breath  Cardiovascular: Negative for chest pain and leg swelling  Gastrointestinal: Negative for abdominal pain, constipation and diarrhea  Endocrine: Negative for cold intolerance and polyuria  Genitourinary: Negative for flank pain and hematuria  Musculoskeletal: Negative for back pain and joint swelling  Skin: Negative for pallor and wound  Neurological: Negative for dizziness, seizures and speech difficulty  Psychiatric/Behavioral: Negative for confusion and hallucinations           Objective:      /70   Pulse 70   Temp (!) 97 °F (36 1 °C)   Ht 6' (1 829 m)   Wt 110 kg (243 lb)   SpO2 96%   BMI 32 96 kg/m²          Physical Exam  Vitals and nursing note reviewed  Constitutional:       General: He is not in acute distress  Appearance: Normal appearance  He is not ill-appearing  HENT:      Head: Normocephalic  Right Ear: External ear normal  There is no impacted cerumen  Left Ear: External ear normal  There is no impacted cerumen  Nose: No congestion or rhinorrhea  Mouth/Throat:      Pharynx: No posterior oropharyngeal erythema  Eyes:      General: No scleral icterus  Right eye: No discharge  Left eye: No discharge  Neck:      Vascular: No carotid bruit  Cardiovascular:      Rate and Rhythm: Normal rate and regular rhythm  Heart sounds: Normal heart sounds  No murmur heard  No friction rub  No gallop  Pulmonary:      Breath sounds: No wheezing or rhonchi  Abdominal:      General: There is no distension  Tenderness: There is no abdominal tenderness  There is no guarding  Musculoskeletal:         General: No swelling  Cervical back: No rigidity  Right lower leg: No edema  Left lower leg: No edema  Lymphadenopathy:      Cervical: No cervical adenopathy  Skin:     Coloration: Skin is not jaundiced  Neurological:      Mental Status: He is alert  Cranial Nerves: No cranial nerve deficit  Motor: No weakness        Coordination: Coordination normal    Psychiatric:         Mood and Affect: Mood normal

## 2022-04-18 DIAGNOSIS — M47.816 LUMBAR SPONDYLOSIS: ICD-10-CM

## 2022-04-18 DIAGNOSIS — S32.010A CLOSED COMPRESSION FRACTURE OF BODY OF L1 VERTEBRA (HCC): ICD-10-CM

## 2022-04-18 RX ORDER — GABAPENTIN 300 MG/1
CAPSULE ORAL
Qty: 30 CAPSULE | Refills: 3 | Status: SHIPPED | OUTPATIENT
Start: 2022-04-18

## 2022-04-18 RX ORDER — LABETALOL 200 MG/1
TABLET, FILM COATED ORAL
Qty: 60 TABLET | Refills: 3 | Status: SHIPPED | OUTPATIENT
Start: 2022-04-18 | End: 2022-06-09

## 2022-04-25 ENCOUNTER — DOCUMENTATION (OUTPATIENT)
Dept: ENDOCRINOLOGY | Facility: HOSPITAL | Age: 62
End: 2022-04-25

## 2022-05-06 DIAGNOSIS — S32.010A CLOSED COMPRESSION FRACTURE OF BODY OF L1 VERTEBRA (HCC): ICD-10-CM

## 2022-05-06 RX ORDER — HYDROCODONE BITARTRATE AND ACETAMINOPHEN 5; 325 MG/1; MG/1
1 TABLET ORAL EVERY 6 HOURS PRN
Qty: 120 TABLET | Refills: 0 | Status: SHIPPED | OUTPATIENT
Start: 2022-05-06 | End: 2022-06-03 | Stop reason: SDUPTHER

## 2022-05-11 DIAGNOSIS — E11.9 TYPE 2 DIABETES MELLITUS WITHOUT COMPLICATION, WITH LONG-TERM CURRENT USE OF INSULIN (HCC): ICD-10-CM

## 2022-05-11 DIAGNOSIS — Z79.4 TYPE 2 DIABETES MELLITUS WITHOUT COMPLICATION, WITH LONG-TERM CURRENT USE OF INSULIN (HCC): ICD-10-CM

## 2022-05-11 RX ORDER — DULAGLUTIDE 3 MG/.5ML
3 INJECTION, SOLUTION SUBCUTANEOUS WEEKLY
Qty: 2 ML | Refills: 5 | Status: SHIPPED | OUTPATIENT
Start: 2022-05-11

## 2022-05-15 DIAGNOSIS — E11.9 TYPE 2 DIABETES MELLITUS WITHOUT COMPLICATION, WITH LONG-TERM CURRENT USE OF INSULIN (HCC): ICD-10-CM

## 2022-05-15 DIAGNOSIS — E78.5 HYPERLIPIDEMIA, UNSPECIFIED HYPERLIPIDEMIA TYPE: ICD-10-CM

## 2022-05-15 DIAGNOSIS — I10 ESSENTIAL HYPERTENSION: ICD-10-CM

## 2022-05-15 DIAGNOSIS — Z79.4 TYPE 2 DIABETES MELLITUS WITHOUT COMPLICATION, WITH LONG-TERM CURRENT USE OF INSULIN (HCC): ICD-10-CM

## 2022-05-16 DIAGNOSIS — Z12.2 SCREENING FOR LUNG CANCER: Primary | ICD-10-CM

## 2022-05-16 PROCEDURE — 4010F ACE/ARB THERAPY RXD/TAKEN: CPT | Performed by: INTERNAL MEDICINE

## 2022-05-16 RX ORDER — ROSUVASTATIN CALCIUM 5 MG/1
TABLET, COATED ORAL
Qty: 90 TABLET | Refills: 3 | Status: SHIPPED | OUTPATIENT
Start: 2022-05-16

## 2022-05-16 RX ORDER — LISINOPRIL 10 MG/1
TABLET ORAL
Qty: 90 TABLET | Refills: 3 | Status: SHIPPED | OUTPATIENT
Start: 2022-05-16

## 2022-05-16 RX ORDER — GLIMEPIRIDE 4 MG/1
TABLET ORAL
Qty: 60 TABLET | Refills: 3 | Status: SHIPPED | OUTPATIENT
Start: 2022-05-16

## 2022-05-30 NOTE — PROGRESS NOTES
6/1/2022    Rhae Anthony  1960  [id number]      Assessment  -Genital wart  -Prostate cancer screening  -Erectile dysfunction  -BPH with lower urinary tract symptoms    Discussion/Plan  Ysabel Jerome is a 64 y o  male who presents in consultation    1  Genital warts- multiple lesions noted on right hemiscrotum  He wishes to discuss surgical removal with MD   Patient otherwise asymptomatic  Will order herpes testing as he reports history of unprotected sex  2  Prostate cancer screening- no abnormal findings noted on digital rectal examination today  Provided patient order with PSA  He will obtain prior to his next office visit  We discussed guidelines for routine prostate cancer screening  3  Erectile dysfunction- patient will continue to use sildenafil as needed prior to sexual activity  This is managed by his PCP  We also discussed referral to reconstructive urology for evaluation of chronic Peyronie disease  He defers at this time as he does not find bothersome  4  BPH with lower urinary tract symptoms- we discussed his symptoms of increased urinary frequency and nocturia and correlation with uncontrolled diabetes  Reviewed the importance of tight glucose control and he will continue to follow with endocrinology  Discussed dietary and behavior modifications  He is not interested in trialing an alpha-blocker at this time    Follow-up with MD for further evaluation of genital warts, and review lab work  He was advised to call sooner with any issues     -All questions answered, patient agrees with plan      History of Present Illness  64 y o  male who presents in consultation today for evaluation of genital warts  He was referred by his PCP  Patient reports new development of warts on right hemiscrotum  He states this began 5-6 months ago  Patient concerned that a new wart is developing on penile shaft  He denies any new sexual partners and has been engaging in unprotected sex with his girlfriend    He also reports chronic history of urinary frequency and nocturia  Patient states he voids every 30 minutes  History includes uncontrolled insulin-dependent diabetes  He denies any gross hematuria or dysuria  No recent PSAs available for review  Patient states he is received a digital rectal examination the past   He uses sildenafil as needed prior to sexual activity and also notes slight curvature of penis when erect  Patient describes as a less than 45 degree angle to the left  He denies any pain or discomfort and curvature does not inhibit sexual activity or urination  Patient denies any prior trauma, urologic instrumentation or surgeries  He denies any strong family history of prostate malignancy  Review of Systems  Review of Systems   Constitutional: Negative  HENT: Negative  Respiratory: Negative  Cardiovascular: Negative  Gastrointestinal: Negative  Genitourinary: Positive for frequency and urgency  Negative for decreased urine volume, difficulty urinating, dysuria, flank pain and hematuria  Musculoskeletal: Negative  Skin: Negative  Neurological: Negative  Psychiatric/Behavioral: Negative  AUA SYMPTOM SCORE    Flowsheet Row Most Recent Value   AUA SYMPTOM SCORE    How often have you had a sensation of not emptying your bladder completely after you finished urinating? 3   How often have you had to urinate again less than two hours after you finished urinating? 5   How often have you found you stopped and started again several times when you urinate? 3   How often have you found it difficult to postpone urination? 3   How often have you had a weak urinary stream? 2   How often have you had to push or strain to begin urination? 0   How many times did you most typically get up to urinate from the time you went to bed at night until the time you got up in the morning?  5   Quality of Life: If you were to spend the rest of your life with your urinary condition just the way it is now, how would you feel about that? 3   AUA SYMPTOM SCORE 21            Past Medical History  Past Medical History:   Diagnosis Date    Alcoholism ([place] [address] )     Anxiety     Atrial fibrillation (HCC)     Diabetes ([facility] )     Diabetes mellitus, type 2 ([place] [address] )     Diverticulitis     ED (erectile dysfunction)     High cholesterol     Hyperlipidemia     Hypertension     LVH     L1 vertebral fracture (HCC)     Lung mass     Neuropathy     Nocturia     Osteoarthritis     Paroxysmal atrial fibrillation (HCC)     Pilar cyst     Shingles     Smoker     Spinal stenosis, lumbar        Past Social History  History reviewed  No pertinent surgical history      Past Family History  Family History   Adopted: Yes       Past Social history  Social History     Socioeconomic History    Marital status: Single     Spouse name: Not on file    Number of children: Not on file    Years of education: Not on file    Highest education level: Not on file   Occupational History    Not on file   Tobacco Use    Smoking status: Current Every Day Smoker     Packs/day: 2 00     Types: Cigarettes    Smokeless tobacco: Never Used   Vaping Use    Vaping Use: Never used   Substance and Sexual Activity    Alcohol use: Not Currently    Drug use: Yes     Types: Marijuana, Other     Comment: prescription pills (unprescribed) daily    Sexual activity: Not Currently   Other Topics Concern    Not on file   Social History Narrative    Not on file     Social Determinants of Health     Financial Resource Strain: Not on file   Food Insecurity: Not on file   Transportation Needs: Not on file   Physical Activity: Not on file   Stress: Not on file   Social Connections: Not on file   Intimate Partner Violence: Not on file   Housing Stability: Not on file       Current Medications  Current Outpatient Medications   Medication Sig Dispense Refill    amLODIPine (NORVASC) 5 mg tablet TAKE 1 TABLET DAILY -MAY CAUSE DIZZINESS OR LIGHTHEADEDNESS- 30 tablet 5    B-D UF III MINI PEN NEEDLES 31G X 5 MM MISC 2 (two) times a day      Contour Next Test test strip TEST 4 TIMES A  each 3    gabapentin (NEURONTIN) 100 mg capsule Take 1 capsule (100 mg total) by mouth 3 (three) times a day 90 capsule 0    gabapentin (NEURONTIN) 300 mg capsule take 1 capsule by mouth at bedtime 30 capsule 3    glimepiride (AMARYL) 4 mg tablet take 1 tablet by mouth once daily 60 tablet 3    HYDROcodone-acetaminophen (NORCO) 5-325 mg per tablet Take 1 tablet by mouth every 6 (six) hours as needed for pain Max Daily Amount: 4 tablets 120 tablet 0    insulin lispro (HumaLOG) 100 units/mL injection pen USE 10 UNITS 3 TIMES DAILY WITH EACH MEAL 15 mL 2    Lantus SoloStar 100 units/mL injection pen INJECT 40 UNITS SUBCUTANEOUSLY AT BEDTIME 15 mL 3    lisinopril (ZESTRIL) 10 mg tablet TAKE 1 TABLET BY MOUTH ONCE DAILY 90 tablet 3    LORazepam (ATIVAN) 0 5 mg tablet   0    LORazepam (ATIVAN) 0 5 mg tablet TAKE TWO(2) TABLETS TWICE A DAY - MAY CAUSE DROWSINESS 120 tablet 3    metFORMIN (GLUCOPHAGE) 500 mg tablet take 1 tablet by mouth three times a day 270 tablet 3    methocarbamol (ROBAXIN) 500 mg tablet Take 1 tablet (500 mg total) by mouth 2 (two) times a day as needed for muscle spasms 60 tablet 3    Needles & Syringes MISC by Does not apply route 3 (three) times a day before meals 90 each 0    polyethylene glycol (MIRALAX) 17 g packet Take 17 g by mouth daily as needed (constipation) 14 each 0    rosuvastatin (CRESTOR) 5 mg tablet take 1 tablet by mouth at bedtime 90 tablet 3    ROSUVASTATIN CALCIUM PO Take by mouth      sildenafil (VIAGRA) 100 mg tablet take 1 tablet by mouth if needed 10 tablet 3    Trulicity 3 YK/4 5XE SOPN INJECT 0 5 ML (3 MG TOTAL) UNDER THE SKIN ONCE A WEEK 2 mL 5    acetaminophen (TYLENOL) 325 mg tablet Take 3 tablets (975 mg total) by mouth every 8 (eight) hours (Patient not taking: No sig reported) 30 tablet 0    cholecalciferol (VITAMIN D3) 250 MCG (59606 UT) capsule Take 5 capsules (50,000 Units total) by mouth 3 (three) times a week (Patient not taking: No sig reported) 45 capsule 0    insulin glargine (LANTUS) 100 units/mL subcutaneous injection 40 units qhs (Patient not taking: No sig reported)      labetalol (NORMODYNE) 200 mg tablet take 1 tablet by mouth twice a day 60 tablet 3    metFORMIN (GLUCOPHAGE) 1000 MG tablet Take 1 tablet (1,000 mg total) by mouth daily with dinner (Patient not taking: No sig reported)  0     No current facility-administered medications for this visit  Allergies  No Known Allergies    Past Medical History, Social History, Family History, medications and allergies were reviewed  Vitals  Vitals:    06/01/22 1255   BP: 132/88   BP Location: Left arm   Patient Position: Sitting   Cuff Size: Adult   Pulse: 97   SpO2: 98%   Weight: 111 kg (245 lb)   Height: 6' (1 829 m)       Physical Exam  Physical Exam  Constitutional:       Appearance: Normal appearance  He is well-developed  HENT:      Head: Normocephalic  Eyes:      Pupils: Pupils are equal, round, and reactive to light  Pulmonary:      Effort: Pulmonary effort is normal    Abdominal:      Palpations: Abdomen is soft  Genitourinary:     Prostate: Normal       Rectum: Normal       Comments: Prostate 45 g, smooth, nontender, no nodules     Penis circumcised, multiple flesh colored lesions noted on right-sided hemiscrotum  Musculoskeletal:         General: Normal range of motion  Cervical back: Normal range of motion  Skin:     General: Skin is warm and dry  Neurological:      General: No focal deficit present  Mental Status: He is alert and oriented to person, place, and time  Psychiatric:         Mood and Affect: Mood normal          Behavior: Behavior normal          Thought Content:  Thought content normal          Judgment: Judgment normal          Results    I have personally reviewed all pertinent lab results and reviewed with patient  No results found for: PSA  Lab Results   Component Value Date    CALCIUM 9 3 07/27/2020    K 3 9 11/03/2021    CO2 24 11/03/2021     11/03/2021    BUN 13 11/03/2021    CREATININE 0 77 11/03/2021     Lab Results   Component Value Date    WBC 10 85 (H) 07/27/2020    HGB 13 6 07/27/2020    HCT 39 9 07/27/2020    MCV 89 07/27/2020     07/27/2020     No results found for this or any previous visit (from the past 1 hour(s))

## 2022-06-01 ENCOUNTER — OFFICE VISIT (OUTPATIENT)
Dept: UROLOGY | Facility: HOSPITAL | Age: 62
End: 2022-06-01
Attending: INTERNAL MEDICINE
Payer: COMMERCIAL

## 2022-06-01 VITALS
BODY MASS INDEX: 33.18 KG/M2 | HEART RATE: 97 BPM | WEIGHT: 245 LBS | DIASTOLIC BLOOD PRESSURE: 88 MMHG | OXYGEN SATURATION: 98 % | HEIGHT: 72 IN | SYSTOLIC BLOOD PRESSURE: 132 MMHG

## 2022-06-01 DIAGNOSIS — N52.9 ERECTILE DYSFUNCTION, UNSPECIFIED ERECTILE DYSFUNCTION TYPE: Primary | ICD-10-CM

## 2022-06-01 DIAGNOSIS — A63.0 WARTS, GENITAL: ICD-10-CM

## 2022-06-01 DIAGNOSIS — Z12.5 SCREENING FOR PROSTATE CANCER: ICD-10-CM

## 2022-06-01 PROCEDURE — 99244 OFF/OP CNSLTJ NEW/EST MOD 40: CPT | Performed by: NURSE PRACTITIONER

## 2022-06-03 DIAGNOSIS — S32.010A CLOSED COMPRESSION FRACTURE OF BODY OF L1 VERTEBRA (HCC): ICD-10-CM

## 2022-06-03 LAB
25(OH)D3+25(OH)D2 SERPL-MCNC: 27.8 NG/ML (ref 30–100)
ALBUMIN SERPL-MCNC: 4.4 G/DL (ref 3.8–4.8)
ALBUMIN/GLOB SERPL: 1.9 {RATIO} (ref 1.2–2.2)
ALP SERPL-CCNC: 91 IU/L (ref 44–121)
ALT SERPL-CCNC: 24 IU/L (ref 0–44)
AST SERPL-CCNC: 14 IU/L (ref 0–40)
BILIRUB SERPL-MCNC: 0.4 MG/DL (ref 0–1.2)
BUN SERPL-MCNC: 13 MG/DL (ref 8–27)
BUN/CREAT SERPL: 16 (ref 10–24)
CALCIUM SERPL-MCNC: 9.4 MG/DL (ref 8.6–10.2)
CHLORIDE SERPL-SCNC: 98 MMOL/L (ref 96–106)
CHOLEST SERPL-MCNC: 162 MG/DL (ref 100–199)
CHOLEST/HDLC SERPL: 5.2 RATIO (ref 0–5)
CO2 SERPL-SCNC: 24 MMOL/L (ref 20–29)
CREAT SERPL-MCNC: 0.8 MG/DL (ref 0.76–1.27)
EGFR: 101 ML/MIN/1.73
EST. AVERAGE GLUCOSE BLD GHB EST-MCNC: 217 MG/DL
GLOBULIN SER-MCNC: 2.3 G/DL (ref 1.5–4.5)
GLUCOSE SERPL-MCNC: 171 MG/DL (ref 65–99)
HBA1C MFR BLD: 9.2 % (ref 4.8–5.6)
HDLC SERPL-MCNC: 31 MG/DL
LDLC SERPL CALC-MCNC: 108 MG/DL (ref 0–99)
POTASSIUM SERPL-SCNC: 4.2 MMOL/L (ref 3.5–5.2)
PROT SERPL-MCNC: 6.7 G/DL (ref 6–8.5)
PTH-INTACT SERPL-MCNC: 15 PG/ML (ref 15–65)
SL AMB VLDL CHOLESTEROL CALC: 23 MG/DL (ref 5–40)
SODIUM SERPL-SCNC: 138 MMOL/L (ref 134–144)
TRIGL SERPL-MCNC: 127 MG/DL (ref 0–149)

## 2022-06-03 PROCEDURE — 3046F HEMOGLOBIN A1C LEVEL >9.0%: CPT | Performed by: INTERNAL MEDICINE

## 2022-06-03 RX ORDER — HYDROCODONE BITARTRATE AND ACETAMINOPHEN 5; 325 MG/1; MG/1
1 TABLET ORAL EVERY 6 HOURS PRN
Qty: 120 TABLET | Refills: 0 | Status: SHIPPED | OUTPATIENT
Start: 2022-06-03 | End: 2022-06-30 | Stop reason: SDUPTHER

## 2022-06-08 LAB
HSV1 IGG SER IA-ACNC: 35.9 INDEX (ref 0–0.9)
HSV2 IGG SER IA-ACNC: <0.91 INDEX (ref 0–0.9)
PSA SERPL-MCNC: 0.5 NG/ML (ref 0–4)

## 2022-06-09 ENCOUNTER — OFFICE VISIT (OUTPATIENT)
Dept: ENDOCRINOLOGY | Facility: HOSPITAL | Age: 62
End: 2022-06-09

## 2022-06-09 VITALS
HEIGHT: 72 IN | SYSTOLIC BLOOD PRESSURE: 124 MMHG | BODY MASS INDEX: 31.53 KG/M2 | WEIGHT: 232.8 LBS | HEART RATE: 86 BPM | DIASTOLIC BLOOD PRESSURE: 82 MMHG

## 2022-06-09 DIAGNOSIS — F41.9 ANXIETY: ICD-10-CM

## 2022-06-09 DIAGNOSIS — Z79.4 TYPE 2 DIABETES MELLITUS WITH OTHER SPECIFIED COMPLICATION, WITH LONG-TERM CURRENT USE OF INSULIN (HCC): Primary | ICD-10-CM

## 2022-06-09 DIAGNOSIS — E11.69 TYPE 2 DIABETES MELLITUS WITH OTHER SPECIFIED COMPLICATION, WITH LONG-TERM CURRENT USE OF INSULIN (HCC): Primary | ICD-10-CM

## 2022-06-09 NOTE — PROGRESS NOTES
Anna Lugo 64 y o  male MRN: [id number]    Encounter: [id number]      Assessment/Plan     Assessment: This is a 64y o -year-old male with type 2 diabetes with hypertension and hyperlipidemia, and vertebral compression fracture  Plan:  1  Type 2 diabetes, insulin requiring:  most recent hemoglobin A1c has increased to 9 2  Review of his Dexcom I would expect his A1c improve if things do not change  However he has been inconsistent with the dosing of his Lantus, and I discussed the importance of taking it as directed without making any adjustments  He will continue with Lantus 32 units daily  I would not recommend making any adjustments  If there is any concerns about this causing hypoglycemia, please contact the office  He will also continue NovoLog 10 units with each meal unless he has increased physical activity which case he will reduce it to 5 units  He will also continue with metformin 500 mg in the morning, 1000 mg in the evening, glimepiride 4 mg daily, and Trulicity 3 mg weekly  Continue to utilize Dexcom to monitor glucose  Contact the office with any concerns or questions  Follow-up in 3 months with lab work completed prior to visit      2  Hypertension:  Normotensive in the office today  [place] function is stable with normal like joints   Continue with current medications   Repeat CMP prior to next office visit      3  Hyperlipidemia:  Previous LDL cholesterol was slightly elevated  Continue current medications    Will continue monitor at this time      4  Vertebral compression fracture:  Once again discussed with him the concerns with osteoporosis it due to his fall   Would recommend getting a DEXA scan completed to see will kind of treatment would need to be done  Danny Hugo contact once results are in      CC:  Type 2 diabetes follow-up    History of Present Illness     HPI:  Salinas Rivera a 64year old male with type 2 diabetes for about 5-10 years   He is on oral agents and insulin at home and takes metformin 500 mg in the morning and 1000 mg in the evening, glimepiride 4 mg daily, Humalog 10 units with each meal, Lantus 32 units in the evening, Trulicity 3 mg   He is currently utilizing sliding scale with his Humalog depending on where his blood sugar is with meals  He has also been making adjustments to his Lantus at night, and will typically take 25 units   He denies any polyuria, polydipsia, nocturia and blurry vision   He denies nephropathy, retinopathy, heart attack, stroke and claudication but does admit to neuropathy with symptoms worse at night   Has not followed up with a podiatrist   Has been working on dietary changes to help with blood sugar numbers  No changes in physical activity, but stays active throughout the day      Hypoglycemic episodes:  May have episodes of hypoglycemia after meals depending on activity level and what he eats      The patient's last eye exam was March 2022  States he has an appointment tomorrow   Last diabetic foot exam was completed October 2021       Blood Sugar/Glucometer/Pump/CGM review:  Review of Dexcom download from May 27 through June 9, 2022 reveals an average glucose level of 208 with a standard deviation of 41  He is in target range 26% the time, and above target range 74% the time  Does have a little bump up in glucose levels with dinner, but then trends down overnight      For hypertension he is treated with lisinopril 10 mg daily  Altagracia Weber is also managed on labetalol 100 mg twice a day and amlodipine 5 mg daily   For hyperlipidemia, he is treated with rosuvastatin        Had a fall from a ladder about 1 year ago  [facility] noted to have an L1 compression fracture at that time  Does currently have chronic back pain   Has had serial imaging since, and seems to be stable   Was recommended to get a DEXA scan completed for further evaluation, but has yet to get that done       Review of Systems   Constitutional: Negative for activity change, appetite change, fatigue and unexpected weight change  HENT: Negative for trouble swallowing  Eyes: Negative for visual disturbance  Respiratory: Negative for chest tightness and shortness of breath  Cardiovascular: Negative for chest pain, palpitations and leg swelling  Gastrointestinal: Negative for abdominal pain, diarrhea, nausea and vomiting  Endocrine: Negative for cold intolerance, heat intolerance, polydipsia, polyphagia and polyuria  Genitourinary: Negative for frequency  Musculoskeletal: Positive for back pain and myalgias (in the back)  Skin: Negative for rash and wound  Neurological: Positive for numbness (bilateral feet, worse at night )  Negative for dizziness, weakness, light-headedness and headaches  Psychiatric/Behavioral: Negative for dysphoric mood and sleep disturbance  The patient is not nervous/anxious  Historical Information   Past Medical History:   Diagnosis Date    Alcoholism ([place] [address] )     Anxiety     Atrial fibrillation (HCC)     Diabetes ([place] [address] )     Diabetes mellitus, type 2 ([place] [address] )     Diverticulitis     ED (erectile dysfunction)     High cholesterol     Hyperlipidemia     Hypertension     LVH     L1 vertebral fracture (HCC)     Lung mass     Neuropathy     Nocturia     Osteoarthritis     [facility]ox[facility] )     Pilar cyst     Shingles     Smoker     Spinal stenosis, lumbar      No past surgical history on file    Social History   Social History     Substance and Sexual Activity   Alcohol Use Not Currently     Social History     Substance and Sexual Activity   Drug Use Yes    Types: Marijuana, Other    Comment: prescription pills (unprescribed) daily     Social History     Tobacco Use   Smoking Status Current Every Day Smoker    Packs/day: 2 00    Types: Cigarettes   Smokeless Tobacco Never Used     Family History:   Family History   Adopted: Yes       Meds/Allergies   Current Outpatient Medications   Medication Sig Dispense Refill    acetaminophen (TYLENOL) 325 mg tablet Take 3 tablets (975 mg total) by mouth every 8 (eight) hours (Patient not taking: No sig reported) 30 tablet 0    amLODIPine (NORVASC) 5 mg tablet TAKE 1 TABLET DAILY -MAY CAUSE DIZZINESS OR LIGHTHEADEDNESS- 30 tablet 5    B-D UF III MINI PEN NEEDLES 31G X 5 MM MISC 2 (two) times a day      cholecalciferol (VITAMIN D3) 250 MCG (06938 UT) capsule Take 5 capsules (50,000 Units total) by mouth 3 (three) times a week (Patient not taking: No sig reported) 45 capsule 0    Contour Next Test test strip TEST 4 TIMES A  each 3    gabapentin (NEURONTIN) 100 mg capsule Take 1 capsule (100 mg total) by mouth 3 (three) times a day 90 capsule 0    gabapentin (NEURONTIN) 300 mg capsule take 1 capsule by mouth at bedtime 30 capsule 3    glimepiride (AMARYL) 4 mg tablet take 1 tablet by mouth once daily 60 tablet 3    HYDROcodone-acetaminophen (NORCO) 5-325 mg per tablet Take 1 tablet by mouth every 6 (six) hours as needed for pain Max Daily Amount: 4 tablets 120 tablet 0    insulin glargine (LANTUS) 100 units/mL subcutaneous injection 40 units qhs (Patient not taking: No sig reported)      insulin lispro (HumaLOG) 100 units/mL injection pen USE 10 UNITS 3 TIMES DAILY WITH EACH MEAL 15 mL 2    labetalol (NORMODYNE) 200 mg tablet take 1 tablet by mouth twice a day 60 tablet 3    Lantus SoloStar 100 units/mL injection pen INJECT 40 UNITS SUBCUTANEOUSLY AT BEDTIME 15 mL 3    lisinopril (ZESTRIL) 10 mg tablet TAKE 1 TABLET BY MOUTH ONCE DAILY 90 tablet 3    LORazepam (ATIVAN) 0 5 mg tablet   0    LORazepam (ATIVAN) 0 5 mg tablet TAKE TWO(2) TABLETS TWICE A DAY - MAY CAUSE DROWSINESS 120 tablet 3    metFORMIN (GLUCOPHAGE) 1000 MG tablet Take 1 tablet (1,000 mg total) by mouth daily with dinner (Patient not taking: No sig reported)  0    metFORMIN (GLUCOPHAGE) 500 mg tablet take 1 tablet by mouth three times a day 270 tablet 3    methocarbamol (ROBAXIN) 500 mg tablet Take 1 tablet (500 mg total) by mouth 2 (two) times a day as needed for muscle spasms 60 tablet 3    Needles & Syringes MISC by Does not apply route 3 (three) times a day before meals 90 each 0    polyethylene glycol (MIRALAX) 17 g packet Take 17 g by mouth daily as needed (constipation) 14 each 0    rosuvastatin (CRESTOR) 5 mg tablet take 1 tablet by mouth at bedtime 90 tablet 3    ROSUVASTATIN CALCIUM PO Take by mouth      sildenafil (VIAGRA) 100 mg tablet take 1 tablet by mouth if needed 10 tablet 3    Trulicity 3 YG/4 7TM SOPN INJECT 0 5 ML (3 MG TOTAL) UNDER THE SKIN ONCE A WEEK 2 mL 5     No current facility-administered medications for this visit  No Known Allergies    Objective   Vitals: Height 6' (1 829 m)  Physical Exam  Vitals and nursing note reviewed  Constitutional:       General: He is not in acute distress  Appearance: Normal appearance  He is not diaphoretic  HENT:      Head: Normocephalic and atraumatic  Eyes:      General: No scleral icterus  Extraocular Movements: Extraocular movements intact  Conjunctiva/sclera: Conjunctivae normal       Pupils: Pupils are equal, round, and reactive to light  Cardiovascular:      Rate and Rhythm: Normal rate and regular rhythm  Heart sounds: No murmur heard  Pulmonary:      Effort: Pulmonary effort is normal  No respiratory distress  Breath sounds: Normal breath sounds  No wheezing  Musculoskeletal:      Cervical back: Normal range of motion  Right lower leg: No edema  Left lower leg: No edema  Lymphadenopathy:      Cervical: No cervical adenopathy  Skin:     General: Skin is warm and dry  Neurological:      Mental Status: He is alert and oriented to person, place, and time  Mental status is at baseline  Sensory: No sensory deficit  Gait: Gait normal    Psychiatric:         Mood and Affect: Mood normal          Behavior: Behavior normal          Thought Content:  Thought content normal          The history was obtained from the review of the chart, patient  Lab Results:   Lab Results   Component Value Date/Time    Hemoglobin A1C 9 2 (H) 06/02/2022 09:30 AM    Hemoglobin A1C 8 4 (H) 11/03/2021 08:37 AM    BUN 13 06/02/2022 09:30 AM    BUN 13 11/03/2021 08:37 AM    Potassium 4 2 06/02/2022 09:30 AM    Potassium 3 9 11/03/2021 08:37 AM    Chloride 98 06/02/2022 09:30 AM    Chloride 101 11/03/2021 08:37 AM    CO2 24 06/02/2022 09:30 AM    CO2 24 11/03/2021 08:37 AM    Creatinine 0 80 06/02/2022 09:30 AM    Creatinine 0 77 11/03/2021 08:37 AM    AST 14 06/02/2022 09:30 AM    AST 17 11/03/2021 08:37 AM    ALT 24 06/02/2022 09:30 AM    ALT 22 11/03/2021 08:37 AM    Albumin 4 4 06/02/2022 09:30 AM    Albumin 4 4 11/03/2021 08:37 AM    Globulin, Total 2 3 06/02/2022 09:30 AM    Globulin, Total 2 5 11/03/2021 08:37 AM    HDL 31 (L) 06/02/2022 09:30 AM    HDL 35 (L) 11/03/2021 08:37 AM    Triglycerides 127 06/02/2022 09:30 AM    Triglycerides 83 11/03/2021 08:37 AM       Portions of the record may have been created with voice recognition software  Occasional wrong word or "sound a like" substitutions may have occurred due to the inherent limitations of voice recognition software  Read the chart carefully and recognize, using context, where substitutions have occurred

## 2022-06-10 ENCOUNTER — DOCUMENTATION (OUTPATIENT)
Dept: ENDOCRINOLOGY | Facility: HOSPITAL | Age: 62
End: 2022-06-10

## 2022-06-10 RX ORDER — LORAZEPAM 0.5 MG/1
TABLET ORAL
Qty: 120 TABLET | Refills: 3 | Status: SHIPPED | OUTPATIENT
Start: 2022-06-10

## 2022-06-27 DIAGNOSIS — S32.010A CLOSED COMPRESSION FRACTURE OF BODY OF L1 VERTEBRA (HCC): ICD-10-CM

## 2022-06-27 RX ORDER — METHOCARBAMOL 500 MG/1
500 TABLET, FILM COATED ORAL 2 TIMES DAILY PRN
Qty: 60 TABLET | Refills: 3 | Status: SHIPPED | OUTPATIENT
Start: 2022-06-27

## 2022-06-29 PROBLEM — F11.90 CHRONIC, CONTINUOUS USE OF OPIOIDS: Status: ACTIVE | Noted: 2022-06-29

## 2022-06-29 RX ORDER — INSULIN GLULISINE 100 [IU]/ML
INJECTION, SOLUTION SUBCUTANEOUS
COMMUNITY
Start: 2022-06-13

## 2022-06-29 NOTE — PROGRESS NOTES
Assessment/Plan:    No problem-specific Assessment & Plan notes found for this encounter  Diagnoses and all orders for this visit:    Chronic bilateral low back pain without sciatica    Spinal stenosis of lumbar region without neurogenic claudication    Chronic, continuous use of opioids  Comments:  KEEPS MED SECURE  NO HYPOGLYCEMIA  HAS NALOXONE  ADL WORSE WITHOUT MEDICATION    Other orders  -     Apidra SoloStar 100 units/mL injection pen          Subjective:      Patient ID: Tamir Martin is a 64 y o  male  HPI    The following portions of the patient's history were reviewed and updated as appropriate: allergies, current medications, past family history, past medical history, past social history, past surgical history, and problem list     Review of Systems   Constitutional: Negative for activity change and fatigue  HENT: Negative for ear discharge, ear pain, rhinorrhea and sore throat  Eyes: Negative for pain and visual disturbance  Respiratory: Negative for cough and shortness of breath  Cardiovascular: Negative for chest pain and leg swelling  Gastrointestinal: Negative for abdominal pain, constipation and diarrhea  Endocrine: Negative for cold intolerance and polyuria  Genitourinary: Negative for flank pain and hematuria  Musculoskeletal: Negative for back pain and joint swelling  Skin: Negative for pallor and wound  Neurological: Negative for dizziness, seizures and speech difficulty  Psychiatric/Behavioral: Negative for confusion and hallucinations  Objective:      Ht 6' (1 829 m)   Wt 105 kg (232 lb)   BMI 31 46 kg/m²          Physical Exam  Vitals and nursing note reviewed  Constitutional:       General: He is not in acute distress  Appearance: Normal appearance  He is not ill-appearing  HENT:      Head: Normocephalic  Right Ear: External ear normal  There is no impacted cerumen  Left Ear: External ear normal  There is no impacted cerumen  Nose: No congestion or rhinorrhea  Mouth/Throat:      Pharynx: No posterior oropharyngeal erythema  Eyes:      General: No scleral icterus  Right eye: No discharge  Left eye: No discharge  Neck:      Vascular: No carotid bruit  Cardiovascular:      Rate and Rhythm: Normal rate and regular rhythm  Heart sounds: Normal heart sounds  No murmur heard  No friction rub  No gallop  Pulmonary:      Breath sounds: No wheezing or rhonchi  Abdominal:      General: There is no distension  Tenderness: There is no abdominal tenderness  There is no guarding  Musculoskeletal:         General: No swelling  Cervical back: No rigidity  Right lower leg: No edema  Left lower leg: No edema  Lymphadenopathy:      Cervical: No cervical adenopathy  Skin:     Coloration: Skin is not jaundiced  Neurological:      Mental Status: He is alert  Cranial Nerves: No cranial nerve deficit  Motor: No weakness        Coordination: Coordination normal    Psychiatric:         Mood and Affect: Mood normal

## 2022-06-30 ENCOUNTER — OFFICE VISIT (OUTPATIENT)
Dept: INTERNAL MEDICINE CLINIC | Facility: CLINIC | Age: 62
End: 2022-06-30
Payer: COMMERCIAL

## 2022-06-30 VITALS
OXYGEN SATURATION: 97 % | RESPIRATION RATE: 14 BRPM | HEIGHT: 72 IN | SYSTOLIC BLOOD PRESSURE: 126 MMHG | HEART RATE: 74 BPM | BODY MASS INDEX: 31.42 KG/M2 | WEIGHT: 232 LBS | TEMPERATURE: 97 F | DIASTOLIC BLOOD PRESSURE: 60 MMHG

## 2022-06-30 DIAGNOSIS — Z79.4 TYPE 2 DIABETES MELLITUS WITH OTHER SPECIFIED COMPLICATION, WITH LONG-TERM CURRENT USE OF INSULIN (HCC): ICD-10-CM

## 2022-06-30 DIAGNOSIS — G89.29 CHRONIC BILATERAL LOW BACK PAIN WITHOUT SCIATICA: Primary | ICD-10-CM

## 2022-06-30 DIAGNOSIS — M48.061 SPINAL STENOSIS OF LUMBAR REGION WITHOUT NEUROGENIC CLAUDICATION: ICD-10-CM

## 2022-06-30 DIAGNOSIS — F11.90 CHRONIC, CONTINUOUS USE OF OPIOIDS: ICD-10-CM

## 2022-06-30 DIAGNOSIS — F17.200 SMOKER: ICD-10-CM

## 2022-06-30 DIAGNOSIS — S32.010A CLOSED COMPRESSION FRACTURE OF BODY OF L1 VERTEBRA (HCC): ICD-10-CM

## 2022-06-30 DIAGNOSIS — E11.69 TYPE 2 DIABETES MELLITUS WITH OTHER SPECIFIED COMPLICATION, WITH LONG-TERM CURRENT USE OF INSULIN (HCC): ICD-10-CM

## 2022-06-30 DIAGNOSIS — M54.50 CHRONIC BILATERAL LOW BACK PAIN WITHOUT SCIATICA: Primary | ICD-10-CM

## 2022-06-30 PROCEDURE — 99213 OFFICE O/P EST LOW 20 MIN: CPT | Performed by: INTERNAL MEDICINE

## 2022-06-30 PROCEDURE — 3008F BODY MASS INDEX DOCD: CPT | Performed by: INTERNAL MEDICINE

## 2022-06-30 PROCEDURE — 3078F DIAST BP <80 MM HG: CPT | Performed by: INTERNAL MEDICINE

## 2022-06-30 PROCEDURE — 4004F PT TOBACCO SCREEN RCVD TLK: CPT | Performed by: INTERNAL MEDICINE

## 2022-06-30 PROCEDURE — 3074F SYST BP LT 130 MM HG: CPT | Performed by: INTERNAL MEDICINE

## 2022-06-30 RX ORDER — HYDROCODONE BITARTRATE AND ACETAMINOPHEN 5; 325 MG/1; MG/1
1 TABLET ORAL EVERY 6 HOURS PRN
Qty: 120 TABLET | Refills: 0 | Status: SHIPPED | OUTPATIENT
Start: 2022-06-30 | End: 2022-07-29 | Stop reason: SDUPTHER

## 2022-07-01 ENCOUNTER — OFFICE VISIT (OUTPATIENT)
Dept: UROLOGY | Facility: HOSPITAL | Age: 62
End: 2022-07-01
Payer: COMMERCIAL

## 2022-07-01 VITALS
OXYGEN SATURATION: 99 % | HEART RATE: 88 BPM | BODY MASS INDEX: 31.42 KG/M2 | WEIGHT: 232 LBS | DIASTOLIC BLOOD PRESSURE: 82 MMHG | SYSTOLIC BLOOD PRESSURE: 132 MMHG | HEIGHT: 72 IN

## 2022-07-01 DIAGNOSIS — A63.0 CONDYLOMA: Primary | ICD-10-CM

## 2022-07-01 PROCEDURE — 3075F SYST BP GE 130 - 139MM HG: CPT | Performed by: UROLOGY

## 2022-07-01 PROCEDURE — 99214 OFFICE O/P EST MOD 30 MIN: CPT | Performed by: UROLOGY

## 2022-07-01 PROCEDURE — 3079F DIAST BP 80-89 MM HG: CPT | Performed by: UROLOGY

## 2022-07-01 RX ORDER — PODOFILOX 5 MG/ML
SOLUTION TOPICAL 2 TIMES DAILY
Qty: 3.5 ML | Refills: 3 | Status: SHIPPED | OUTPATIENT
Start: 2022-07-01

## 2022-07-01 NOTE — PROGRESS NOTES
HISTORY:    Follow-up for condyloma  Present six months or longer mainly on the scrotum  At last visit he he was given a herpes test because of unprotected sex  He Denies any penile vesicle lesions or other changes in that regard         ASSESSMENT / PLAN:    Condylox for the lesions on right side of scrotum and penis  The scrotal ones could take a long time to involute with treatment    Refills provided as well  Worn to not place the medication on normal skin  Occasionally this can does become irritated and if so he would lay off for a week  Reassess in three months  If still sizable lesions on scrotum, or penis, we will then schedule him for a carbon dioxide laser treatment of condyloma in the OR    The following portions of the patient's history were reviewed and updated as appropriate: allergies, current medications, past family history, past medical history, past social history, past surgical history and problem list     Review of Systems   All other systems reviewed and are negative  Objective:     Physical Exam  Constitutional:       General: He is not in acute distress  Appearance: He is well-developed  He is not diaphoretic  HENT:      Head: Normocephalic and atraumatic  Eyes:      General: No scleral icterus  Pulmonary:      Effort: Pulmonary effort is normal    Genitourinary:     Comments: Numerous warts on the right side of scrotum, ranging 3-6 mm  Penis has at least three warts on distal shaft, spread around the circumference, 2-3 mm diameter  Skin:     Coloration: Skin is not pale  Neurological:      Mental Status: He is alert and oriented to person, place, and time  Psychiatric:         Behavior: Behavior normal          Thought Content:  Thought content normal          Judgment: Judgment normal            0   Lab Value Date/Time    PSA 0 5 06/07/2022 0000   ]  BUN   Date Value Ref Range Status   06/02/2022 13 8 - 27 mg/dL Final     Creatinine   Date Value Ref Range Status   06/02/2022 0 80 0 76 - 1 27 mg/dL Final   07/27/2020 0 59 (L) 0 60 - 1 30 mg/dL Final     Comment:     Standardized to IDMS reference method     No components found for: CBC      Patient Active Problem List   Diagnosis    Lumbar spondylosis    Chronic bilateral low back pain without sciatica    Closed compression fracture of body of L1 vertebra (HCC)    Fall    Pulmonary nodule    Type 2 diabetes mellitus (HCC)    Acute pain due to trauma    Concussion without loss of consciousness    Essential hypertension    Hyperlipidemia    Contusion of abdominal wall    Corneal abrasion    Annual physical exam    Spinal stenosis, lumbar    Warts, genital    Chronic, continuous use of opioids    Smoker        Diagnoses and all orders for this visit:    Condyloma  -     podofilox (CONDYLOX) 0 5 % external solution; Apply topically 2 (two) times a day Two warts on penis and scrotum           Patient ID: Cristina Angel is a 64 y o  male        Current Outpatient Medications:     amLODIPine (NORVASC) 5 mg tablet, TAKE 1 TABLET DAILY -MAY CAUSE DIZZINESS OR LIGHTHEADEDNESS-, Disp: 30 tablet, Rfl: 5    Apidra SoloStar 100 units/mL injection pen, , Disp: , Rfl:     B-D UF III MINI PEN NEEDLES 31G X 5 MM MISC, 2 (two) times a day, Disp: , Rfl:     Contour Next Test test strip, TEST 4 TIMES A DAY, Disp: 100 each, Rfl: 3    gabapentin (NEURONTIN) 100 mg capsule, Take 1 capsule (100 mg total) by mouth 3 (three) times a day, Disp: 90 capsule, Rfl: 0    gabapentin (NEURONTIN) 300 mg capsule, take 1 capsule by mouth at bedtime, Disp: 30 capsule, Rfl: 3    glimepiride (AMARYL) 4 mg tablet, take 1 tablet by mouth once daily, Disp: 60 tablet, Rfl: 3    HYDROcodone-acetaminophen (NORCO) 5-325 mg per tablet, Take 1 tablet by mouth every 6 (six) hours as needed for pain Max Daily Amount: 4 tablets, Disp: 120 tablet, Rfl: 0    insulin lispro (HumaLOG) 100 units/mL injection pen, USE 10 UNITS 3 TIMES DAILY WITH EACH MEAL, Disp: 15 mL, Rfl: 2    Lantus SoloStar 100 units/mL injection pen, INJECT 40 UNITS SUBCUTANEOUSLY AT BEDTIME, Disp: 15 mL, Rfl: 3    lisinopril (ZESTRIL) 10 mg tablet, TAKE 1 TABLET BY MOUTH ONCE DAILY, Disp: 90 tablet, Rfl: 3    LORazepam (ATIVAN) 0 5 mg tablet, , Disp: , Rfl: 0    LORazepam (ATIVAN) 0 5 mg tablet, TAKE TWO(2) TABLETS TWICE A DAY - MAY CAUSE DROWSINESS - AVOID ALCOHOL & ALCOHOL CONTAINING PRODUCTS, Disp: 120 tablet, Rfl: 3    metFORMIN (GLUCOPHAGE) 500 mg tablet, take 1 tablet by mouth three times a day, Disp: 270 tablet, Rfl: 3    methocarbamol (ROBAXIN) 500 mg tablet, TAKE 1 TABLET (500 MG TOTAL) BY MOUTH 2 (TWO) TIMES A DAY AS NEEDED FOR MUSCLE SPASMS, Disp: 60 tablet, Rfl: 3    Needles & Syringes MISC, by Does not apply route 3 (three) times a day before meals, Disp: 90 each, Rfl: 0    podofilox (CONDYLOX) 0 5 % external solution, Apply topically 2 (two) times a day Two warts on penis and scrotum, Disp: 3 5 mL, Rfl: 3    polyethylene glycol (MIRALAX) 17 g packet, Take 17 g by mouth daily as needed (constipation), Disp: 14 each, Rfl: 0    rosuvastatin (CRESTOR) 5 mg tablet, take 1 tablet by mouth at bedtime, Disp: 90 tablet, Rfl: 3    ROSUVASTATIN CALCIUM PO, Take by mouth, Disp: , Rfl:     sildenafil (VIAGRA) 100 mg tablet, take 1 tablet by mouth if needed, Disp: 10 tablet, Rfl: 3    Trulicity 3 UG/0 1SE SOPN, INJECT 0 5 ML (3 MG TOTAL) UNDER THE SKIN ONCE A WEEK, Disp: 2 mL, Rfl: 5    labetalol (NORMODYNE) 200 mg tablet, take 1 tablet by mouth twice a day, Disp: 60 tablet, Rfl: 3    Past Medical History:   Diagnosis Date    [facility]     Anxiety     Atrial fibrillation (HCC)     Diabetes [facility]     [facility] mellitus, type 2 ([facility] )     Diverticulitis     ED (erectile dysfunction)     High cholesterol     Hyperlipidemia     Hypertension     LVH     L1 vertebral fracture (HCC)     Lung mass     Neuropathy     Nocturia     Osteoarthritis     [facility]oxysmal [facility] fibrillation ([facility]     Pilar cyst     Shingles     Smoker     Spinal stenosis, lumbar        History reviewed  No pertinent surgical history      Social History

## 2022-07-22 ENCOUNTER — HOSPITAL ENCOUNTER (OUTPATIENT)
Dept: CT IMAGING | Facility: HOSPITAL | Age: 62
Discharge: HOME | End: 2022-07-22
Attending: INTERNAL MEDICINE
Payer: COMMERCIAL

## 2022-07-22 DIAGNOSIS — Z12.2 SCREENING FOR LUNG CANCER: ICD-10-CM

## 2022-07-22 PROCEDURE — 71271 CT THORAX LUNG CANCER SCR C-: CPT

## 2022-07-25 NOTE — TELEPHONE ENCOUNTER
Pt calling to cx his #2 MBB scheduled for 12/3/2019, pt states he is going to do physical therapy to see if it helps prior to scheduling more procedures  Pt will call back if PTx does not help  sensation is normal and strength is normal.

## 2022-07-29 DIAGNOSIS — S32.010A CLOSED COMPRESSION FRACTURE OF BODY OF L1 VERTEBRA (HCC): ICD-10-CM

## 2022-07-29 RX ORDER — HYDROCODONE BITARTRATE AND ACETAMINOPHEN 5; 325 MG/1; MG/1
1 TABLET ORAL EVERY 6 HOURS PRN
Qty: 120 TABLET | Refills: 0 | Status: SHIPPED | OUTPATIENT
Start: 2022-07-29 | End: 2022-08-26 | Stop reason: SDUPTHER

## 2022-08-15 DIAGNOSIS — E11.9 TYPE 2 DIABETES MELLITUS WITHOUT COMPLICATION, WITHOUT LONG-TERM CURRENT USE OF INSULIN (HCC): ICD-10-CM

## 2022-08-15 RX ORDER — PERPHENAZINE 16 MG/1
TABLET, FILM COATED ORAL
Qty: 100 STRIP | Refills: 1 | Status: SHIPPED | OUTPATIENT
Start: 2022-08-15

## 2022-08-17 DIAGNOSIS — S32.010A CLOSED COMPRESSION FRACTURE OF BODY OF L1 VERTEBRA (HCC): ICD-10-CM

## 2022-08-17 DIAGNOSIS — I10 ESSENTIAL HYPERTENSION: ICD-10-CM

## 2022-08-17 RX ORDER — AMLODIPINE BESYLATE 5 MG/1
TABLET ORAL
Qty: 30 TABLET | Refills: 5 | Status: SHIPPED | OUTPATIENT
Start: 2022-08-17

## 2022-08-17 RX ORDER — LABETALOL 200 MG/1
TABLET, FILM COATED ORAL
Qty: 60 TABLET | Refills: 3 | Status: SHIPPED | OUTPATIENT
Start: 2022-08-17 | End: 2022-09-16

## 2022-08-22 DIAGNOSIS — M47.816 LUMBAR SPONDYLOSIS: ICD-10-CM

## 2022-08-22 RX ORDER — GABAPENTIN 300 MG/1
CAPSULE ORAL
Qty: 30 CAPSULE | Refills: 3 | Status: SHIPPED | OUTPATIENT
Start: 2022-08-22

## 2022-08-26 DIAGNOSIS — S32.010A CLOSED COMPRESSION FRACTURE OF BODY OF L1 VERTEBRA (HCC): ICD-10-CM

## 2022-08-26 RX ORDER — HYDROCODONE BITARTRATE AND ACETAMINOPHEN 5; 325 MG/1; MG/1
1 TABLET ORAL EVERY 6 HOURS PRN
Qty: 120 TABLET | Refills: 0 | Status: SHIPPED | OUTPATIENT
Start: 2022-08-26 | End: 2022-09-23 | Stop reason: SDUPTHER

## 2022-09-07 ENCOUNTER — DOCUMENTATION (OUTPATIENT)
Dept: ENDOCRINOLOGY | Facility: HOSPITAL | Age: 62
End: 2022-09-07

## 2022-09-09 ENCOUNTER — OFFICE VISIT (OUTPATIENT)
Dept: ENDOCRINOLOGY | Facility: HOSPITAL | Age: 62
End: 2022-09-09
Payer: COMMERCIAL

## 2022-09-09 VITALS
BODY MASS INDEX: 30.75 KG/M2 | HEART RATE: 90 BPM | HEIGHT: 72 IN | WEIGHT: 227 LBS | DIASTOLIC BLOOD PRESSURE: 96 MMHG | SYSTOLIC BLOOD PRESSURE: 158 MMHG

## 2022-09-09 DIAGNOSIS — I10 ESSENTIAL HYPERTENSION: ICD-10-CM

## 2022-09-09 DIAGNOSIS — E78.5 HYPERLIPIDEMIA, UNSPECIFIED HYPERLIPIDEMIA TYPE: ICD-10-CM

## 2022-09-09 DIAGNOSIS — Z79.4 TYPE 2 DIABETES MELLITUS WITH OTHER SPECIFIED COMPLICATION, WITH LONG-TERM CURRENT USE OF INSULIN (HCC): Primary | ICD-10-CM

## 2022-09-09 DIAGNOSIS — E11.69 TYPE 2 DIABETES MELLITUS WITH OTHER SPECIFIED COMPLICATION, WITH LONG-TERM CURRENT USE OF INSULIN (HCC): Primary | ICD-10-CM

## 2022-09-09 LAB — SL AMB POCT HEMOGLOBIN AIC: 8.2 (ref ?–6.5)

## 2022-09-09 PROCEDURE — 99214 OFFICE O/P EST MOD 30 MIN: CPT | Performed by: PHYSICIAN ASSISTANT

## 2022-09-09 PROCEDURE — 3052F HG A1C>EQUAL 8.0%<EQUAL 9.0%: CPT | Performed by: INTERNAL MEDICINE

## 2022-09-09 PROCEDURE — 83036 HEMOGLOBIN GLYCOSYLATED A1C: CPT | Performed by: PHYSICIAN ASSISTANT

## 2022-09-09 NOTE — PATIENT INSTRUCTIONS
Continue to monitor diet, maintain physical activity  Make sure to drink plenty water throughout the day  Continue with metformin, glimepiride, Trulicity to 3 0 mg weekly  Take Humalog 5 units with lunch and 10 units with dinner  Make sure to be consistent with the Lantus at night  Take 22 units  Call if there is concerns  Continue using Dexcom to monitor blood sugar  Continue with blood pressure and cholesterol medications  Follow-up in 3 months

## 2022-09-09 NOTE — PROGRESS NOTES
Lorraine Henao 64 y o  male MRN: [id number]    Encounter: [id number]      Assessment/Plan     Assessment: This is a 64y o -year-old male with type 2 diabetes with hypertension and hyperlipidemia  Plan:  1  Type 2 diabetes, insulin requiring:  Most recent hemoglobin A1c has decreased to 8 2, but is still above goal   He has been inconsistent once again with his Lantus dosing, and due to his concerns of hypoglycemia, will decrease his Lantus to 22 units daily  I informed him that he needs to be consistent so we can make appropriate adjustments, and bring his blood sugars down  As for his NovoLog since he is not eating breakfast, he will not take any at this time, decrease NovoLog to 5 units at lunch, and continue with 10 units at dinner  I would like to see a Dexcom download in about 2-4 weeks  Contact the office sooner if there is any concerns or questions  Follow-up in 3 months with lab work completed prior to visit      2  Hypertension:  Normotensive in the office today  Angelina Hove function is stable with normal like joints   Continue with current medications   Repeat CMP prior to next office visit      3  Hyperlipidemia:  Previous LDL cholesterol was slightly elevated   Continue current medications  Will continue monitor at this time  CC:  Type 2 diabetes follow-up    History of Present Illness     HPI:  Oneil Carlotas a 64year old male with type 2 diabetes for about 5-10 years   He is on oral agents and insulin at home and takes metformin 500 mg in the morning and 1000 mg in the evening, glimepiride 4 mg daily, Humalog 10 units with each meal, Lantus 20-30 units in the evening, Trulicity 3 mg  Has been inconsistent with his Lantus dosing, but typically will take close to 20 units at night  He will also only take Humalog 5 units at lunch he is more active in the afternoon   He is currently utilizing sliding scale with his Humalog depending on where his blood sugar is with meals   He denies any polyuria, polydipsia, nocturia and blurry vision   He denies nephropathy, retinopathy, heart attack, stroke and claudication but does admit to neuropathy with symptoms worse at night   Has not followed up with a podiatrist   Has been working on dietary changes to help with blood sugar numbers  No changes in physical activity, but stays active throughout the day      Hypoglycemic episodes:  May have episodes of hypoglycemia after meals depending on activity level and what he eats      The patient's last eye exam was March 2022  Last diabetic foot exam was completed October 2021       Blood Sugar/Glucometer/Pump/CGM review:  Review of Dexcom from August 27 through September 9, 2022 reveals an average glucose level of 203 with standard deviation of 51  He is in target range 30% time, above target range 60% time, below target range 2% time  Overnight glucose levels typically trend downward, but in the morning he is at the high end of normal   Some mild postprandial hyperglycemia with breakfast, trends down in the afternoon do physical activity, but then trends upwards after dinner      For hypertension he is treated with lisinopril 10 mg daily  Hang Dalal is also managed on labetalol 100 mg twice a day and amlodipine 5 mg daily   For hyperlipidemia, he is treated with rosuvastatin      Has a history of falling off a ladder and having a vertebral fracture   Was noted to have an L1 compression fracture at that time  Does currently have chronic back pain   Has had serial imaging since, and seems to be stable   Was recommended to get a DEXA scan completed for further evaluation, but has yet to get that done  Review of Systems   Constitutional: Negative for activity change, appetite change, fatigue and unexpected weight change  HENT: Negative for trouble swallowing  Eyes: Negative for visual disturbance  Respiratory: Negative for chest tightness and shortness of breath      Cardiovascular: Negative for chest pain, palpitations and leg swelling  Gastrointestinal: Negative for abdominal pain, diarrhea, nausea and vomiting  Endocrine: Negative for cold intolerance, heat intolerance, polydipsia, polyphagia and polyuria  Genitourinary: Negative for frequency  Musculoskeletal: Positive for back pain and myalgias (in the back)  Skin: Negative for rash and wound  Neurological: Positive for numbness (bilateral feet, worse at night )  Negative for dizziness, weakness, light-headedness and headaches  Psychiatric/Behavioral: Negative for dysphoric mood and sleep disturbance  The patient is not nervous/anxious  Historical Information   Past Medical History:   Diagnosis Date    Alcoholism ([place] [address] )     Anxiety     Atrial fibrillation (HCC)     Diabetes ([place] [address] )     Diabetes mellitus, type 2 ([place] [address] )     Diverticulitis     ED (erectile dysfunction)     High cholesterol     Hyperlipidemia     Hypertension     LVH     [facility]     Lung mass     Neuropathy     Nocturia     Osteoarthritis     Paroxysmal atrial fibrillation ([place] [address] )     Pilar cyst     Shingles     Smoker     Spinal stenosis, lumbar      No past surgical history on file    Social History   Social History     Substance and Sexual Activity   Alcohol Use Not Currently     Social History     Substance and Sexual Activity   Drug Use Yes    Types: Marijuana, Other    Comment: prescription pills (unprescribed) daily     Social History     Tobacco Use   Smoking Status Current Every Day Smoker    Packs/day: 2 00    Types: Cigarettes   Smokeless Tobacco Never Used     Family History:   Family History   Adopted: Yes       Meds/Allergies   Current Outpatient Medications   Medication Sig Dispense Refill    amLODIPine (NORVASC) 5 mg tablet TAKE 1 TABLET DAILY -MAY CAUSE DIZZINESS OR LIGHTHEADEDNESS- 30 tablet 5    Apidra SoloStar 100 units/mL injection pen       B-D UF III MINI PEN NEEDLES 31G X 5 MM MISC 2 (two) times a day     [employer] Next Test test strip TEST 4 TIMES A  strip 1    gabapentin (NEURONTIN) 100 mg capsule Take 1 capsule (100 mg total) by mouth 3 (three) times a day 90 capsule 0    gabapentin (NEURONTIN) 300 mg capsule take 1 capsule by mouth at bedtime 30 capsule 3    glimepiride (AMARYL) 4 mg tablet take 1 tablet by mouth once daily 60 tablet 3    HYDROcodone-acetaminophen (NORCO) 5-325 mg per tablet Take 1 tablet by mouth every 6 (six) hours as needed for pain Max Daily Amount: 4 tablets 120 tablet 0    insulin lispro (HumaLOG) 100 units/mL injection pen USE 10 UNITS 3 TIMES DAILY WITH EACH MEAL 15 mL 2    labetalol (NORMODYNE) 200 mg tablet take 1 tablet by mouth twice a day 60 tablet 3    Lantus SoloStar 100 units/mL injection pen INJECT 40 UNITS SUBCUTANEOUSLY AT BEDTIME 15 mL 3    lisinopril (ZESTRIL) 10 mg tablet TAKE 1 TABLET BY MOUTH ONCE DAILY 90 tablet 3    LORazepam (ATIVAN) 0 5 mg tablet   0    LORazepam (ATIVAN) 0 5 mg tablet TAKE TWO(2) TABLETS TWICE A DAY - MAY CAUSE DROWSINESS - AVOID ALCOHOL & ALCOHOL CONTAINING PRODUCTS 120 tablet 3    metFORMIN (GLUCOPHAGE) 500 mg tablet take 1 tablet by mouth three times a day 270 tablet 3    methocarbamol (ROBAXIN) 500 mg tablet TAKE 1 TABLET (500 MG TOTAL) BY MOUTH 2 (TWO) TIMES A DAY AS NEEDED FOR MUSCLE SPASMS 60 tablet 3    Needles & Syringes MISC by Does not apply route 3 (three) times a day before meals 90 each 0    podofilox (CONDYLOX) 0 5 % external solution Apply topically 2 (two) times a day Two warts on penis and scrotum 3 5 mL 3    polyethylene glycol (MIRALAX) 17 g packet Take 17 g by mouth daily as needed (constipation) 14 each 0    rosuvastatin (CRESTOR) 5 mg tablet take 1 tablet by mouth at bedtime 90 tablet 3    ROSUVASTATIN CALCIUM PO Take by mouth      sildenafil (VIAGRA) 100 mg tablet take 1 tablet by mouth if needed 10 tablet 3    Trulicity 3 TC/7 5BN SOPN INJECT 0 5 ML (3 MG TOTAL) UNDER THE SKIN ONCE A WEEK 2 mL 5     No current facility-administered medications for this visit  No Known Allergies    Objective   Vitals: There were no vitals taken for this visit  Physical Exam  Vitals and nursing note reviewed  Constitutional:       General: He is not in acute distress  Appearance: Normal appearance  He is not diaphoretic  HENT:      Head: Normocephalic and atraumatic  Eyes:      General: No scleral icterus  Extraocular Movements: Extraocular movements intact  Conjunctiva/sclera: Conjunctivae normal       Pupils: Pupils are equal, round, and reactive to light  Cardiovascular:      Rate and Rhythm: Normal rate and regular rhythm  Heart sounds: No murmur heard  Pulmonary:      Effort: Pulmonary effort is normal  No respiratory distress  Breath sounds: Normal breath sounds  No wheezing  Musculoskeletal:      Right lower leg: No edema  Left lower leg: No edema  Lymphadenopathy:      Cervical: No cervical adenopathy  Skin:     General: Skin is warm and dry  Neurological:      Mental Status: He is alert and oriented to person, place, and time  Mental status is at baseline  Sensory: No sensory deficit  Gait: Gait normal    Psychiatric:         Mood and Affect: Mood normal          Behavior: Behavior normal          Thought Content: Thought content normal          The history was obtained from the review of the chart, patient      Lab Results:   Lab Results   Component Value Date/Time    Hemoglobin A1C 9 2 (H) 06/02/2022 09:30 AM    Hemoglobin A1C 8 4 (H) 11/03/2021 08:37 AM    BUN 13 06/02/2022 09:30 AM    BUN 13 11/03/2021 08:37 AM    Potassium 4 2 06/02/2022 09:30 AM    Potassium 3 9 11/03/2021 08:37 AM    Chloride 98 06/02/2022 09:30 AM    Chloride 101 11/03/2021 08:37 AM    CO2 24 06/02/2022 09:30 AM    CO2 24 11/03/2021 08:37 AM    Creatinine 0 80 06/02/2022 09:30 AM    Creatinine 0 77 11/03/2021 08:37 AM    AST 14 06/02/2022 09:30 AM    AST 17 11/03/2021 08:37 AM    ALT 24 06/02/2022 09:30 AM    ALT 22 11/03/2021 08:37 AM    Albumin 4 4 06/02/2022 09:30 AM    Albumin 4 4 11/03/2021 08:37 AM    Globulin, Total 2 3 06/02/2022 09:30 AM    Globulin, Total 2 5 11/03/2021 08:37 AM    HDL 31 (L) 06/02/2022 09:30 AM    HDL 35 (L) 11/03/2021 08:37 AM    Triglycerides 127 06/02/2022 09:30 AM    Triglycerides 83 11/03/2021 08:37 AM       Portions of the record may have been created with voice recognition software  Occasional wrong word or "sound a like" substitutions may have occurred due to the inherent limitations of voice recognition software  Read the chart carefully and recognize, using context, where substitutions have occurred

## 2022-09-13 LAB
LEFT EYE DIABETIC RETINOPATHY: NORMAL
RIGHT EYE DIABETIC RETINOPATHY: NORMAL

## 2022-09-13 PROCEDURE — 2022F DILAT RTA XM EVC RTNOPTHY: CPT | Performed by: INTERNAL MEDICINE

## 2022-09-23 ENCOUNTER — OFFICE VISIT (OUTPATIENT)
Dept: INTERNAL MEDICINE CLINIC | Facility: CLINIC | Age: 62
End: 2022-09-23
Payer: COMMERCIAL

## 2022-09-23 VITALS
SYSTOLIC BLOOD PRESSURE: 140 MMHG | DIASTOLIC BLOOD PRESSURE: 70 MMHG | BODY MASS INDEX: 30.75 KG/M2 | WEIGHT: 227 LBS | RESPIRATION RATE: 14 BRPM | HEIGHT: 72 IN | TEMPERATURE: 97.8 F | HEART RATE: 74 BPM | OXYGEN SATURATION: 98 %

## 2022-09-23 DIAGNOSIS — I48.0 PAROXYSMAL ATRIAL FIBRILLATION (HCC): ICD-10-CM

## 2022-09-23 DIAGNOSIS — G89.29 CHRONIC BILATERAL LOW BACK PAIN WITHOUT SCIATICA: ICD-10-CM

## 2022-09-23 DIAGNOSIS — Z79.4 TYPE 2 DIABETES MELLITUS WITH OTHER SPECIFIED COMPLICATION, WITH LONG-TERM CURRENT USE OF INSULIN (HCC): ICD-10-CM

## 2022-09-23 DIAGNOSIS — E11.69 TYPE 2 DIABETES MELLITUS WITH OTHER SPECIFIED COMPLICATION, WITH LONG-TERM CURRENT USE OF INSULIN (HCC): ICD-10-CM

## 2022-09-23 DIAGNOSIS — M48.061 SPINAL STENOSIS OF LUMBAR REGION WITHOUT NEUROGENIC CLAUDICATION: ICD-10-CM

## 2022-09-23 DIAGNOSIS — S32.010A CLOSED COMPRESSION FRACTURE OF BODY OF L1 VERTEBRA (HCC): ICD-10-CM

## 2022-09-23 DIAGNOSIS — M47.816 LUMBAR SPONDYLOSIS: Primary | ICD-10-CM

## 2022-09-23 DIAGNOSIS — M54.50 CHRONIC BILATERAL LOW BACK PAIN WITHOUT SCIATICA: ICD-10-CM

## 2022-09-23 DIAGNOSIS — F11.90 CHRONIC, CONTINUOUS USE OF OPIOIDS: ICD-10-CM

## 2022-09-23 PROCEDURE — 3077F SYST BP >= 140 MM HG: CPT | Performed by: INTERNAL MEDICINE

## 2022-09-23 PROCEDURE — 99214 OFFICE O/P EST MOD 30 MIN: CPT | Performed by: INTERNAL MEDICINE

## 2022-09-23 PROCEDURE — 3078F DIAST BP <80 MM HG: CPT | Performed by: INTERNAL MEDICINE

## 2022-09-23 RX ORDER — HYDROCODONE BITARTRATE AND ACETAMINOPHEN 5; 325 MG/1; MG/1
1 TABLET ORAL EVERY 6 HOURS PRN
Qty: 120 TABLET | Refills: 0 | Status: SHIPPED | OUTPATIENT
Start: 2022-09-23 | End: 2022-10-21 | Stop reason: SDUPTHER

## 2022-09-23 RX ORDER — NALOXONE HYDROCHLORIDE 4 MG/.1ML
SPRAY NASAL
Qty: 1 EACH | Refills: 1 | Status: SHIPPED | OUTPATIENT
Start: 2022-09-23

## 2022-09-23 NOTE — PROGRESS NOTES
Tobacco Cessation Counseling: Tobacco cessation counseling was not provided  The patient is sincerely urged to quit consumption of tobacco  He is not ready to quit tobacco          Assessment/Plan:    No problem-specific Assessment & Plan notes found for this encounter  Diagnoses and all orders for this visit:    Lumbar spondylosis    Chronic bilateral low back pain without sciatica    Chronic, continuous use of opioids  Comments:  -keeps secure  no oversedation  canot function adl  without    Spinal stenosis of lumbar region without neurogenic claudication    Type 2 diabetes mellitus with other specified complication, with long-term current use of insulin (HCC)  Comments:  -follows endocrinology--seen 9/2022    Paroxysmal atrial fibrillation (HCC)          Subjective:      Patient ID: Alicia Cockayne is a 64 y o  male  Never felt afib  Unable to take own puls ok now  Pulse ox  Fit bit      The following portions of the patient's history were reviewed and updated as appropriate: allergies, current medications, past family history, past medical history, past social history, past surgical history, and problem list     Review of Systems   Constitutional: Negative for activity change and fatigue  HENT: Negative for ear discharge, ear pain, rhinorrhea and sore throat  Eyes: Negative for pain and visual disturbance  Respiratory: Negative for cough and shortness of breath  Cardiovascular: Negative for chest pain and leg swelling  Gastrointestinal: Negative for abdominal pain, constipation and diarrhea  Endocrine: Negative for cold intolerance and polyuria  Genitourinary: Negative for flank pain and hematuria  Musculoskeletal: Negative for back pain and joint swelling  Skin: Negative for pallor and wound  Neurological: Negative for dizziness, seizures and speech difficulty  Psychiatric/Behavioral: Negative for confusion and hallucinations           Objective:      /70   Pulse 74   Temp 97 8 °F (36 6 °C)   Resp 14   Ht 6' (1 829 m)   Wt 103 kg (227 lb)   SpO2 98%   BMI 30 79 kg/m²          Physical Exam  Vitals and nursing note reviewed  Constitutional:       General: He is not in acute distress  Appearance: Normal appearance  He is not ill-appearing  HENT:      Head: Normocephalic  Right Ear: External ear normal  There is no impacted cerumen  Left Ear: External ear normal  There is no impacted cerumen  Nose: No congestion or rhinorrhea  Mouth/Throat:      Pharynx: No posterior oropharyngeal erythema  Eyes:      General: No scleral icterus  Right eye: No discharge  Left eye: No discharge  Neck:      Vascular: No carotid bruit  Cardiovascular:      Rate and Rhythm: Normal rate and regular rhythm  Heart sounds: Normal heart sounds  No murmur heard  No friction rub  No gallop  Pulmonary:      Breath sounds: No wheezing or rhonchi  Abdominal:      General: There is no distension  Tenderness: There is no abdominal tenderness  There is no guarding  Musculoskeletal:         General: No swelling  Cervical back: No rigidity  Right lower leg: No edema  Left lower leg: No edema  Lymphadenopathy:      Cervical: No cervical adenopathy  Skin:     Coloration: Skin is not jaundiced  Neurological:      Mental Status: He is alert  Cranial Nerves: No cranial nerve deficit  Motor: No weakness        Coordination: Coordination normal    Psychiatric:         Mood and Affect: Mood normal

## 2022-10-03 ENCOUNTER — OFFICE VISIT (OUTPATIENT)
Dept: UROLOGY | Facility: HOSPITAL | Age: 62
End: 2022-10-03
Payer: COMMERCIAL

## 2022-10-03 VITALS
DIASTOLIC BLOOD PRESSURE: 82 MMHG | WEIGHT: 228 LBS | HEART RATE: 90 BPM | BODY MASS INDEX: 30.88 KG/M2 | HEIGHT: 72 IN | OXYGEN SATURATION: 98 % | SYSTOLIC BLOOD PRESSURE: 142 MMHG

## 2022-10-03 DIAGNOSIS — F41.9 ANXIETY: ICD-10-CM

## 2022-10-03 DIAGNOSIS — Z12.5 SCREENING FOR PROSTATE CANCER: Primary | ICD-10-CM

## 2022-10-03 PROCEDURE — 99213 OFFICE O/P EST LOW 20 MIN: CPT | Performed by: NURSE PRACTITIONER

## 2022-10-03 RX ORDER — LORAZEPAM 0.5 MG/1
1 TABLET ORAL 2 TIMES DAILY
Qty: 120 TABLET | Refills: 3 | Status: SHIPPED | OUTPATIENT
Start: 2022-10-03

## 2022-10-03 RX ORDER — LORAZEPAM 0.5 MG/1
TABLET ORAL
Qty: 120 TABLET | Refills: 3 | Status: CANCELLED | OUTPATIENT
Start: 2022-10-03

## 2022-10-03 NOTE — PROGRESS NOTES
10/03/22    Michael Claire   1960   [id number]     Assessment  1 Genital warts  2 Prostate cancer screening  3 Erectile dysfunction     Discussion/Plan  1 Genital warts   Continue Condylox 0 5% topical gel    Safe sexual practices   2 Prostate cancer screening   6/7/22 PSA 0 5  3 Erectile dysfunction   Continue Sildenafil 100 mg PRN     Return in June 2023 with PSA  Patient defers OR for removal of genital warts  Subjective  HPI   Michael Claire is a 64 y o  male who presents in follow up today for evaluation of genital warts  He was evaluated by Dr Missy Downs in July of this year  He states the topical therapy is working to remove the warts and he defers surgical intervention  He denies new lesions  He uses sildenafil as needed prior to sexual activity and also notes slight curvature of penis when erect  Patient describes as a less than 45 degree angle to the left  He denies any pain or discomfort and curvature does not inhibit sexual activity or urination  Patient denies any prior trauma, urologic instrumentation or surgeries  He denies family history of prostate malignancy  He does not report bothersome urinary symptoms  Review of Systems - History obtained from chart review and the patient  General ROS: negative  Psychological ROS: negative  Respiratory ROS: no cough, shortness of breath, or wheezing  Cardiovascular ROS: no chest pain or dyspnea on exertion  Gastrointestinal ROS: no abdominal pain, change in bowel habits, or black or bloody stools  Genito-Urinary ROS: positive for - erectile dysfunction  Musculoskeletal ROS: negative  Neurological ROS: negative  Dermatological ROS: negative       Objective  Physical Exam  Vitals and nursing note reviewed  Constitutional:       General: He is awake  He is not in acute distress  Appearance: Normal appearance  He is well-developed, well-groomed and normal weight  He is not ill-appearing, toxic-appearing or diaphoretic     Pulmonary:      Effort: Pulmonary effort is normal    Abdominal:      Tenderness: There is no right CVA tenderness or left CVA tenderness  Musculoskeletal:         General: Normal range of motion  Cervical back: Normal range of motion and neck supple  Skin:     General: Skin is warm  Neurological:      General: No focal deficit present  Mental Status: He is alert and oriented to person, place, and time  Mental status is at baseline  Psychiatric:         Attention and Perception: Attention normal          Mood and Affect: Mood normal          Speech: Speech normal          Behavior: Behavior normal  Behavior is cooperative  Thought Content: Thought content normal          Cognition and Memory: Cognition normal          Judgment: Judgment normal            Lorna Sainz     I have spent 15 minutes with Patient  today in which greater than 50% of this time was spent in counseling/coordination of care regarding Intructions for management

## 2022-10-19 ENCOUNTER — DOCUMENTATION (OUTPATIENT)
Dept: ENDOCRINOLOGY | Facility: HOSPITAL | Age: 62
End: 2022-10-19

## 2022-10-21 DIAGNOSIS — S32.010A CLOSED COMPRESSION FRACTURE OF BODY OF L1 VERTEBRA (HCC): ICD-10-CM

## 2022-10-21 RX ORDER — HYDROCODONE BITARTRATE AND ACETAMINOPHEN 5; 325 MG/1; MG/1
1 TABLET ORAL EVERY 6 HOURS PRN
Qty: 120 TABLET | Refills: 0 | Status: SHIPPED | OUTPATIENT
Start: 2022-10-21 | End: 2022-11-20

## 2022-11-06 DIAGNOSIS — S32.010A CLOSED COMPRESSION FRACTURE OF BODY OF L1 VERTEBRA (HCC): ICD-10-CM

## 2022-11-14 DIAGNOSIS — S32.010A CLOSED COMPRESSION FRACTURE OF BODY OF L1 VERTEBRA (HCC): ICD-10-CM

## 2022-11-14 RX ORDER — METHOCARBAMOL 500 MG/1
500 TABLET, FILM COATED ORAL 2 TIMES DAILY PRN
Qty: 60 TABLET | Refills: 3 | Status: SHIPPED | OUTPATIENT
Start: 2022-11-14

## 2022-11-18 DIAGNOSIS — S32.010A CLOSED COMPRESSION FRACTURE OF BODY OF L1 VERTEBRA (HCC): ICD-10-CM

## 2022-11-18 RX ORDER — HYDROCODONE BITARTRATE AND ACETAMINOPHEN 5; 325 MG/1; MG/1
1 TABLET ORAL EVERY 6 HOURS PRN
Qty: 120 TABLET | Refills: 0 | Status: SHIPPED | OUTPATIENT
Start: 2022-11-18 | End: 2022-12-18

## 2022-11-21 DIAGNOSIS — Z79.4 TYPE 2 DIABETES MELLITUS WITHOUT COMPLICATION, WITH LONG-TERM CURRENT USE OF INSULIN (HCC): ICD-10-CM

## 2022-11-21 DIAGNOSIS — E11.9 TYPE 2 DIABETES MELLITUS WITHOUT COMPLICATION, WITH LONG-TERM CURRENT USE OF INSULIN (HCC): ICD-10-CM

## 2022-11-21 RX ORDER — DULAGLUTIDE 3 MG/.5ML
INJECTION, SOLUTION SUBCUTANEOUS
Qty: 2 ML | Refills: 5 | Status: SHIPPED | OUTPATIENT
Start: 2022-11-21

## 2022-12-01 LAB
ALBUMIN SERPL-MCNC: 4.3 G/DL (ref 3.8–4.8)
ALBUMIN/CREAT UR: 300 MG/G CREAT (ref 0–29)
ALBUMIN/GLOB SERPL: 1.9 {RATIO} (ref 1.2–2.2)
ALP SERPL-CCNC: 83 IU/L (ref 44–121)
ALT SERPL-CCNC: 28 IU/L (ref 0–44)
AST SERPL-CCNC: 27 IU/L (ref 0–40)
BILIRUB SERPL-MCNC: 0.3 MG/DL (ref 0–1.2)
BUN SERPL-MCNC: 11 MG/DL (ref 8–27)
BUN/CREAT SERPL: 15 (ref 10–24)
CALCIUM SERPL-MCNC: 9.8 MG/DL (ref 8.6–10.2)
CHLORIDE SERPL-SCNC: 100 MMOL/L (ref 96–106)
CHOLEST SERPL-MCNC: 154 MG/DL (ref 100–199)
CHOLEST/HDLC SERPL: 3.7 RATIO (ref 0–5)
CO2 SERPL-SCNC: 24 MMOL/L (ref 20–29)
CREAT SERPL-MCNC: 0.72 MG/DL (ref 0.76–1.27)
CREAT UR-MCNC: 82.2 MG/DL
EGFR: 103 ML/MIN/1.73
EST. AVERAGE GLUCOSE BLD GHB EST-MCNC: 186 MG/DL
GLOBULIN SER-MCNC: 2.3 G/DL (ref 1.5–4.5)
GLUCOSE SERPL-MCNC: 133 MG/DL (ref 70–99)
HBA1C MFR BLD: 8.1 % (ref 4.8–5.6)
HDLC SERPL-MCNC: 42 MG/DL
LDLC SERPL CALC-MCNC: 99 MG/DL (ref 0–99)
MICROALBUMIN UR-MCNC: 246.7 UG/ML
POTASSIUM SERPL-SCNC: 4.2 MMOL/L (ref 3.5–5.2)
PROT SERPL-MCNC: 6.6 G/DL (ref 6–8.5)
SL AMB VLDL CHOLESTEROL CALC: 13 MG/DL (ref 5–40)
SODIUM SERPL-SCNC: 138 MMOL/L (ref 134–144)
TRIGL SERPL-MCNC: 67 MG/DL (ref 0–149)
TSH SERPL DL<=0.005 MIU/L-ACNC: 0.98 UIU/ML (ref 0.45–4.5)

## 2022-12-06 LAB
DME PARACHUTE DELIVERY DATE REQUESTED: NORMAL
DME PARACHUTE ITEM DESCRIPTION: NORMAL
DME PARACHUTE ITEM DESCRIPTION: NORMAL
DME PARACHUTE ORDER STATUS: NORMAL
DME PARACHUTE SUPPLIER NAME: NORMAL
DME PARACHUTE SUPPLIER PHONE: NORMAL

## 2022-12-09 ENCOUNTER — OFFICE VISIT (OUTPATIENT)
Dept: ENDOCRINOLOGY | Facility: HOSPITAL | Age: 62
End: 2022-12-09

## 2022-12-09 VITALS
HEART RATE: 80 BPM | SYSTOLIC BLOOD PRESSURE: 136 MMHG | OXYGEN SATURATION: 96 % | BODY MASS INDEX: 31.56 KG/M2 | WEIGHT: 233 LBS | DIASTOLIC BLOOD PRESSURE: 80 MMHG | HEIGHT: 72 IN

## 2022-12-09 DIAGNOSIS — E11.69 TYPE 2 DIABETES MELLITUS WITH OTHER SPECIFIED COMPLICATION, WITH LONG-TERM CURRENT USE OF INSULIN (HCC): Primary | ICD-10-CM

## 2022-12-09 DIAGNOSIS — I10 ESSENTIAL HYPERTENSION: ICD-10-CM

## 2022-12-09 DIAGNOSIS — Z79.4 TYPE 2 DIABETES MELLITUS WITH OTHER SPECIFIED COMPLICATION, WITH LONG-TERM CURRENT USE OF INSULIN (HCC): Primary | ICD-10-CM

## 2022-12-09 NOTE — PROGRESS NOTES
Syed Galvez 58 y o  male MRN: [id number]    Encounter: [id number]      Assessment/Plan     Assessment: This is a 58y o -year-old male with type 2 diabetes with hypertension and hyperlipidemia  Plan:  1  Type 2 diabetes, insulin requiring:  Most recent hemoglobin A1c has decreased to 8 1, but is still above goal   Goals levels doing better, but still running slightly high  At this time we will not make any adjustments to his Lantus and you will continue with 22 units daily  He will continue with NovoLog 5 units at lunch, but increased to 13 units with dinner  I asked him to stop by in 2 to 4 weeks to download his Dexcom for review and see if any further adjustments need to be made to his insulin  Continue with lifestyle modifications to help improve glucose levels  Follow-up in 3 months with lab work completed prior to visit      2  Hypertension:  Normotensive in the office today  Mcmichael Maryjo function is stable with normal like joints   Continue with current medications   Repeat CMP prior to next office visit      3  Hyperlipidemia: Lipid panel was excellent  Continue with current medication  We will continue to monitor over time  CC: Type 2 diabetes follow-up    History of Present Illness     HPI:  Derrek Willis a 64year old male with type 2 diabetes for about 5-10 years   He is on oral agents and insulin at home and takes metformin 500 mg in the morning and 1000 mg in the evening, glimepiride 4 mg daily, Humalog 5 units with lunch and 10 units with dinner, Lantus 22 units in the evening, Trulicity 3 mg  Has been inconsistent with his Lantus dosing, but typically will take close to 20 units at night    He is currently utilizing sliding scale with his Humalog depending on where his blood sugar is with meals  He denies any polyuria, polydipsia, nocturia and blurry vision   He denies nephropathy, retinopathy, heart attack, stroke and claudication but does admit to neuropathy with symptoms worse at night   Has not followed up with a podiatrist   Has been working on dietary changes to help with blood sugar numbers  No changes in physical activity, but stays active throughout the day      Hypoglycemic episodes:  May have episodes of hypoglycemia after meals depending on activity level and what he eats      The patient's last eye exam was March 2022  Last diabetic foot exam was completed October 2021       Blood Sugar/Glucometer/Pump/CGM review:  [facility] Dexcom from November 26 through December 9, 2022 reveals an average glucose level of 199 with a standard deviation of 55  He is in target range 41% of the time, above target range 58% of the time and below target range 2% of the time  Throughout the day he typically remains at the high end of normal or slightly high  He has significant hyperglycemia after dinner going into bedtime which then trends down overnight      For hypertension he is treated with lisinopril 10 mg daily  An Perez is also managed on labetalol 100 mg twice a day and amlodipine 5 mg daily   For hyperlipidemia, he is treated with rosuvastatin      Has a history of falling off a ladder and having a vertebral fracture   Was noted to have an L1 compression fracture at that time   Does currently have chronic back pain   Has had serial imaging since, and seems to be stable   Was recommended to get a DEXA scan completed for further evaluation, but has yet to get that done  Review of Systems   Constitutional: Negative for activity change, appetite change, fatigue and unexpected weight change  HENT: Negative for trouble swallowing  Eyes: Negative for visual disturbance  Respiratory: Negative for chest tightness and shortness of breath  Cardiovascular: Negative for chest pain, palpitations and leg swelling  Gastrointestinal: Negative for abdominal pain, diarrhea, nausea and vomiting  Endocrine: Negative for cold intolerance, heat intolerance, polydipsia, polyphagia and polyuria  Genitourinary: Negative for frequency  Musculoskeletal: Positive for back pain and myalgias (in the back)  Skin: Negative for rash and wound  Neurological: Positive for numbness (bilateral feet, worse at night )  Negative for dizziness, weakness, light-headedness and headaches  Psychiatric/Behavioral: Negative for dysphoric mood and sleep disturbance  The patient is not nervous/anxious          Historical Information   Past Medical History:   Diagnosis Date   • Alcoholism ([place] [address] )    • Anxiety    • Atrial fibrillation ([facility])    • Diabetes ([place] [address] )    • Diabetes mellitus, type 2 ([place] [address] )    • Diverticulitis    • ED (erectile dysfunction)    • High cholesterol    • Hyperlipidemia    • Hypertension     LVH    • L1 vertebral fracture ([facility])    • Lung mass    • Neuropathy    • Nocturia    • Osteoarthritis    • Paroxysmal atrial fibrillation ([facility])    • Pilar cyst    • Shingles    • Smoker    • Spinal stenosis, lumbar      Past Surgical History:   Procedure Laterality Date   • WISDOM TOOTH EXTRACTION       Social History   Social History     Substance and Sexual Activity   Alcohol Use Not Currently     Social History     Substance and Sexual Activity   Drug Use Yes   • Types: Marijuana, Other    Comment: prescription pills (unprescribed) daily     Social History     Tobacco Use   Smoking Status Every Day   • Packs/day: 2 00   • Types: Cigarettes   Smokeless Tobacco Never     Family History:   Family History   Adopted: Yes       Meds/Allergies   Current Outpatient Medications   Medication Sig Dispense Refill   • amLODIPine (NORVASC) 5 mg tablet TAKE 1 TABLET DAILY -MAY CAUSE DIZZINESS OR LIGHTHEADEDNESS- 30 tablet 5   • Apidra SoloStar 100 units/mL injection pen Inject 10 Units under the skin 3 (three) times a day with meals     • B-D UF III MINI PEN NEEDLES 31G X 5 MM MISC 2 (two) times a day     • Contour Next Test test strip TEST 4 TIMES A  strip 1   • gabapentin (NEURONTIN) 300 mg capsule take 1 capsule by mouth at bedtime 30 capsule 3   • glimepiride (AMARYL) 4 mg tablet take 1 tablet by mouth once daily 60 tablet 3   • HYDROcodone-acetaminophen (NORCO) 5-325 mg per tablet Take 1 tablet by mouth every 6 (six) hours as needed for pain Max Daily Amount: 4 tablets 120 tablet 0   • labetalol (NORMODYNE) 200 mg tablet take 1 tablet by mouth twice a day 60 tablet 3   • Lantus SoloStar 100 units/mL injection pen INJECT 40 UNITS SUBCUTANEOUSLY AT BEDTIME (Patient taking differently: 20-30 units at night) 15 mL 3   • lisinopril (ZESTRIL) 10 mg tablet TAKE 1 TABLET BY MOUTH ONCE DAILY 90 tablet 3   • LORazepam (ATIVAN) 0 5 mg tablet   0   • LORazepam (ATIVAN) 0 5 mg tablet Take 2 tablets (1 mg total) by mouth 2 (two) times a day 120 tablet 3   • metFORMIN (GLUCOPHAGE) 500 mg tablet take 1 tablet by mouth three times a day 270 tablet 3   • methocarbamol (ROBAXIN) 500 mg tablet TAKE 1 TABLET (500 MG TOTAL) BY MOUTH 2 (TWO) TIMES A DAY AS NEEDED FOR MUSCLE SPASMS 60 tablet 3   • naloxone (NARCAN) 4 mg/0 1 mL nasal spray Administer 1 spray into a nostril  If no response after 2-3 minutes, give another dose in the other nostril using a new spray  1 each 1   • Needles & Syringes MISC by Does not apply route 3 (three) times a day before meals 90 each 0   • podofilox (CONDYLOX) 0 5 % external solution Apply topically 2 (two) times a day Two warts on penis and scrotum 3 5 mL 3   • polyethylene glycol (MIRALAX) 17 g packet Take 17 g by mouth daily as needed (constipation) 14 each 0   • rosuvastatin (CRESTOR) 5 mg tablet take 1 tablet by mouth at bedtime 90 tablet 3   • ROSUVASTATIN CALCIUM PO Take by mouth     • sildenafil (VIAGRA) 100 mg tablet take 1 tablet by mouth if needed 10 tablet 3   • Trulicity 3 PS/5 3TW SOPN INJECT 0 5 ML (3 MG TOTAL) UNDER THE SKIN ONCE A WEEK - REFRIGERATE 2 mL 5     No current facility-administered medications for this visit       No Known Allergies    Objective   Vitals: Blood pressure 136/80, pulse 80, height 6' (1 829 m), weight 106 kg (233 lb), SpO2 96 %  Physical Exam  Vitals and nursing note reviewed  Constitutional:       General: He is not in acute distress  Appearance: Normal appearance  HENT:      Head: Normocephalic and atraumatic  Eyes:      General: No scleral icterus  Pupils: Pupils are equal, round, and reactive to light  Cardiovascular:      Rate and Rhythm: Normal rate and regular rhythm  Pulses: no weak pulses          Dorsalis pedis pulses are 2+ on the right side and 2+ on the left side  Posterior tibial pulses are 2+ on the right side and 2+ on the left side  Heart sounds: No murmur heard  Pulmonary:      Effort: Pulmonary effort is normal  No respiratory distress  Breath sounds: Normal breath sounds  No wheezing  Musculoskeletal:      Right lower leg: No edema  Left lower leg: No edema  Feet:      Right foot:      Skin integrity: Callus and dry skin present  No ulcer, skin breakdown, erythema or warmth  Left foot:      Skin integrity: Callus and dry skin present  No ulcer, skin breakdown, erythema or warmth  Lymphadenopathy:      Cervical: No cervical adenopathy  Skin:     General: Skin is warm and dry  Neurological:      Mental Status: He is alert and oriented to person, place, and time  Sensory: No sensory deficit  Psychiatric:         Mood and Affect: Mood normal          Behavior: Behavior normal          Thought Content: Thought content normal      Patient's shoes and socks removed  Right Foot/Ankle   Right Foot Inspection  Skin Exam: skin normal, skin intact, dry skin, callus and callus  No warmth, no erythema, no maceration, no abnormal color, no pre-ulcer and no ulcer  Toe Exam: ROM and strength within normal limits   No tenderness, erythema and  no right toe deformity    Sensory   Vibration: diminished  Proprioception: intact  Monofilament testing: diminished    Vascular  Capillary refills: < 3 seconds  The right DP pulse is 2+  The right PT pulse is 2+  Left Foot/Ankle  Left Foot Inspection  Skin Exam: skin normal, skin intact, dry skin and callus  No warmth, no erythema, no maceration, normal color, no pre-ulcer and no ulcer  Toe Exam: ROM and strength within normal limits  No tenderness, no erythema and no left toe deformity  Sensory   Vibration: diminished  Proprioception: intact  Monofilament testing: diminished    Vascular  Capillary refills: < 3 seconds  The left DP pulse is 2+  The left PT pulse is 2+  Assign Risk Category  No deformity present  Loss of protective sensation  No weak pulses  Risk: 1        The history was obtained from the review of the chart, patient  Lab Results:   Lab Results   Component Value Date/Time    Hemoglobin A1C 8 1 (H) 11/30/2022 02:25 PM    Hemoglobin A1C 8 2 (A) 09/09/2022 01:18 PM    Hemoglobin A1C 9 2 (H) 06/02/2022 09:30 AM    BUN 11 11/30/2022 02:25 PM    BUN 13 06/02/2022 09:30 AM    Potassium 4 2 11/30/2022 02:25 PM    Potassium 4 2 06/02/2022 09:30 AM    Chloride 100 11/30/2022 02:25 PM    Chloride 98 06/02/2022 09:30 AM    CO2 24 11/30/2022 02:25 PM    CO2 24 06/02/2022 09:30 AM    Creatinine 0 72 (L) 11/30/2022 02:25 PM    Creatinine 0 80 06/02/2022 09:30 AM    AST 27 11/30/2022 02:25 PM    AST 14 06/02/2022 09:30 AM    ALT 28 11/30/2022 02:25 PM    ALT 24 06/02/2022 09:30 AM    Albumin 4 3 11/30/2022 02:25 PM    Albumin 4 4 06/02/2022 09:30 AM    Globulin, Total 2 3 11/30/2022 02:25 PM    Globulin, Total 2 3 06/02/2022 09:30 AM    HDL 42 11/30/2022 02:25 PM    HDL 31 (L) 06/02/2022 09:30 AM    Triglycerides 67 11/30/2022 02:25 PM    Triglycerides 127 06/02/2022 09:30 AM       Portions of the record may have been created with voice recognition software  Occasional wrong word or "sound a like" substitutions may have occurred due to the inherent limitations of voice recognition software   Read the chart carefully and recognize, using context, where substitutions have occurred

## 2022-12-09 NOTE — PATIENT INSTRUCTIONS
Continue to monitor diet, maintain physical activity  Make sure to drink plenty water throughout the day  Continue with metformin, glimepiride, Trulicity to 3 0 mg weekly  Take Humalog 5 units with lunch and 13 units with dinner  Make sure to be consistent with the Lantus at night  Take 22 units  Call if there is concerns  Continue using Dexcom to monitor blood sugar  Continue with blood pressure and cholesterol medications  Follow-up in 3 months

## 2022-12-16 ENCOUNTER — OFFICE VISIT (OUTPATIENT)
Dept: INTERNAL MEDICINE CLINIC | Facility: CLINIC | Age: 62
End: 2022-12-16

## 2022-12-16 VITALS
TEMPERATURE: 97.8 F | HEIGHT: 72 IN | SYSTOLIC BLOOD PRESSURE: 130 MMHG | DIASTOLIC BLOOD PRESSURE: 74 MMHG | RESPIRATION RATE: 12 BRPM | BODY MASS INDEX: 32.23 KG/M2 | HEART RATE: 74 BPM | WEIGHT: 238 LBS | OXYGEN SATURATION: 98 %

## 2022-12-16 DIAGNOSIS — M47.816 LUMBAR SPONDYLOSIS: ICD-10-CM

## 2022-12-16 DIAGNOSIS — M54.50 CHRONIC BILATERAL LOW BACK PAIN WITHOUT SCIATICA: ICD-10-CM

## 2022-12-16 DIAGNOSIS — I10 ESSENTIAL HYPERTENSION: ICD-10-CM

## 2022-12-16 DIAGNOSIS — Z00.00 ANNUAL PHYSICAL EXAM: Primary | ICD-10-CM

## 2022-12-16 DIAGNOSIS — E78.5 HYPERLIPIDEMIA, UNSPECIFIED HYPERLIPIDEMIA TYPE: ICD-10-CM

## 2022-12-16 DIAGNOSIS — F17.200 SMOKER: ICD-10-CM

## 2022-12-16 DIAGNOSIS — Z12.12 SCREENING FOR COLORECTAL CANCER: ICD-10-CM

## 2022-12-16 DIAGNOSIS — S32.010A CLOSED COMPRESSION FRACTURE OF BODY OF L1 VERTEBRA (HCC): ICD-10-CM

## 2022-12-16 DIAGNOSIS — I48.0 PAROXYSMAL ATRIAL FIBRILLATION (HCC): ICD-10-CM

## 2022-12-16 DIAGNOSIS — M48.061 SPINAL STENOSIS OF LUMBAR REGION WITHOUT NEUROGENIC CLAUDICATION: ICD-10-CM

## 2022-12-16 DIAGNOSIS — F11.90 CHRONIC, CONTINUOUS USE OF OPIOIDS: ICD-10-CM

## 2022-12-16 DIAGNOSIS — Z11.59 NEED FOR HEPATITIS C SCREENING TEST: ICD-10-CM

## 2022-12-16 DIAGNOSIS — E11.69 TYPE 2 DIABETES MELLITUS WITH OTHER SPECIFIED COMPLICATION, WITH LONG-TERM CURRENT USE OF INSULIN (HCC): ICD-10-CM

## 2022-12-16 DIAGNOSIS — Z12.2 ENCOUNTER FOR SCREENING FOR LUNG CANCER: ICD-10-CM

## 2022-12-16 DIAGNOSIS — G89.29 CHRONIC BILATERAL LOW BACK PAIN WITHOUT SCIATICA: ICD-10-CM

## 2022-12-16 DIAGNOSIS — Z12.5 SCREENING FOR PROSTATE CANCER: ICD-10-CM

## 2022-12-16 DIAGNOSIS — Z12.11 SCREENING FOR COLORECTAL CANCER: ICD-10-CM

## 2022-12-16 DIAGNOSIS — Z79.4 TYPE 2 DIABETES MELLITUS WITH OTHER SPECIFIED COMPLICATION, WITH LONG-TERM CURRENT USE OF INSULIN (HCC): ICD-10-CM

## 2022-12-16 DIAGNOSIS — R91.1 PULMONARY NODULE: ICD-10-CM

## 2022-12-16 RX ORDER — HYDROCODONE BITARTRATE AND ACETAMINOPHEN 5; 325 MG/1; MG/1
1 TABLET ORAL EVERY 6 HOURS PRN
Qty: 120 TABLET | Refills: 0 | Status: SHIPPED | OUTPATIENT
Start: 2022-12-16 | End: 2023-01-15

## 2022-12-16 NOTE — PROGRESS NOTES
[facility]    NAME: Liliane Slaughter  AGE: 58 y o  SEX: male  : 1960     DATE: 2022     Assessment and Plan:     Problem List Items Addressed This Visit        Endocrine    Type 2 diabetes mellitus ([address] )       Cardiovascular and Mediastinum    Essential hypertension    Paroxysmal atrial fibrillation (HCC)       Musculoskeletal and Integument    Lumbar spondylosis       Other    Chronic bilateral low back pain without sciatica    Pulmonary nodule    Hyperlipidemia    Encounter for screening for lung cancer - Primary    Spinal stenosis, lumbar    Chronic, continuous use of opioids    Smoker    Need for hepatitis C screening test    Relevant Orders    Hepatitis C Antibody ([facility])       Immunizations and preventive care screenings were discussed with patient today  Appropriate education was printed on patient's after visit summary  Discussed risks and benefits of prostate cancer screening  We discussed the controversial history of PSA screening for prostate cancer in the [place] as well as the risk of over detection and over treatment of prostate cancer by way of PSA screening  The patient understands that PSA blood testing is an imperfect way to screen for prostate cancer and that elevated PSA levels in the blood may also be caused by infection, inflammation, prostatic trauma or manipulation, urological procedures, or by benign prostatic enlargement  The role of the digital rectal examination in prostate cancer screening was also discussed and I discussed with him that there is large interobserver variability in the findings of digital rectal examination  Counseling:  · Exercise: the importance of regular exercise/physical activity was discussed  Recommend exercise 3-5 times per week for at least 30 minutes         Depression Screening and Follow-up Plan: Patient was screened for depression during today's encounter  They screened negative with a PHQ-2 score of 0  Return in 3 months (on 3/16/2023)  Chief Complaint:     Chief Complaint   Patient presents with   • Annual Exam      History of Present Illness:     Adult Annual Physical   Patient here for a comprehensive physical exam  The patient reports no problems  Diet and Physical Activity  · Diet/Nutrition: well balanced diet  · Exercise: walking  Depression Screening  PHQ-2/9 Depression Screening    Little interest or pleasure in doing things: 0 - not at all  Feeling down, depressed, or hopeless: 0 - not at all  PHQ-2 Score: 0  PHQ-2 Interpretation: Negative depression screen       General Health  · Sleep: sleeps well  · Hearing: normal - none   · Vision: vision problems:      · Dental: regular dental visits   Health  · Symptoms include: none     Review of Systems:     Review of Systems   Constitutional: Negative for activity change, appetite change, chills, diaphoresis, fatigue and fever  HENT: Negative for congestion, facial swelling, hearing loss, mouth sores, rhinorrhea, sore throat, trouble swallowing and voice change  Eyes: Negative for photophobia and pain  Respiratory: Negative for apnea, cough, chest tightness, shortness of breath and stridor  Cardiovascular: Negative for chest pain, palpitations and leg swelling  Gastrointestinal: Negative for abdominal distention, abdominal pain, blood in stool and constipation  Endocrine: Negative for cold intolerance and heat intolerance  Genitourinary: Negative for difficulty urinating, dysuria, flank pain, genital sores, hematuria and urgency  Musculoskeletal: Negative for arthralgias, back pain, gait problem, joint swelling and myalgias  Skin: Negative for rash and wound  Allergic/Immunologic: Negative for environmental allergies, food allergies and immunocompromised state     Neurological: Negative for dizziness, tremors, seizures, syncope, facial asymmetry, speech difficulty, weakness, light-headedness, numbness and headaches  Hematological: Negative for adenopathy  Does not bruise/bleed easily  Psychiatric/Behavioral: Negative for agitation, behavioral problems, hallucinations, self-injury, sleep disturbance and suicidal ideas        Past Medical History:     Past Medical History:   Diagnosis Date   • Alcoholism ([place] [address] )    • Anxiety    • Atrial fibrillation ([facility])    • Diabetes ([place] [address] )    • Diabetes mellitus, type 2 ([place] [address] )    • Diverticulitis    • ED (erectile dysfunction)    • High cholesterol    • Hyperlipidemia    • Hypertension     LVH    • L1 vertebral fracture ([facility])    • Lung mass    • Neuropathy    • Nocturia    • Osteoarthritis    • Paroxysmal atrial fibrillation ([facility])    • Pilar cyst    • Shingles    • Smoker    • Spinal stenosis, lumbar       Past Surgical History:     Past Surgical History:   Procedure Laterality Date   • WISDOM TOOTH EXTRACTION        Family History:     Family History   Adopted: Yes      Social History:     Social History     Socioeconomic History   • Marital status: Single     Spouse name: Not on file   • Number of children: Not on file   • Years of education: Not on file   • Highest education level: Not on file   Occupational History   • Not on file   Tobacco Use   • Smoking status: Every Day     Packs/day: 2 00     Types: Cigarettes   • Smokeless tobacco: Never   Vaping Use   • Vaping Use: Never used   Substance and Sexual Activity   • Alcohol use: Not Currently   • Drug use: Yes     Types: Marijuana, Other     Comment: prescription pills (unprescribed) daily   • Sexual activity: Not Currently   Other Topics Concern   • Not on file   Social History Narrative   • Not on file     Social Determinants of Health     Financial Resource Strain: Not on file   Food Insecurity: Not on file   Transportation Needs: Not on file   Physical Activity: Not on file   Stress: Not on file   Social Connections: Not on file   Intimate Partner Violence: Not on file   Housing Stability: Not on file      Current Medications:     Current Outpatient Medications   Medication Sig Dispense Refill   • amLODIPine (NORVASC) 5 mg tablet TAKE 1 TABLET DAILY -MAY CAUSE DIZZINESS OR LIGHTHEADEDNESS- 30 tablet 5   • Apidra SoloStar 100 units/mL injection pen Inject 10 Units under the skin 3 (three) times a day with meals     • B-D UF III MINI PEN NEEDLES 31G X 5 MM MISC 2 (two) times a day     • Contour Next Test test strip TEST 4 TIMES A  strip 1   • gabapentin (NEURONTIN) 300 mg capsule take 1 capsule by mouth at bedtime 30 capsule 3   • glimepiride (AMARYL) 4 mg tablet take 1 tablet by mouth once daily 60 tablet 3   • HYDROcodone-acetaminophen (NORCO) 5-325 mg per tablet Take 1 tablet by mouth every 6 (six) hours as needed for pain Max Daily Amount: 4 tablets 120 tablet 0   • labetalol (NORMODYNE) 200 mg tablet take 1 tablet by mouth twice a day 60 tablet 3   • Lantus SoloStar 100 units/mL injection pen INJECT 40 UNITS SUBCUTANEOUSLY AT BEDTIME (Patient taking differently: 20-30 units at night) 15 mL 3   • lisinopril (ZESTRIL) 10 mg tablet TAKE 1 TABLET BY MOUTH ONCE DAILY 90 tablet 3   • LORazepam (ATIVAN) 0 5 mg tablet   0   • LORazepam (ATIVAN) 0 5 mg tablet Take 2 tablets (1 mg total) by mouth 2 (two) times a day 120 tablet 3   • metFORMIN (GLUCOPHAGE) 500 mg tablet take 1 tablet by mouth three times a day 270 tablet 3   • methocarbamol (ROBAXIN) 500 mg tablet TAKE 1 TABLET (500 MG TOTAL) BY MOUTH 2 (TWO) TIMES A DAY AS NEEDED FOR MUSCLE SPASMS 60 tablet 3   • naloxone (NARCAN) 4 mg/0 1 mL nasal spray Administer 1 spray into a nostril  If no response after 2-3 minutes, give another dose in the other nostril using a new spray   1 each 1   • Needles & Syringes MISC by Does not apply route 3 (three) times a day before meals 90 each 0   • podofilox (CONDYLOX) 0 5 % external solution Apply topically 2 (two) times a day Two warts on penis and scrotum 3 5 mL 3   • polyethylene glycol (MIRALAX) 17 g packet Take 17 g by mouth daily as needed (constipation) 14 each 0   • rosuvastatin (CRESTOR) 5 mg tablet take 1 tablet by mouth at bedtime 90 tablet 3   • ROSUVASTATIN CALCIUM PO Take by mouth     • sildenafil (VIAGRA) 100 mg tablet take 1 tablet by mouth if needed 10 tablet 3   • Trulicity 3 UD/1 1RJ SOPN INJECT 0 5 ML (3 MG TOTAL) UNDER THE SKIN ONCE A WEEK - REFRIGERATE 2 mL 5     No current facility-administered medications for this visit  Allergies:     No Known Allergies   Physical Exam:     Ht 6' (1 829 m)   Wt 108 kg (238 lb)   BMI 32 28 kg/m²     Physical Exam  Constitutional:       General: He is not in acute distress  Appearance: Normal appearance  He is not ill-appearing or toxic-appearing  HENT:      Head: Normocephalic and atraumatic  Right Ear: Tympanic membrane and external ear normal       Left Ear: Tympanic membrane and external ear normal       Nose: Nose normal       Mouth/Throat:      Mouth: Mucous membranes are moist       Pharynx: Oropharynx is clear  Eyes:      General: No scleral icterus  Right eye: No discharge  Left eye: No discharge  Extraocular Movements: Extraocular movements intact  Conjunctiva/sclera: Conjunctivae normal       Pupils: Pupils are equal, round, and reactive to light  Neck:      Vascular: No carotid bruit  Cardiovascular:      Rate and Rhythm: Normal rate and regular rhythm  Pulses: Normal pulses  Heart sounds: No murmur heard  No friction rub  No gallop  Pulmonary:      Effort: Pulmonary effort is normal       Breath sounds: Normal breath sounds  No wheezing, rhonchi or rales  Abdominal:      General: Bowel sounds are normal  There is no distension  Palpations: Abdomen is soft  There is no mass  Tenderness: There is no guarding or rebound  Musculoskeletal:         General: No swelling  Cervical back: Normal range of motion and neck supple  No rigidity  Right lower leg: No edema  Left lower leg: No edema  Lymphadenopathy:      Cervical: No cervical adenopathy  Skin:     General: Skin is warm  Capillary Refill: Capillary refill takes less than 2 seconds  Coloration: Skin is not jaundiced  Findings: No rash  Neurological:      General: No focal deficit present  Mental Status: He is alert and oriented to person, place, and time  Cranial Nerves: No cranial nerve deficit  Sensory: No sensory deficit  Motor: No weakness        Gait: Gait normal       Deep Tendon Reflexes: Reflexes normal    Psychiatric:         Mood and Affect: Mood normal          Behavior: Behavior normal          Judgment: Judgment normal           Hershall Force, DO  [facility] INTERNAL MEDICINE

## 2022-12-16 NOTE — PATIENT INSTRUCTIONS

## 2022-12-19 LAB
DME PARACHUTE DELIVERY DATE ACTUAL: NORMAL
DME PARACHUTE DELIVERY DATE REQUESTED: NORMAL
DME PARACHUTE ITEM DESCRIPTION: NORMAL
DME PARACHUTE ITEM DESCRIPTION: NORMAL
DME PARACHUTE ORDER STATUS: NORMAL
DME PARACHUTE SUPPLIER NAME: NORMAL
DME PARACHUTE SUPPLIER PHONE: NORMAL

## 2022-12-22 DIAGNOSIS — S32.010A CLOSED COMPRESSION FRACTURE OF BODY OF L1 VERTEBRA (HCC): ICD-10-CM

## 2022-12-22 RX ORDER — LABETALOL 200 MG/1
TABLET, FILM COATED ORAL
Qty: 60 TABLET | Refills: 3 | Status: SHIPPED | OUTPATIENT
Start: 2022-12-22 | End: 2023-01-21

## 2023-01-11 DIAGNOSIS — M47.816 LUMBAR SPONDYLOSIS: ICD-10-CM

## 2023-01-11 RX ORDER — GABAPENTIN 300 MG/1
CAPSULE ORAL
Qty: 30 CAPSULE | Refills: 3 | Status: SHIPPED | OUTPATIENT
Start: 2023-01-11

## 2023-01-13 DIAGNOSIS — S32.010A CLOSED COMPRESSION FRACTURE OF BODY OF L1 VERTEBRA (HCC): ICD-10-CM

## 2023-01-13 RX ORDER — HYDROCODONE BITARTRATE AND ACETAMINOPHEN 5; 325 MG/1; MG/1
1 TABLET ORAL EVERY 6 HOURS PRN
Qty: 120 TABLET | Refills: 0 | Status: SHIPPED | OUTPATIENT
Start: 2023-01-13 | End: 2023-02-12

## 2023-01-22 DIAGNOSIS — E11.9 TYPE 2 DIABETES MELLITUS WITHOUT COMPLICATION, WITH LONG-TERM CURRENT USE OF INSULIN (HCC): ICD-10-CM

## 2023-01-22 DIAGNOSIS — Z79.4 TYPE 2 DIABETES MELLITUS WITHOUT COMPLICATION, WITH LONG-TERM CURRENT USE OF INSULIN (HCC): ICD-10-CM

## 2023-01-22 RX ORDER — GLIMEPIRIDE 4 MG/1
TABLET ORAL
Qty: 60 TABLET | Refills: 3 | Status: SHIPPED | OUTPATIENT
Start: 2023-01-22

## 2023-01-24 DIAGNOSIS — F41.9 ANXIETY: ICD-10-CM

## 2023-01-24 RX ORDER — LORAZEPAM 0.5 MG/1
1 TABLET ORAL 2 TIMES DAILY
Qty: 120 TABLET | Refills: 3 | Status: SHIPPED | OUTPATIENT
Start: 2023-01-24

## 2023-01-26 DIAGNOSIS — E11.9 TYPE 2 DIABETES MELLITUS WITHOUT COMPLICATION, WITH LONG-TERM CURRENT USE OF INSULIN (HCC): Primary | ICD-10-CM

## 2023-01-26 DIAGNOSIS — Z79.4 TYPE 2 DIABETES MELLITUS WITHOUT COMPLICATION, WITH LONG-TERM CURRENT USE OF INSULIN (HCC): Primary | ICD-10-CM

## 2023-01-26 RX ORDER — PEN NEEDLE, DIABETIC 31 GX5/16"
NEEDLE, DISPOSABLE MISCELLANEOUS 2 TIMES DAILY
Qty: 100 EACH | Refills: 2 | Status: SHIPPED | OUTPATIENT
Start: 2023-01-26

## 2023-02-10 ENCOUNTER — OFFICE VISIT (OUTPATIENT)
Dept: INTERNAL MEDICINE CLINIC | Facility: CLINIC | Age: 63
End: 2023-02-10

## 2023-02-10 VITALS
HEART RATE: 78 BPM | BODY MASS INDEX: 31.83 KG/M2 | OXYGEN SATURATION: 98 % | HEIGHT: 72 IN | TEMPERATURE: 97.8 F | SYSTOLIC BLOOD PRESSURE: 130 MMHG | RESPIRATION RATE: 12 BRPM | WEIGHT: 235 LBS | DIASTOLIC BLOOD PRESSURE: 74 MMHG

## 2023-02-10 DIAGNOSIS — G89.29 CHRONIC BILATERAL LOW BACK PAIN WITHOUT SCIATICA: Primary | ICD-10-CM

## 2023-02-10 DIAGNOSIS — M54.50 CHRONIC BILATERAL LOW BACK PAIN WITHOUT SCIATICA: Primary | ICD-10-CM

## 2023-02-10 DIAGNOSIS — E11.69 TYPE 2 DIABETES MELLITUS WITH OTHER SPECIFIED COMPLICATION, WITH LONG-TERM CURRENT USE OF INSULIN (HCC): ICD-10-CM

## 2023-02-10 DIAGNOSIS — Z79.4 TYPE 2 DIABETES MELLITUS WITH OTHER SPECIFIED COMPLICATION, WITH LONG-TERM CURRENT USE OF INSULIN (HCC): ICD-10-CM

## 2023-02-10 DIAGNOSIS — M47.816 LUMBAR SPONDYLOSIS: ICD-10-CM

## 2023-02-10 DIAGNOSIS — F11.90 CHRONIC, CONTINUOUS USE OF OPIOIDS: ICD-10-CM

## 2023-02-10 DIAGNOSIS — S32.010A CLOSED COMPRESSION FRACTURE OF BODY OF L1 VERTEBRA (HCC): ICD-10-CM

## 2023-02-10 RX ORDER — HYDROCODONE BITARTRATE AND ACETAMINOPHEN 5; 325 MG/1; MG/1
1 TABLET ORAL EVERY 6 HOURS PRN
Qty: 120 TABLET | Refills: 0 | Status: SHIPPED | OUTPATIENT
Start: 2023-02-10 | End: 2023-03-12

## 2023-02-10 NOTE — PROGRESS NOTES
BMI Counseling: There is no height or weight on file to calculate BMI  The BMI is above normal  Nutrition recommendations include decreasing portion sizes  Exercise recommendations include exercising 3-5 times per week  Patient referred to PCP  Rationale for BMI follow-up plan is due to patient being overweight or obese  Assessment/Plan:    No problem-specific Assessment & Plan notes found for this encounter  Diagnoses and all orders for this visit:    Chronic bilateral low back pain without sciatica  Comments:  -pain no weakness    Type 2 diabetes mellitus with other specified complication, with long-term current use of insulin (HCC)    Lumbar spondylosis    Chronic, continuous use of opioids  Comments:  - cant dec  -enables activity of daily livins  -keeps secure  -no oversedation,understands narcan          Subjective:      Patient ID: Lluvia Diez is a 58 y o  male  HPI    The following portions of the patient's history were reviewed and updated as appropriate: allergies, current medications, past family history, past medical history, past social history, past surgical history, and problem list     Review of Systems   Constitutional: Negative for activity change and fatigue  HENT: Negative for ear discharge, ear pain, rhinorrhea and sore throat  Eyes: Negative for pain and visual disturbance  Respiratory: Negative for cough and shortness of breath  Cardiovascular: Negative for chest pain and leg swelling  Gastrointestinal: Negative for abdominal pain, constipation and diarrhea  Endocrine: Negative for cold intolerance and polyuria  Genitourinary: Negative for flank pain and hematuria  Musculoskeletal: Negative for back pain and joint swelling  Skin: Negative for pallor and wound  Neurological: Negative for dizziness, seizures and speech difficulty  Psychiatric/Behavioral: Negative for confusion and hallucinations           Objective:      /74   Pulse 78   Temp 97 8 °F (36 6 °C)   Resp 12   Ht 6' (1 829 m)   Wt 107 kg (235 lb)   SpO2 98%   BMI 31 87 kg/m²          Physical Exam  Vitals and nursing note reviewed  Constitutional:       General: He is not in acute distress  Appearance: Normal appearance  He is not ill-appearing  HENT:      Head: Normocephalic  Right Ear: External ear normal  There is no impacted cerumen  Left Ear: External ear normal  There is no impacted cerumen  Nose: No congestion or rhinorrhea  Mouth/Throat:      Pharynx: No posterior oropharyngeal erythema  Eyes:      General: No scleral icterus  Right eye: No discharge  Left eye: No discharge  Neck:      Vascular: No carotid bruit  Cardiovascular:      Rate and Rhythm: Normal rate and regular rhythm  Heart sounds: Normal heart sounds  No murmur heard  No friction rub  No gallop  Pulmonary:      Breath sounds: No wheezing or rhonchi  Abdominal:      General: There is no distension  Tenderness: There is no abdominal tenderness  There is no guarding  Musculoskeletal:         General: No swelling  Cervical back: No rigidity  Right lower leg: No edema  Left lower leg: No edema  Lymphadenopathy:      Cervical: No cervical adenopathy  Skin:     Coloration: Skin is not jaundiced  Neurological:      Mental Status: He is alert  Cranial Nerves: No cranial nerve deficit  Motor: No weakness        Coordination: Coordination normal    Psychiatric:         Mood and Affect: Mood normal

## 2023-02-11 DIAGNOSIS — I10 ESSENTIAL HYPERTENSION: ICD-10-CM

## 2023-02-11 RX ORDER — AMLODIPINE BESYLATE 5 MG/1
TABLET ORAL
Qty: 30 TABLET | Refills: 5 | Status: SHIPPED | OUTPATIENT
Start: 2023-02-11

## 2023-02-14 PROBLEM — Z11.59 NEED FOR HEPATITIS C SCREENING TEST: Status: RESOLVED | Noted: 2022-12-16 | Resolved: 2023-02-14

## 2023-02-21 DIAGNOSIS — E11.69 TYPE 2 DIABETES MELLITUS WITH OTHER SPECIFIED COMPLICATION, WITH LONG-TERM CURRENT USE OF INSULIN (HCC): Primary | ICD-10-CM

## 2023-02-21 DIAGNOSIS — Z79.4 TYPE 2 DIABETES MELLITUS WITH OTHER SPECIFIED COMPLICATION, WITH LONG-TERM CURRENT USE OF INSULIN (HCC): Primary | ICD-10-CM

## 2023-02-21 DIAGNOSIS — E11.9 TYPE 2 DIABETES MELLITUS WITHOUT COMPLICATION, WITHOUT LONG-TERM CURRENT USE OF INSULIN (HCC): ICD-10-CM

## 2023-02-21 RX ORDER — INSULIN GLULISINE 100 [IU]/ML
INJECTION, SOLUTION SUBCUTANEOUS
Qty: 15 ML | Refills: 1 | Status: SHIPPED | OUTPATIENT
Start: 2023-02-21

## 2023-02-21 RX ORDER — INSULIN GLARGINE 100 [IU]/ML
INJECTION, SOLUTION SUBCUTANEOUS
Qty: 15 ML | Refills: 3 | Status: SHIPPED | OUTPATIENT
Start: 2023-02-21

## 2023-02-28 DIAGNOSIS — N52.9 ERECTILE DYSFUNCTION, UNSPECIFIED ERECTILE DYSFUNCTION TYPE: ICD-10-CM

## 2023-02-28 RX ORDER — SILDENAFIL 100 MG/1
TABLET, FILM COATED ORAL
Qty: 10 TABLET | Refills: 3 | Status: SHIPPED | OUTPATIENT
Start: 2023-02-28

## 2023-03-10 ENCOUNTER — OFFICE VISIT (OUTPATIENT)
Dept: ENDOCRINOLOGY | Facility: HOSPITAL | Age: 63
End: 2023-03-10

## 2023-03-10 VITALS
SYSTOLIC BLOOD PRESSURE: 154 MMHG | HEIGHT: 72 IN | OXYGEN SATURATION: 97 % | BODY MASS INDEX: 31.97 KG/M2 | HEART RATE: 90 BPM | DIASTOLIC BLOOD PRESSURE: 90 MMHG | WEIGHT: 236 LBS

## 2023-03-10 DIAGNOSIS — S32.010A CLOSED COMPRESSION FRACTURE OF BODY OF L1 VERTEBRA (HCC): ICD-10-CM

## 2023-03-10 DIAGNOSIS — Z79.4 TYPE 2 DIABETES MELLITUS WITH OTHER SPECIFIED COMPLICATION, WITH LONG-TERM CURRENT USE OF INSULIN (HCC): Primary | ICD-10-CM

## 2023-03-10 DIAGNOSIS — E11.69 TYPE 2 DIABETES MELLITUS WITH OTHER SPECIFIED COMPLICATION, WITH LONG-TERM CURRENT USE OF INSULIN (HCC): Primary | ICD-10-CM

## 2023-03-10 LAB — SL AMB POCT HEMOGLOBIN AIC: 9.7 (ref ?–6.5)

## 2023-03-10 RX ORDER — HYDROCODONE BITARTRATE AND ACETAMINOPHEN 5; 325 MG/1; MG/1
1 TABLET ORAL EVERY 6 HOURS PRN
Qty: 120 TABLET | Refills: 0 | Status: SHIPPED | OUTPATIENT
Start: 2023-03-10 | End: 2023-04-09

## 2023-03-10 NOTE — PROGRESS NOTES
Andrea Corbin 58 y o  male MRN: [id number]    Encounter: [id number]      Assessment/Plan     Assessment: This is a 58y o -year-old male with type 2 diabetes with hypertension and hyperlipidemia  Plan:  1  Type 2 diabetes, insulin requiring: Recent hemoglobin A1c was 9 7  Rest the importance of being consistent with his insulin, not to make adjustments on his own and to contact the office  At this time he will continue with NovoLog 10 units with breakfast and lunch, but increase to 14 units with dinner  He will increase his Lantus to 25 units daily  He will continue with same dose of metformin, glimepiride, and Trulicity  Continue utilizing the Dexcom to monitor glucose levels  Would like to see a download in about 2 to 4 weeks  Contact the office with any concerns or questions  Follow-up in 3 months with lab work completed prior to visit      2  Hypertension: Elevated in office today, but currently asymptomatic  Rolin Chris function is stable   Continue with current medications   Repeat CMP prior to next office visit      3  Hyperlipidemia: Lipid panel was excellent  Continue with current medication  Repeat lipid panel prior to next office visit    CC: Type 2 diabetes follow-up    History of Present Illness     HPI:  Hitesh Stanford a 58year old male with type 2 diabetes for [employer]   He is on oral agents and insulin at home and takes metformin 500 mg in the morning and 1000 mg in the evening, glimepiride 4 mg daily, Humalog 10 units with lunch and 10 units with dinner, Lantus 15-20 units in the evening, Trulicity 3 mg  Has been inconsistent with his Lantus dosing, but typically will take close to 20 units at night    He is currently utilizing sliding scale with his Humalog depending on where his blood sugar is with meals  He denies any polyuria, polydipsia, nocturia and blurry vision   He denies nephropathy, retinopathy, heart attack, stroke and claudication but does admit to neuropathy with symptoms worse at night   Has not followed up with a podiatrist   Is not made any lifestyle modifications  Does admit to decreased physical activity recently      Hypoglycemic episodes:  May have episodes of hypoglycemia after meals depending on activity level and what he eats      The patient's last eye exam was March 2022  Last diabetic foot exam was completed December 2022       Blood Sugar/Glucometer/Pump/CGM review: Download of Dexcom from February 25 through March 10, 2023 reveals an average glucose level of 258 with a standard deviation of 55  He is in target range 8% of the time, and above target range 92% of time  Glucose levels trend down overnight and into the morning and remained stable from late morning to mid afternoon more than it starts to trend up and remain high in the evening      For hypertension he is treated with lisinopril 10 mg daily  Dee Seen is also managed on labetalol 100 mg twice a day and amlodipine 5 mg daily   For hyperlipidemia, he is treated with rosuvastatin      Has a history of falling off a ladder and having a vertebral fracture   Was noted to have an L1 compression fracture at that time   Does currently have chronic back pain   Has had serial imaging since, and seems to be stable   Was recommended to get a DEXA scan completed for further evaluation, but has yet to get that done  Review of Systems   Constitutional: Negative for activity change, appetite change, fatigue and unexpected weight change  HENT: Negative for trouble swallowing  Eyes: Negative for visual disturbance  Respiratory: Negative for chest tightness and shortness of breath  Cardiovascular: Negative for chest pain, palpitations and leg swelling  Gastrointestinal: Negative for abdominal pain, diarrhea, nausea and vomiting  Endocrine: Negative for cold intolerance, heat intolerance, polydipsia, polyphagia and polyuria  Genitourinary: Negative for frequency     Musculoskeletal: Positive for back pain and myalgias (in the back)  Skin: Negative for rash and wound  Neurological: Positive for numbness (bilateral feet, worse at night )  Negative for dizziness, weakness, light-headedness and headaches  Psychiatric/Behavioral: Negative for dysphoric mood and sleep disturbance  The patient is not nervous/anxious          Historical Information   Past Medical History:   Diagnosis Date   • Alcoholism ([place] [address] )    • Anxiety    • Atrial fibrillation ([facility])    • Diabetes ([place] [address] )    • Diabetes mellitus, type 2 ([place] [address] )    • Diverticulitis    • ED (erectile dysfunction)    • High cholesterol    • Hyperlipidemia    • Hypertension     LVH    • L1 vertebral fracture ([facility])    • Lung mass    • Neuropathy    • Nocturia    • Osteoarthritis    • Paroxysmal atrial fibrillation ([facility])    • Pilar cyst    • Shingles    • Smoker    • Spinal stenosis, lumbar      Past Surgical History:   Procedure Laterality Date   • WISDOM TOOTH EXTRACTION       Social History   Social History     Substance and Sexual Activity   Alcohol Use Not Currently     Social History     Substance and Sexual Activity   Drug Use Yes   • Types: Marijuana, Other    Comment: prescription pills (unprescribed) daily     Social History     Tobacco Use   Smoking Status Every Day   • Packs/day: 2 00   • Types: Cigarettes   Smokeless Tobacco Never     Family History:   Family History   Adopted: Yes       Meds/Allergies   Current Outpatient Medications   Medication Sig Dispense Refill   • amLODIPine (NORVASC) 5 mg tablet TAKE 1 TABLET DAILY -MAY CAUSE DIZZINESS OR LIGHTHEADEDNESS- 30 tablet 5   • Apidra SoloStar 100 units/mL injection pen INJECT 10 UNITS SUBCUTANEOUSLY THREE TIMES A DAY AS DIRECTED WITH EACH MEAL 15 mL 1   • Contour Next Test test strip TEST 4 TIMES A  strip 1   • gabapentin (NEURONTIN) 300 mg capsule take 1 capsule by mouth at bedtime 30 capsule 3   • glimepiride (AMARYL) 4 mg tablet take 1 tablet by mouth once daily 60 tablet 3   • HYDROcodone-acetaminophen (NORCO) 5-325 mg per tablet Take 1 tablet by mouth every 6 (six) hours as needed for pain Max Daily Amount: 4 tablets 120 tablet 0   • Insulin Pen Needle (B-D UF III MINI PEN NEEDLES) 31G X 5 MM MISC Inject under the skin 2 (two) times a day 100 each 2   • Lantus SoloStar 100 units/mL SOPN INJECT 40 UNITS SUBCUTANEOUSLY AT BEDTIME 15 mL 3   • lisinopril (ZESTRIL) 10 mg tablet TAKE 1 TABLET BY MOUTH ONCE DAILY 90 tablet 3   • LORazepam (ATIVAN) 0 5 mg tablet   0   • LORazepam (ATIVAN) 0 5 mg tablet TAKE 2 TABLETS (1 MG TOTAL) BY MOUTH 2 (TWO) TIMES A  tablet 3   • metFORMIN (GLUCOPHAGE) 500 mg tablet take 1 tablet by mouth three times a day 270 tablet 3   • methocarbamol (ROBAXIN) 500 mg tablet TAKE 1 TABLET (500 MG TOTAL) BY MOUTH 2 (TWO) TIMES A DAY AS NEEDED FOR MUSCLE SPASMS 60 tablet 3   • naloxone (NARCAN) 4 mg/0 1 mL nasal spray Administer 1 spray into a nostril  If no response after 2-3 minutes, give another dose in the other nostril using a new spray  1 each 1   • Needles & Syringes MISC by Does not apply route 3 (three) times a day before meals 90 each 0   • podofilox (CONDYLOX) 0 5 % external solution Apply topically 2 (two) times a day Two warts on penis and scrotum 3 5 mL 3   • polyethylene glycol (MIRALAX) 17 g packet Take 17 g by mouth daily as needed (constipation) 14 each 0   • rosuvastatin (CRESTOR) 5 mg tablet take 1 tablet by mouth at bedtime 90 tablet 3   • ROSUVASTATIN CALCIUM PO Take by mouth     • sildenafil (VIAGRA) 100 mg tablet take 1 tablet by mouth if needed 10 tablet 3   • Trulicity 3 GZ/4 4IH SOPN INJECT 0 5 ML (3 MG TOTAL) UNDER THE SKIN ONCE A WEEK - REFRIGERATE 2 mL 5   • labetalol (NORMODYNE) 200 mg tablet take 1 tablet by mouth twice a day 60 tablet 3     No current facility-administered medications for this visit  No Known Allergies    Objective   Vitals: Blood pressure 154/90, pulse 90, height 6' (1 829 m), weight 107 kg (236 lb), SpO2 97 %      Physical Exam  Vitals and nursing note reviewed  Constitutional:       General: He is not in acute distress  Appearance: Normal appearance  He is not diaphoretic  HENT:      Head: Normocephalic and atraumatic  Eyes:      General: No scleral icterus  Extraocular Movements: Extraocular movements intact  Conjunctiva/sclera: Conjunctivae normal       Pupils: Pupils are equal, round, and reactive to light  Cardiovascular:      Rate and Rhythm: Normal rate and regular rhythm  Heart sounds: No murmur heard  Pulmonary:      Effort: Pulmonary effort is normal  No respiratory distress  Breath sounds: Normal breath sounds  No wheezing  Musculoskeletal:      Cervical back: Normal range of motion  Right lower leg: No edema  Left lower leg: No edema  Lymphadenopathy:      Cervical: No cervical adenopathy  Skin:     General: Skin is warm and dry  Neurological:      Mental Status: He is alert and oriented to person, place, and time  Mental status is at baseline  Sensory: No sensory deficit  Gait: Gait normal    Psychiatric:         Mood and Affect: Mood normal          Behavior: Behavior normal          Thought Content: Thought content normal          The history was obtained from the review of the chart, patient      Lab Results:   Lab Results   Component Value Date/Time    Hemoglobin A1C 9 7 (A) 03/10/2023 01:06 PM    Hemoglobin A1C 8 1 (H) 11/30/2022 02:25 PM    Hemoglobin A1C 8 2 (A) 09/09/2022 01:18 PM    Hemoglobin A1C 9 2 (H) 06/02/2022 09:30 AM    BUN 11 11/30/2022 02:25 PM    BUN 13 06/02/2022 09:30 AM    Potassium 4 2 11/30/2022 02:25 PM    Potassium 4 2 06/02/2022 09:30 AM    Chloride 100 11/30/2022 02:25 PM    Chloride 98 06/02/2022 09:30 AM    CO2 24 11/30/2022 02:25 PM    CO2 24 06/02/2022 09:30 AM    Creatinine 0 72 (L) 11/30/2022 02:25 PM    Creatinine 0 80 06/02/2022 09:30 AM    AST 27 11/30/2022 02:25 PM    AST 14 06/02/2022 09:30 AM    ALT 28 11/30/2022 02:25 PM    ALT 24 06/02/2022 09:30 AM    Albumin 4 3 11/30/2022 02:25 PM    Albumin 4 4 06/02/2022 09:30 AM    Globulin, Total 2 3 11/30/2022 02:25 PM    Globulin, Total 2 3 06/02/2022 09:30 AM    HDL 42 11/30/2022 02:25 PM    HDL 31 (L) 06/02/2022 09:30 AM    Triglycerides 67 11/30/2022 02:25 PM    Triglycerides 127 06/02/2022 09:30 AM       Portions of the record may have been created with voice recognition software  Occasional wrong word or "sound a like" substitutions may have occurred due to the inherent limitations of voice recognition software  Read the chart carefully and recognize, using context, where substitutions have occurred

## 2023-03-10 NOTE — PATIENT INSTRUCTIONS
Continue to monitor diet, maintain physical activity  Make sure to drink plenty water throughout the day  Continue with metformin, glimepiride, Trulicity to 3 0 mg weekly  Take Apidra 10 units with lunch and 14 units with dinner  Make sure to be consistent with the Lantus at night  Take 25 units  Call if there is concerns  Continue using Dexcom to monitor blood sugar  Continue with blood pressure and cholesterol medications  Follow-up in 3 months

## 2023-03-13 DIAGNOSIS — S32.010A CLOSED COMPRESSION FRACTURE OF BODY OF L1 VERTEBRA (HCC): ICD-10-CM

## 2023-03-13 DIAGNOSIS — I10 ESSENTIAL HYPERTENSION: ICD-10-CM

## 2023-03-13 RX ORDER — LISINOPRIL 10 MG/1
TABLET ORAL
Qty: 90 TABLET | Refills: 3 | Status: SHIPPED | OUTPATIENT
Start: 2023-03-13

## 2023-03-13 RX ORDER — METHOCARBAMOL 500 MG/1
500 TABLET, FILM COATED ORAL 2 TIMES DAILY PRN
Qty: 60 TABLET | Refills: 3 | Status: SHIPPED | OUTPATIENT
Start: 2023-03-13

## 2023-03-17 ENCOUNTER — OFFICE VISIT (OUTPATIENT)
Dept: INTERNAL MEDICINE CLINIC | Facility: CLINIC | Age: 63
End: 2023-03-17

## 2023-03-17 VITALS
DIASTOLIC BLOOD PRESSURE: 70 MMHG | WEIGHT: 237 LBS | TEMPERATURE: 97.3 F | BODY MASS INDEX: 32.1 KG/M2 | HEIGHT: 72 IN | HEART RATE: 74 BPM | SYSTOLIC BLOOD PRESSURE: 140 MMHG | OXYGEN SATURATION: 98 %

## 2023-03-17 DIAGNOSIS — J01.00 SUBACUTE MAXILLARY SINUSITIS: Primary | ICD-10-CM

## 2023-03-17 RX ORDER — AZITHROMYCIN 250 MG/1
TABLET, FILM COATED ORAL
Qty: 6 TABLET | Refills: 0 | Status: SHIPPED | OUTPATIENT
Start: 2023-03-17 | End: 2023-03-21

## 2023-03-17 NOTE — PROGRESS NOTES
Assessment/Plan:    No problem-specific Assessment & Plan notes found for this encounter  There are no diagnoses linked to this encounter  Subjective:      Patient ID: Maximilian Bray is a 58 y o  male  Day 6 ill  Want abx  connor  Declines covid test      The following portions of the patient's history were reviewed and updated as appropriate: allergies, current medications, past family history, past medical history, past social history, past surgical history, and problem list     Review of Systems   Constitutional: Negative for activity change, fatigue and fever  HENT: Positive for congestion and sore throat  Negative for ear discharge, ear pain and rhinorrhea  Eyes: Negative for pain and visual disturbance  Respiratory: Positive for cough  Negative for shortness of breath and wheezing  Cardiovascular: Negative for chest pain and leg swelling  Gastrointestinal: Negative for abdominal pain, constipation and diarrhea  Endocrine: Negative for cold intolerance and polyuria  Genitourinary: Negative for flank pain and hematuria  Musculoskeletal: Negative for back pain and joint swelling  Skin: Negative for pallor and wound  Neurological: Negative for dizziness, seizures and speech difficulty  Psychiatric/Behavioral: Negative for confusion and hallucinations  Objective:      Ht 6' (1 829 m)   Wt 108 kg (237 lb)   BMI 32 14 kg/m²          Physical Exam  Vitals and nursing note reviewed  Constitutional:       General: He is not in acute distress  Appearance: Normal appearance  He is not ill-appearing  HENT:      Head: Normocephalic  Right Ear: External ear normal  There is no impacted cerumen  Left Ear: External ear normal  There is no impacted cerumen  Nose: No congestion or rhinorrhea  Mouth/Throat:      Pharynx: No posterior oropharyngeal erythema  Eyes:      General: No scleral icterus  Right eye: No discharge           Left eye: No discharge  Neck:      Vascular: No carotid bruit  Cardiovascular:      Rate and Rhythm: Normal rate and regular rhythm  Heart sounds: Normal heart sounds  No murmur heard  No friction rub  No gallop  Pulmonary:      Breath sounds: No wheezing or rhonchi  Abdominal:      General: There is no distension  Tenderness: There is no abdominal tenderness  There is no guarding  Musculoskeletal:         General: No swelling  Cervical back: No rigidity  Right lower leg: No edema  Left lower leg: No edema  Lymphadenopathy:      Cervical: No cervical adenopathy  Skin:     Coloration: Skin is not jaundiced  Neurological:      Mental Status: He is alert  Cranial Nerves: No cranial nerve deficit  Motor: No weakness        Coordination: Coordination normal    Psychiatric:         Mood and Affect: Mood normal

## 2023-03-31 ENCOUNTER — OFFICE VISIT (OUTPATIENT)
Dept: INTERNAL MEDICINE CLINIC | Facility: CLINIC | Age: 63
End: 2023-03-31

## 2023-03-31 VITALS
SYSTOLIC BLOOD PRESSURE: 120 MMHG | TEMPERATURE: 97.8 F | RESPIRATION RATE: 12 BRPM | HEART RATE: 74 BPM | DIASTOLIC BLOOD PRESSURE: 80 MMHG | OXYGEN SATURATION: 96 %

## 2023-03-31 DIAGNOSIS — J01.00 SUBACUTE MAXILLARY SINUSITIS: Primary | ICD-10-CM

## 2023-03-31 RX ORDER — AMOXICILLIN AND CLAVULANATE POTASSIUM 875; 125 MG/1; MG/1
1 TABLET, FILM COATED ORAL EVERY 12 HOURS SCHEDULED
Qty: 16 TABLET | Refills: 0 | Status: SHIPPED | OUTPATIENT
Start: 2023-03-31 | End: 2023-04-08

## 2023-03-31 RX ORDER — METHYLPREDNISOLONE 4 MG/1
TABLET ORAL
Qty: 21 EACH | Refills: 0 | Status: SHIPPED | OUTPATIENT
Start: 2023-03-31

## 2023-03-31 NOTE — PROGRESS NOTES
Assessment/Plan:    No problem-specific Assessment & Plan notes found for this encounter  There are no diagnoses linked to this encounter  Subjective:      Patient ID: Bereket Dozier is a 58 y o  male  3/17/23 OV  Day 6 ill  Want abx  connor  Declines covid test  zpack    TODAY:3/31/23  -BETTER THEN WORSE      The following portions of the patient's history were reviewed and updated as appropriate: allergies, current medications, past family history, past medical history, past social history, past surgical history, and problem list     Review of Systems   Constitutional: Negative for activity change, fatigue and fever  HENT: Negative for ear discharge, ear pain, rhinorrhea and sore throat  Eyes: Negative for pain and visual disturbance  Respiratory: Positive for cough  Negative for shortness of breath  Cardiovascular: Negative for chest pain and leg swelling  Gastrointestinal: Negative for abdominal pain, constipation and diarrhea  Endocrine: Negative for cold intolerance and polyuria  Genitourinary: Negative for flank pain and hematuria  Musculoskeletal: Negative for back pain and joint swelling  Skin: Negative for pallor and wound  Neurological: Negative for dizziness, seizures and speech difficulty  Psychiatric/Behavioral: Negative for confusion and hallucinations  Objective: There were no vitals taken for this visit  Physical Exam  Vitals and nursing note reviewed  Constitutional:       General: He is not in acute distress  Appearance: Normal appearance  He is not ill-appearing  HENT:      Head: Normocephalic  Right Ear: External ear normal  There is no impacted cerumen  Left Ear: External ear normal  There is no impacted cerumen  Nose: No congestion or rhinorrhea  Mouth/Throat:      Pharynx: No posterior oropharyngeal erythema  Eyes:      General: No scleral icterus  Right eye: No discharge           Left eye: No discharge  Neck:      Vascular: No carotid bruit  Cardiovascular:      Rate and Rhythm: Normal rate and regular rhythm  Heart sounds: Normal heart sounds  No murmur heard  No friction rub  No gallop  Pulmonary:      Effort: No respiratory distress  Breath sounds: No wheezing or rhonchi  Abdominal:      General: There is no distension  Tenderness: There is no abdominal tenderness  There is no guarding  Musculoskeletal:         General: No swelling  Cervical back: No rigidity  Right lower leg: No edema  Left lower leg: No edema  Lymphadenopathy:      Cervical: No cervical adenopathy  Skin:     Coloration: Skin is not jaundiced  Neurological:      Mental Status: He is alert  Cranial Nerves: No cranial nerve deficit  Motor: No weakness        Coordination: Coordination normal    Psychiatric:         Mood and Affect: Mood normal

## 2023-04-06 ENCOUNTER — TELEPHONE (OUTPATIENT)
Dept: ENDOCRINOLOGY | Facility: HOSPITAL | Age: 63
End: 2023-04-06

## 2023-04-06 NOTE — TELEPHONE ENCOUNTER
Patient is having trouble getting his G6 transmitters from [address] [place] [address], he stated they are working out an insurance issue  We can provide patient with a dexcom transmitter, patient will pick this up

## 2023-04-07 DIAGNOSIS — S32.010A CLOSED COMPRESSION FRACTURE OF BODY OF L1 VERTEBRA (HCC): ICD-10-CM

## 2023-04-07 RX ORDER — HYDROCODONE BITARTRATE AND ACETAMINOPHEN 5; 325 MG/1; MG/1
1 TABLET ORAL EVERY 6 HOURS PRN
Qty: 120 TABLET | Refills: 0 | Status: SHIPPED | OUTPATIENT
Start: 2023-04-07 | End: 2023-05-07

## 2023-04-27 DIAGNOSIS — S32.010A CLOSED COMPRESSION FRACTURE OF BODY OF L1 VERTEBRA (HCC): ICD-10-CM

## 2023-04-27 RX ORDER — LABETALOL 200 MG/1
TABLET, FILM COATED ORAL
Qty: 60 TABLET | Refills: 3 | Status: SHIPPED | OUTPATIENT
Start: 2023-04-27 | End: 2023-05-27

## 2023-05-05 DIAGNOSIS — S32.010A CLOSED COMPRESSION FRACTURE OF BODY OF L1 VERTEBRA (HCC): ICD-10-CM

## 2023-05-05 RX ORDER — HYDROCODONE BITARTRATE AND ACETAMINOPHEN 5; 325 MG/1; MG/1
1 TABLET ORAL EVERY 6 HOURS PRN
Qty: 120 TABLET | Refills: 0 | Status: SHIPPED | OUTPATIENT
Start: 2023-05-05 | End: 2023-06-04

## 2023-05-13 DIAGNOSIS — M47.816 LUMBAR SPONDYLOSIS: ICD-10-CM

## 2023-05-13 RX ORDER — GABAPENTIN 300 MG/1
CAPSULE ORAL
Qty: 30 CAPSULE | Refills: 3 | Status: SHIPPED | OUTPATIENT
Start: 2023-05-13

## 2023-05-16 PROBLEM — J01.00 SUBACUTE MAXILLARY SINUSITIS: Status: RESOLVED | Noted: 2023-03-17 | Resolved: 2023-05-16

## 2023-05-24 DIAGNOSIS — Z79.4 TYPE 2 DIABETES MELLITUS WITHOUT COMPLICATION, WITH LONG-TERM CURRENT USE OF INSULIN (HCC): ICD-10-CM

## 2023-05-24 DIAGNOSIS — E11.9 TYPE 2 DIABETES MELLITUS WITHOUT COMPLICATION, WITH LONG-TERM CURRENT USE OF INSULIN (HCC): ICD-10-CM

## 2023-05-24 RX ORDER — DULAGLUTIDE 3 MG/.5ML
INJECTION, SOLUTION SUBCUTANEOUS
Qty: 2 ML | Refills: 5 | Status: SHIPPED | OUTPATIENT
Start: 2023-05-24

## 2023-05-30 DIAGNOSIS — E78.5 HYPERLIPIDEMIA, UNSPECIFIED HYPERLIPIDEMIA TYPE: ICD-10-CM

## 2023-05-30 RX ORDER — ROSUVASTATIN CALCIUM 5 MG/1
TABLET, COATED ORAL
Qty: 90 TABLET | Refills: 3 | Status: SHIPPED | OUTPATIENT
Start: 2023-05-30

## 2023-05-31 DIAGNOSIS — F41.9 ANXIETY: ICD-10-CM

## 2023-05-31 RX ORDER — LORAZEPAM 0.5 MG/1
1 TABLET ORAL 2 TIMES DAILY
Qty: 120 TABLET | Refills: 3 | Status: SHIPPED | OUTPATIENT
Start: 2023-05-31

## 2023-06-02 ENCOUNTER — OFFICE VISIT (OUTPATIENT)
Dept: INTERNAL MEDICINE CLINIC | Facility: CLINIC | Age: 63
End: 2023-06-02

## 2023-06-02 VITALS
DIASTOLIC BLOOD PRESSURE: 70 MMHG | TEMPERATURE: 97.8 F | BODY MASS INDEX: 31.29 KG/M2 | WEIGHT: 231 LBS | SYSTOLIC BLOOD PRESSURE: 130 MMHG | HEIGHT: 72 IN | HEART RATE: 74 BPM | OXYGEN SATURATION: 97 % | RESPIRATION RATE: 12 BRPM

## 2023-06-02 DIAGNOSIS — M54.50 CHRONIC BILATERAL LOW BACK PAIN WITHOUT SCIATICA: ICD-10-CM

## 2023-06-02 DIAGNOSIS — G89.29 CHRONIC BILATERAL LOW BACK PAIN WITHOUT SCIATICA: ICD-10-CM

## 2023-06-02 DIAGNOSIS — F11.90 CHRONIC, CONTINUOUS USE OF OPIOIDS: ICD-10-CM

## 2023-06-02 DIAGNOSIS — M48.061 SPINAL STENOSIS OF LUMBAR REGION WITHOUT NEUROGENIC CLAUDICATION: ICD-10-CM

## 2023-06-02 DIAGNOSIS — S32.010A CLOSED COMPRESSION FRACTURE OF BODY OF L1 VERTEBRA (HCC): Primary | ICD-10-CM

## 2023-06-02 RX ORDER — HYDROCODONE BITARTRATE AND ACETAMINOPHEN 5; 325 MG/1; MG/1
1 TABLET ORAL EVERY 6 HOURS PRN
Qty: 120 TABLET | Refills: 0 | Status: SHIPPED | OUTPATIENT
Start: 2023-06-02 | End: 2023-07-02

## 2023-06-02 NOTE — PROGRESS NOTES
Assessment/Plan:    No problem-specific Assessment & Plan notes found for this encounter  Diagnoses and all orders for this visit:    Closed compression fracture of body of L1 vertebra (HCC)    Chronic bilateral low back pain without sciatica    Spinal stenosis of lumbar region without neurogenic claudication    Chronic, continuous use of opioids  Comments:  -keeps secure and understands narcan  -not ready to titrate down, adl able with med  -no oversedation          Subjective:      Patient ID: Macy Mckeon is a 58 y o  male  HPI    The following portions of the patient's history were reviewed and updated as appropriate: allergies, current medications, past family history, past medical history, past social history, past surgical history, and problem list     Review of Systems   Constitutional: Negative for activity change and fatigue  HENT: Negative for ear discharge, ear pain, rhinorrhea and sore throat  Eyes: Negative for pain and visual disturbance  Respiratory: Negative for cough and shortness of breath  Cardiovascular: Negative for chest pain and leg swelling  Gastrointestinal: Negative for abdominal pain, constipation and diarrhea  Endocrine: Negative for cold intolerance and polyuria  Genitourinary: Negative for flank pain and hematuria  Musculoskeletal: Positive for back pain  Negative for joint swelling  Skin: Negative for pallor and wound  Neurological: Negative for dizziness, seizures and speech difficulty  Psychiatric/Behavioral: Negative for confusion and hallucinations  Objective: There were no vitals taken for this visit  Physical Exam  Vitals and nursing note reviewed  Constitutional:       General: He is not in acute distress  Appearance: Normal appearance  He is not ill-appearing  HENT:      Head: Normocephalic  Right Ear: External ear normal  There is no impacted cerumen        Left Ear: External ear normal  There is no impacted cerumen  Nose: No congestion or rhinorrhea  Mouth/Throat:      Pharynx: No posterior oropharyngeal erythema  Eyes:      General: No scleral icterus  Right eye: No discharge  Left eye: No discharge  Neck:      Vascular: No carotid bruit  Cardiovascular:      Rate and Rhythm: Normal rate and regular rhythm  Heart sounds: Normal heart sounds  No murmur heard  No friction rub  No gallop  Pulmonary:      Breath sounds: No wheezing or rhonchi  Abdominal:      General: There is no distension  Tenderness: There is no abdominal tenderness  There is no guarding  Musculoskeletal:         General: No swelling  Cervical back: No rigidity  Right lower leg: No edema  Left lower leg: No edema  Lymphadenopathy:      Cervical: No cervical adenopathy  Skin:     Coloration: Skin is not jaundiced  Neurological:      Mental Status: He is alert  Cranial Nerves: No cranial nerve deficit  Motor: No weakness        Coordination: Coordination normal    Psychiatric:         Mood and Affect: Mood normal

## 2023-06-14 DIAGNOSIS — E11.9 TYPE 2 DIABETES MELLITUS WITHOUT COMPLICATION, WITHOUT LONG-TERM CURRENT USE OF INSULIN (HCC): ICD-10-CM

## 2023-06-14 LAB
ALBUMIN SERPL-MCNC: 4.2 G/DL (ref 3.8–4.8)
ALBUMIN/CREAT UR: 541 MG/G CREAT (ref 0–29)
ALBUMIN/GLOB SERPL: 1.8 {RATIO} (ref 1.2–2.2)
ALP SERPL-CCNC: 95 IU/L (ref 44–121)
ALT SERPL-CCNC: 22 IU/L (ref 0–44)
AST SERPL-CCNC: 21 IU/L (ref 0–40)
BILIRUB SERPL-MCNC: 0.3 MG/DL (ref 0–1.2)
BUN SERPL-MCNC: 10 MG/DL (ref 8–27)
BUN/CREAT SERPL: 14 (ref 10–24)
CALCIUM SERPL-MCNC: 9.4 MG/DL (ref 8.6–10.2)
CHLORIDE SERPL-SCNC: 100 MMOL/L (ref 96–106)
CHOLEST SERPL-MCNC: 183 MG/DL (ref 100–199)
CHOLEST/HDLC SERPL: 4.6 RATIO (ref 0–5)
CO2 SERPL-SCNC: 24 MMOL/L (ref 20–29)
CREAT SERPL-MCNC: 0.71 MG/DL (ref 0.76–1.27)
CREAT UR-MCNC: 90.7 MG/DL
EGFR: 104 ML/MIN/1.73
EST. AVERAGE GLUCOSE BLD GHB EST-MCNC: 235 MG/DL
GLOBULIN SER-MCNC: 2.4 G/DL (ref 1.5–4.5)
GLUCOSE SERPL-MCNC: 199 MG/DL (ref 70–99)
HBA1C MFR BLD: 9.8 % (ref 4.8–5.6)
HDLC SERPL-MCNC: 40 MG/DL
LDLC SERPL CALC-MCNC: 116 MG/DL (ref 0–99)
MICROALBUMIN UR-MCNC: 491.1 UG/ML
POTASSIUM SERPL-SCNC: 4.4 MMOL/L (ref 3.5–5.2)
PROT SERPL-MCNC: 6.6 G/DL (ref 6–8.5)
SL AMB VLDL CHOLESTEROL CALC: 27 MG/DL (ref 5–40)
SODIUM SERPL-SCNC: 138 MMOL/L (ref 134–144)
TRIGL SERPL-MCNC: 149 MG/DL (ref 0–149)
TSH SERPL DL<=0.005 MIU/L-ACNC: 0.97 UIU/ML (ref 0.45–4.5)

## 2023-06-14 RX ORDER — PERPHENAZINE 16 MG/1
TABLET, FILM COATED ORAL
Qty: 100 STRIP | Refills: 1 | Status: SHIPPED | OUTPATIENT
Start: 2023-06-14

## 2023-06-21 ENCOUNTER — DOCUMENTATION (OUTPATIENT)
Dept: ENDOCRINOLOGY | Facility: HOSPITAL | Age: 63
End: 2023-06-21

## 2023-06-28 ENCOUNTER — TELEPHONE (OUTPATIENT)
Dept: UROLOGY | Facility: HOSPITAL | Age: 63
End: 2023-06-28

## 2023-06-29 ENCOUNTER — OFFICE VISIT (OUTPATIENT)
Dept: ENDOCRINOLOGY | Facility: HOSPITAL | Age: 63
End: 2023-06-29
Payer: COMMERCIAL

## 2023-06-29 ENCOUNTER — OFFICE VISIT (OUTPATIENT)
Dept: UROLOGY | Facility: HOSPITAL | Age: 63
End: 2023-06-29
Payer: COMMERCIAL

## 2023-06-29 VITALS
BODY MASS INDEX: 31.87 KG/M2 | HEART RATE: 90 BPM | DIASTOLIC BLOOD PRESSURE: 82 MMHG | OXYGEN SATURATION: 96 % | SYSTOLIC BLOOD PRESSURE: 130 MMHG | WEIGHT: 235 LBS

## 2023-06-29 VITALS
WEIGHT: 235 LBS | SYSTOLIC BLOOD PRESSURE: 132 MMHG | HEART RATE: 92 BPM | HEIGHT: 72 IN | DIASTOLIC BLOOD PRESSURE: 80 MMHG | BODY MASS INDEX: 31.83 KG/M2 | OXYGEN SATURATION: 98 %

## 2023-06-29 DIAGNOSIS — I10 ESSENTIAL HYPERTENSION: ICD-10-CM

## 2023-06-29 DIAGNOSIS — N52.9 ERECTILE DYSFUNCTION, UNSPECIFIED ERECTILE DYSFUNCTION TYPE: ICD-10-CM

## 2023-06-29 DIAGNOSIS — A63.0 WARTS, GENITAL: ICD-10-CM

## 2023-06-29 DIAGNOSIS — E11.69 TYPE 2 DIABETES MELLITUS WITH OTHER SPECIFIED COMPLICATION, WITH LONG-TERM CURRENT USE OF INSULIN (HCC): Primary | ICD-10-CM

## 2023-06-29 DIAGNOSIS — E78.5 HYPERLIPIDEMIA, UNSPECIFIED HYPERLIPIDEMIA TYPE: ICD-10-CM

## 2023-06-29 DIAGNOSIS — Z12.5 SCREENING FOR PROSTATE CANCER: Primary | ICD-10-CM

## 2023-06-29 DIAGNOSIS — Z79.4 TYPE 2 DIABETES MELLITUS WITH OTHER SPECIFIED COMPLICATION, WITH LONG-TERM CURRENT USE OF INSULIN (HCC): Primary | ICD-10-CM

## 2023-06-29 PROCEDURE — 99214 OFFICE O/P EST MOD 30 MIN: CPT | Performed by: PHYSICIAN ASSISTANT

## 2023-06-29 NOTE — PATIENT INSTRUCTIONS
Continue to monitor diet, maintain physical activity  Make sure to drink plenty water throughout the day  Continue with metformin, glimepiride, Trulicity to 3 0 mg weekly  Decrease Apidra  to 6 units with breakfast and lunch, and 14 units with dinner  Make sure to be consistent with the Lantus at night  Take 24 units  Call if there is concerns  Continue using Dexcom to monitor blood sugar  Continue with blood pressure and cholesterol medications  Follow-up in 3 months

## 2023-06-29 NOTE — PROGRESS NOTES
6/29/2023    Jesus Quinonez  1960  [id number]      Assessment  -Genital warts  -Prostate cancer screening  -Erectile dysfunction    Discussion/Plan  Shara Enamorado is a 58 y o  male being managed by Dr Calroz Daily  1  Genital warts- physical exam findings unremarkable and remained stable  He will continue to use Condylox 0 5% topical gel as needed  He is not interested in surgical intervention  Reviewed safe sex practices  2  Prostate cancer screening- unfortunately, patient did not obtain PSA prior to today's visit  Advised patient to complete PSA in the next 1 to 2 weeks and we will call with his results  Last PSA from 6/7/2022 was 0 5  No abnormal findings noted on SUYAPA today  3  Erectile dysfunction-continue sildenafil as needed prior to sexual activity  Call with results of PSA in the next 1 to 2 weeks  If findings remain stable, he will return to the office in 1 year for follow-up  Patient was advised to also do with any questions or issues     -All questions answered, patient agrees with plan      History of Present Illness  58 y o  male with a history of genital warts, ED, and prostate cancer screening presents today for follow up  Patient was seen in the office in October 2022  He continues to use Condylox gel as needed for management of genital warts  Patient denies any new recurrences or progression and lesions  He admits that he does not use medication diligently as prescribed  Patient continues to use sildenafil as needed prior to sexual activity  Last PSA from 6/7/2022 was 0 5  Patient states he is adopted and family history unknown  He denies any additional lower urinary tract symptoms, gross hematuria, or dysuria  Review of Systems  Review of Systems   Constitutional: Negative  HENT: Negative  Respiratory: Negative  Cardiovascular: Negative  Gastrointestinal: Negative  Genitourinary: Positive for genital sores   Negative for decreased urine volume, difficulty urinating, dysuria, flank pain, frequency, hematuria, penile discharge, penile pain, penile swelling and urgency  Musculoskeletal: Negative  Skin: Negative  Neurological: Negative  Psychiatric/Behavioral: Negative          Past Medical History  Past Medical History:   Diagnosis Date   • Alcoholism ([place] [address] )    • Anxiety    • Atrial fibrillation (HCC)    • Diabetes ([place] [address] )    • Diabetes mellitus, type 2 ([place] [address] )    • Diverticulitis    • ED (erectile dysfunction)    • High cholesterol    • Hyperlipidemia    • Hypertension     LVH    • L1 vertebral fracture ([facility])    • Lung mass    • Neuropathy    • Nocturia    • Osteoarthritis    • Paroxysmal atrial fibrillation (HCC)    • Pilar cyst    • Shingles    • Smoker    • Spinal stenosis, lumbar        Past Social History  Past Surgical History:   Procedure Laterality Date   • WISDOM TOOTH EXTRACTION         Past Family History  Family History   Adopted: Yes       Past Social history  Social History     Socioeconomic History   • Marital status: Single     Spouse name: Not on file   • Number of children: Not on file   • Years of education: Not on file   • Highest education level: Not on file   Occupational History   • Not on file   Tobacco Use   • Smoking status: Every Day     Packs/day: 2 00     Types: Cigarettes   • Smokeless tobacco: Never   Vaping Use   • Vaping Use: Never used   Substance and Sexual Activity   • Alcohol use: Not Currently   • Drug use: Yes     Types: Marijuana, Other     Comment: prescription pills (unprescribed) daily   • Sexual activity: Not Currently   Other Topics Concern   • Not on file   Social History Narrative   • Not on file     Social Determinants of Health     Financial Resource Strain: Not on file   Food Insecurity: Not on file   Transportation Needs: Not on file   Physical Activity: Not on file   Stress: Not on file   Social Connections: Not on file   Intimate Partner Violence: Not on file   Housing Stability: Not on file       Current Medications  Current Outpatient Medications   Medication Sig Dispense Refill   • amLODIPine (NORVASC) 5 mg tablet TAKE 1 TABLET DAILY -MAY CAUSE DIZZINESS OR LIGHTHEADEDNESS- 30 tablet 5   • Apidra SoloStar 100 units/mL injection pen INJECT 10 UNITS SUBCUTANEOUSLY THREE TIMES A DAY AS DIRECTED WITH EACH MEAL 30 mL 1   • Contour Next Test test strip TEST 4 TIMES A  strip 1   • gabapentin (NEURONTIN) 300 mg capsule take 1 capsule by mouth at bedtime 30 capsule 3   • glimepiride (AMARYL) 4 mg tablet take 1 tablet by mouth once daily 60 tablet 3   • HYDROcodone-acetaminophen (NORCO) 5-325 mg per tablet Take 1 tablet by mouth every 6 (six) hours as needed for pain Max Daily Amount: 4 tablets 120 tablet 0   • Insulin Pen Needle (B-D UF III MINI PEN NEEDLES) 31G X 5 MM MISC Inject under the skin 2 (two) times a day 100 each 2   • labetalol (NORMODYNE) 200 mg tablet take 1 tablet by mouth twice a day 60 tablet 3   • Lantus SoloStar 100 units/mL SOPN INJECT 40 UNITS SUBCUTANEOUSLY AT BEDTIME 15 mL 3   • lisinopril (ZESTRIL) 10 mg tablet take 1 tablet by mouth once daily 90 tablet 3   • LORazepam (ATIVAN) 0 5 mg tablet   0   • LORazepam (ATIVAN) 0 5 mg tablet Take 2 tablets (1 mg total) by mouth 2 (two) times a day 120 tablet 3   • metFORMIN (GLUCOPHAGE) 500 mg tablet take 1 tablet by mouth three times a day 270 tablet 3   • methocarbamol (ROBAXIN) 500 mg tablet TAKE 1 TABLET (500 MG TOTAL) BY MOUTH 2 (TWO) TIMES A DAY AS NEEDED FOR MUSCLE SPASMS 60 tablet 3   • methylPREDNISolone 4 MG tablet therapy pack Use as directed on package 21 each 0   • naloxone (NARCAN) 4 mg/0 1 mL nasal spray Administer 1 spray into a nostril  If no response after 2-3 minutes, give another dose in the other nostril using a new spray   1 each 1   • Needles & Syringes MISC by Does not apply route 3 (three) times a day before meals 90 each 0   • podofilox (CONDYLOX) 0 5 % external solution Apply topically 2 (two) times a day Two warts on penis and scrotum 3 5 mL 3   • polyethylene glycol (MIRALAX) 17 g packet Take 17 g by mouth daily as needed (constipation) 14 each 0   • rosuvastatin (CRESTOR) 5 mg tablet take 1 tablet by mouth at bedtime 90 tablet 3   • ROSUVASTATIN CALCIUM PO Take by mouth     • sildenafil (VIAGRA) 100 mg tablet take 1 tablet by mouth if needed 10 tablet 3   • Trulicity 3 MS/1 0RH injection INJECT 0 5 ML (3 MG TOTAL) UNDER THE SKIN ONCE A WEEK - REFRIGERATE 2 mL 5     No current facility-administered medications for this visit  Allergies  No Known Allergies    Past Medical History, Social History, Family History, medications and allergies were reviewed  Vitals  Vitals:    06/29/23 1251   BP: 130/82   BP Location: Left arm   Patient Position: Sitting   Cuff Size: Adult   Pulse: 90   SpO2: 96%   Weight: 107 kg (235 lb)       Physical Exam  Physical Exam  Constitutional:       Appearance: Normal appearance  He is well-developed  HENT:      Head: Normocephalic  Eyes:      Pupils: Pupils are equal, round, and reactive to light  Pulmonary:      Effort: Pulmonary effort is normal    Abdominal:      Palpations: Abdomen is soft  Genitourinary:     Penis: Normal        Prostate: Normal       Rectum: Normal       Comments: Penis circumcised, flesh-colored genital warts noted on right distal side of shaft and right hemiscrotum  Prostate 45 g, smooth, nontender, no nodules  Musculoskeletal:         General: Normal range of motion  Cervical back: Normal range of motion  Skin:     General: Skin is warm and dry  Neurological:      General: No focal deficit present  Mental Status: He is alert and oriented to person, place, and time  Psychiatric:         Mood and Affect: Mood normal          Behavior: Behavior normal          Thought Content:  Thought content normal          Judgment: Judgment normal          Results    I have personally reviewed all pertinent lab results and reviewed with patient  Lab Results Component Value Date    PSA 0 5 06/07/2022     Lab Results   Component Value Date    CALCIUM 9 3 07/27/2020    K 4 4 06/13/2023    CO2 24 06/13/2023     06/13/2023    BUN 10 06/13/2023    CREATININE 0 71 (L) 06/13/2023     Lab Results   Component Value Date    WBC 10 85 (H) 07/27/2020    HGB 13 6 07/27/2020    HCT 39 9 07/27/2020    MCV 89 07/27/2020     07/27/2020     No results found for this or any previous visit (from the past 1 hour(s))

## 2023-06-29 NOTE — PROGRESS NOTES
Renard Azul 58 y o  male MRN: [id number]    Encounter: [id number]      Assessment/Plan     Assessment: This is a 58y o -year-old male with type 2 diabetes with hypertension and hyperlipidemia  Plan:  1  Type 2 diabetes, insulin requiring: Recent hemoglobin A1c was 9 8  Has been having issues getting Dexcom supplies, so do not have any glucose logs present at today's office visit  That being said his Dexcom downloads are fairly consistent, and with his high A1c likely needs more Lantus  To prevent episodes of hypoglycemia, I would like to decrease his NovoLog to 6 units with breakfast and lunch, continue with 14 units at dinner, and increase his Lantus to 24 units daily  See importance of making sure to take insulin as directed  Once he has his Dexcom I would like to see download in about 2 weeks so we can make further adjustments to his insulin  He will continue with same dose of metformin, glimepiride, and Trulicity  Contact the office with any concerns or questions  Follow-up in 3 months with lab work completed prior to visit      2  Hypertension: Elevated in office today, but currently asymptomatic  Dedrick Husk function is stable   Continue with current medications   Repeat CMP prior to next office visit      3  Hyperlipidemia: Lipid panel was excellent   Continue with current medication  Repeat lipid panel prior to next office visit  4   Diabetic nephropathy: Albumin creatinine ratio continues to increase  Kidney function overall is doing well  These numbers will likely improve if we can get glucose levels under control  Is currently on lisinopril  May consider Jardiance in the future      CC: Type 2 diabetes follow-up    History of Present Illness     HPI:  Daniel Hawkins a 58year old male with type 2 diabetes for [employer]   He is on oral agents and insulin at home and takes metformin 500 mg in the morning and 1000 mg in the evening, glimepiride 4 mg daily, Humalog 10 units with lunch and 14 units with dinner, Lantus 20 units in the evening, Trulicity 3 mg   He is currently utilizing sliding scale with his Humalog depending on where his blood sugar is with meals  He denies any polyuria, polydipsia, nocturia and blurry vision   He denies nephropathy, retinopathy, heart attack, stroke and claudication but does admit to neuropathy with symptoms worse at night   Has not followed up with a podiatrist   Is not made any lifestyle modifications  Does admit to decreased physical activity recently  Has been having issues receiving Dexcom supplies  Leaves he will get new supplies within the next week      Hypoglycemic episodes:  May have episodes of hypoglycemia after meals depending on activity level and what he eats      The patient's last eye exam was September 2022  Last diabetic foot exam was completed December 2022       Blood Sugar/Glucometer/Pump/CGM review: No blood sugar logs present at today's office visit      For hypertension he is treated with lisinopril 10 mg daily  Uriel Silvestre is also managed on labetalol 100 mg twice a day and amlodipine 5 mg daily   For hyperlipidemia, he is treated with rosuvastatin      Has a history of falling off a ladder and having a vertebral fracture   Was noted to have an L1 compression fracture at that time   Does currently have chronic back pain   Did follow-up with pain management   Has had serial imaging since, and seems to be stable   Was recommended to get a DEXA scan completed for further evaluation, but has yet to get that done  Review of Systems   Constitutional: Negative for activity change, appetite change, fatigue and unexpected weight change  HENT: Negative for trouble swallowing  Eyes: Negative for visual disturbance  Respiratory: Negative for chest tightness and shortness of breath  Cardiovascular: Negative for chest pain, palpitations and leg swelling  Gastrointestinal: Negative for abdominal pain, diarrhea, nausea and vomiting     Endocrine: Negative for cold intolerance, heat intolerance, polydipsia, polyphagia and polyuria  Genitourinary: Negative for frequency  Musculoskeletal: Positive for back pain and myalgias (in the back)  Skin: Negative for rash and wound  Neurological: Positive for numbness (bilateral feet, worse at night )  Negative for dizziness, weakness, light-headedness and headaches  Psychiatric/Behavioral: Negative for dysphoric mood and sleep disturbance  The patient is not nervous/anxious          Historical Information   Past Medical History:   Diagnosis Date   • Alcoholism ([place] [address] )    • Anxiety    • Atrial fibrillation ([facility])    • Diabetes ([place] [address] )    • Diabetes mellitus, type 2 ([place] [address] )    • Diverticulitis    • ED (erectile dysfunction)    • High cholesterol    • Hyperlipidemia    • Hypertension     LVH    • L1 vertebral fracture ([facility])    • Lung mass    • Neuropathy    • Nocturia    • Osteoarthritis    • Paroxysmal atrial fibrillation ([facility])    • Pilar cyst    • Shingles    • Smoker    • Spinal stenosis, lumbar      Past Surgical History:   Procedure Laterality Date   • WISDOM TOOTH EXTRACTION       Social History   Social History     Substance and Sexual Activity   Alcohol Use Not Currently     Social History     Substance and Sexual Activity   Drug Use Yes   • Types: Marijuana, Other    Comment: prescription pills (unprescribed) daily     Social History     Tobacco Use   Smoking Status Every Day   • Packs/day: 2 00   • Types: Cigarettes   Smokeless Tobacco Never     Family History:   Family History   Adopted: Yes       Meds/Allergies   Current Outpatient Medications   Medication Sig Dispense Refill   • amLODIPine (NORVASC) 5 mg tablet TAKE 1 TABLET DAILY -MAY CAUSE DIZZINESS OR LIGHTHEADEDNESS- 30 tablet 5   • Apidra SoloStar 100 units/mL injection pen INJECT 10 UNITS SUBCUTANEOUSLY THREE TIMES A DAY AS DIRECTED WITH EACH MEAL 30 mL 1   • Contour Next Test test strip TEST 4 TIMES A  strip 1   • gabapentin (NEURONTIN) 300 mg capsule take 1 capsule by mouth at bedtime 30 capsule 3   • glimepiride (AMARYL) 4 mg tablet take 1 tablet by mouth once daily 60 tablet 3   • HYDROcodone-acetaminophen (NORCO) 5-325 mg per tablet Take 1 tablet by mouth every 6 (six) hours as needed for pain Max Daily Amount: 4 tablets 120 tablet 0   • Insulin Pen Needle (B-D UF III MINI PEN NEEDLES) 31G X 5 MM MISC Inject under the skin 2 (two) times a day 100 each 2   • labetalol (NORMODYNE) 200 mg tablet take 1 tablet by mouth twice a day 60 tablet 3   • Lantus SoloStar 100 units/mL SOPN INJECT 40 UNITS SUBCUTANEOUSLY AT BEDTIME 15 mL 3   • lisinopril (ZESTRIL) 10 mg tablet take 1 tablet by mouth once daily 90 tablet 3   • LORazepam (ATIVAN) 0 5 mg tablet   0   • LORazepam (ATIVAN) 0 5 mg tablet Take 2 tablets (1 mg total) by mouth 2 (two) times a day 120 tablet 3   • metFORMIN (GLUCOPHAGE) 500 mg tablet take 1 tablet by mouth three times a day 270 tablet 3   • methocarbamol (ROBAXIN) 500 mg tablet TAKE 1 TABLET (500 MG TOTAL) BY MOUTH 2 (TWO) TIMES A DAY AS NEEDED FOR MUSCLE SPASMS 60 tablet 3   • methylPREDNISolone 4 MG tablet therapy pack Use as directed on package 21 each 0   • naloxone (NARCAN) 4 mg/0 1 mL nasal spray Administer 1 spray into a nostril  If no response after 2-3 minutes, give another dose in the other nostril using a new spray   1 each 1   • Needles & Syringes MISC by Does not apply route 3 (three) times a day before meals 90 each 0   • podofilox (CONDYLOX) 0 5 % external solution Apply topically 2 (two) times a day Two warts on penis and scrotum 3 5 mL 3   • polyethylene glycol (MIRALAX) 17 g packet Take 17 g by mouth daily as needed (constipation) 14 each 0   • rosuvastatin (CRESTOR) 5 mg tablet take 1 tablet by mouth at bedtime 90 tablet 3   • ROSUVASTATIN CALCIUM PO Take by mouth     • sildenafil (VIAGRA) 100 mg tablet take 1 tablet by mouth if needed 10 tablet 3   • Trulicity 3 LD/6 7OA injection INJECT 0 5 ML (3 MG TOTAL) UNDER THE SKIN ONCE A WEEK - REFRIGERATE 2 mL 5     No current facility-administered medications for this visit  No Known Allergies    Objective   Vitals: Blood pressure 132/80, pulse 92, height 6' (1 829 m), weight 107 kg (235 lb), SpO2 98 %  Physical Exam  Vitals and nursing note reviewed  Constitutional:       General: He is not in acute distress  Appearance: Normal appearance  He is not diaphoretic  HENT:      Head: Normocephalic and atraumatic  Eyes:      General: No scleral icterus  Extraocular Movements: Extraocular movements intact  Conjunctiva/sclera: Conjunctivae normal       Pupils: Pupils are equal, round, and reactive to light  Cardiovascular:      Rate and Rhythm: Normal rate and regular rhythm  Heart sounds: No murmur heard  Pulmonary:      Effort: Pulmonary effort is normal  No respiratory distress  Breath sounds: Normal breath sounds  No wheezing  Musculoskeletal:      Cervical back: Normal range of motion  Right lower leg: No edema  Left lower leg: No edema  Lymphadenopathy:      Cervical: No cervical adenopathy  Skin:     General: Skin is warm and dry  Neurological:      Mental Status: He is alert and oriented to person, place, and time  Mental status is at baseline  Sensory: No sensory deficit  Gait: Gait normal    Psychiatric:         Mood and Affect: Mood normal          Behavior: Behavior normal          Thought Content: Thought content normal          The history was obtained from the review of the chart, patient      Lab Results:   Lab Results   Component Value Date/Time    Hemoglobin A1C 9 8 (H) 06/13/2023 01:47 PM    Hemoglobin A1C 9 7 (A) 03/10/2023 01:06 PM    Hemoglobin A1C 8 1 (H) 11/30/2022 02:25 PM    Hemoglobin A1C 8 2 (A) 09/09/2022 01:18 PM    BUN 10 06/13/2023 01:47 PM    BUN 11 11/30/2022 02:25 PM    Potassium 4 4 06/13/2023 01:47 PM    Potassium 4 2 11/30/2022 02:25 PM    Chloride 100 06/13/2023 01:47 PM    Chloride 100 "11/30/2022 02:25 PM    CO2 24 06/13/2023 01:47 PM    CO2 24 11/30/2022 02:25 PM    Creatinine 0 71 (L) 06/13/2023 01:47 PM    Creatinine 0 72 (L) 11/30/2022 02:25 PM    AST 21 06/13/2023 01:47 PM    AST 27 11/30/2022 02:25 PM    ALT 22 06/13/2023 01:47 PM    ALT 28 11/30/2022 02:25 PM    Protein, Total 6 6 06/13/2023 01:47 PM    Protein, Total 6 6 11/30/2022 02:25 PM    Albumin 4 2 06/13/2023 01:47 PM    Albumin 4 3 11/30/2022 02:25 PM    Globulin, Total 2 4 06/13/2023 01:47 PM    Globulin, Total 2 3 11/30/2022 02:25 PM    HDL 40 06/13/2023 01:47 PM    HDL 42 11/30/2022 02:25 PM    Triglycerides 149 06/13/2023 01:47 PM    Triglycerides 67 11/30/2022 02:25 PM       Portions of the record may have been created with voice recognition software  Occasional wrong word or \"sound a like\" substitutions may have occurred due to the inherent limitations of voice recognition software  Read the chart carefully and recognize, using context, where substitutions have occurred    "

## 2023-06-30 DIAGNOSIS — S32.010A CLOSED COMPRESSION FRACTURE OF BODY OF L1 VERTEBRA (HCC): ICD-10-CM

## 2023-06-30 RX ORDER — HYDROCODONE BITARTRATE AND ACETAMINOPHEN 5; 325 MG/1; MG/1
1 TABLET ORAL EVERY 6 HOURS PRN
Qty: 120 TABLET | Refills: 0 | Status: SHIPPED | OUTPATIENT
Start: 2023-06-30 | End: 2023-07-30

## 2023-07-12 LAB — PSA SERPL-MCNC: 0.5 NG/ML (ref 0–4)

## 2023-07-19 DIAGNOSIS — S32.010A CLOSED COMPRESSION FRACTURE OF BODY OF L1 VERTEBRA (HCC): ICD-10-CM

## 2023-07-19 RX ORDER — METHOCARBAMOL 500 MG/1
TABLET, FILM COATED ORAL
Qty: 60 TABLET | Refills: 3 | Status: SHIPPED | OUTPATIENT
Start: 2023-07-19

## 2023-07-28 DIAGNOSIS — S32.010A CLOSED COMPRESSION FRACTURE OF BODY OF L1 VERTEBRA (HCC): ICD-10-CM

## 2023-07-28 RX ORDER — HYDROCODONE BITARTRATE AND ACETAMINOPHEN 5; 325 MG/1; MG/1
1 TABLET ORAL EVERY 6 HOURS PRN
Qty: 120 TABLET | Refills: 0 | Status: SHIPPED | OUTPATIENT
Start: 2023-07-28 | End: 2023-08-27

## 2023-08-14 DIAGNOSIS — I10 ESSENTIAL HYPERTENSION: ICD-10-CM

## 2023-08-14 RX ORDER — AMLODIPINE BESYLATE 5 MG/1
TABLET ORAL
Qty: 30 TABLET | Refills: 5 | Status: SHIPPED | OUTPATIENT
Start: 2023-08-14

## 2023-08-18 DIAGNOSIS — S32.010A CLOSED COMPRESSION FRACTURE OF BODY OF L1 VERTEBRA (HCC): ICD-10-CM

## 2023-08-22 DIAGNOSIS — S32.010A CLOSED COMPRESSION FRACTURE OF BODY OF L1 VERTEBRA (HCC): ICD-10-CM

## 2023-08-25 DIAGNOSIS — S32.010A CLOSED COMPRESSION FRACTURE OF BODY OF L1 VERTEBRA (HCC): ICD-10-CM

## 2023-08-27 RX ORDER — HYDROCODONE BITARTRATE AND ACETAMINOPHEN 5; 325 MG/1; MG/1
1 TABLET ORAL EVERY 6 HOURS PRN
Qty: 120 TABLET | Refills: 0 | Status: SHIPPED | OUTPATIENT
Start: 2023-08-27 | End: 2023-09-26

## 2023-08-28 DIAGNOSIS — S32.010A CLOSED COMPRESSION FRACTURE OF BODY OF L1 VERTEBRA (HCC): ICD-10-CM

## 2023-08-28 RX ORDER — LABETALOL 200 MG/1
200 TABLET, FILM COATED ORAL 2 TIMES DAILY
Qty: 60 TABLET | Refills: 3 | Status: SHIPPED | OUTPATIENT
Start: 2023-08-28 | End: 2023-09-27

## 2023-09-21 DIAGNOSIS — A63.0 CONDYLOMA: ICD-10-CM

## 2023-09-21 RX ORDER — PODOFILOX 5 MG/ML
SOLUTION TOPICAL 2 TIMES DAILY
Qty: 3.5 ML | Refills: 0 | Status: SHIPPED | OUTPATIENT
Start: 2023-09-21

## 2023-09-21 NOTE — TELEPHONE ENCOUNTER
Reason for call:   [x] Refill   [] Prior Auth  [] Other:     Office:   [] PCP/Provider -   [x] Speciality/Provider -  [facility]    Medication: CONDYLOX    Dose/Frequency: .5%    Quantity: 3.5ml    Pharmacy: [facility]

## 2023-09-25 DIAGNOSIS — F41.9 ANXIETY: ICD-10-CM

## 2023-09-25 RX ORDER — LORAZEPAM 0.5 MG/1
1 TABLET ORAL 2 TIMES DAILY
Qty: 120 TABLET | Refills: 3 | Status: SHIPPED | OUTPATIENT
Start: 2023-09-25

## 2023-09-26 ENCOUNTER — OFFICE VISIT (OUTPATIENT)
Dept: INTERNAL MEDICINE CLINIC | Facility: CLINIC | Age: 63
End: 2023-09-26
Payer: COMMERCIAL

## 2023-09-26 VITALS
SYSTOLIC BLOOD PRESSURE: 120 MMHG | BODY MASS INDEX: 31.83 KG/M2 | HEART RATE: 74 BPM | HEIGHT: 72 IN | WEIGHT: 235 LBS | OXYGEN SATURATION: 98 % | RESPIRATION RATE: 14 BRPM | TEMPERATURE: 97.8 F | DIASTOLIC BLOOD PRESSURE: 76 MMHG

## 2023-09-26 DIAGNOSIS — M48.061 SPINAL STENOSIS OF LUMBAR REGION WITHOUT NEUROGENIC CLAUDICATION: ICD-10-CM

## 2023-09-26 DIAGNOSIS — S32.010A CLOSED COMPRESSION FRACTURE OF BODY OF L1 VERTEBRA (HCC): ICD-10-CM

## 2023-09-26 DIAGNOSIS — I10 ESSENTIAL HYPERTENSION: Primary | ICD-10-CM

## 2023-09-26 DIAGNOSIS — F11.90 CHRONIC, CONTINUOUS USE OF OPIOIDS: ICD-10-CM

## 2023-09-26 PROCEDURE — 99213 OFFICE O/P EST LOW 20 MIN: CPT | Performed by: INTERNAL MEDICINE

## 2023-09-26 RX ORDER — NALOXONE HYDROCHLORIDE 4 MG/.1ML
SPRAY NASAL
Qty: 1 EACH | Refills: 1 | Status: SHIPPED | OUTPATIENT
Start: 2023-09-26 | End: 2024-09-25

## 2023-09-26 RX ORDER — HYDROCODONE BITARTRATE AND ACETAMINOPHEN 5; 325 MG/1; MG/1
1 TABLET ORAL EVERY 6 HOURS PRN
Qty: 120 TABLET | Refills: 0 | Status: SHIPPED | OUTPATIENT
Start: 2023-09-26 | End: 2023-10-26

## 2023-09-26 NOTE — PROGRESS NOTES
Tobacco Cessation Counseling: Tobacco cessation counseling was not provided. The patient is sincerely urged to quit consumption of tobacco. He is not ready to quit tobacco.         Assessment/Plan:    No problem-specific Assessment & Plan notes found for this encounter. Diagnoses and all orders for this visit:    Essential hypertension    Spinal stenosis of lumbar region without neurogenic claudication    Chronic, continuous use of opioids  Comments:  med allows adl  understands narcan  no oversedation          Subjective:      Patient ID: Aria Baires is a 58 y.o. male. Endo ov 6/2023  Pain meds reviewed  Not ready to dec robaxon or vicoden  Sees endo  bs 139   dexcom 6        The following portions of the patient's history were reviewed and updated as appropriate: allergies, current medications, past family history, past medical history, past social history, past surgical history, and problem list.    Review of Systems   Constitutional: Negative for activity change and fatigue. HENT: Negative for ear discharge, ear pain, rhinorrhea and sore throat. Eyes: Negative for pain and visual disturbance. Respiratory: Negative for cough and shortness of breath. Cardiovascular: Negative for chest pain and leg swelling. Gastrointestinal: Negative for abdominal pain, constipation and diarrhea. Endocrine: Negative for cold intolerance and polyuria. Genitourinary: Negative for flank pain and hematuria. Musculoskeletal: Negative for back pain and joint swelling. Skin: Negative for pallor and wound. Neurological: Negative for dizziness, seizures and speech difficulty. Psychiatric/Behavioral: Negative for confusion and hallucinations. Objective:      Ht 6' (1.829 m)   Wt 107 kg (235 lb)   BMI 31.87 kg/m²          Physical Exam  Vitals and nursing note reviewed. Constitutional:       General: He is not in acute distress. Appearance: Normal appearance. He is not ill-appearing. HENT:      Head: Normocephalic. Right Ear: External ear normal. There is no impacted cerumen. Left Ear: External ear normal. There is no impacted cerumen. Nose: No congestion or rhinorrhea. Mouth/Throat:      Pharynx: No posterior oropharyngeal erythema. Eyes:      General: No scleral icterus. Right eye: No discharge. Left eye: No discharge. Neck:      Vascular: No carotid bruit. Cardiovascular:      Rate and Rhythm: Normal rate and regular rhythm. Heart sounds: Normal heart sounds. No murmur heard. No friction rub. No gallop. Pulmonary:      Breath sounds: No wheezing or rhonchi. Abdominal:      General: There is no distension. Tenderness: There is no abdominal tenderness. There is no guarding. Musculoskeletal:         General: No swelling. Cervical back: No rigidity. Right lower leg: No edema. Left lower leg: No edema. Lymphadenopathy:      Cervical: No cervical adenopathy. Skin:     Coloration: Skin is not jaundiced. Neurological:      Mental Status: He is alert. Cranial Nerves: No cranial nerve deficit. Motor: No weakness.       Coordination: Coordination normal.   Psychiatric:         Mood and Affect: Mood normal.

## 2023-09-27 LAB
LEFT EYE DIABETIC RETINOPATHY: POSITIVE
RIGHT EYE DIABETIC RETINOPATHY: POSITIVE

## 2023-09-28 ENCOUNTER — VBI (OUTPATIENT)
Dept: ADMINISTRATIVE | Facility: OTHER | Age: 63
End: 2023-09-28

## 2023-10-03 DIAGNOSIS — E11.9 TYPE 2 DIABETES MELLITUS WITHOUT COMPLICATION, WITH LONG-TERM CURRENT USE OF INSULIN (HCC): ICD-10-CM

## 2023-10-03 DIAGNOSIS — Z79.4 TYPE 2 DIABETES MELLITUS WITHOUT COMPLICATION, WITH LONG-TERM CURRENT USE OF INSULIN (HCC): ICD-10-CM

## 2023-10-03 RX ORDER — GLIMEPIRIDE 4 MG/1
TABLET ORAL
Qty: 60 TABLET | Refills: 3 | Status: SHIPPED | OUTPATIENT
Start: 2023-10-03

## 2023-10-05 LAB
LEFT EYE DIABETIC RETINOPATHY: POSITIVE
RIGHT EYE DIABETIC RETINOPATHY: POSITIVE

## 2023-10-23 DIAGNOSIS — S32.010A CLOSED COMPRESSION FRACTURE OF BODY OF L1 VERTEBRA (HCC): ICD-10-CM

## 2023-10-23 RX ORDER — HYDROCODONE BITARTRATE AND ACETAMINOPHEN 5; 325 MG/1; MG/1
1 TABLET ORAL EVERY 6 HOURS PRN
Qty: 120 TABLET | Refills: 0 | Status: SHIPPED | OUTPATIENT
Start: 2023-10-23 | End: 2023-11-22

## 2023-11-06 ENCOUNTER — DOCUMENTATION (OUTPATIENT)
Dept: ENDOCRINOLOGY | Facility: HOSPITAL | Age: 63
End: 2023-11-06

## 2023-11-07 DIAGNOSIS — S32.010A CLOSED COMPRESSION FRACTURE OF BODY OF L1 VERTEBRA (HCC): ICD-10-CM

## 2023-11-07 DIAGNOSIS — E11.9 TYPE 2 DIABETES MELLITUS WITHOUT COMPLICATION, WITH LONG-TERM CURRENT USE OF INSULIN (HCC): ICD-10-CM

## 2023-11-07 DIAGNOSIS — Z79.4 TYPE 2 DIABETES MELLITUS WITHOUT COMPLICATION, WITH LONG-TERM CURRENT USE OF INSULIN (HCC): ICD-10-CM

## 2023-11-07 RX ORDER — DULAGLUTIDE 3 MG/.5ML
INJECTION, SOLUTION SUBCUTANEOUS
Qty: 2 ML | Refills: 5 | Status: SHIPPED | OUTPATIENT
Start: 2023-11-07

## 2023-11-21 DIAGNOSIS — S32.010A CLOSED COMPRESSION FRACTURE OF BODY OF L1 VERTEBRA (HCC): ICD-10-CM

## 2023-11-21 RX ORDER — HYDROCODONE BITARTRATE AND ACETAMINOPHEN 5; 325 MG/1; MG/1
1 TABLET ORAL EVERY 6 HOURS PRN
Qty: 120 TABLET | Refills: 0 | Status: SHIPPED | OUTPATIENT
Start: 2023-11-21 | End: 2023-12-21

## 2023-11-26 DIAGNOSIS — M47.816 LUMBAR SPONDYLOSIS: ICD-10-CM

## 2023-11-27 DIAGNOSIS — S32.010A CLOSED COMPRESSION FRACTURE OF BODY OF L1 VERTEBRA (HCC): ICD-10-CM

## 2023-11-27 RX ORDER — GABAPENTIN 300 MG/1
CAPSULE ORAL
Qty: 30 CAPSULE | Refills: 3 | Status: SHIPPED | OUTPATIENT
Start: 2023-11-27

## 2023-11-27 RX ORDER — METHOCARBAMOL 500 MG/1
TABLET, FILM COATED ORAL
Qty: 60 TABLET | Refills: 3 | Status: SHIPPED | OUTPATIENT
Start: 2023-11-27

## 2023-11-30 ENCOUNTER — TELEPHONE (OUTPATIENT)
Dept: INTERNAL MEDICINE CLINIC | Facility: CLINIC | Age: 63
End: 2023-11-30

## 2023-11-30 DIAGNOSIS — Z79.4 TYPE 2 DIABETES MELLITUS WITH OTHER SPECIFIED COMPLICATION, WITH LONG-TERM CURRENT USE OF INSULIN (HCC): ICD-10-CM

## 2023-11-30 DIAGNOSIS — E11.69 TYPE 2 DIABETES MELLITUS WITH OTHER SPECIFIED COMPLICATION, WITH LONG-TERM CURRENT USE OF INSULIN (HCC): ICD-10-CM

## 2023-11-30 DIAGNOSIS — E11.9 TYPE 2 DIABETES MELLITUS WITHOUT COMPLICATION, WITHOUT LONG-TERM CURRENT USE OF INSULIN (HCC): ICD-10-CM

## 2023-11-30 RX ORDER — INSULIN GLULISINE 100 [IU]/ML
10 INJECTION, SOLUTION SUBCUTANEOUS
Qty: 15 ML | Refills: 1 | Status: SHIPPED | OUTPATIENT
Start: 2023-11-30

## 2023-11-30 RX ORDER — INSULIN GLARGINE 100 [IU]/ML
40 INJECTION, SOLUTION SUBCUTANEOUS
Qty: 15 ML | Refills: 3 | Status: SHIPPED | OUTPATIENT
Start: 2023-11-30 | End: 2023-11-30 | Stop reason: SDUPTHER

## 2023-11-30 RX ORDER — INSULIN GLULISINE 100 [IU]/ML
10 INJECTION, SOLUTION SUBCUTANEOUS
Qty: 9 ML | Refills: 0 | Status: SHIPPED | OUTPATIENT
Start: 2023-11-30 | End: 2023-11-30 | Stop reason: SDUPTHER

## 2023-11-30 RX ORDER — INSULIN GLARGINE 100 [IU]/ML
40 INJECTION, SOLUTION SUBCUTANEOUS
Qty: 15 ML | Refills: 2 | Status: SHIPPED | OUTPATIENT
Start: 2023-11-30

## 2023-11-30 NOTE — TELEPHONE ENCOUNTER
2026 [facility], [place], operated by [facility] called and asked that you send a new rx for the apidra solostar, the qty should be 15/ not 9

## 2023-12-15 NOTE — TELEPHONE ENCOUNTER
Upon review of the In Basket request we were able to locate, review, and update the patient chart as requested for Diabetic Eye Exam.    Any additional questions or concerns should be emailed to the Practice Liaisons via the appropriate education email address, please do not reply via In Basket.     Thank you  Janiya Hassan MA no lesions,  no deformities,  no traumatic injuries,   no masses present, breathing is unlabored without accessory muscle use, normal breath sounds

## 2023-12-19 ENCOUNTER — OFFICE VISIT (OUTPATIENT)
Dept: INTERNAL MEDICINE CLINIC | Facility: CLINIC | Age: 63
End: 2023-12-19
Payer: COMMERCIAL

## 2023-12-19 VITALS
BODY MASS INDEX: 31.83 KG/M2 | SYSTOLIC BLOOD PRESSURE: 130 MMHG | WEIGHT: 235 LBS | HEART RATE: 80 BPM | TEMPERATURE: 97.5 F | DIASTOLIC BLOOD PRESSURE: 70 MMHG | HEIGHT: 72 IN

## 2023-12-19 DIAGNOSIS — M48.061 SPINAL STENOSIS OF LUMBAR REGION WITHOUT NEUROGENIC CLAUDICATION: ICD-10-CM

## 2023-12-19 DIAGNOSIS — M54.50 CHRONIC BILATERAL LOW BACK PAIN WITHOUT SCIATICA: ICD-10-CM

## 2023-12-19 DIAGNOSIS — S32.010A CLOSED COMPRESSION FRACTURE OF BODY OF L1 VERTEBRA (HCC): ICD-10-CM

## 2023-12-19 DIAGNOSIS — F11.90 CHRONIC, CONTINUOUS USE OF OPIOIDS: ICD-10-CM

## 2023-12-19 DIAGNOSIS — I10 ESSENTIAL HYPERTENSION: Primary | ICD-10-CM

## 2023-12-19 DIAGNOSIS — G89.29 CHRONIC BILATERAL LOW BACK PAIN WITHOUT SCIATICA: ICD-10-CM

## 2023-12-19 PROCEDURE — 99213 OFFICE O/P EST LOW 20 MIN: CPT | Performed by: INTERNAL MEDICINE

## 2023-12-19 RX ORDER — HYDROCODONE BITARTRATE AND ACETAMINOPHEN 5; 325 MG/1; MG/1
1 TABLET ORAL EVERY 6 HOURS PRN
Qty: 120 TABLET | Refills: 0 | Status: SHIPPED | OUTPATIENT
Start: 2023-12-19 | End: 2024-01-18

## 2023-12-19 NOTE — PROGRESS NOTES
Depression Screening and Follow-up Plan: Patient was screened for depression during today's encounter. They screened negative with a PHQ-2 score of 0.        Assessment/Plan:    No problem-specific Assessment & Plan notes found for this encounter.       Diagnoses and all orders for this visit:    Essential hypertension    Chronic bilateral low back pain without sciatica    Chronic, continuous use of opioids  Comments:  -keeps secure  -required for adl  -no oversedation    Spinal stenosis of lumbar region without neurogenic claudication    Closed compression fracture of body of L1 vertebra (HCC)  -     HYDROcodone-acetaminophen (NORCO) 5-325 mg per tablet; Take 1 tablet by mouth every 6 (six) hours as needed for pain Max Daily Amount: 4 tablets          Subjective:      Patient ID: Raffy Huang is a 63 y.o. male.    HPI    The following portions of the patient's history were reviewed and updated as appropriate: allergies, current medications, past family history, past medical history, past social history, past surgical history, and problem list.    Review of Systems   Constitutional:  Negative for activity change and fatigue.   HENT:  Negative for ear discharge, ear pain, rhinorrhea and sore throat.    Eyes:  Negative for pain and visual disturbance.   Respiratory:  Negative for cough and shortness of breath.    Cardiovascular:  Negative for chest pain and leg swelling.   Gastrointestinal:  Negative for abdominal pain, constipation and diarrhea.   Endocrine: Negative for cold intolerance and polyuria.   Genitourinary:  Negative for flank pain and hematuria.   Musculoskeletal:  Negative for back pain and joint swelling.   Skin:  Negative for pallor and wound.   Neurological:  Negative for dizziness, seizures and speech difficulty.   Psychiatric/Behavioral:  Negative for confusion and hallucinations.          Objective:      Ht 6' (1.829 m)   Wt 107 kg (235 lb)   BMI 31.87 kg/m²          Physical Exam  Vitals and  nursing note reviewed.   Constitutional:       General: He is not in acute distress.     Appearance: Normal appearance. He is not ill-appearing.   HENT:      Head: Normocephalic.      Right Ear: External ear normal. There is no impacted cerumen.      Left Ear: External ear normal. There is no impacted cerumen.      Nose: No congestion or rhinorrhea.      Mouth/Throat:      Pharynx: No posterior oropharyngeal erythema.   Eyes:      General: No scleral icterus.        Right eye: No discharge.         Left eye: No discharge.   Neck:      Vascular: No carotid bruit.   Cardiovascular:      Rate and Rhythm: Normal rate and regular rhythm.      Heart sounds: Normal heart sounds. No murmur heard.     No friction rub. No gallop.   Pulmonary:      Breath sounds: No wheezing or rhonchi.   Abdominal:      General: There is no distension.      Tenderness: There is no abdominal tenderness. There is no guarding.   Musculoskeletal:         General: No swelling.      Cervical back: No rigidity.      Right lower leg: No edema.      Left lower leg: No edema.   Lymphadenopathy:      Cervical: No cervical adenopathy.   Skin:     Coloration: Skin is not jaundiced.   Neurological:      Mental Status: He is alert.      Cranial Nerves: No cranial nerve deficit.      Motor: No weakness.      Coordination: Coordination normal.   Psychiatric:         Mood and Affect: Mood normal.

## 2024-01-09 LAB
ALBUMIN SERPL-MCNC: 4 G/DL (ref 3.9–4.9)
ALBUMIN/GLOB SERPL: 1.8 {RATIO} (ref 1.2–2.2)
ALP SERPL-CCNC: 89 IU/L (ref 44–121)
ALT SERPL-CCNC: 17 IU/L (ref 0–44)
AST SERPL-CCNC: 16 IU/L (ref 0–40)
BILIRUB SERPL-MCNC: 0.3 MG/DL (ref 0–1.2)
BUN SERPL-MCNC: 18 MG/DL (ref 8–27)
BUN/CREAT SERPL: 22 (ref 10–24)
CALCIUM SERPL-MCNC: 9.5 MG/DL (ref 8.6–10.2)
CHLORIDE SERPL-SCNC: 100 MMOL/L (ref 96–106)
CO2 SERPL-SCNC: 25 MMOL/L (ref 20–29)
CREAT SERPL-MCNC: 0.82 MG/DL (ref 0.76–1.27)
EGFR: 99 ML/MIN/1.73
EST. AVERAGE GLUCOSE BLD GHB EST-MCNC: 217 MG/DL
GLOBULIN SER-MCNC: 2.2 G/DL (ref 1.5–4.5)
GLUCOSE SERPL-MCNC: 206 MG/DL (ref 70–99)
HBA1C MFR BLD: 9.2 % (ref 4.8–5.6)
POTASSIUM SERPL-SCNC: 4.1 MMOL/L (ref 3.5–5.2)
PROT SERPL-MCNC: 6.2 G/DL (ref 6–8.5)
SODIUM SERPL-SCNC: 138 MMOL/L (ref 134–144)

## 2024-01-11 ENCOUNTER — OFFICE VISIT (OUTPATIENT)
Dept: ENDOCRINOLOGY | Facility: HOSPITAL | Age: 64
End: 2024-01-11
Payer: COMMERCIAL

## 2024-01-11 VITALS
DIASTOLIC BLOOD PRESSURE: 78 MMHG | HEIGHT: 72 IN | BODY MASS INDEX: 31.97 KG/M2 | OXYGEN SATURATION: 97 % | HEART RATE: 93 BPM | SYSTOLIC BLOOD PRESSURE: 134 MMHG | WEIGHT: 236 LBS

## 2024-01-11 DIAGNOSIS — E11.69 TYPE 2 DIABETES MELLITUS WITH OTHER SPECIFIED COMPLICATION, WITH LONG-TERM CURRENT USE OF INSULIN (HCC): Primary | ICD-10-CM

## 2024-01-11 DIAGNOSIS — Z79.4 TYPE 2 DIABETES MELLITUS WITH OTHER SPECIFIED COMPLICATION, WITH LONG-TERM CURRENT USE OF INSULIN (HCC): Primary | ICD-10-CM

## 2024-01-11 PROCEDURE — 99214 OFFICE O/P EST MOD 30 MIN: CPT | Performed by: PHYSICIAN ASSISTANT

## 2024-01-11 PROCEDURE — 95251 CONT GLUC MNTR ANALYSIS I&R: CPT | Performed by: PHYSICIAN ASSISTANT

## 2024-01-11 NOTE — PATIENT INSTRUCTIONS
Continue to monitor diet, maintain physical activity.  Make sure to drink plenty water throughout the day.     Continue with metformin, glimepiride.    Increase Trulicity to 4.5 mg weekly. Use up the 3 mg pens.     Continue same dose of insulin at this time.     Continue using Dexcom to monitor blood sugar.     Continue with blood pressure and cholesterol medications.     Follow-up in 3 months.

## 2024-01-11 NOTE — PROGRESS NOTES
Raffy Huang 63 y.o. male MRN: [id number]    Encounter: [id number]      Assessment/Plan     Assessment:  This is a 63 y.o.-year-old male with type 2 diabetes with hypertension and hyperlipidemia.    Plan:  1. Type 2 diabetes, insulin requiring: Recent hemoglobin A1c was 9.2.  Continues to have issues with receiving Dexcom supplies, but we do have a 2-week report at this time.  In general glucose levels are improving, but he is still consistently running high.  Discussed medication adjustments, and at this time we will increase his Trulicity to 4.5 mg weekly.  He will continue with metformin 500 mg in the morning and 1000 mg in the evening, glimepiride 4 mg daily, Lantus 24 units in the evening and Humalog 10 units with each meal.  That being said he may benefit from an SGLT2 inhibitor due to his microalbuminuria in the future.  Continue utilizing Dexcom to monitor glucose levels.  I would like him to keep us informed of glucose levels so we can continue to make adjustments to his medications in between office visits.  Contact the office with any concerns or questions.  Follow-up in 3 months with lab work completed prior to visit.     2. Hypertension: Normotensive in the office today.  Kidney function is stable.  Continue with current medications.  Repeat CMP prior to next office visit.     3. Hyperlipidemia: Lipid panel was excellent.  Continue with current medication.  Will obtain lipid panel prior to next office visit.     4.  Diabetic nephropathy: Albumin creatinine ratio continues to increase.  Kidney function overall is doing well.  These numbers will likely improve if we can get glucose levels under control.  Is currently on lisinopril.  May consider Jardiance in the future.    CC:Type 2 diabetes follow-up    History of Present Illness     HPI:  Raffy Huang is a 62 year old male with type 2 diabetes for many years.  He is on oral agents and insulin at home and takes metformin 500 mg in the morning and 1000  mg in the evening, glimepiride 4 mg daily, Humalog 10 units with meals, Lantus 24 units in the evening, Trulicity 3 mg.  He does try to make adjustments to his Humalog based on current glucose levels.  He denies nephropathy, retinopathy, heart attack, stroke and claudication but does admit to neuropathy with symptoms worse at night.  Has not followed up with a podiatrist.  No lifestyle modification since last office visit.  Limited physical activity due to his chronic back pain.  Continues to have some issues with receiving Dexcom supplies.  No concerns out of the ordinary for his glucose levels.     Hypoglycemic episodes:  May have episodes of hypoglycemia after meals depending on activity level and what he eats.     The patient's last eye exam was September 2022. Last diabetic foot exam was completed December 2022.      Blood Sugar/Glucometer/Pump/CGM review: No blood sugar logs present at today's office visit.     For hypertension he is treated with lisinopril 10 mg daily.  He is also managed on labetalol 100 mg twice a day and amlodipine 5 mg daily.  For hyperlipidemia, he is treated with rosuvastatin.     Has a history of falling off a ladder and having a vertebral fracture.  Was noted to have an L1 compression fracture at that time.  Does currently have chronic back pain.  Did follow-up with pain management.  Has had serial imaging since, and seems to be stable.  Was recommended to get a DEXA scan completed for further evaluation, but has yet to get that done.     Review of Systems   Constitutional:  Negative for activity change, appetite change, fatigue and unexpected weight change.   HENT:  Negative for trouble swallowing.    Eyes:  Negative for visual disturbance.   Respiratory:  Negative for chest tightness and shortness of breath.    Cardiovascular:  Negative for chest pain, palpitations and leg swelling.   Gastrointestinal:  Negative for abdominal pain, diarrhea, nausea and vomiting.   Endocrine:  Negative for cold intolerance, heat intolerance, polydipsia, polyphagia and polyuria.   Genitourinary:  Negative for frequency.   Musculoskeletal:  Positive for back pain and myalgias (in the back).   Skin:  Negative for rash and wound.   Neurological:  Positive for numbness (bilateral feet, worse at night.). Negative for dizziness, weakness, light-headedness and headaches.   Psychiatric/Behavioral:  Negative for dysphoric mood and sleep disturbance. The patient is not nervous/anxious.        Historical Information   Past Medical History:   Diagnosis Date   • Alcoholism (HCC)    • Anxiety    • Atrial fibrillation (HCC)    • Diabetes (HCC)    • Diabetes mellitus, type 2 (HCC)    • Diverticulitis    • ED (erectile dysfunction)    • High cholesterol    • Hyperlipidemia    • Hypertension     LVH    • L1 vertebral fracture (HCC)    • Lung mass    • Neuropathy    • Nocturia    • Osteoarthritis    • Paroxysmal atrial fibrillation (HCC)    • Pilar cyst    • Shingles    • Smoker    • Spinal stenosis, lumbar      Past Surgical History:   Procedure Laterality Date   • WISDOM TOOTH EXTRACTION       Social History   Social History     Substance and Sexual Activity   Alcohol Use Not Currently     Social History     Substance and Sexual Activity   Drug Use Yes   • Types: Marijuana, Other    Comment: prescription pills (unprescribed) daily     Social History     Tobacco Use   Smoking Status Every Day   • Current packs/day: 2.00   • Types: Cigarettes   Smokeless Tobacco Never     Family History:   Family History   Adopted: Yes       Meds/Allergies   Current Outpatient Medications   Medication Sig Dispense Refill   • amLODIPine (NORVASC) 5 mg tablet TAKE 1 TABLET DAILY -MAY CAUSE DIZZINESS OR LIGHTHEADEDNESS- 30 tablet 5   • Contour Next Test test strip TEST 4 TIMES A  strip 1   • dulaglutide (Trulicity) 4.5 MG/0.5ML injection Inject 0.5 mL (4.5 mg total) under the skin every 7 days 2 mL 5   • gabapentin (NEURONTIN) 300 mg  capsule take 1 capsule by mouth at bedtime 30 capsule 3   • glimepiride (AMARYL) 4 mg tablet take 1 tablet by mouth once daily 60 tablet 3   • HYDROcodone-acetaminophen (NORCO) 5-325 mg per tablet Take 1 tablet by mouth every 6 (six) hours as needed for pain Max Daily Amount: 4 tablets 120 tablet 0   • insulin glulisine (Apidra SoloStar) 100 units/mL injection pen Inject 10 Units under the skin 3 (three) times a day with meals 15 mL 1   • Insulin Pen Needle (B-D UF III MINI PEN NEEDLES) 31G X 5 MM MISC Inject under the skin 2 (two) times a day 100 each 2   • labetalol (NORMODYNE) 200 mg tablet Take 1 tablet (200 mg total) by mouth 2 (two) times a day 60 tablet 3   • Lantus SoloStar 100 units/mL SOPN Inject 0.4 mL (40 Units total) under the skin daily at bedtime 15 mL 2   • lisinopril (ZESTRIL) 10 mg tablet take 1 tablet by mouth once daily 90 tablet 3   • LORazepam (ATIVAN) 0.5 mg tablet   0   • LORazepam (ATIVAN) 0.5 mg tablet TAKE TWO(2) TABLETS TWICE A DAY - MAY CAUSE DROWSINESS 120 tablet 3   • metFORMIN (GLUCOPHAGE) 500 mg tablet TAKE 1 TABLET BY MOUTH 3 (THREE) TIMES A DAY TAKE WITH FOOD 270 tablet 3   • methocarbamol (ROBAXIN) 500 mg tablet TAKE 1 TABLET TWICE A DAY AS NEEDED FOR MUSCLE SPASMS - MAY CAUSE DROWSINESS 60 tablet 3   • methylPREDNISolone 4 MG tablet therapy pack Use as directed on package 21 each 0   • naloxone (NARCAN) 4 mg/0.1 mL nasal spray Administer 1 spray into a nostril. If no response after 2-3 minutes, give another dose in the other nostril using a new spray. 1 each 1   • naloxone (NARCAN) 4 mg/0.1 mL nasal spray Administer 1 spray into a nostril. If no response after 2-3 minutes, give another dose in the other nostril using a new spray. 1 each 1   • Needles & Syringes MISC by Does not apply route 3 (three) times a day before meals 90 each 0   • podofilox (CONDYLOX) 0.5 % external solution Apply topically 2 (two) times a day Two warts on penis and scrotum 3.5 mL 0   • polyethylene glycol  (MIRALAX) 17 g packet Take 17 g by mouth daily as needed (constipation) 14 each 0   • rosuvastatin (CRESTOR) 5 mg tablet take 1 tablet by mouth at bedtime 90 tablet 3   • ROSUVASTATIN CALCIUM PO Take by mouth     • sildenafil (VIAGRA) 100 mg tablet take 1 tablet by mouth if needed 10 tablet 3     No current facility-administered medications for this visit.     No Known Allergies    Objective   Vitals: Blood pressure 134/78, pulse 93, height 6' (1.829 m), weight 107 kg (236 lb), SpO2 97%.    Physical Exam  Vitals and nursing note reviewed.   Constitutional:       General: He is not in acute distress.     Appearance: Normal appearance. He is not diaphoretic.   HENT:      Head: Normocephalic and atraumatic.   Eyes:      General: No scleral icterus.     Extraocular Movements: Extraocular movements intact.      Conjunctiva/sclera: Conjunctivae normal.      Pupils: Pupils are equal, round, and reactive to light.   Cardiovascular:      Rate and Rhythm: Normal rate and regular rhythm.      Heart sounds: No murmur heard.  Pulmonary:      Effort: Pulmonary effort is normal. No respiratory distress.      Breath sounds: Normal breath sounds. No wheezing.   Musculoskeletal:      Cervical back: Normal range of motion.      Right lower leg: No edema.      Left lower leg: No edema.   Lymphadenopathy:      Cervical: No cervical adenopathy.   Skin:     General: Skin is warm and dry.   Neurological:      Mental Status: He is alert and oriented to person, place, and time. Mental status is at baseline.      Sensory: No sensory deficit.      Gait: Gait normal.   Psychiatric:         Mood and Affect: Mood normal.         Behavior: Behavior normal.         Thought Content: Thought content normal.         The history was obtained from the review of the chart, patient.    Lab Results:   Lab Results   Component Value Date/Time    Hemoglobin A1C 9.2 (H) 01/08/2024 11:40 AM    Hemoglobin A1C 9.8 (H) 06/13/2023 01:47 PM    Hemoglobin A1C 9.7 (A)  "03/10/2023 01:06 PM    BUN 18 01/08/2024 11:40 AM    BUN 10 06/13/2023 01:47 PM    Potassium 4.1 01/08/2024 11:40 AM    Potassium 4.4 06/13/2023 01:47 PM    Chloride 100 01/08/2024 11:40 AM    Chloride 100 06/13/2023 01:47 PM    CO2 25 01/08/2024 11:40 AM    CO2 24 06/13/2023 01:47 PM    Creatinine 0.82 01/08/2024 11:40 AM    Creatinine 0.71 (L) 06/13/2023 01:47 PM    AST 16 01/08/2024 11:40 AM    AST 21 06/13/2023 01:47 PM    ALT 17 01/08/2024 11:40 AM    ALT 22 06/13/2023 01:47 PM    Protein, Total 6.2 01/08/2024 11:40 AM    Protein, Total 6.6 06/13/2023 01:47 PM    Albumin 4.0 01/08/2024 11:40 AM    Albumin 4.2 06/13/2023 01:47 PM    Globulin, Total 2.2 01/08/2024 11:40 AM    Globulin, Total 2.4 06/13/2023 01:47 PM    HDL 40 06/13/2023 01:47 PM    Triglycerides 149 06/13/2023 01:47 PM         Portions of the record may have been created with voice recognition software. Occasional wrong word or \"sound a like\" substitutions may have occurred due to the inherent limitations of voice recognition software. Read the chart carefully and recognize, using context, where substitutions have occurred.    "

## 2024-01-12 RX ORDER — DULAGLUTIDE 4.5 MG/.5ML
4.5 INJECTION, SOLUTION SUBCUTANEOUS
Qty: 2 ML | Refills: 5 | Status: SHIPPED | OUTPATIENT
Start: 2024-01-12

## 2024-01-17 DIAGNOSIS — F41.9 ANXIETY: ICD-10-CM

## 2024-01-17 DIAGNOSIS — S32.010A CLOSED COMPRESSION FRACTURE OF BODY OF L1 VERTEBRA (HCC): ICD-10-CM

## 2024-01-17 RX ORDER — LORAZEPAM 0.5 MG/1
1 TABLET ORAL 2 TIMES DAILY PRN
Qty: 120 TABLET | Refills: 3 | Status: SHIPPED | OUTPATIENT
Start: 2024-01-17 | End: 2024-05-16

## 2024-01-17 RX ORDER — HYDROCODONE BITARTRATE AND ACETAMINOPHEN 5; 325 MG/1; MG/1
1 TABLET ORAL EVERY 6 HOURS PRN
Qty: 120 TABLET | Refills: 0 | Status: SHIPPED | OUTPATIENT
Start: 2024-01-17 | End: 2024-02-16

## 2024-01-29 ENCOUNTER — NURSE TRIAGE (OUTPATIENT)
Age: 64
End: 2024-01-29

## 2024-02-07 DIAGNOSIS — I10 ESSENTIAL HYPERTENSION: ICD-10-CM

## 2024-02-07 RX ORDER — AMLODIPINE BESYLATE 5 MG/1
TABLET ORAL
Qty: 30 TABLET | Refills: 5 | Status: SHIPPED | OUTPATIENT
Start: 2024-02-07

## 2024-02-15 DIAGNOSIS — S32.010A CLOSED COMPRESSION FRACTURE OF BODY OF L1 VERTEBRA (HCC): ICD-10-CM

## 2024-02-15 RX ORDER — HYDROCODONE BITARTRATE AND ACETAMINOPHEN 5; 325 MG/1; MG/1
1 TABLET ORAL EVERY 6 HOURS PRN
Qty: 120 TABLET | Refills: 0 | Status: SHIPPED | OUTPATIENT
Start: 2024-02-15 | End: 2024-03-16

## 2024-03-02 DIAGNOSIS — S32.010A CLOSED COMPRESSION FRACTURE OF BODY OF L1 VERTEBRA (HCC): ICD-10-CM

## 2024-03-04 RX ORDER — LABETALOL 200 MG/1
200 TABLET, FILM COATED ORAL 2 TIMES DAILY
Qty: 180 TABLET | Refills: 4 | Status: SHIPPED | OUTPATIENT
Start: 2024-03-04

## 2024-03-08 DIAGNOSIS — I10 ESSENTIAL HYPERTENSION: ICD-10-CM

## 2024-03-08 RX ORDER — LISINOPRIL 10 MG/1
TABLET ORAL
Qty: 90 TABLET | Refills: 3 | Status: SHIPPED | OUTPATIENT
Start: 2024-03-08

## 2024-03-14 DIAGNOSIS — S32.010A CLOSED COMPRESSION FRACTURE OF BODY OF L1 VERTEBRA (HCC): ICD-10-CM

## 2024-03-14 RX ORDER — HYDROCODONE BITARTRATE AND ACETAMINOPHEN 5; 325 MG/1; MG/1
1 TABLET ORAL EVERY 6 HOURS PRN
Qty: 120 TABLET | Refills: 0 | Status: SHIPPED | OUTPATIENT
Start: 2024-03-14 | End: 2024-04-13

## 2024-04-07 DIAGNOSIS — N52.9 ERECTILE DYSFUNCTION, UNSPECIFIED ERECTILE DYSFUNCTION TYPE: ICD-10-CM

## 2024-04-08 DIAGNOSIS — E11.69 TYPE 2 DIABETES MELLITUS WITH OTHER SPECIFIED COMPLICATION, WITH LONG-TERM CURRENT USE OF INSULIN (HCC): ICD-10-CM

## 2024-04-08 DIAGNOSIS — Z79.4 TYPE 2 DIABETES MELLITUS WITH OTHER SPECIFIED COMPLICATION, WITH LONG-TERM CURRENT USE OF INSULIN (HCC): ICD-10-CM

## 2024-04-08 RX ORDER — SILDENAFIL 100 MG/1
TABLET, FILM COATED ORAL
Qty: 10 TABLET | Refills: 3 | Status: SHIPPED | OUTPATIENT
Start: 2024-04-08

## 2024-04-09 RX ORDER — INSULIN GLULISINE 100 [IU]/ML
10 INJECTION, SOLUTION SUBCUTANEOUS
Qty: 30 ML | Refills: 1 | Status: SHIPPED | OUTPATIENT
Start: 2024-04-09

## 2024-04-14 NOTE — PROGRESS NOTES
Assessment/Plan:    No problem-specific Assessment & Plan notes found for this encounter.       Diagnoses and all orders for this visit:    Spinal stenosis of lumbar region without neurogenic claudication    Chronic bilateral low back pain without sciatica    Chronic, continuous use of opioids  Comments:  --keeps secure  -required for adl  -no oversedation          Subjective:      Patient ID: Raffy Huang is a 63 y.o. male.    Follows  endocrinology    Labs 1/2024  Has  repeat ordered        The following portions of the patient's history were reviewed and updated as appropriate: allergies, current medications, past family history, past medical history, past social history, past surgical history, and problem list.    Review of Systems   Constitutional:  Negative for activity change and fatigue.   HENT:  Negative for ear discharge, ear pain, rhinorrhea and sore throat.    Eyes:  Negative for pain and visual disturbance.   Respiratory:  Negative for cough and shortness of breath.    Cardiovascular:  Negative for chest pain and leg swelling.   Gastrointestinal:  Negative for abdominal pain, constipation and diarrhea.   Endocrine: Negative for cold intolerance and polyuria.   Genitourinary:  Negative for flank pain and hematuria.   Musculoskeletal:  Negative for back pain and joint swelling.   Skin:  Negative for pallor and wound.   Neurological:  Negative for dizziness, seizures and speech difficulty.   Psychiatric/Behavioral:  Negative for confusion and hallucinations.          Objective:      There were no vitals taken for this visit.         Physical Exam  Vitals and nursing note reviewed.   Constitutional:       General: He is not in acute distress.     Appearance: Normal appearance. He is not ill-appearing.   HENT:      Head: Normocephalic.      Right Ear: External ear normal. There is no impacted cerumen.      Left Ear: External ear normal. There is no impacted cerumen.      Nose: No congestion or  rhinorrhea.      Mouth/Throat:      Pharynx: No posterior oropharyngeal erythema.   Eyes:      General: No scleral icterus.        Right eye: No discharge.         Left eye: No discharge.   Neck:      Vascular: No carotid bruit.   Cardiovascular:      Rate and Rhythm: Normal rate and regular rhythm.      Heart sounds: Normal heart sounds. No murmur heard.     No friction rub. No gallop.   Pulmonary:      Breath sounds: No wheezing or rhonchi.   Abdominal:      General: There is no distension.      Tenderness: There is no abdominal tenderness. There is no guarding.   Musculoskeletal:         General: No swelling.      Cervical back: No rigidity.      Right lower leg: No edema.      Left lower leg: No edema.   Lymphadenopathy:      Cervical: No cervical adenopathy.   Skin:     Coloration: Skin is not jaundiced.   Neurological:      Mental Status: He is alert.      Cranial Nerves: No cranial nerve deficit.      Motor: No weakness.      Coordination: Coordination normal.   Psychiatric:         Mood and Affect: Mood normal.

## 2024-04-15 ENCOUNTER — OFFICE VISIT (OUTPATIENT)
Dept: INTERNAL MEDICINE CLINIC | Facility: CLINIC | Age: 64
End: 2024-04-15
Payer: COMMERCIAL

## 2024-04-15 VITALS
BODY MASS INDEX: 30.75 KG/M2 | WEIGHT: 227 LBS | DIASTOLIC BLOOD PRESSURE: 70 MMHG | TEMPERATURE: 97.8 F | SYSTOLIC BLOOD PRESSURE: 120 MMHG | HEIGHT: 72 IN | HEART RATE: 70 BPM

## 2024-04-15 DIAGNOSIS — G89.29 CHRONIC BILATERAL LOW BACK PAIN WITHOUT SCIATICA: ICD-10-CM

## 2024-04-15 DIAGNOSIS — M54.50 CHRONIC BILATERAL LOW BACK PAIN WITHOUT SCIATICA: ICD-10-CM

## 2024-04-15 DIAGNOSIS — F11.90 CHRONIC, CONTINUOUS USE OF OPIOIDS: ICD-10-CM

## 2024-04-15 DIAGNOSIS — S32.010A CLOSED COMPRESSION FRACTURE OF BODY OF L1 VERTEBRA (HCC): ICD-10-CM

## 2024-04-15 DIAGNOSIS — M48.061 SPINAL STENOSIS OF LUMBAR REGION WITHOUT NEUROGENIC CLAUDICATION: Primary | ICD-10-CM

## 2024-04-15 PROCEDURE — 99213 OFFICE O/P EST LOW 20 MIN: CPT | Performed by: INTERNAL MEDICINE

## 2024-04-15 RX ORDER — HYDROCODONE BITARTRATE AND ACETAMINOPHEN 5; 325 MG/1; MG/1
1 TABLET ORAL EVERY 6 HOURS PRN
Qty: 120 TABLET | Refills: 0 | Status: SHIPPED | OUTPATIENT
Start: 2024-04-15 | End: 2024-05-15

## 2024-04-26 LAB
ALBUMIN SERPL-MCNC: 4.2 G/DL (ref 3.9–4.9)
ALBUMIN/CREAT UR: 701 MG/G CREAT (ref 0–29)
ALBUMIN/GLOB SERPL: 1.8 {RATIO} (ref 1.2–2.2)
ALP SERPL-CCNC: 105 IU/L (ref 44–121)
ALT SERPL-CCNC: 25 IU/L (ref 0–44)
AST SERPL-CCNC: 19 IU/L (ref 0–40)
BILIRUB SERPL-MCNC: 0.3 MG/DL (ref 0–1.2)
BUN SERPL-MCNC: 14 MG/DL (ref 8–27)
BUN/CREAT SERPL: 19 (ref 10–24)
CALCIUM SERPL-MCNC: 9.4 MG/DL (ref 8.6–10.2)
CHLORIDE SERPL-SCNC: 98 MMOL/L (ref 96–106)
CHOLEST SERPL-MCNC: 214 MG/DL (ref 100–199)
CHOLEST/HDLC SERPL: 4.2 RATIO (ref 0–5)
CO2 SERPL-SCNC: 26 MMOL/L (ref 20–29)
CREAT SERPL-MCNC: 0.72 MG/DL (ref 0.76–1.27)
CREAT UR-MCNC: 48.9 MG/DL
EGFR: 103 ML/MIN/1.73
EST. AVERAGE GLUCOSE BLD GHB EST-MCNC: 249 MG/DL
GLOBULIN SER-MCNC: 2.4 G/DL (ref 1.5–4.5)
GLUCOSE SERPL-MCNC: 220 MG/DL (ref 70–99)
HBA1C MFR BLD: 10.3 % (ref 4.8–5.6)
HDLC SERPL-MCNC: 51 MG/DL
LDLC SERPL CALC-MCNC: 145 MG/DL (ref 0–99)
MICROALBUMIN UR-MCNC: 342.8 UG/ML
POTASSIUM SERPL-SCNC: 4.2 MMOL/L (ref 3.5–5.2)
PROT SERPL-MCNC: 6.6 G/DL (ref 6–8.5)
SL AMB VLDL CHOLESTEROL CALC: 18 MG/DL (ref 5–40)
SODIUM SERPL-SCNC: 139 MMOL/L (ref 134–144)
TRIGL SERPL-MCNC: 103 MG/DL (ref 0–149)
TSH SERPL DL<=0.005 MIU/L-ACNC: 3.36 UIU/ML (ref 0.45–4.5)

## 2024-04-26 PROCEDURE — 3062F POS MACROALBUMINURIA REV: CPT | Performed by: PHYSICIAN ASSISTANT

## 2024-05-02 ENCOUNTER — OFFICE VISIT (OUTPATIENT)
Dept: ENDOCRINOLOGY | Facility: HOSPITAL | Age: 64
End: 2024-05-02
Payer: COMMERCIAL

## 2024-05-02 VITALS
SYSTOLIC BLOOD PRESSURE: 124 MMHG | BODY MASS INDEX: 30.31 KG/M2 | DIASTOLIC BLOOD PRESSURE: 86 MMHG | WEIGHT: 223.8 LBS | HEART RATE: 90 BPM | HEIGHT: 72 IN

## 2024-05-02 DIAGNOSIS — E11.9 TYPE 2 DIABETES MELLITUS WITHOUT COMPLICATION, WITHOUT LONG-TERM CURRENT USE OF INSULIN (HCC): ICD-10-CM

## 2024-05-02 PROCEDURE — 3074F SYST BP LT 130 MM HG: CPT | Performed by: PHYSICIAN ASSISTANT

## 2024-05-02 PROCEDURE — 99214 OFFICE O/P EST MOD 30 MIN: CPT | Performed by: PHYSICIAN ASSISTANT

## 2024-05-02 PROCEDURE — 3079F DIAST BP 80-89 MM HG: CPT | Performed by: PHYSICIAN ASSISTANT

## 2024-05-02 RX ORDER — INSULIN GLARGINE 100 [IU]/ML
INJECTION, SOLUTION SUBCUTANEOUS
Qty: 15 ML | Refills: 2 | Status: SHIPPED | OUTPATIENT
Start: 2024-05-02

## 2024-05-02 NOTE — PROGRESS NOTES
Raffy Huang 63 y.o. male MRN: [id number]    Encounter: [id number]      Assessment/Plan     Assessment:  This is a 63 y.o.-year-old male with type 2 diabetes with hypertension and hyperlipidemia.    Plan:  1. Type 2 diabetes, insulin requiring: Recent hemoglobin A1c was 10.3.  I did inform him this is the highest A1c we have on record.  Glucose levels are continuously running high.  Unfortunately I think he continues to decrease his Lantus on his own as he does not think this helps his glucose levels as effectively as his Humalog.  At this time would like to increase his Lantus to 24 units daily.  Continue with NovoLog 10 units with each meal.  Continue Trulicity 4.5 mg weekly, metformin 500 mg in the morning and 1000 mg in the evening, and glimepiride 4 mg daily.  Likely will need to get him to start on an SGLT2 inhibitor in the future due to urine albumin levels.  Continue utilizing Dexcom to monitor glucose levels.  Contact the office with any concerns or questions.  Follow-up in 3 months with lab work completed prior to visit.     2. Hypertension: Normotensive in the office today.  Kidney function is stable.  Continue with current medications.  Repeat CMP prior to next office visit.     3. Hyperlipidemia: Lipid panel was excellent.  Continue with current medication.  Will obtain lipid panel prior to next office visit.     4.  Diabetic nephropathy: Albumin creatinine ratio continues to increase.  Kidney function overall is doing well.  These numbers will likely improve if we can get glucose levels under control.  Is currently on lisinopril.  Will need an SGLT2 inhibitor in the future.    CC: Type 2 diabetes follow-up    History of Present Illness     HPI:  Raffy Huang is a 63 year old male with type 2 diabetes for many years.  He is on oral agents and insulin at home and takes metformin 500 mg in the morning and 1000 mg in the evening, glimepiride 4 mg daily, Humalog 10 units with meals, Lantus 24 units in the  evening, Trulicity 4.5 mg.  He does try to make adjustments to his Humalog based on current glucose levels.  He denies nephropathy, retinopathy, heart attack, stroke and claudication but does admit to neuropathy with symptoms worse at night.  Has not followed up with a podiatrist.  No lifestyle modification since last office visit.  Limited physical activity due to his chronic back pain.  States that he is doing well today without any concerns or questions.     Hypoglycemic episodes:  May have episodes of hypoglycemia after meals depending on activity level and what he eats.     The patient's last eye exam was October 2023. Last diabetic foot exam was completed December 2022.      Blood Sugar/Glucometer/Pump/CGM review: No blood sugar logs present at today's office visit.     For hypertension he is treated with lisinopril 10 mg daily.  He is also managed on labetalol 100 mg twice a day and amlodipine 5 mg daily.  For hyperlipidemia, he is treated with rosuvastatin.     Has a history of falling off a ladder and having a vertebral fracture.  Was noted to have an L1 compression fracture at that time.  Does currently have chronic back pain.  Did follow-up with pain management.  Has had serial imaging since, and seems to be stable.  Was recommended to get a DEXA scan completed for further evaluation, but has yet to get that done.     Review of Systems   Constitutional:  Negative for activity change, appetite change, fatigue and unexpected weight change.   HENT:  Negative for trouble swallowing.    Eyes:  Negative for visual disturbance.   Respiratory:  Negative for chest tightness and shortness of breath.    Cardiovascular:  Negative for chest pain, palpitations and leg swelling.   Gastrointestinal:  Negative for abdominal pain, diarrhea, nausea and vomiting.   Endocrine: Negative for cold intolerance, heat intolerance, polydipsia, polyphagia and polyuria.   Genitourinary:  Negative for frequency.   Musculoskeletal:   Positive for back pain and myalgias (in the back).   Skin:  Negative for rash and wound.   Neurological:  Positive for numbness (bilateral feet, worse at night.). Negative for dizziness, weakness, light-headedness and headaches.   Psychiatric/Behavioral:  Negative for dysphoric mood and sleep disturbance. The patient is not nervous/anxious.        Historical Information   Past Medical History:   Diagnosis Date   • Alcoholism (HCC)    • Anxiety    • Atrial fibrillation (HCC)    • Diabetes (HCC)    • Diabetes mellitus, type 2 (HCC)    • Diverticulitis    • ED (erectile dysfunction)    • High cholesterol    • Hyperlipidemia    • Hypertension     LVH    • L1 vertebral fracture (HCC)    • Lung mass    • Neuropathy    • Nocturia    • Osteoarthritis    • Paroxysmal atrial fibrillation (HCC)    • Pilar cyst    • Shingles    • Smoker    • Spinal stenosis, lumbar      Past Surgical History:   Procedure Laterality Date   • WISDOM TOOTH EXTRACTION       Social History   Social History     Substance and Sexual Activity   Alcohol Use Not Currently     Social History     Substance and Sexual Activity   Drug Use Yes   • Types: Marijuana, Other    Comment: prescription pills (unprescribed) daily     Social History     Tobacco Use   Smoking Status Every Day   • Current packs/day: 2.00   • Types: Cigarettes   Smokeless Tobacco Never     Family History:   Family History   Adopted: Yes       Meds/Allergies   Current Outpatient Medications   Medication Sig Dispense Refill   • amLODIPine (NORVASC) 5 mg tablet TAKE 1 TABLET DAILY -MAY CAUSE DIZZINESS OR LIGHTHEADEDNESS- 30 tablet 5   • Contour Next Test test strip TEST 4 TIMES A  strip 1   • dulaglutide (Trulicity) 4.5 MG/0.5ML injection Inject 0.5 mL (4.5 mg total) under the skin every 7 days 2 mL 5   • gabapentin (NEURONTIN) 300 mg capsule take 1 capsule by mouth at bedtime 30 capsule 3   • glimepiride (AMARYL) 4 mg tablet take 1 tablet by mouth once daily 60 tablet 3   •  HYDROcodone-acetaminophen (NORCO) 5-325 mg per tablet Take 1 tablet by mouth every 6 (six) hours as needed for pain Max Daily Amount: 4 tablets 120 tablet 0   • insulin glulisine (Apidra SoloStar) 100 units/mL injection pen INJECT 10 UNITS UNDER THE SKIN 3 (THREE) TIMES A DAY WITH MEALS 30 mL 1   • Insulin Pen Needle (B-D UF III MINI PEN NEEDLES) 31G X 5 MM MISC Inject under the skin 2 (two) times a day 100 each 2   • labetalol (NORMODYNE) 200 mg tablet take 1 tablet by mouth twice a day 180 tablet 4   • Lantus SoloStar 100 units/mL SOPN Take 24 units daily, up to 40 units. 15 mL 2   • lisinopril (ZESTRIL) 10 mg tablet take 1 tablet by mouth once daily 90 tablet 3   • LORazepam (ATIVAN) 0.5 mg tablet Take 2 tablets (1 mg total) by mouth 2 (two) times a day as needed for anxiety May cause drowsiness 120 tablet 3   • metFORMIN (GLUCOPHAGE) 500 mg tablet TAKE 1 TABLET BY MOUTH 3 (THREE) TIMES A DAY TAKE WITH FOOD 270 tablet 3   • methocarbamol (ROBAXIN) 500 mg tablet TAKE 1 TABLET TWICE A DAY AS NEEDED FOR MUSCLE SPASMS - MAY CAUSE DROWSINESS 60 tablet 3   • naloxone (NARCAN) 4 mg/0.1 mL nasal spray Administer 1 spray into a nostril. If no response after 2-3 minutes, give another dose in the other nostril using a new spray. 1 each 1   • naloxone (NARCAN) 4 mg/0.1 mL nasal spray Administer 1 spray into a nostril. If no response after 2-3 minutes, give another dose in the other nostril using a new spray. 1 each 1   • Needles & Syringes MISC by Does not apply route 3 (three) times a day before meals 90 each 0   • podofilox (CONDYLOX) 0.5 % external solution Apply topically 2 (two) times a day Two warts on penis and scrotum 3.5 mL 0   • polyethylene glycol (MIRALAX) 17 g packet Take 17 g by mouth daily as needed (constipation) 14 each 0   • rosuvastatin (CRESTOR) 5 mg tablet take 1 tablet by mouth at bedtime 90 tablet 3   • sildenafil (VIAGRA) 100 mg tablet take 1 tablet by mouth if needed 10 tablet 3   • LORazepam  (ATIVAN) 0.5 mg tablet  (Patient not taking: Reported on 5/2/2024)  0   • methylPREDNISolone 4 MG tablet therapy pack Use as directed on package (Patient not taking: Reported on 5/2/2024) 21 each 0   • ROSUVASTATIN CALCIUM PO Take by mouth (Patient not taking: Reported on 5/2/2024)       No current facility-administered medications for this visit.     No Known Allergies    Objective   Vitals: Blood pressure 124/86, pulse 90, height 6' (1.829 m), weight 102 kg (223 lb 12.8 oz).    Physical Exam  Vitals and nursing note reviewed.   Constitutional:       General: He is not in acute distress.     Appearance: Normal appearance. He is not diaphoretic.   HENT:      Head: Normocephalic and atraumatic.   Eyes:      General: No scleral icterus.     Extraocular Movements: Extraocular movements intact.      Conjunctiva/sclera: Conjunctivae normal.      Pupils: Pupils are equal, round, and reactive to light.   Cardiovascular:      Rate and Rhythm: Normal rate and regular rhythm.      Pulses: no weak pulses.           Dorsalis pedis pulses are 2+ on the right side and 2+ on the left side.        Posterior tibial pulses are 2+ on the right side and 2+ on the left side.      Heart sounds: No murmur heard.  Pulmonary:      Effort: Pulmonary effort is normal. No respiratory distress.      Breath sounds: Normal breath sounds. No wheezing.   Musculoskeletal:      Cervical back: Normal range of motion.      Right lower leg: No edema.      Left lower leg: No edema.   Feet:      Right foot:      Skin integrity: Dry skin present. No ulcer, skin breakdown, erythema, warmth or callus.      Left foot:      Skin integrity: Dry skin present. No ulcer, skin breakdown, erythema, warmth or callus.   Lymphadenopathy:      Cervical: No cervical adenopathy.   Skin:     General: Skin is warm and dry.   Neurological:      Mental Status: He is alert and oriented to person, place, and time. Mental status is at baseline.      Sensory: No sensory deficit.       Gait: Gait normal.   Psychiatric:         Mood and Affect: Mood normal.         Behavior: Behavior normal.         Thought Content: Thought content normal.     Patient's shoes and socks removed.    Right Foot/Ankle   Right Foot Inspection  Skin Exam: skin normal, skin intact and dry skin. No warmth, no callus, no erythema, no maceration, no abnormal color, no pre-ulcer, no ulcer and no callus.     Toe Exam: ROM and strength within normal limits. No tenderness, erythema and  no right toe deformity    Sensory   Vibration: diminished  Proprioception: intact  Monofilament testing: diminished    Vascular  Capillary refills: < 3 seconds  The right DP pulse is 2+. The right PT pulse is 2+.     Left Foot/Ankle  Left Foot Inspection  Skin Exam: skin normal, skin intact and dry skin. No warmth, no erythema, no maceration, normal color, no pre-ulcer, no ulcer and no callus.     Toe Exam: ROM and strength within normal limits. No tenderness, no erythema and no left toe deformity.     Sensory   Vibration: diminished  Proprioception: intact  Monofilament testing: diminished    Vascular  Capillary refills: < 3 seconds  The left DP pulse is 2+. The left PT pulse is 2+.     Assign Risk Category  No deformity present  Loss of protective sensation  No weak pulses  Risk: 1        The history was obtained from the review of the chart, patient.    Lab Results:   Lab Results   Component Value Date/Time    Hemoglobin A1C 10.3 (H) 04/25/2024 10:22 AM    Hemoglobin A1C 9.2 (H) 01/08/2024 11:40 AM    Hemoglobin A1C 9.8 (H) 06/13/2023 01:47 PM    BUN 14 04/25/2024 10:22 AM    BUN 18 01/08/2024 11:40 AM    BUN 10 06/13/2023 01:47 PM    Potassium 4.2 04/25/2024 10:22 AM    Potassium 4.1 01/08/2024 11:40 AM    Potassium 4.4 06/13/2023 01:47 PM    Chloride 98 04/25/2024 10:22 AM    Chloride 100 01/08/2024 11:40 AM    Chloride 100 06/13/2023 01:47 PM    CO2 26 04/25/2024 10:22 AM    CO2 25 01/08/2024 11:40 AM    CO2 24 06/13/2023 01:47 PM     "Creatinine 0.72 (L) 04/25/2024 10:22 AM    Creatinine 0.82 01/08/2024 11:40 AM    Creatinine 0.71 (L) 06/13/2023 01:47 PM    AST 19 04/25/2024 10:22 AM    AST 16 01/08/2024 11:40 AM    AST 21 06/13/2023 01:47 PM    ALT 25 04/25/2024 10:22 AM    ALT 17 01/08/2024 11:40 AM    ALT 22 06/13/2023 01:47 PM    Protein, Total 6.6 04/25/2024 10:22 AM    Protein, Total 6.2 01/08/2024 11:40 AM    Protein, Total 6.6 06/13/2023 01:47 PM    Albumin 4.2 04/25/2024 10:22 AM    Albumin 4.0 01/08/2024 11:40 AM    Albumin 4.2 06/13/2023 01:47 PM    Globulin, Total 2.4 04/25/2024 10:22 AM    Globulin, Total 2.2 01/08/2024 11:40 AM    Globulin, Total 2.4 06/13/2023 01:47 PM    HDL 51 04/25/2024 10:22 AM    HDL 40 06/13/2023 01:47 PM    Triglycerides 103 04/25/2024 10:22 AM    Triglycerides 149 06/13/2023 01:47 PM       Portions of the record may have been created with voice recognition software. Occasional wrong word or \"sound a like\" substitutions may have occurred due to the inherent limitations of voice recognition software. Read the chart carefully and recognize, using context, where substitutions have occurred.    "

## 2024-05-02 NOTE — PATIENT INSTRUCTIONS
Continue to monitor diet, maintain physical activity.  Make sure to drink plenty water throughout the day.     Continue with metformin, glimepiride, Trulicity.      Continue with the same dose of humalog at this time.     Recommend increasing Lantus to 24 units daily.     Continue using Dexcom to monitor blood sugar.     Continue with blood pressure and cholesterol medications.     Follow-up in 3 months.

## 2024-05-14 DIAGNOSIS — S32.010A CLOSED COMPRESSION FRACTURE OF BODY OF L1 VERTEBRA (HCC): ICD-10-CM

## 2024-05-14 RX ORDER — HYDROCODONE BITARTRATE AND ACETAMINOPHEN 5; 325 MG/1; MG/1
1 TABLET ORAL EVERY 6 HOURS PRN
Qty: 120 TABLET | Refills: 0 | Status: SHIPPED | OUTPATIENT
Start: 2024-05-14 | End: 2024-06-13

## 2024-05-14 NOTE — TELEPHONE ENCOUNTER
Medication:     HYDROcodone-acetaminophen (NORCO) 5-325 mg per tablet       Dose/Frequency: Take 1 tablet by mouth every 6 (six) hours as needed for pain     Quantity: 120    Pharmacy: [facility] - [address] [place] [address] [phone]     Office:   [x] PCP/Provider -   [] Speciality/Provider -     Does the patient have enough for 3 days?   [] Yes   [x] No - Send as HP to POD

## 2024-05-15 DIAGNOSIS — F41.9 ANXIETY: ICD-10-CM

## 2024-05-16 ENCOUNTER — TELEPHONE (OUTPATIENT)
Dept: ENDOCRINOLOGY | Facility: HOSPITAL | Age: 64
End: 2024-05-16

## 2024-05-16 RX ORDER — LORAZEPAM 0.5 MG/1
1 TABLET ORAL 2 TIMES DAILY PRN
Qty: 120 TABLET | Refills: 3 | Status: SHIPPED | OUTPATIENT
Start: 2024-05-16 | End: 2024-09-13

## 2024-05-16 RX ORDER — METHOCARBAMOL 500 MG/1
TABLET, FILM COATED ORAL
Qty: 60 TABLET | Refills: 3 | Status: SHIPPED | OUTPATIENT
Start: 2024-05-16

## 2024-05-19 DIAGNOSIS — E11.9 TYPE 2 DIABETES MELLITUS WITHOUT COMPLICATION, WITH LONG-TERM CURRENT USE OF INSULIN (HCC): ICD-10-CM

## 2024-05-19 DIAGNOSIS — Z79.4 TYPE 2 DIABETES MELLITUS WITHOUT COMPLICATION, WITH LONG-TERM CURRENT USE OF INSULIN (HCC): ICD-10-CM

## 2024-05-19 RX ORDER — GLIMEPIRIDE 4 MG/1
TABLET ORAL
Qty: 60 TABLET | Refills: 3 | Status: SHIPPED | OUTPATIENT
Start: 2024-05-19

## 2024-05-21 ENCOUNTER — TELEPHONE (OUTPATIENT)
Age: 64
End: 2024-05-21

## 2024-05-21 NOTE — TELEPHONE ENCOUNTER
Patient called in states that he is unable to get the Trulicity and asks if provider has an alternative to prescribe him?  He uses [facility].  Please advise.

## 2024-05-31 DIAGNOSIS — E11.9 TYPE 2 DIABETES MELLITUS WITHOUT COMPLICATION, WITHOUT LONG-TERM CURRENT USE OF INSULIN (HCC): Primary | ICD-10-CM

## 2024-05-31 NOTE — TELEPHONE ENCOUNTER
Pt called into med's refill line for update on his medication Rybelsus. I explain to pt that we left him message to call back and give the okay for provider to write script for medication. Pt is okay with trying the Rybelsus and would like script be sent over to his insurance for coverage, then send to [facility] in [place]. Thank you

## 2024-06-07 DIAGNOSIS — Z79.4 TYPE 2 DIABETES MELLITUS WITH OTHER SPECIFIED COMPLICATION, WITH LONG-TERM CURRENT USE OF INSULIN (HCC): Primary | ICD-10-CM

## 2024-06-07 DIAGNOSIS — E11.69 TYPE 2 DIABETES MELLITUS WITH OTHER SPECIFIED COMPLICATION, WITH LONG-TERM CURRENT USE OF INSULIN (HCC): Primary | ICD-10-CM

## 2024-06-13 DIAGNOSIS — S32.010A CLOSED COMPRESSION FRACTURE OF BODY OF L1 VERTEBRA (HCC): ICD-10-CM

## 2024-06-13 RX ORDER — HYDROCODONE BITARTRATE AND ACETAMINOPHEN 5; 325 MG/1; MG/1
1 TABLET ORAL EVERY 6 HOURS PRN
Qty: 120 TABLET | Refills: 0 | Status: SHIPPED | OUTPATIENT
Start: 2024-06-13 | End: 2024-07-13

## 2024-06-29 LAB
ALBUMIN SERPL-MCNC: 4.3 G/DL (ref 3.9–4.9)
ALP SERPL-CCNC: 135 IU/L (ref 44–121)
ALT SERPL-CCNC: 22 IU/L (ref 0–44)
AST SERPL-CCNC: 16 IU/L (ref 0–40)
BILIRUB SERPL-MCNC: <0.2 MG/DL (ref 0–1.2)
BUN SERPL-MCNC: 14 MG/DL (ref 8–27)
BUN/CREAT SERPL: 15 (ref 10–24)
CALCIUM SERPL-MCNC: 9.4 MG/DL (ref 8.6–10.2)
CHLORIDE SERPL-SCNC: 94 MMOL/L (ref 96–106)
CO2 SERPL-SCNC: 29 MMOL/L (ref 20–29)
CREAT SERPL-MCNC: 0.91 MG/DL (ref 0.76–1.27)
EGFR: 95 ML/MIN/1.73
EST. AVERAGE GLUCOSE BLD GHB EST-MCNC: 246 MG/DL
GLOBULIN SER-MCNC: 2.3 G/DL (ref 1.5–4.5)
GLUCOSE SERPL-MCNC: 363 MG/DL (ref 70–99)
HBA1C MFR BLD: 10.2 % (ref 4.8–5.6)
POTASSIUM SERPL-SCNC: 4.1 MMOL/L (ref 3.5–5.2)
PROT SERPL-MCNC: 6.6 G/DL (ref 6–8.5)
SODIUM SERPL-SCNC: 136 MMOL/L (ref 134–144)

## 2024-07-12 DIAGNOSIS — S32.010A CLOSED COMPRESSION FRACTURE OF BODY OF L1 VERTEBRA (HCC): ICD-10-CM

## 2024-07-12 RX ORDER — HYDROCODONE BITARTRATE AND ACETAMINOPHEN 5; 325 MG/1; MG/1
1 TABLET ORAL EVERY 6 HOURS PRN
Qty: 120 TABLET | Refills: 0 | Status: SHIPPED | OUTPATIENT
Start: 2024-07-12 | End: 2024-08-11

## 2024-07-12 NOTE — TELEPHONE ENCOUNTER
Medication:     HYDROcodone-acetaminophen (NORCO       Dose/Frequency: 5-325mg    Quantity: 120 Tablet    Pharmacy: [facility] - [address] [place] [address]     Office:   [x] PCP/Provider -   [] Speciality/Provider -     Does the patient have enough for 3 days?   [x] Yes   [] No - Send as HP to POD

## 2024-07-19 DIAGNOSIS — E78.5 HYPERLIPIDEMIA, UNSPECIFIED HYPERLIPIDEMIA TYPE: ICD-10-CM

## 2024-07-19 RX ORDER — ROSUVASTATIN CALCIUM 5 MG/1
TABLET, COATED ORAL
Qty: 90 TABLET | Refills: 1 | Status: SHIPPED | OUTPATIENT
Start: 2024-07-19

## 2024-08-08 ENCOUNTER — OFFICE VISIT (OUTPATIENT)
Dept: INTERNAL MEDICINE CLINIC | Facility: CLINIC | Age: 64
End: 2024-08-08
Payer: COMMERCIAL

## 2024-08-08 ENCOUNTER — TELEPHONE (OUTPATIENT)
Dept: OTHER | Facility: HOSPITAL | Age: 64
End: 2024-08-08

## 2024-08-08 VITALS
BODY MASS INDEX: 32.23 KG/M2 | WEIGHT: 238 LBS | OXYGEN SATURATION: 99 % | HEIGHT: 72 IN | SYSTOLIC BLOOD PRESSURE: 130 MMHG | HEART RATE: 74 BPM | DIASTOLIC BLOOD PRESSURE: 70 MMHG | TEMPERATURE: 97.8 F | RESPIRATION RATE: 12 BRPM

## 2024-08-08 DIAGNOSIS — G89.29 CHRONIC BILATERAL LOW BACK PAIN WITHOUT SCIATICA: ICD-10-CM

## 2024-08-08 DIAGNOSIS — M54.50 CHRONIC BILATERAL LOW BACK PAIN WITHOUT SCIATICA: ICD-10-CM

## 2024-08-08 DIAGNOSIS — F11.90 CHRONIC, CONTINUOUS USE OF OPIOIDS: ICD-10-CM

## 2024-08-08 DIAGNOSIS — E11.00 TYPE 2 DIABETES MELLITUS WITH HYPEROSMOLARITY WITHOUT COMA, WITHOUT LONG-TERM CURRENT USE OF INSULIN (HCC): Primary | ICD-10-CM

## 2024-08-08 PROCEDURE — 99213 OFFICE O/P EST LOW 20 MIN: CPT | Performed by: INTERNAL MEDICINE

## 2024-08-08 NOTE — PATIENT INSTRUCTIONS
"Patient Education     Type 2 diabetes   The Basics   Written by the doctors and editors at [facility]   What is type 2 diabetes? -- This is a disorder that disrupts the way the body uses sugar. It is sometimes called type 2 diabetes mellitus.  All of the cells in the body need sugar to work normally. Sugar gets into the cells with the help of a hormone called insulin. Insulin is made by the pancreas, an organ in the belly. If there is not enough insulin, or if cells in the body don't respond normally to insulin, sugar builds up in the blood. That is what happens to people with diabetes.  There are 2 different types of diabetes:   In type 1 diabetes, the pancreas makes little or no insulin.   In type 2 diabetes, the pancreas still makes some insulin, but the cells in the body stop responding normally. Eventually, the pancreas cannot make enough insulin to keep up.  Having excess body weight or obesity increases a person's risk of developing type 2 diabetes. But people without excess body weight can get diabetes, too.  What are the symptoms of type 2 diabetes? -- Type 2 diabetes usually causes no symptoms. When symptoms do happen, they include:   Needing to urinate often   Intense thirst   Blurry vision  Can diabetes lead to other health problems? -- Yes. Type 2 diabetes might not make you feel sick. But if it is not managed, it can lead to serious problems over time, such as:   Heart attacks   Strokes   Kidney disease   Vision problems (or even blindness)   Pain or loss of feeling in the hands and feet   Needing to have fingers, toes, or other body parts removed (amputated)  How do I know if I have type 2 diabetes? -- Your doctor or nurse can do a blood test. There are 2 tests that can be used for this. Both involve measuring the amount of sugar in your blood, called your \"blood sugar\" or \"blood glucose\":   One of the tests measures your blood sugar at the time the blood sample is taken. This test is done in the " "morning. You can't eat or drink anything except water for at least 8 hours before the test.   The other test shows what your average blood sugar has been for the past 2 to 3 months. This blood test is called \"hemoglobin A1C\" or just \"A1C.\" It can be checked at any time of the day, even if you have recently eaten.  How is type 2 diabetes treated? -- The goals of treatment are to manage your blood sugar and lower the risk of future problems that can happen in people with diabetes.  Treatment might include:   Lifestyle changes - This is an important part of managing diabetes. It includes eating healthy foods and getting plenty of physical activity.   Medicines - There are a few medicines that help lower blood sugar. Some people need to take pills that help the body make more insulin or that help insulin do its job. Others need insulin shots.  Depending on what medicines you take, you might need to check your blood sugar regularly at home. But not everyone with type 2 diabetes needs to do this. Your doctor or nurse will tell you if you should be checking your blood sugar, and when and how to do this.  Sometimes, people with type 2 diabetes also need medicines to help prevent problems caused by the disease. For instance, medicines used to lower blood pressure can reduce the chances of a heart attack or stroke.   General medical care - It's also important to take care of other areas of your health. This includes watching your blood pressure and cholesterol levels. You should also get certain vaccines, such as vaccines to protect against the flu and coronavirus disease 2019 (\"COVID-19\"). Some people also need a vaccine to prevent pneumonia.  Can type 2 diabetes be prevented? -- Yes. To lower your chances of getting type 2 diabetes, the most important thing you can do is eat a healthy diet and get plenty of physical activity. This can help you lose weight if you are overweight. But eating well and being active are also good " for your overall health. Even gentle activity, like walking, has benefits.  If you smoke, quitting can also lower your risk of type 2 diabetes. Quitting smoking can be difficult, but your doctor or nurse can help.  All topics are updated as new evidence becomes available and our peer review process is complete.  This topic retrieved from [employer] on: Apr 24, 2024.  Topic 13953 Version 23.0  Release: 32.3.2 - C32.113  © 2024 UpToDate, Inc. and/or its affiliates. All rights reserved.  Consumer Information Use and Disclaimer   Disclaimer: This generalized information is a limited summary of diagnosis, treatment, and/or medication information. It is not meant to be comprehensive and should be used as a tool to help the user understand and/or assess potential diagnostic and treatment options. It does NOT include all information about conditions, treatments, medications, side effects, or risks that may apply to a specific patient. It is not intended to be medical advice or a substitute for the medical advice, diagnosis, or treatment of a health care provider based on the health care provider's examination and assessment of a patient's specific and unique circumstances. Patients must speak with a health care provider for complete information about their health, medical questions, and treatment options, including any risks or benefits regarding use of medications. This information does not endorse any treatments or medications as safe, effective, or approved for treating a specific patient. UpToDate, Inc. and its affiliates disclaim any warranty or liability relating to this information or the use thereof.The use of this information is governed by the Terms of Use, available at https://www.wolterskluwer.com/en/know/clinical-effectiveness-terms. 2024© UpToDate, Inc. and its affiliates and/or licensors. All rights reserved.  Copyright   © 2024 UpToDate, Inc. and/or its affiliates. All rights reserved.

## 2024-08-08 NOTE — TELEPHONE ENCOUNTER
Pt called refill line regarding his appointment with dr. Stratton at 5 pm today. Pt stated he got a conflicting message on my chart. I looked at chart and told pt that the appointment was not canceled so he should still go. Pt understood.

## 2024-08-08 NOTE — PROGRESS NOTES
"     Assessment/Plan:    No problem-specific Assessment & Plan notes found for this encounter.       Diagnoses and all orders for this visit:    Type 2 diabetes mellitus with hyperosmolarity without coma, without long-term current use of insulin ([facility])  Comments:  discussed endo follow up  Orders:  -     Ambulatory referral to Diabetic Education - use to refer for diabetes group classes, individual diabetes education, medical nutrition therapy, device training; Future  -     Hemoglobin A1c (w/out EAG) ([facility] ONLY); Future  -     Hemoglobin A1c (w/out EAG) ([facility] ONLY)    Chronic bilateral low back pain without sciatica    Chronic, continuous use of opioids  Comments:  --keeps secure  -required for adl  -no oversedation          Subjective:      Patient ID: Raffy Huang is a 63 y.o. male.    Works on A1c with endo \"that's what we work on\"        The following portions of the patient's history were reviewed and updated as appropriate: allergies, current medications, past family history, past medical history, past social history, past surgical history, and problem list.    Review of Systems   Constitutional:  Negative for activity change and fatigue.   HENT:  Negative for ear discharge, ear pain, rhinorrhea and sore throat.    Eyes:  Negative for pain and visual disturbance.   Respiratory:  Negative for cough and shortness of breath.    Cardiovascular:  Negative for chest pain and leg swelling.   Gastrointestinal:  Negative for abdominal pain, constipation and diarrhea.   Endocrine: Negative for cold intolerance and polyuria.   Genitourinary:  Negative for flank pain and hematuria.   Musculoskeletal:  Negative for back pain and joint swelling.   Skin:  Negative for pallor and wound.   Neurological:  Negative for dizziness, seizures and speech difficulty.   Psychiatric/Behavioral:  Negative for confusion and hallucinations.          Objective:      /70   Pulse 74   Temp 97.8 °F (36.6 °C)   Resp 12   Ht 6' " (1.829 m)   Wt 108 kg (238 lb)   SpO2 99%   BMI 32.28 kg/m²          Physical Exam  Vitals and nursing note reviewed.   Constitutional:       General: He is not in acute distress.     Appearance: Normal appearance. He is not ill-appearing.   HENT:      Head: Normocephalic and atraumatic.      Right Ear: External ear normal. There is no impacted cerumen.      Left Ear: External ear normal. There is no impacted cerumen.      Nose: Nose normal. No congestion or rhinorrhea.      Mouth/Throat:      Mouth: Mucous membranes are moist.      Pharynx: No posterior oropharyngeal erythema.   Eyes:      General: No scleral icterus.        Right eye: No discharge.         Left eye: No discharge.      Conjunctiva/sclera: Conjunctivae normal.   Neck:      Vascular: No carotid bruit.   Cardiovascular:      Rate and Rhythm: Normal rate and regular rhythm.      Heart sounds: Normal heart sounds. No murmur heard.     No friction rub. No gallop.   Pulmonary:      Effort: Pulmonary effort is normal. No respiratory distress.      Breath sounds: No wheezing or rhonchi.   Abdominal:      General: There is no distension.      Tenderness: There is no abdominal tenderness. There is no guarding.   Musculoskeletal:         General: No swelling.      Cervical back: Neck supple. No rigidity.      Right lower leg: No edema.      Left lower leg: No edema.   Lymphadenopathy:      Cervical: No cervical adenopathy.   Skin:     General: Skin is warm and dry.      Coloration: Skin is not jaundiced.   Neurological:      General: No focal deficit present.      Mental Status: He is alert and oriented to person, place, and time.      Cranial Nerves: No cranial nerve deficit.      Motor: No weakness.      Coordination: Coordination normal.   Psychiatric:         Mood and Affect: Mood normal.

## 2024-08-09 DIAGNOSIS — S32.010A CLOSED COMPRESSION FRACTURE OF BODY OF L1 VERTEBRA (HCC): ICD-10-CM

## 2024-08-09 RX ORDER — HYDROCODONE BITARTRATE AND ACETAMINOPHEN 5; 325 MG/1; MG/1
1 TABLET ORAL EVERY 6 HOURS PRN
Qty: 120 TABLET | Refills: 0 | Status: SHIPPED | OUTPATIENT
Start: 2024-08-09 | End: 2024-09-08

## 2024-08-09 NOTE — TELEPHONE ENCOUNTER
Medication: Hydrocodone-acetaminophen    Dose/Frequency: 5-325mg 1 tab q 6 hours PRN    Quantity: 120    [facility]: [facility]    Office:   [x] PCP/Provider -   [] Speciality/Provider -     Does the patient have enough for 3 days?   [] Yes   [x] No - Send as HP to POD

## 2024-08-10 DIAGNOSIS — I10 ESSENTIAL HYPERTENSION: ICD-10-CM

## 2024-08-10 RX ORDER — AMLODIPINE BESYLATE 5 MG/1
TABLET ORAL
Qty: 30 TABLET | Refills: 5 | Status: SHIPPED | OUTPATIENT
Start: 2024-08-10

## 2024-08-19 ENCOUNTER — OFFICE VISIT (OUTPATIENT)
Dept: ENDOCRINOLOGY | Facility: HOSPITAL | Age: 64
End: 2024-08-19
Payer: COMMERCIAL

## 2024-08-19 VITALS
BODY MASS INDEX: 31.48 KG/M2 | HEIGHT: 72 IN | WEIGHT: 232.4 LBS | DIASTOLIC BLOOD PRESSURE: 78 MMHG | HEART RATE: 88 BPM | SYSTOLIC BLOOD PRESSURE: 134 MMHG | OXYGEN SATURATION: 97 %

## 2024-08-19 DIAGNOSIS — E11.69 TYPE 2 DIABETES MELLITUS WITH OTHER SPECIFIED COMPLICATION, WITH LONG-TERM CURRENT USE OF INSULIN (HCC): Primary | ICD-10-CM

## 2024-08-19 DIAGNOSIS — Z79.4 TYPE 2 DIABETES MELLITUS WITH OTHER SPECIFIED COMPLICATION, WITH LONG-TERM CURRENT USE OF INSULIN (HCC): Primary | ICD-10-CM

## 2024-08-19 PROCEDURE — 95251 CONT GLUC MNTR ANALYSIS I&R: CPT | Performed by: PHYSICIAN ASSISTANT

## 2024-08-19 PROCEDURE — 99214 OFFICE O/P EST MOD 30 MIN: CPT | Performed by: PHYSICIAN ASSISTANT

## 2024-08-19 NOTE — PATIENT INSTRUCTIONS
Continue to monitor diet, maintain physical activity.  Make sure to drink plenty water throughout the day.     Continue with metformin, glimepiride, Ozempic.      Continue with the same dose of humalog at this time.      Recommend increasing Lantus to 30 units daily.     Continue using Dexcom to monitor blood sugar.     Continue with blood pressure and cholesterol medications.     Follow-up in 3 months.

## 2024-08-19 NOTE — PROGRESS NOTES
Raffy Huang 63 y.o. male MRN: [id number]    Encounter: [id number]      Assessment & Plan     Assessment:  This is a 63 y.o.-year-old male with type 2 diabetes with hypertension and hyperlipidemia.    Plan:  1. Type 2 diabetes, insulin requiring: Most recent hemoglobin A1c was 10.2.  This was completed 2 months ago.  Review of his Dexcom I would expect his A1c to be in a similar range, but he may have improved slightly.  Overall review of his Dexcom shows that he is consistently running high and we did discuss about making adjustments.  I think it would be beneficial to start an SGLT2 inhibitor, but he just wants to increase his Lantus at this time.  Will increase his Lantus to 30 units daily.  Continue with NovoLog 10 units with each meal.  Continue Ozempic 2 mg weekly, metformin 500 mg in the morning and 1000 mg in the evening, and glimepiride 4 mg daily.  Continue utilizing Dexcom to monitor glucose levels.  Contact the office with any concerns or questions.  Follow-up in 3 months with lab work completed prior to visit.     2. Hypertension: Normotensive in the office today.  Kidney function is stable.  Continue with current medications.  Repeat CMP prior to next office visit.     3. Hyperlipidemia: Lipid panel was excellent.  Continue with current medication.  Will obtain lipid panel prior to next office visit.     4.  Diabetic nephropathy: Albumin creatinine ratio continues to increase.  Kidney function overall is doing well.  These numbers will likely improve if we can get glucose levels under control.  Is currently on lisinopril.  Recommended starting an SGLT2 inhibitor, but he declined at this time.    CC: Type 2 diabetes follow-up    History of Present Illness     HPI:  Raffy Huang is a 63 year old male with type 2 diabetes for many years.  He is on oral agents and insulin at home and takes metformin 500 mg in the morning and 1000 mg in the evening, glimepiride 4 mg daily, Humalog 10 units with meals,  Lantus 24 units in the evening, Ozempic 2 mg weekly.  He does try to make adjustments to his Humalog based on current glucose levels.  He denies nephropathy, retinopathy, heart attack, stroke and claudication but does admit to neuropathy with symptoms worse at night.  Has not followed up with a podiatrist.  No changes since last office visit.  No significant dietary changes.  Still has limited physical activity due to chronic back pain and neuropathy symptoms.     Hypoglycemic episodes:  May have episodes of hypoglycemia after meals depending on activity level and what he eats.     The patient's last eye exam was October 2023. Last diabetic foot exam was completed May 2024.      Blood Sugar/Glucometer/Pump/CGM review: Download of Dexcom from August 5 through August 18, 2024 reveals an average glucose level of 267.  He is in target range 9% of the time, and above target range 91% time.  All in all glucose levels are relatively stable.  Does have some trend down to overnight and may be at lowest late morning, but then slowly increases the rest of the day.     For hypertension he is treated with lisinopril 10 mg daily.  He is also managed on labetalol 100 mg twice a day and amlodipine 5 mg daily.  For hyperlipidemia, he is treated with rosuvastatin.     Has a history of falling off a ladder and having a vertebral fracture.  Was noted to have an L1 compression fracture at that time.  Does currently have chronic back pain.  Did follow-up with pain management.  Has had serial imaging since, and seems to be stable.  Was recommended to get a DEXA scan completed for further evaluation, but has yet to get that done.     Review of Systems   Constitutional:  Negative for activity change, appetite change, fatigue and unexpected weight change.   HENT:  Negative for trouble swallowing.    Eyes:  Negative for visual disturbance.   Respiratory:  Negative for chest tightness and shortness of breath.    Cardiovascular:  Negative for  chest pain, palpitations and leg swelling.   Gastrointestinal:  Negative for abdominal pain, diarrhea, nausea and vomiting.   Endocrine: Negative for cold intolerance, heat intolerance, polydipsia, polyphagia and polyuria.   Genitourinary:  Negative for frequency.   Musculoskeletal:  Positive for back pain and myalgias (in the back).   Skin:  Negative for rash and wound.   Neurological:  Positive for numbness (bilateral feet, worse at night.). Negative for dizziness, weakness, light-headedness and headaches.   Psychiatric/Behavioral:  Negative for dysphoric mood and sleep disturbance. The patient is not nervous/anxious.        Historical Information   Past Medical History:   Diagnosis Date   • Alcoholism (HCC)    • Anxiety    • Atrial fibrillation (HCC)    • Diabetes (HCC)    • Diabetes mellitus, type 2 (HCC)    • Diverticulitis    • ED (erectile dysfunction)    • High cholesterol    • Hyperlipidemia    • Hypertension     LVH    • L1 vertebral fracture (HCC)    • Lung mass    • Neuropathy    • Nocturia    • Osteoarthritis    • Paroxysmal atrial fibrillation (HCC)    • Pilar cyst    • Shingles    • Smoker    • Spinal stenosis, lumbar      Past Surgical History:   Procedure Laterality Date   • WISDOM TOOTH EXTRACTION       Social History   Social History     Substance and Sexual Activity   Alcohol Use Not Currently     Social History     Substance and Sexual Activity   Drug Use Yes   • Types: Marijuana, Other    Comment: prescription pills (unprescribed) daily     Social History     Tobacco Use   Smoking Status Every Day   • Current packs/day: 2.00   • Types: Cigarettes   Smokeless Tobacco Never     Family History:   Family History   Adopted: Yes       Meds/Allergies   Current Outpatient Medications   Medication Sig Dispense Refill   • amLODIPine (NORVASC) 5 mg tablet TAKE 1 TABLET DAILY -MAY CAUSE DIZZINESS OR LIGHTHEADEDNESS- 30 tablet 5   • Contour Next Test test strip TEST 4 TIMES A  strip 1   • gabapentin  (NEURONTIN) 300 mg capsule take 1 capsule by mouth at bedtime 30 capsule 3   • glimepiride (AMARYL) 4 mg tablet take 1 tablet by mouth once daily 60 tablet 3   • HYDROcodone-acetaminophen (NORCO) 5-325 mg per tablet Take 1 tablet by mouth every 6 (six) hours as needed for pain Max Daily Amount: 4 tablets 120 tablet 0   • insulin glulisine (Apidra SoloStar) 100 units/mL injection pen INJECT 10 UNITS UNDER THE SKIN 3 (THREE) TIMES A DAY WITH MEALS 30 mL 1   • Insulin Pen Needle (B-D UF III MINI PEN NEEDLES) 31G X 5 MM MISC Inject under the skin 2 (two) times a day 100 each 2   • labetalol (NORMODYNE) 200 mg tablet take 1 tablet by mouth twice a day 180 tablet 4   • Lantus SoloStar 100 units/mL SOPN Take 24 units daily, up to 40 units. 15 mL 2   • lisinopril (ZESTRIL) 10 mg tablet take 1 tablet by mouth once daily 90 tablet 3   • LORazepam (ATIVAN) 0.5 mg tablet TAKE 2 TABLETS (1 MG TOTAL) BY MOUTH 2 (TWO) TIMES A DAY AS NEEDED FOR ANXIETY MAY CAUSE DROWSINESS 120 tablet 3   • metFORMIN (GLUCOPHAGE) 500 mg tablet TAKE 1 TABLET BY MOUTH 3 (THREE) TIMES A DAY TAKE WITH FOOD 270 tablet 3   • methocarbamol (ROBAXIN) 500 mg tablet TAKE 1 TABLET TWICE A DAY AS NEEDED FOR MUSCLE SPASMS - MAY CAUSE DROWSINESS 60 tablet 3   • naloxone (NARCAN) 4 mg/0.1 mL nasal spray Administer 1 spray into a nostril. If no response after 2-3 minutes, give another dose in the other nostril using a new spray. 1 each 1   • naloxone (NARCAN) 4 mg/0.1 mL nasal spray Administer 1 spray into a nostril. If no response after 2-3 minutes, give another dose in the other nostril using a new spray. 1 each 1   • Needles & Syringes MISC by Does not apply route 3 (three) times a day before meals 90 each 0   • podofilox (CONDYLOX) 0.5 % external solution Apply topically 2 (two) times a day Two warts on penis and scrotum 3.5 mL 0   • polyethylene glycol (MIRALAX) 17 g packet Take 17 g by mouth daily as needed (constipation) 14 each 0   • rosuvastatin (CRESTOR)  5 mg tablet take 1 tablet by mouth at bedtime 90 tablet 1   • semaglutide, 2 mg/dose, (Ozempic, 2 MG/DOSE,) 8 mg/ mL injection pen Inject 0.75 mL (2 mg total) under the skin every 7 days 3 mL 5   • sildenafil (VIAGRA) 100 mg tablet take 1 tablet by mouth if needed 10 tablet 3   • ROSUVASTATIN CALCIUM PO Take by mouth (Patient not taking: Reported on 5/2/2024)       No current facility-administered medications for this visit.     No Known Allergies    Objective   Vitals: Blood pressure 134/78, pulse 88, height 6' (1.829 m), weight 105 kg (232 lb 6.4 oz), SpO2 97%.    Physical Exam  Vitals and nursing note reviewed.   Constitutional:       General: He is not in acute distress.     Appearance: Normal appearance. He is not diaphoretic.   HENT:      Head: Normocephalic and atraumatic.   Eyes:      General: No scleral icterus.     Extraocular Movements: Extraocular movements intact.      Conjunctiva/sclera: Conjunctivae normal.      Pupils: Pupils are equal, round, and reactive to light.   Cardiovascular:      Rate and Rhythm: Normal rate and regular rhythm.      Heart sounds: No murmur heard.  Pulmonary:      Effort: Pulmonary effort is normal. No respiratory distress.      Breath sounds: Normal breath sounds. No wheezing.   Musculoskeletal:      Cervical back: Normal range of motion.      Right lower leg: No edema.      Left lower leg: No edema.   Lymphadenopathy:      Cervical: No cervical adenopathy.   Skin:     General: Skin is warm and dry.   Neurological:      Mental Status: He is alert and oriented to person, place, and time. Mental status is at baseline.      Sensory: No sensory deficit.      Gait: Gait normal.   Psychiatric:         Mood and Affect: Mood normal.         Behavior: Behavior normal.         Thought Content: Thought content normal.         The history was obtained from the review of the chart, patient.    Lab Results:   Lab Results   Component Value Date/Time    Hemoglobin A1C 10.2 (H) 06/28/2024  "09:32 AM    Hemoglobin A1C 10.3 (H) 04/25/2024 10:22 AM    Hemoglobin A1C 9.2 (H) 01/08/2024 11:40 AM    BUN 14 06/28/2024 09:32 AM    BUN 14 04/25/2024 10:22 AM    BUN 18 01/08/2024 11:40 AM    Potassium 4.1 06/28/2024 09:32 AM    Potassium 4.2 04/25/2024 10:22 AM    Potassium 4.1 01/08/2024 11:40 AM    Chloride 94 (L) 06/28/2024 09:32 AM    Chloride 98 04/25/2024 10:22 AM    Chloride 100 01/08/2024 11:40 AM    CO2 29 06/28/2024 09:32 AM    CO2 26 04/25/2024 10:22 AM    CO2 25 01/08/2024 11:40 AM    Creatinine 0.91 06/28/2024 09:32 AM    Creatinine 0.72 (L) 04/25/2024 10:22 AM    Creatinine 0.82 01/08/2024 11:40 AM    AST 16 06/28/2024 09:32 AM    AST 19 04/25/2024 10:22 AM    AST 16 01/08/2024 11:40 AM    ALT 22 06/28/2024 09:32 AM    ALT 25 04/25/2024 10:22 AM    ALT 17 01/08/2024 11:40 AM    Protein, Total 6.6 06/28/2024 09:32 AM    Protein, Total 6.6 04/25/2024 10:22 AM    Protein, Total 6.2 01/08/2024 11:40 AM    Albumin 4.3 06/28/2024 09:32 AM    Albumin 4.2 04/25/2024 10:22 AM    Albumin 4.0 01/08/2024 11:40 AM    Globulin, Total 2.3 06/28/2024 09:32 AM    Globulin, Total 2.4 04/25/2024 10:22 AM    Globulin, Total 2.2 01/08/2024 11:40 AM    HDL 51 04/25/2024 10:22 AM    Triglycerides 103 04/25/2024 10:22 AM         Portions of the record may have been created with voice recognition software. Occasional wrong word or \"sound a like\" substitutions may have occurred due to the inherent limitations of voice recognition software. Read the chart carefully and recognize, using context, where substitutions have occurred.    "

## 2024-08-24 DIAGNOSIS — E11.69 TYPE 2 DIABETES MELLITUS WITH OTHER SPECIFIED COMPLICATION, WITH LONG-TERM CURRENT USE OF INSULIN (HCC): ICD-10-CM

## 2024-08-24 DIAGNOSIS — Z79.4 TYPE 2 DIABETES MELLITUS WITH OTHER SPECIFIED COMPLICATION, WITH LONG-TERM CURRENT USE OF INSULIN (HCC): ICD-10-CM

## 2024-08-25 RX ORDER — INSULIN GLULISINE 100 [IU]/ML
10 INJECTION, SOLUTION SUBCUTANEOUS
Qty: 15 ML | Refills: 3 | Status: SHIPPED | OUTPATIENT
Start: 2024-08-25

## 2024-09-09 DIAGNOSIS — F41.9 ANXIETY: ICD-10-CM

## 2024-09-09 DIAGNOSIS — S32.010A CLOSED COMPRESSION FRACTURE OF BODY OF L1 VERTEBRA (HCC): ICD-10-CM

## 2024-09-09 RX ORDER — LORAZEPAM 0.5 MG/1
1 TABLET ORAL 2 TIMES DAILY PRN
Qty: 120 TABLET | Refills: 0 | Status: SHIPPED | OUTPATIENT
Start: 2024-09-09 | End: 2025-01-07

## 2024-09-09 RX ORDER — HYDROCODONE BITARTRATE AND ACETAMINOPHEN 5; 325 MG/1; MG/1
1 TABLET ORAL EVERY 6 HOURS PRN
Qty: 120 TABLET | Refills: 0 | Status: SHIPPED | OUTPATIENT
Start: 2024-09-09 | End: 2024-10-09

## 2024-09-09 NOTE — TELEPHONE ENCOUNTER
Reason for call:   [x] Refill   [] Prior Auth  [] Other:     Office:   [x] PCP/Provider -   [] Specialty/Provider -     Medication:         Does the patient have enough for 3 days?   [] Yes   [x] No - Send as HP to POD

## 2024-10-07 DIAGNOSIS — F41.9 ANXIETY: ICD-10-CM

## 2024-10-07 DIAGNOSIS — S32.010A CLOSED COMPRESSION FRACTURE OF BODY OF L1 VERTEBRA (HCC): ICD-10-CM

## 2024-10-07 RX ORDER — HYDROCODONE BITARTRATE AND ACETAMINOPHEN 5; 325 MG/1; MG/1
1 TABLET ORAL EVERY 6 HOURS PRN
Qty: 120 TABLET | Refills: 0 | Status: SHIPPED | OUTPATIENT
Start: 2024-10-07 | End: 2024-11-06

## 2024-10-07 RX ORDER — LORAZEPAM 0.5 MG/1
1 TABLET ORAL 2 TIMES DAILY PRN
Qty: 120 TABLET | Refills: 0 | Status: SHIPPED | OUTPATIENT
Start: 2024-10-07 | End: 2025-02-04

## 2024-10-07 NOTE — TELEPHONE ENCOUNTER
Reason for call:   [x] Refill   [] Prior Auth  [] Other:     Office:   [x] PCP/Provider - Miguelito Stratton DO / [facility]    [] Specialty/Provider -     LORazepam (ATIVAN) 0.5 mg tablet/ Take 2 tablets (1 mg total) by mouth 2 (two) times a day as needed for anxiety /Qty 120    HYDROcodone-acetaminophen (NORCO) 5-325 mg per tablet /  Take 1 tablet by mouth every 6 (six) hours as needed for pain / Qty 120    Pharmacy:  [facility] [place] - [address]     Does the patient have enough for 3 days?   [] Yes   [x] No - Send as HP to POD

## 2024-10-28 DIAGNOSIS — M47.816 LUMBAR SPONDYLOSIS: ICD-10-CM

## 2024-10-29 RX ORDER — GABAPENTIN 300 MG/1
CAPSULE ORAL
Qty: 30 CAPSULE | Refills: 3 | Status: SHIPPED | OUTPATIENT
Start: 2024-10-29

## 2024-11-05 DIAGNOSIS — S32.010A CLOSED COMPRESSION FRACTURE OF BODY OF L1 VERTEBRA (HCC): ICD-10-CM

## 2024-11-05 RX ORDER — HYDROCODONE BITARTRATE AND ACETAMINOPHEN 5; 325 MG/1; MG/1
1 TABLET ORAL EVERY 6 HOURS PRN
Qty: 120 TABLET | Refills: 0 | Status: SHIPPED | OUTPATIENT
Start: 2024-11-05 | End: 2024-12-05

## 2024-11-05 NOTE — TELEPHONE ENCOUNTER
Reason for call:   [x] Refill   [] Prior Auth  [] Other:     Office:   [x] PCP/Provider - Dr Stratton  [] Specialty/Provider -     Medication: hydrocodone     Dose/Frequency: 5-325 mg Q6H PRN     Quantity: 120    Pharmacy: [facility] [place] on file     Does the patient have enough for 3 days?   [] Yes   [x] No - Send as HP to POD

## 2024-11-06 DIAGNOSIS — F41.9 ANXIETY: ICD-10-CM

## 2024-11-06 RX ORDER — LORAZEPAM 0.5 MG/1
TABLET ORAL
Qty: 120 TABLET | Refills: 0 | Status: SHIPPED | OUTPATIENT
Start: 2024-11-06

## 2024-11-12 ENCOUNTER — DOCUMENTATION (OUTPATIENT)
Dept: ENDOCRINOLOGY | Facility: HOSPITAL | Age: 64
End: 2024-11-12

## 2024-11-19 ENCOUNTER — OFFICE VISIT (OUTPATIENT)
Dept: ENDOCRINOLOGY | Facility: HOSPITAL | Age: 64
End: 2024-11-19
Payer: COMMERCIAL

## 2024-11-19 VITALS
BODY MASS INDEX: 31.15 KG/M2 | HEART RATE: 90 BPM | HEIGHT: 72 IN | WEIGHT: 230 LBS | SYSTOLIC BLOOD PRESSURE: 140 MMHG | DIASTOLIC BLOOD PRESSURE: 84 MMHG | OXYGEN SATURATION: 98 %

## 2024-11-19 DIAGNOSIS — I10 ESSENTIAL HYPERTENSION: ICD-10-CM

## 2024-11-19 DIAGNOSIS — E78.5 HYPERLIPIDEMIA, UNSPECIFIED HYPERLIPIDEMIA TYPE: ICD-10-CM

## 2024-11-19 DIAGNOSIS — R80.9 MICROALBUMINURIA: ICD-10-CM

## 2024-11-19 DIAGNOSIS — Z79.4 TYPE 2 DIABETES MELLITUS WITH OTHER SPECIFIED COMPLICATION, WITH LONG-TERM CURRENT USE OF INSULIN (HCC): Primary | ICD-10-CM

## 2024-11-19 DIAGNOSIS — E11.69 TYPE 2 DIABETES MELLITUS WITH OTHER SPECIFIED COMPLICATION, WITH LONG-TERM CURRENT USE OF INSULIN (HCC): Primary | ICD-10-CM

## 2024-11-19 PROCEDURE — 99214 OFFICE O/P EST MOD 30 MIN: CPT | Performed by: PHYSICIAN ASSISTANT

## 2024-11-19 PROCEDURE — 95251 CONT GLUC MNTR ANALYSIS I&R: CPT | Performed by: PHYSICIAN ASSISTANT

## 2024-11-19 NOTE — PROGRESS NOTES
Raffy Huang 64 y.o. male MRN: [id number]    Encounter: [id number]      Assessment & Plan     Assessment:  This is a 64 y.o.-year-old male with type 2 diabetes with microalbuminuria, hypertension and hyperlipidemia.    Plan:  1. Type 2 diabetes, insulin requiring: Most recent hemoglobin A1c was 10.2.  No lab work completed prior to today's office visit.  I did give him a lab slip to get completed at earliest convenience.  Is looking better at this time so would expect his A1c to be trending down.  At this time I like to increase his Lantus to 35 units daily.  He will continue with NovoLog 10 units with meals and Ozempic 2 mg weekly with metformin 500 mg in the morning and 1000 mg in the evening and glimepiride 4 mg daily.  Continue utilizing Dexcom to monitor glucose levels.  Contact the office with any concerns or questions.  Follow-up in 3 months with labwork completed prior to visit.     2. Hypertension: Normotensive in the office today.  Kidney function is stable.  Continue with current medications.  Repeat CMP prior to next office visit.     3. Hyperlipidemia: Lipid panel was excellent.  Continue with current medication.  Repeat lipid panel at this time.       4.  Diabetic nephropathy: Albumin creatinine ratio continues to increase.  Kidney function overall is doing well.  These numbers will likely improve if we can get glucose levels under control.  Is currently on lisinopril.  Recommended starting an SGLT2 inhibitor, but he declined at this time.  Will recheck urine sample at this time.  Hopefully improving glucose levels will improve microalbuminuria.    CC: Type 2 diabetes follow-up    History of Present Illness     HPI:  Raffy Huang is a 63 year old male with type 2 diabetes for many years.  He is on oral agents and insulin at home and takes metformin 500 mg in the morning and 1000 mg in the evening, glimepiride 4 mg daily, Humalog 10 units with meals, Lantus 30 units in the evening, Ozempic 2 mg  weekly.  He does try to make adjustments to his Humalog based on current glucose levels.  He denies nephropathy, retinopathy, heart attack, stroke and claudication but does admit to neuropathy with symptoms worse at night.  Has not followed up with a podiatrist.  No changes since last office visit.  No significant dietary changes.  Still has limited physical activity due to chronic back pain and neuropathy symptoms.  Had some increase stressors recently has he has had issues with his truck.     Hypoglycemic episodes:  May have episodes of hypoglycemia after meals depending on activity level and what he eats.     The patient's last eye exam was October 2023. Last diabetic foot exam was completed May 2024.      Blood Sugar/Glucometer/Pump/CGM review: Downloaded Dexcom from November 5 through November 18, 2024 reveals an average glucose level of 233 with a standard deviation of 56.  He is in target range 17% of time, and above target range 83% time.  Glucose levels trending up slightly at midnight and max out around 2 AM her glucose levels start trending down throughout the day.  Lowest is typically right before dinner around 6 PM where then will increase into the evening.     For hypertension he is treated with lisinopril 10 mg daily.  He is also managed on labetalol 100 mg twice a day and amlodipine 5 mg daily.  For hyperlipidemia, he is treated with rosuvastatin.     Has a history of falling off a ladder and having a vertebral fracture.  Was noted to have an L1 compression fracture at that time.  Does currently have chronic back pain.  Did follow-up with pain management.  Has had serial imaging since, and seems to be stable.  Was recommended to get a DEXA scan completed for further evaluation, but has yet to get that done.     Review of Systems   Constitutional:  Negative for activity change, appetite change, fatigue and unexpected weight change.   HENT:  Negative for trouble swallowing.    Eyes:  Negative for  visual disturbance.   Respiratory:  Negative for chest tightness and shortness of breath.    Cardiovascular:  Negative for chest pain, palpitations and leg swelling.   Gastrointestinal:  Negative for abdominal pain, diarrhea, nausea and vomiting.   Endocrine: Negative for cold intolerance, heat intolerance, polydipsia, polyphagia and polyuria.   Genitourinary:  Negative for frequency.   Musculoskeletal:  Positive for back pain and myalgias (in the back).   Skin:  Negative for rash and wound.   Neurological:  Positive for numbness (bilateral feet, worse at night.). Negative for dizziness, weakness, light-headedness and headaches.   Psychiatric/Behavioral:  Negative for dysphoric mood and sleep disturbance. The patient is not nervous/anxious.        Historical Information   Past Medical History:   Diagnosis Date    Alcoholism (HCC)     Anxiety     Atrial fibrillation (HCC)     Diabetes (HCC)     Diabetes mellitus, type 2 (HCC)     Diverticulitis     ED (erectile dysfunction)     High cholesterol     Hyperlipidemia     Hypertension     LVH     L1 vertebral fracture (HCC)     Lung mass     Neuropathy     Nocturia     Osteoarthritis     Paroxysmal atrial fibrillation (HCC)     Pilar cyst     Shingles     Smoker     Spinal stenosis, lumbar      Past Surgical History:   Procedure Laterality Date    WISDOM TOOTH EXTRACTION       Social History   Social History     Substance and Sexual Activity   Alcohol Use Not Currently     Social History     Substance and Sexual Activity   Drug Use Yes    Types: Marijuana, Other    Comment: prescription pills (unprescribed) daily     Social History     Tobacco Use   Smoking Status Every Day    Current packs/day: 2.00    Types: Cigarettes   Smokeless Tobacco Never     Family History:   Family History   Adopted: Yes       Meds/Allergies   Current Outpatient Medications   Medication Sig Dispense Refill    amLODIPine (NORVASC) 5 mg tablet TAKE 1 TABLET DAILY -MAY CAUSE DIZZINESS OR  LIGHTHEADEDNESS- 30 tablet 5    Contour Next Test test strip TEST 4 TIMES A  strip 1    gabapentin (NEURONTIN) 300 mg capsule take 1 capsule by mouth at bedtime 30 capsule 3    glimepiride (AMARYL) 4 mg tablet take 1 tablet by mouth once daily 60 tablet 3    HYDROcodone-acetaminophen (NORCO) 5-325 mg per tablet Take 1 tablet by mouth every 6 (six) hours as needed for pain Max Daily Amount: 4 tablets 120 tablet 0    insulin glulisine (Apidra SoloStar) 100 units/mL injection pen INJECT 10 UNITS UNDER THE SKIN 3 (THREE) TIMES A DAY WITH MEALS 15 mL 3    Insulin Pen Needle (B-D UF III MINI PEN NEEDLES) 31G X 5 MM MISC Inject under the skin 2 (two) times a day 100 each 2    labetalol (NORMODYNE) 200 mg tablet take 1 tablet by mouth twice a day 180 tablet 4    Lantus SoloStar 100 units/mL SOPN Take 24 units daily, up to 40 units. 15 mL 2    lisinopril (ZESTRIL) 10 mg tablet take 1 tablet by mouth once daily 90 tablet 3    LORazepam (ATIVAN) 0.5 mg tablet TAKE TWO(2) TABLETS TWICE A DAY AS NEEDED FOR ANXIETY - MAY CAUSE DROWSINESS 120 tablet 0    metFORMIN (GLUCOPHAGE) 500 mg tablet TAKE 1 TABLET BY MOUTH 3 (THREE) TIMES A DAY TAKE WITH FOOD 270 tablet 3    methocarbamol (ROBAXIN) 500 mg tablet TAKE 1 TABLET TWICE A DAY AS NEEDED FOR MUSCLE SPASMS - MAY CAUSE DROWSINESS 60 tablet 3    naloxone (NARCAN) 4 mg/0.1 mL nasal spray Administer 1 spray into a nostril. If no response after 2-3 minutes, give another dose in the other nostril using a new spray. 1 each 1    Needles & Syringes MISC by Does not apply route 3 (three) times a day before meals 90 each 0    podofilox (CONDYLOX) 0.5 % external solution Apply topically 2 (two) times a day Two warts on penis and scrotum 3.5 mL 0    polyethylene glycol (MIRALAX) 17 g packet Take 17 g by mouth daily as needed (constipation) 14 each 0    rosuvastatin (CRESTOR) 5 mg tablet take 1 tablet by mouth at bedtime 90 tablet 1    semaglutide, 2 mg/dose, (Ozempic, 2 MG/DOSE,) 8 mg/  mL injection pen Inject 0.75 mL (2 mg total) under the skin every 7 days 3 mL 5    sildenafil (VIAGRA) 100 mg tablet take 1 tablet by mouth if needed 10 tablet 3    naloxone (NARCAN) 4 mg/0.1 mL nasal spray Administer 1 spray into a nostril. If no response after 2-3 minutes, give another dose in the other nostril using a new spray. 1 each 1    ROSUVASTATIN CALCIUM PO Take by mouth (Patient not taking: Reported on 5/2/2024)       No current facility-administered medications for this visit.     No Known Allergies    Objective   Vitals: Blood pressure 140/84, pulse 90, height 6' (1.829 m), weight 104 kg (230 lb), SpO2 98%.    Physical Exam  Vitals and nursing note reviewed.   Constitutional:       General: He is not in acute distress.     Appearance: Normal appearance. He is not diaphoretic.   HENT:      Head: Normocephalic and atraumatic.   Eyes:      General: No scleral icterus.     Pupils: Pupils are equal, round, and reactive to light.   Cardiovascular:      Rate and Rhythm: Normal rate and regular rhythm.      Heart sounds: No murmur heard.  Pulmonary:      Effort: Pulmonary effort is normal. No respiratory distress.      Breath sounds: Normal breath sounds. No wheezing.   Musculoskeletal:      Right lower leg: No edema.      Left lower leg: No edema.   Lymphadenopathy:      Cervical: No cervical adenopathy.   Skin:     General: Skin is warm and dry.   Neurological:      Mental Status: He is alert and oriented to person, place, and time.      Sensory: No sensory deficit.   Psychiatric:         Mood and Affect: Mood normal.         Behavior: Behavior normal.         Thought Content: Thought content normal.         The history was obtained from the review of the chart, patient.    Lab Results:   Lab Results   Component Value Date/Time    Hemoglobin A1C 10.2 (H) 06/28/2024 09:32 AM    Hemoglobin A1C 10.3 (H) 04/25/2024 10:22 AM    Hemoglobin A1C 9.2 (H) 01/08/2024 11:40 AM    BUN 14 06/28/2024 09:32 AM    BUN 14  "04/25/2024 10:22 AM    BUN 18 01/08/2024 11:40 AM    Potassium 4.1 06/28/2024 09:32 AM    Potassium 4.2 04/25/2024 10:22 AM    Potassium 4.1 01/08/2024 11:40 AM    Chloride 94 (L) 06/28/2024 09:32 AM    Chloride 98 04/25/2024 10:22 AM    Chloride 100 01/08/2024 11:40 AM    CO2 29 06/28/2024 09:32 AM    CO2 26 04/25/2024 10:22 AM    CO2 25 01/08/2024 11:40 AM    Creatinine 0.91 06/28/2024 09:32 AM    Creatinine 0.72 (L) 04/25/2024 10:22 AM    Creatinine 0.82 01/08/2024 11:40 AM    AST 16 06/28/2024 09:32 AM    AST 19 04/25/2024 10:22 AM    AST 16 01/08/2024 11:40 AM    ALT 22 06/28/2024 09:32 AM    ALT 25 04/25/2024 10:22 AM    ALT 17 01/08/2024 11:40 AM    Protein, Total 6.6 06/28/2024 09:32 AM    Protein, Total 6.6 04/25/2024 10:22 AM    Protein, Total 6.2 01/08/2024 11:40 AM    Albumin 4.3 06/28/2024 09:32 AM    Albumin 4.2 04/25/2024 10:22 AM    Albumin 4.0 01/08/2024 11:40 AM    Globulin, Total 2.3 06/28/2024 09:32 AM    Globulin, Total 2.4 04/25/2024 10:22 AM    Globulin, Total 2.2 01/08/2024 11:40 AM    HDL 51 04/25/2024 10:22 AM    Triglycerides 103 04/25/2024 10:22 AM           Imaging Studies: Results Review Statement: No pertinent imaging studies reviewed.    Portions of the record may have been created with voice recognition software. Occasional wrong word or \"sound a like\" substitutions may have occurred due to the inherent limitations of voice recognition software. Read the chart carefully and recognize, using context, where substitutions have occurred.    "

## 2024-11-19 NOTE — PATIENT INSTRUCTIONS
Continue to monitor diet, maintain physical activity.  Make sure to drink plenty water throughout the day.     Continue with metformin, glimepiride, Ozempic.      Continue with the same dose of humalog at this time.      Recommend increasing Lantus to 35 units daily.     Continue using Dexcom to monitor blood sugar.     Continue with blood pressure and cholesterol medications.     Follow-up in 3 months.

## 2024-11-20 DIAGNOSIS — E11.69 TYPE 2 DIABETES MELLITUS WITH OTHER SPECIFIED COMPLICATION, WITH LONG-TERM CURRENT USE OF INSULIN (HCC): ICD-10-CM

## 2024-11-20 DIAGNOSIS — S32.010A CLOSED COMPRESSION FRACTURE OF BODY OF L1 VERTEBRA (HCC): ICD-10-CM

## 2024-11-20 DIAGNOSIS — Z79.4 TYPE 2 DIABETES MELLITUS WITH OTHER SPECIFIED COMPLICATION, WITH LONG-TERM CURRENT USE OF INSULIN (HCC): ICD-10-CM

## 2024-11-21 ENCOUNTER — TELEPHONE (OUTPATIENT)
Dept: ADMINISTRATIVE | Facility: OTHER | Age: 64
End: 2024-11-21

## 2024-11-21 RX ORDER — METHOCARBAMOL 500 MG/1
TABLET, FILM COATED ORAL
Qty: 60 TABLET | Refills: 3 | Status: SHIPPED | OUTPATIENT
Start: 2024-11-21

## 2024-11-21 RX ORDER — SEMAGLUTIDE 2.68 MG/ML
INJECTION, SOLUTION SUBCUTANEOUS
Qty: 3 ML | Refills: 5 | Status: SHIPPED | OUTPATIENT
Start: 2024-11-21

## 2024-11-21 NOTE — TELEPHONE ENCOUNTER
Upon review of the In Basket request and the patient's chart, initial outreach has been made via fax to facility. Please see Contacts section for details.     Thank you  Dell Cristina MA

## 2024-11-21 NOTE — TELEPHONE ENCOUNTER
----- Message from Jacklyn MARTINEZ sent at 11/19/2024  2:15 PM EST -----  Regarding: HM UPDATE  11/19/24 2:16 PM    Hello, our patient Raffy Huang has had Diabetic Eye Exam completed/performed. Please assist in updating the patient chart by making an External outreach to [facility] facility located in [facility]. The date of service is 2024.    Thank you,  Jacklyn Phelan MA  [facility] [place]

## 2024-11-21 NOTE — LETTER
Diabetic Eye Exam Form    Date Requested: 24  Patient: Raffy Huang      Please complete form  Patient : [id number]   Referring Provider: Miguelito Stratton,       DIABETIC Eye Exam Date _______________________________      Type of Exam MUST be documented for Diabetic Eye Exams. Please CHECK ONE.     Retinal Exam       Dilated Retinal Exam       OCT       Optomap-Iris Exam      Fundus Photography       Left Eye - Please check Retinopathy or No Retinopathy        Exam did show retinopathy    Exam did not show retinopathy       Right Eye - Please check Retinopathy or No Retinopathy       Exam did show retinopathy    Exam did not show retinopathy       Comments __________________________________________________________    Practice Providing Exam ______________________________________________    Exam Performed By (print name) _______________________________________      Provider Signature ___________________________________________________      These reports are needed for  compliance.  Please fax this completed form and a copy of the Diabetic Eye Exam report to our office located at [address] [place] [address] as soon as possible via Fax [phone] attention Dell: Phone [phone]  We thank you for your assistance in treating our mutual patient.

## 2024-11-22 NOTE — TELEPHONE ENCOUNTER
Upon review of the In Basket request we  found as per [facility] that the most recent DM eye exam is already on file.    Any additional questions or concerns should be emailed to the Practice Liaisons via the appropriate education email address, please do not reply via In Basket.    Thank you  Dell Cristina MA   [facility]

## 2024-11-26 DIAGNOSIS — S32.010A CLOSED COMPRESSION FRACTURE OF BODY OF L1 VERTEBRA (HCC): ICD-10-CM

## 2024-12-01 NOTE — PATIENT INSTRUCTIONS
"Patient Education     Routine physical for adults   The Basics   Written by the doctors and editors at [facility]   What is a physical? -- A physical is a routine visit, or \"check-up,\" with your doctor. You might also hear it called a \"wellness visit\" or \"preventive visit.\"  During each visit, the doctor will:   Ask about your physical and mental health   Ask about your habits, behaviors, and lifestyle   Do an exam   Give you vaccines if needed   Talk to you about any medicines you take   Give advice about your health   Answer your questions  Getting regular check-ups is an important part of taking care of your health. It can help your doctor find and treat any problems you have. But it's also important for preventing health problems.  A routine physical is different from a \"sick visit.\" A sick visit is when you see a doctor because of a health concern or problem. Since physicals are scheduled ahead of time, you can think about what you want to ask the doctor.  How often should I get a physical? -- It depends on your age and health. In general, for people age 21 years and older:   If you are younger than 50 years, you might be able to get a physical every 3 years.   If you are 50 years or older, your doctor might recommend a physical every year.  If you have an ongoing health condition, like diabetes or high blood pressure, your doctor will probably want to see you more often.  What happens during a physical? -- In general, each visit will include:   Physical exam - The doctor or nurse will check your height, weight, heart rate, and blood pressure. They will also look at your eyes and ears. They will ask about how you are feeling and whether you have any symptoms that bother you.   Medicines - It's a good idea to bring a list of all the medicines you take to each doctor visit. Your doctor will talk to you about your medicines and answer any questions. Tell them if you are having any side effects that bother you. You " "should also tell them if you are having trouble paying for any of your medicines.   Habits and behaviors - This includes:   Your diet   Your exercise habits   Whether you smoke, drink alcohol, or use drugs   Whether you are sexually active   Whether you feel safe at home  Your doctor will talk to you about things you can do to improve your health and lower your risk of health problems. They will also offer help and support. For example, if you want to quit smoking, they can give you advice and might prescribe medicines. If you want to improve your diet or get more physical activity, they can help you with this, too.   Lab tests, if needed - The tests you get will depend on your age and situation. For example, your doctor might want to check your:   Cholesterol   Blood sugar   Iron level   Vaccines - The recommended vaccines will depend on your age, health, and what vaccines you already had. Vaccines are very important because they can prevent certain serious or deadly infections.   Discussion of screening - \"Screening\" means checking for diseases or other health problems before they cause symptoms. Your doctor can recommend screening based on your age, risk, and preferences. This might include tests to check for:   Cancer, such as breast, prostate, cervical, ovarian, colorectal, prostate, lung, or skin cancer   Sexually transmitted infections, such as chlamydia and gonorrhea   Mental health conditions like depression and anxiety  Your doctor will talk to you about the different types of screening tests. They can help you decide which screenings to have. They can also explain what the results might mean.   Answering questions - The physical is a good time to ask the doctor or nurse questions about your health. If needed, they can refer you to other doctors or specialists, too.  Adults older than 65 years often need other care, too. As you get older, your doctor will talk to you about:   How to prevent falling at " home   Hearing or vision tests   Memory testing   How to take your medicines safely   Making sure that you have the help and support you need at home  All topics are updated as new evidence becomes available and our peer review process is complete.  This topic retrieved from [employer] on: May 02, 2024.  Topic 639623 Version 1.0  Release: 32.4.3 - C32.122  © 2024 UpToDate, Inc. and/or its affiliates. All rights reserved.  Consumer Information Use and Disclaimer   Disclaimer: This generalized information is a limited summary of diagnosis, treatment, and/or medication information. It is not meant to be comprehensive and should be used as a tool to help the user understand and/or assess potential diagnostic and treatment options. It does NOT include all information about conditions, treatments, medications, side effects, or risks that may apply to a specific patient. It is not intended to be medical advice or a substitute for the medical advice, diagnosis, or treatment of a health care provider based on the health care provider's examination and assessment of a patient's specific and unique circumstances. Patients must speak with a health care provider for complete information about their health, medical questions, and treatment options, including any risks or benefits regarding use of medications. This information does not endorse any treatments or medications as safe, effective, or approved for treating a specific patient. UpToDate, Inc. and its affiliates disclaim any warranty or liability relating to this information or the use thereof.The use of this information is governed by the Terms of Use, available at https://www.[employer].com/en/know/clinical-effectiveness-terms. 2024© UpToDate, Inc. and its affiliates and/or licensors. All rights reserved.  Copyright   © 2024 UpToDate, Inc. and/or its affiliates. All rights reserved.

## 2024-12-01 NOTE — PROGRESS NOTES
Adult Annual Physical  Name: Raffy Huang      : 1960      MRN: [id number]  Encounter Provider: Miguelito Stratton DO  Encounter Date: 2024   Encounter department: [facility] INTERNAL MEDICINE    Assessment & Plan  Essential hypertension         Paroxysmal atrial fibrillation (HCC)         Pulmonary nodule         Type 2 diabetes mellitus with hyperosmolarity without coma, without long-term current use of insulin (HCC)    Lab Results   Component Value Date    HGBA1C 10.2 (H) 2024            Lumbar spondylosis         Chronic, continuous use of opioids         Smoker         Pure hypercholesterolemia         Spinal stenosis of lumbar region with neurogenic claudication         Need for hepatitis C screening test    Orders:    Hepatitis C Antibody; Future    Screening for prostate cancer    Orders:    PSA, Total Screen; Future    Annual physical exam         Screening for colorectal cancer         Smoking greater than 20 pack years    Orders:    CT lung screening program; Future      Immunizations and preventive care screenings were discussed with patient today. Appropriate education was printed on patient's after visit summary.    Discussed risks and benefits of prostate cancer screening. We discussed the controversial history of PSA screening for prostate cancer in the [place] as well as the risk of over detection and over treatment of prostate cancer by way of PSA screening.  The patient understands that PSA blood testing is an imperfect way to screen for prostate cancer and that elevated PSA levels in the blood may also be caused by infection, inflammation, prostatic trauma or manipulation, urological procedures, or by benign prostatic enlargement.    The role of the digital rectal examination in prostate cancer screening was also discussed and I discussed with him that there is large interobserver variability in the findings of digital rectal examination.    Counseling:  Exercise:  the importance of regular exercise/physical activity was discussed. Recommend exercise 3-5 times per week for at least 30 minutes.       Tobacco Cessation Counseling: Tobacco cessation counseling was not provided. The patient is sincerely urged to quit consumption of tobacco. He is not ready to quit tobacco.         History of Present Illness     Adult Annual Physical:  Patient presents for annual physical.     Diet and Physical Activity:  - Diet/Nutrition: well balanced diet.  - Exercise: walking.    General Health:  - Sleep: sleeps well.  - Hearing: normal hearing right ear.  - Vision: no vision problems.  - Dental: regular dental visits.     Health:  - History of STDs: no.   - Urinary symptoms: none.     Review of Systems   Constitutional:  Negative for activity change, appetite change, chills, diaphoresis, fatigue and fever.   HENT:  Negative for congestion, facial swelling, hearing loss, mouth sores, rhinorrhea, sore throat, trouble swallowing and voice change.    Eyes:  Negative for photophobia and pain.   Respiratory:  Negative for apnea, cough, chest tightness, shortness of breath and stridor.    Cardiovascular:  Negative for chest pain, palpitations and leg swelling.   Gastrointestinal:  Negative for abdominal distention, abdominal pain, blood in stool and constipation.   Endocrine: Negative for cold intolerance and heat intolerance.   Genitourinary:  Negative for difficulty urinating, dysuria, flank pain, genital sores, hematuria and urgency.   Musculoskeletal:  Negative for arthralgias, back pain, gait problem, joint swelling and myalgias.   Skin:  Negative for rash and wound.   Allergic/Immunologic: Negative for environmental allergies, food allergies and immunocompromised state.   Neurological:  Negative for dizziness, tremors, seizures, syncope, facial asymmetry, speech difficulty, weakness, light-headedness, numbness and headaches.   Hematological:  Negative for adenopathy. Does not bruise/bleed  easily.   Psychiatric/Behavioral:  Negative for agitation, behavioral problems, hallucinations, self-injury, sleep disturbance and suicidal ideas.      Social History     Tobacco Use    Smoking status: Every Day     Current packs/day: 2.00     Types: Cigarettes    Smokeless tobacco: Never   Vaping Use    Vaping status: Never Used   Substance and Sexual Activity    Alcohol use: Not Currently    Drug use: Yes     Types: Marijuana, Other     Comment: prescription pills (unprescribed) daily    Sexual activity: Not Currently       Objective   /80   Pulse 74   Temp 97.8 °F (36.6 °C)   Resp 12   Ht 6' (1.829 m)   Wt 106 kg (234 lb)   SpO2 100%   BMI 31.74 kg/m²     Physical Exam  Constitutional:       General: He is not in acute distress.     Appearance: Normal appearance. He is not ill-appearing or toxic-appearing.   HENT:      Head: Normocephalic and atraumatic.      Right Ear: Tympanic membrane and external ear normal.      Left Ear: Tympanic membrane and external ear normal.      Nose: Nose normal.      Mouth/Throat:      Mouth: Mucous membranes are moist.      Pharynx: Oropharynx is clear.   Eyes:      General: No scleral icterus.        Right eye: No discharge.         Left eye: No discharge.      Extraocular Movements: Extraocular movements intact.      Conjunctiva/sclera: Conjunctivae normal.      Pupils: Pupils are equal, round, and reactive to light.   Neck:      Vascular: No carotid bruit.   Cardiovascular:      Rate and Rhythm: Normal rate and regular rhythm.      Pulses: Normal pulses.      Heart sounds: No murmur heard.     No friction rub. No gallop.   Pulmonary:      Effort: Pulmonary effort is normal.      Breath sounds: Normal breath sounds. No wheezing, rhonchi or rales.   Abdominal:      General: Bowel sounds are normal. There is no distension.      Palpations: Abdomen is soft. There is no mass.      Tenderness: There is no guarding or rebound.   Musculoskeletal:         General: No  swelling.      Cervical back: Normal range of motion and neck supple. No rigidity.      Right lower leg: No edema.      Left lower leg: No edema.   Lymphadenopathy:      Cervical: No cervical adenopathy.   Skin:     General: Skin is warm.      Capillary Refill: Capillary refill takes less than 2 seconds.      Coloration: Skin is not jaundiced.      Findings: No rash.   Neurological:      General: No focal deficit present.      Mental Status: He is alert and oriented to person, place, and time.      Cranial Nerves: No cranial nerve deficit.      Sensory: No sensory deficit.      Motor: No weakness.      Gait: Gait normal.      Deep Tendon Reflexes: Reflexes normal.   Psychiatric:         Mood and Affect: Mood normal.         Behavior: Behavior normal.         Judgment: Judgment normal.

## 2024-12-02 ENCOUNTER — OFFICE VISIT (OUTPATIENT)
Dept: INTERNAL MEDICINE CLINIC | Facility: CLINIC | Age: 64
End: 2024-12-02
Payer: COMMERCIAL

## 2024-12-02 VITALS
RESPIRATION RATE: 12 BRPM | BODY MASS INDEX: 31.69 KG/M2 | HEIGHT: 72 IN | OXYGEN SATURATION: 100 % | HEART RATE: 74 BPM | DIASTOLIC BLOOD PRESSURE: 80 MMHG | WEIGHT: 234 LBS | TEMPERATURE: 97.8 F | SYSTOLIC BLOOD PRESSURE: 130 MMHG

## 2024-12-02 DIAGNOSIS — Z12.12 SCREENING FOR COLORECTAL CANCER: ICD-10-CM

## 2024-12-02 DIAGNOSIS — F11.90 CHRONIC, CONTINUOUS USE OF OPIOIDS: ICD-10-CM

## 2024-12-02 DIAGNOSIS — S32.010A CLOSED COMPRESSION FRACTURE OF BODY OF L1 VERTEBRA (HCC): ICD-10-CM

## 2024-12-02 DIAGNOSIS — M48.062 SPINAL STENOSIS OF LUMBAR REGION WITH NEUROGENIC CLAUDICATION: ICD-10-CM

## 2024-12-02 DIAGNOSIS — I10 ESSENTIAL HYPERTENSION: ICD-10-CM

## 2024-12-02 DIAGNOSIS — Z00.00 ANNUAL PHYSICAL EXAM: Primary | ICD-10-CM

## 2024-12-02 DIAGNOSIS — F41.9 ANXIETY: ICD-10-CM

## 2024-12-02 DIAGNOSIS — E78.00 PURE HYPERCHOLESTEROLEMIA: ICD-10-CM

## 2024-12-02 DIAGNOSIS — F17.200 SMOKER: ICD-10-CM

## 2024-12-02 DIAGNOSIS — I48.0 PAROXYSMAL ATRIAL FIBRILLATION (HCC): ICD-10-CM

## 2024-12-02 DIAGNOSIS — R91.1 PULMONARY NODULE: ICD-10-CM

## 2024-12-02 DIAGNOSIS — Z11.59 NEED FOR HEPATITIS C SCREENING TEST: ICD-10-CM

## 2024-12-02 DIAGNOSIS — E11.00 TYPE 2 DIABETES MELLITUS WITH HYPEROSMOLARITY WITHOUT COMA, WITHOUT LONG-TERM CURRENT USE OF INSULIN (HCC): ICD-10-CM

## 2024-12-02 DIAGNOSIS — Z12.11 SCREENING FOR COLORECTAL CANCER: ICD-10-CM

## 2024-12-02 DIAGNOSIS — M47.816 LUMBAR SPONDYLOSIS: ICD-10-CM

## 2024-12-02 DIAGNOSIS — Z12.5 SCREENING FOR PROSTATE CANCER: ICD-10-CM

## 2024-12-02 DIAGNOSIS — F17.210 SMOKING GREATER THAN 20 PACK YEARS: ICD-10-CM

## 2024-12-02 PROCEDURE — 99396 PREV VISIT EST AGE 40-64: CPT | Performed by: INTERNAL MEDICINE

## 2024-12-02 RX ORDER — HYDROCODONE BITARTRATE AND ACETAMINOPHEN 5; 325 MG/1; MG/1
1 TABLET ORAL EVERY 6 HOURS PRN
Qty: 120 TABLET | Refills: 0 | Status: SHIPPED | OUTPATIENT
Start: 2024-12-02 | End: 2025-01-01

## 2024-12-02 RX ORDER — LORAZEPAM 0.5 MG/1
1 TABLET ORAL 2 TIMES DAILY PRN
Qty: 120 TABLET | Refills: 3 | Status: SHIPPED | OUTPATIENT
Start: 2024-12-02

## 2024-12-17 ENCOUNTER — OFFICE VISIT (OUTPATIENT)
Dept: INTERNAL MEDICINE CLINIC | Facility: CLINIC | Age: 64
End: 2024-12-17
Payer: COMMERCIAL

## 2024-12-17 DIAGNOSIS — H65.112 ACUTE ALLERGIC OTITIS MEDIA OF LEFT EAR, RECURRENCE NOT SPECIFIED: Primary | ICD-10-CM

## 2024-12-17 PROCEDURE — 99213 OFFICE O/P EST LOW 20 MIN: CPT | Performed by: INTERNAL MEDICINE

## 2024-12-17 RX ORDER — AZELASTINE 1 MG/ML
1 SPRAY, METERED NASAL 2 TIMES DAILY
Qty: 30 ML | Refills: 1 | Status: SHIPPED | OUTPATIENT
Start: 2024-12-17

## 2024-12-17 RX ORDER — AZITHROMYCIN 250 MG/1
TABLET, FILM COATED ORAL
Qty: 6 TABLET | Refills: 0 | Status: SHIPPED | OUTPATIENT
Start: 2024-12-17 | End: 2024-12-21

## 2024-12-17 RX ORDER — AMOXICILLIN 875 MG/1
TABLET, COATED ORAL
COMMUNITY
Start: 2024-12-10

## 2024-12-17 NOTE — PROGRESS NOTES
Assessment/Plan:    No problem-specific Assessment & Plan notes found for this encounter.       There are no diagnoses linked to this encounter.      Subjective:      Patient ID: Raffy Huang is a 64 y.o. male.    S/p urgent care  ear  pain  12/10/24-otitis media  tx augmentin-left ear    L tm  red        The following portions of the patient's history were reviewed and updated as appropriate: allergies, current medications, past family history, past medical history, past social history, past surgical history, and problem list.    Review of Systems   Constitutional:  Negative for activity change and fatigue.   HENT:  Negative for ear discharge, ear pain, rhinorrhea and sore throat.    Eyes:  Negative for pain and visual disturbance.   Respiratory:  Negative for cough and shortness of breath.    Cardiovascular:  Negative for chest pain and leg swelling.   Gastrointestinal:  Negative for abdominal pain, constipation and diarrhea.   Endocrine: Negative for cold intolerance and polyuria.   Genitourinary:  Negative for flank pain and hematuria.   Musculoskeletal:  Negative for back pain and joint swelling.   Skin:  Negative for pallor and wound.   Neurological:  Negative for dizziness, seizures and speech difficulty.   Psychiatric/Behavioral:  Negative for confusion and hallucinations.          Objective:      There were no vitals taken for this visit.         Physical Exam  Vitals and nursing note reviewed.   Constitutional:       General: He is not in acute distress.     Appearance: Normal appearance. He is not ill-appearing.   HENT:      Head: Normocephalic.      Right Ear: External ear normal. There is no impacted cerumen.      Left Ear: External ear normal. There is no impacted cerumen.      Nose: No congestion or rhinorrhea.      Mouth/Throat:      Pharynx: No posterior oropharyngeal erythema.   Eyes:      General: No scleral icterus.        Right eye: No discharge.         Left eye: No discharge.   Neck:       Vascular: No carotid bruit.   Cardiovascular:      Rate and Rhythm: Normal rate and regular rhythm.      Heart sounds: Normal heart sounds. No murmur heard.     No friction rub. No gallop.   Pulmonary:      Breath sounds: No wheezing or rhonchi.   Abdominal:      General: There is no distension.      Tenderness: There is no abdominal tenderness. There is no guarding.   Musculoskeletal:         General: No swelling.      Cervical back: No rigidity.      Right lower leg: No edema.      Left lower leg: No edema.   Lymphadenopathy:      Cervical: No cervical adenopathy.   Skin:     Coloration: Skin is not jaundiced.   Neurological:      Mental Status: He is alert.      Cranial Nerves: No cranial nerve deficit.      Motor: No weakness.      Coordination: Coordination normal.   Psychiatric:         Mood and Affect: Mood normal.

## 2024-12-31 PROBLEM — Z12.12 SCREENING FOR COLORECTAL CANCER: Status: RESOLVED | Noted: 2021-10-15 | Resolved: 2024-12-31

## 2024-12-31 PROBLEM — Z11.59 NEED FOR HEPATITIS C SCREENING TEST: Status: RESOLVED | Noted: 2022-12-16 | Resolved: 2024-12-31

## 2024-12-31 PROBLEM — Z12.11 SCREENING FOR COLORECTAL CANCER: Status: RESOLVED | Noted: 2021-10-15 | Resolved: 2024-12-31

## 2025-01-02 ENCOUNTER — TELEPHONE (OUTPATIENT)
Age: 65
End: 2025-01-02

## 2025-01-02 DIAGNOSIS — S32.010A CLOSED COMPRESSION FRACTURE OF BODY OF L1 VERTEBRA (HCC): ICD-10-CM

## 2025-01-02 DIAGNOSIS — H69.90 DYSFUNCTION OF EUSTACHIAN TUBE, UNSPECIFIED LATERALITY: Primary | ICD-10-CM

## 2025-01-02 RX ORDER — HYDROCODONE BITARTRATE AND ACETAMINOPHEN 5; 325 MG/1; MG/1
1 TABLET ORAL EVERY 6 HOURS PRN
Qty: 120 TABLET | Refills: 0 | Status: SHIPPED | OUTPATIENT
Start: 2025-01-02 | End: 2025-02-01

## 2025-01-02 NOTE — TELEPHONE ENCOUNTER
"Pt is still having symptoms of ear ache in left ear, had inner ear infection. No fever. Is unable to hear out of left ear still.   Was prescribed an abx from  approximately the week of 12/9, then was seen by PCP on 12/17/24 and was prescribed amoxicillin (AMOXIL) 875 mg tablet and   azelastine (ASTELIN) 0.1 % nasal spray   Pt is still not able to hear correctly in left ear, feels like he \"is in a box\".  Please call to advise, #[phone]   "

## 2025-01-02 NOTE — TELEPHONE ENCOUNTER
Medication: HYDROcodone-acetaminophen (NORCO) 5-325 mg per tablet      Dose/Frequency: Take 1 tablet by mouth every 6 (six) hours as needed for pain Max Daily Amount: 4 tablets     Quantity: 120 tablet     Pharmacy: [facility] - [address] [place] [address] [phone]     Office:   [x] PCP/Provider - Miguelito Stratton, DO   [] Speciality/Provider -     Does the patient have enough for 3 days?   [] Yes   [x] No - Send as HP to POD

## 2025-01-06 ENCOUNTER — TELEPHONE (OUTPATIENT)
Age: 65
End: 2025-01-06

## 2025-01-06 NOTE — TELEPHONE ENCOUNTER
Pt called to schedule and asked if we had anything in the [place] area. I recommended he call his insurance to see if there are any providers in the are that take it.

## 2025-01-13 ENCOUNTER — HOSPITAL ENCOUNTER (OUTPATIENT)
Dept: CT IMAGING | Facility: HOSPITAL | Age: 65
Discharge: HOME | End: 2025-01-13
Attending: INTERNAL MEDICINE

## 2025-01-13 DIAGNOSIS — F17.210 SMOKING GREATER THAN 20 PACK YEARS: ICD-10-CM

## 2025-01-16 PROBLEM — H65.112 ACUTE ALLERGIC OTITIS MEDIA OF LEFT EAR: Status: RESOLVED | Noted: 2024-12-17 | Resolved: 2025-01-16

## 2025-01-21 ENCOUNTER — OFFICE VISIT (OUTPATIENT)
Dept: PODIATRY | Facility: CLINIC | Age: 65
End: 2025-01-21
Payer: COMMERCIAL

## 2025-01-21 VITALS — BODY MASS INDEX: 31.56 KG/M2 | WEIGHT: 233 LBS | HEIGHT: 72 IN

## 2025-01-21 DIAGNOSIS — B35.1 ONYCHOMYCOSIS: ICD-10-CM

## 2025-01-21 DIAGNOSIS — E11.40 TYPE 2 DIABETES MELLITUS WITH DIABETIC NEUROPATHY, WITHOUT LONG-TERM CURRENT USE OF INSULIN (HCC): ICD-10-CM

## 2025-01-21 DIAGNOSIS — L97.511 DIABETIC ULCER OF TOE OF RIGHT FOOT ASSOCIATED WITH TYPE 2 DIABETES MELLITUS, LIMITED TO BREAKDOWN OF SKIN (HCC): Primary | ICD-10-CM

## 2025-01-21 DIAGNOSIS — E11.621 DIABETIC ULCER OF TOE OF RIGHT FOOT ASSOCIATED WITH TYPE 2 DIABETES MELLITUS, LIMITED TO BREAKDOWN OF SKIN (HCC): Primary | ICD-10-CM

## 2025-01-21 PROCEDURE — 99202 OFFICE O/P NEW SF 15 MIN: CPT | Performed by: PODIATRIST

## 2025-01-21 PROCEDURE — 97597 DBRDMT OPN WND 1ST 20 CM/<: CPT | Performed by: PODIATRIST

## 2025-01-21 PROCEDURE — 11721 DEBRIDE NAIL 6 OR MORE: CPT | Performed by: PODIATRIST

## 2025-01-29 ENCOUNTER — TELEPHONE (OUTPATIENT)
Age: 65
End: 2025-01-29

## 2025-01-29 NOTE — TELEPHONE ENCOUNTER
Pt is requesting an insurance referral for the following:      Test Name / Order Name: eval and treat    DX Code:     Date Of Service: 2/5/2025    Location/Facility Name/Address/Phone #:   [facility] [place] [address]  Fax #: [phone]     Location / Facility NPI: [id number]    Best Phone # To Reach The Patient:

## 2025-01-30 LAB
ALBUMIN SERPL-MCNC: 4.2 G/DL (ref 3.9–4.9)
ALBUMIN/CREAT UR: 862 MG/G CREAT (ref 0–29)
ALP SERPL-CCNC: 100 IU/L (ref 44–121)
ALT SERPL-CCNC: 28 IU/L (ref 0–44)
AST SERPL-CCNC: 16 IU/L (ref 0–40)
BILIRUB SERPL-MCNC: 0.2 MG/DL (ref 0–1.2)
BUN SERPL-MCNC: 13 MG/DL (ref 8–27)
BUN/CREAT SERPL: 17 (ref 10–24)
CALCIUM SERPL-MCNC: 9.4 MG/DL (ref 8.6–10.2)
CHLORIDE SERPL-SCNC: 99 MMOL/L (ref 96–106)
CHOLEST SERPL-MCNC: 216 MG/DL (ref 100–199)
CHOLEST/HDLC SERPL: 4.2 RATIO (ref 0–5)
CO2 SERPL-SCNC: 24 MMOL/L (ref 20–29)
CREAT SERPL-MCNC: 0.76 MG/DL (ref 0.76–1.27)
CREAT UR-MCNC: 90.2 MG/DL
EGFR: 100 ML/MIN/1.73
EST. AVERAGE GLUCOSE BLD GHB EST-MCNC: 226 MG/DL
GLOBULIN SER-MCNC: 2.5 G/DL (ref 1.5–4.5)
GLUCOSE SERPL-MCNC: 205 MG/DL (ref 70–99)
HBA1C MFR BLD: 9.5 % (ref 4.8–5.6)
HCV AB S/CO SERPL IA: NON REACTIVE
HDLC SERPL-MCNC: 51 MG/DL
LDLC SERPL CALC-MCNC: 149 MG/DL (ref 0–99)
MICROALBUMIN UR-MCNC: 777.2 UG/ML
POTASSIUM SERPL-SCNC: 4.2 MMOL/L (ref 3.5–5.2)
PROT SERPL-MCNC: 6.7 G/DL (ref 6–8.5)
SL AMB VLDL CHOLESTEROL CALC: 16 MG/DL (ref 5–40)
SODIUM SERPL-SCNC: 136 MMOL/L (ref 134–144)
TRIGL SERPL-MCNC: 88 MG/DL (ref 0–149)

## 2025-01-31 ENCOUNTER — RESULTS FOLLOW-UP (OUTPATIENT)
Dept: ENDOCRINOLOGY | Facility: HOSPITAL | Age: 65
End: 2025-01-31

## 2025-01-31 DIAGNOSIS — S32.010A CLOSED COMPRESSION FRACTURE OF BODY OF L1 VERTEBRA (HCC): ICD-10-CM

## 2025-01-31 RX ORDER — HYDROCODONE BITARTRATE AND ACETAMINOPHEN 5; 325 MG/1; MG/1
1 TABLET ORAL EVERY 6 HOURS PRN
Qty: 120 TABLET | Refills: 0 | Status: SHIPPED | OUTPATIENT
Start: 2025-01-31 | End: 2025-03-02

## 2025-01-31 NOTE — TELEPHONE ENCOUNTER
Reason for call:   [x] Refill   [] Prior Auth  [] Other:     Office:   [x] PCP/Provider -   [] Specialty/Provider -     Medication: HYDROcodone-acetaminophen (NORCO) 5-325 Take 1 tablet by mouth every 6 (six) hours as needed for pain       Pharmacy: [facility]    Does the patient have enough for 3 days?   [] Yes   [x] No - Send as HP to POD

## 2025-02-03 NOTE — TELEPHONE ENCOUNTER
Patient called because he spoke to [facility] and was told they did not receive referral from office  yet.   Called [facility] and she said they just need a referral from PCP they patient was seen by PCP first and is being referred to ENT.      Faxed ENT referral from 1/2/25  Fax#  [phone]    Thank you

## 2025-02-17 DIAGNOSIS — E11.9 TYPE 2 DIABETES MELLITUS WITHOUT COMPLICATION, WITHOUT LONG-TERM CURRENT USE OF INSULIN (HCC): ICD-10-CM

## 2025-02-18 RX ORDER — INSULIN GLARGINE 100 [IU]/ML
INJECTION, SOLUTION SUBCUTANEOUS
Qty: 15 ML | Refills: 2 | Status: SHIPPED | OUTPATIENT
Start: 2025-02-18

## 2025-02-19 DIAGNOSIS — I10 ESSENTIAL HYPERTENSION: ICD-10-CM

## 2025-02-20 RX ORDER — AMLODIPINE BESYLATE 5 MG/1
TABLET ORAL
Qty: 30 TABLET | Refills: 5 | Status: SHIPPED | OUTPATIENT
Start: 2025-02-20

## 2025-02-21 ENCOUNTER — OFFICE VISIT (OUTPATIENT)
Dept: PODIATRY | Facility: CLINIC | Age: 65
End: 2025-02-21
Payer: COMMERCIAL

## 2025-02-21 VITALS — HEIGHT: 72 IN | BODY MASS INDEX: 31.97 KG/M2 | WEIGHT: 236 LBS

## 2025-02-21 DIAGNOSIS — E11.621 DIABETIC ULCER OF TOE OF RIGHT FOOT ASSOCIATED WITH TYPE 2 DIABETES MELLITUS, LIMITED TO BREAKDOWN OF SKIN (HCC): Primary | ICD-10-CM

## 2025-02-21 DIAGNOSIS — L97.511 DIABETIC ULCER OF TOE OF RIGHT FOOT ASSOCIATED WITH TYPE 2 DIABETES MELLITUS, LIMITED TO BREAKDOWN OF SKIN (HCC): Primary | ICD-10-CM

## 2025-02-21 DIAGNOSIS — E11.40 TYPE 2 DIABETES MELLITUS WITH DIABETIC NEUROPATHY, WITHOUT LONG-TERM CURRENT USE OF INSULIN (HCC): ICD-10-CM

## 2025-02-21 PROCEDURE — 97597 DBRDMT OPN WND 1ST 20 CM/<: CPT | Performed by: PODIATRIST

## 2025-02-21 PROCEDURE — RECHECK: Performed by: PODIATRIST

## 2025-02-21 NOTE — PROGRESS NOTES
"   PATIENT:  Raffy Huang  1960    ASSESSMENT:  1. Diabetic ulcer of toe of right foot associated with type 2 diabetes mellitus, limited to breakdown of skin (HCC)  Debridement Diabetic Ulcer Anterior;Right;Medial;Plantar Toe D1, great      2. Onychomycosis        3. Type 2 diabetes mellitus with diabetic neuropathy, without long-term current use of insulin ([facility])              Orders Placed This Encounter   Procedures   • Debridement Diabetic Ulcer Anterior;Right;Medial;Plantar Toe D1, great          PLAN:  Disease prevention and related risk factors of diabetes were identified and discussed.    The patient was educated in proper foot wear for diabetics.    Educated in daily foot assessment and routine diabetic foot care.    Discussed the importance of controlling BS through diet and exercise.    The patient will follow up in 12 weeks for further diabetic foot exam and care.    Follow-up in 1 month for ulcer check.  Dry sterile dressing with antibiotic ointment applied right great toe.  Patient instructed to bandage daily until seen for follow-up.  Call immediately if any signs of increasing infection is noted.    Procedures: 76711  All mycotic toenails were reduced and debrided in length, width, and girth using a nail nipper and electric dremel.    All hyperkeratotic skin lesion(s) if present were sharply pared with a #10 scalpel with no evidence of ulceration/abscess.    Patient tolerated procedure(s) well without complications.    Debridement   Wound 01/21/25 Diabetic Ulcer Toe D1, great Anterior;Right;Medial;Plantar    Universal Protocol:  procedure performed by consultantConsent: Verbal consent obtained.  Risks and benefits: risks, benefits and alternatives were discussed  Consent given by: patient  Time out: Immediately prior to procedure a \"time out\" was called to verify the correct patient, procedure, equipment, support staff and site/side marked as required.  Patient understanding: patient states " understanding of the procedure being performed  Patient identity confirmed: verbally with patient    Debridement Details  Performed by: physician  Debridement type: selective  Pain control: lidocaine 4%      Post-debridement measurements  Length (cm): 0.2  Width (cm): 0.2  Depth (cm): 0.1  Percent debrided: 100%  Surface Area (cm^2): 0.04  Area Debrided (cm^2): 0.04  Volume (cm^3): 0    Devitalized tissue debrided: callus and slough  Instrument(s) utilized: blade and curette  Bleeding: small  Hemostasis obtained with: pressure  Procedural pain (0-10): 2  Post-procedural pain: 2   Response to treatment: procedure was tolerated well         HPI:  Raffy Huang is a 64 y.o.year old male seen for diabetic foot exam.  Patient has class findings with type 2  DM.    BS is under control.  Patient concerned of thick toenails and discolored callus on the right great toe..  The patient denied any acute pedal disorder, redness, acute swelling, or recent injury.      The following portions of the patient's history were reviewed and updated as appropriate: allergies, current medications, past family history, past medical history, past social history, past surgical history, and problem list.    REVIEW OF SYSTEMS:  GENERAL: No fever or chills  HEART: No chest pain, or palpitation  RESPIRATORY:  No acute SOB or cough  GI: No Nausea, vomit or diarrhea  NEUROLOGIC: No syncope or acute weakness    PHYSICAL EXAM:    Ht 6' (1.829 m) Comment: stated  Wt 106 kg (233 lb)   BMI 31.60 kg/m²     VASCULAR EXAM:  Posterior tibial artery absent bilateral  Dorsalis pedis artery +1 bilateral  The patient has moderate skin atrophy, color changes, lack of digital hair, and nail dystrophy.    There is trace lower extremity edema bilaterally.      NEUROLOGIC EXAM:  Sensation is intact to light touch  Sensation is intact to 10gm monofilament but diminished.    Vibratory sensation absent.     Numb tingling paresthesias noted bilateral.          DERMATOLOGIC EXAM:   Texture, Tone and Turgor are diminished bilateral.    The patient has dystrophic/hypertrophic toenails with yellow/white discoloration, onycholysis, and subungal debris.   Fungal odor noted.  Brittle nature noted.  Right foot nails severely dystrophic x 3 with 0.5 cm ave thickness (1 2 and 5)  Left foot nails severely dystrophic x 4 with 0.5 cm ave thickness (1-3 and 5)    Patient has hyperkeratotic lesions noted:  Right foot located at none.  Left foot located at none    Ulcers/Wounds:  Partial depth ulceration with hyperkeratotic margin plantar medial aspect of right great toe IPJ.  No signs of infection.  Scant serosanguineous drainage.  Measures 0.2 x 0.2 x 0.1 cm    No notable suspicious skin lesions.      MUSCULOSKELETAL EXAM:   No acute joint pain, edema, or redness.  Denies any acute musculoskeletal problem.    Patient has no gross pedal deformities. Patient has no ambulation limitations.      Patient wears DM shoes? No    Risk Category/Class Findings:  Q8(B1, B2 ABC)  0 = No loss of protective sensation    A1)  Has the patient had a previous amputation of the foot or integral skeletal portion thereof? No  B1)  Does the patient have absent posterior tibial pulse? Yes  B3)  Does the patient have absent dorsalis pedis? No  B2)  Does the patient have three of the following? Yes           1.  Hair growth (increased or decreased), 2.  Nail changes (thickening), and 3.  Pigmentary changes (discoloring)  C)  Does the patient have two of the following and one above? No

## 2025-02-26 DIAGNOSIS — Z79.4 TYPE 2 DIABETES MELLITUS WITHOUT COMPLICATION, WITH LONG-TERM CURRENT USE OF INSULIN (HCC): ICD-10-CM

## 2025-02-26 DIAGNOSIS — E11.9 TYPE 2 DIABETES MELLITUS WITHOUT COMPLICATION, WITH LONG-TERM CURRENT USE OF INSULIN (HCC): ICD-10-CM

## 2025-02-27 RX ORDER — GLIMEPIRIDE 4 MG/1
4 TABLET ORAL DAILY
Qty: 30 TABLET | Refills: 5 | Status: SHIPPED | OUTPATIENT
Start: 2025-02-27

## 2025-02-28 ENCOUNTER — TELEPHONE (OUTPATIENT)
Age: 65
End: 2025-02-28

## 2025-02-28 DIAGNOSIS — S32.010A CLOSED COMPRESSION FRACTURE OF BODY OF L1 VERTEBRA (HCC): ICD-10-CM

## 2025-02-28 RX ORDER — HYDROCODONE BITARTRATE AND ACETAMINOPHEN 5; 325 MG/1; MG/1
1 TABLET ORAL EVERY 6 HOURS PRN
Qty: 120 TABLET | Refills: 0 | Status: SHIPPED | OUTPATIENT
Start: 2025-02-28 | End: 2025-03-30

## 2025-02-28 RX ORDER — HYDROCODONE BITARTRATE AND ACETAMINOPHEN 5; 325 MG/1; MG/1
1 TABLET ORAL EVERY 6 HOURS PRN
Qty: 120 TABLET | Refills: 0 | OUTPATIENT
Start: 2025-02-28 | End: 2025-03-30

## 2025-02-28 NOTE — TELEPHONE ENCOUNTER
Patient called wanting to know results of recent Hepatitis test.  Please review and contact patient.

## 2025-02-28 NOTE — TELEPHONE ENCOUNTER
Medication: Hydrocodone-acetaminophen    Dose/Frequency: 5-325 mg 1 tab 1 6 hrs    Quantity: 120    Pharmacy: [facility]     Office:   [x] PCP/Provider -   [] Speciality/Provider -     Does the patient have enough for 3 days?   [] Yes   [x] No - Send as HP to POD

## 2025-03-05 ENCOUNTER — TELEPHONE (OUTPATIENT)
Age: 65
End: 2025-03-05

## 2025-03-05 NOTE — TELEPHONE ENCOUNTER
His A1c has improved to 9.5.  Cholesterol was high, would recommend monitoring diet to help lower that.  Although I did not order it his hepatitis C lab work was negative.  I would contact his PCP about the lung scan.

## 2025-03-05 NOTE — TELEPHONE ENCOUNTER
Pt calling with concerns on his lab results.   He would like a call back for his hepatitis and labs taken on 1/29/25. And also his lung imaging.

## 2025-03-12 DIAGNOSIS — I10 ESSENTIAL HYPERTENSION: ICD-10-CM

## 2025-03-14 RX ORDER — LISINOPRIL 10 MG/1
10 TABLET ORAL DAILY
Qty: 90 TABLET | Refills: 1 | Status: SHIPPED | OUTPATIENT
Start: 2025-03-14

## 2025-03-25 ENCOUNTER — OFFICE VISIT (OUTPATIENT)
Dept: PODIATRY | Facility: CLINIC | Age: 65
End: 2025-03-25
Payer: COMMERCIAL

## 2025-03-25 VITALS — WEIGHT: 233 LBS | HEIGHT: 72 IN | BODY MASS INDEX: 31.56 KG/M2

## 2025-03-25 DIAGNOSIS — M47.816 LUMBAR SPONDYLOSIS: ICD-10-CM

## 2025-03-25 DIAGNOSIS — B35.1 ONYCHOMYCOSIS: Primary | ICD-10-CM

## 2025-03-25 DIAGNOSIS — L84 CORNS AND CALLUS: ICD-10-CM

## 2025-03-25 DIAGNOSIS — E11.40 TYPE 2 DIABETES MELLITUS WITH DIABETIC NEUROPATHY, WITHOUT LONG-TERM CURRENT USE OF INSULIN (HCC): ICD-10-CM

## 2025-03-25 PROCEDURE — 11721 DEBRIDE NAIL 6 OR MORE: CPT | Performed by: PODIATRIST

## 2025-03-25 PROCEDURE — RECHECK: Performed by: PODIATRIST

## 2025-03-25 PROCEDURE — 11055 PARING/CUTG B9 HYPRKER LES 1: CPT | Performed by: PODIATRIST

## 2025-03-26 RX ORDER — GABAPENTIN 300 MG/1
300 CAPSULE ORAL
Qty: 30 CAPSULE | Refills: 3 | Status: SHIPPED | OUTPATIENT
Start: 2025-03-26

## 2025-03-26 NOTE — PROGRESS NOTES
PATIENT:  Raffy Huang  1960    ASSESSMENT:  1. Onychomycosis        2. Corns and callus        3. Type 2 diabetes mellitus with diabetic neuropathy, without long-term current use of insulin (HCC)                No orders of the defined types were placed in this encounter.         PLAN:  Disease prevention and related risk factors of diabetes were identified and discussed.    The patient was educated in proper foot wear for diabetics.    Educated in daily foot assessment and routine diabetic foot care.    Discussed the importance of controlling BS through diet and exercise.    The patient will follow up in 12 weeks for further diabetic foot exam and care.    Follow-up in 1 month for ulcer check.  Dry sterile dressing with antibiotic ointment applied right great toe.  Patient instructed to bandage daily until seen for follow-up.  Call immediately if any signs of increasing infection is noted.    Procedures: 17956, 77161  All mycotic toenails were reduced and debrided in length, width, and girth using a nail nipper and electric dremel.    All hyperkeratotic skin lesion(s) if present were sharply pared with a #10 scalpel with no evidence of ulceration/abscess.    Patient tolerated procedure(s) well without complications.    Procedures     HPI:  Raffy Huang is a 64 y.o.year old male seen for diabetic foot exam.  Patient has class findings with type 2  DM.    BS is under control.  Patient concerned of thick toenails and discolored callus on the right great toe..  The patient denied any acute pedal disorder, redness, acute swelling, or recent injury.      The following portions of the patient's history were reviewed and updated as appropriate: allergies, current medications, past family history, past medical history, past social history, past surgical history, and problem list.    REVIEW OF SYSTEMS:  GENERAL: No fever or chills  HEART: No chest pain, or palpitation  RESPIRATORY:  No acute SOB or cough  GI: No  Nausea, vomit or diarrhea  NEUROLOGIC: No syncope or acute weakness    PHYSICAL EXAM:    Ht 6' (1.829 m)   Wt 106 kg (233 lb)   BMI 31.60 kg/m²     VASCULAR EXAM:  Posterior tibial artery absent bilateral  Dorsalis pedis artery +1 bilateral  The patient has moderate skin atrophy, color changes, lack of digital hair, and nail dystrophy.    There is trace lower extremity edema bilaterally.      NEUROLOGIC EXAM:  Sensation is intact to light touch  Sensation is intact to 10gm monofilament but diminished.    Vibratory sensation absent.     Numb tingling paresthesias noted bilateral.         DERMATOLOGIC EXAM:   Texture, Tone and Turgor are diminished bilateral.    The patient has dystrophic/hypertrophic toenails with yellow/white discoloration, onycholysis, and subungal debris.  Fungal odor noted.  Brittle nature noted.  Right foot nails severely dystrophic x 3 with 0.5 cm ave thickness (1 2 and 5)  Left foot nails severely dystrophic x 4 with 0.5 cm ave thickness (1-3 and 5)    Patient has hyperkeratotic lesions noted:  Right foot located at plantar medial right hallux IPJ.  Left foot located at none    Ulcers/Wounds:  Partial depth ulcer plantar medial aspect of right great toe IPJ closed 100%    No notable suspicious skin lesions.      MUSCULOSKELETAL EXAM:   No acute joint pain, edema, or redness.  Denies any acute musculoskeletal problem.    Patient has no gross pedal deformities. Patient has no ambulation limitations.      Patient wears DM shoes? No    Risk Category/Class Findings:  Q8(B1, B2 ABC)  0 = No loss of protective sensation    A1)  Has the patient had a previous amputation of the foot or integral skeletal portion thereof? No  B1)  Does the patient have absent posterior tibial pulse? Yes  B3)  Does the patient have absent dorsalis pedis? No  B2)  Does the patient have three of the following? Yes           1.  Hair growth (increased or decreased), 2.  Nail changes (thickening), and 3.  Pigmentary changes  (discoloring)  C)  Does the patient have two of the following and one above? No

## 2025-03-28 DIAGNOSIS — Z79.4 TYPE 2 DIABETES MELLITUS WITHOUT COMPLICATION, WITH LONG-TERM CURRENT USE OF INSULIN (HCC): ICD-10-CM

## 2025-03-28 DIAGNOSIS — E11.9 TYPE 2 DIABETES MELLITUS WITHOUT COMPLICATION, WITH LONG-TERM CURRENT USE OF INSULIN (HCC): ICD-10-CM

## 2025-03-28 DIAGNOSIS — S32.010A CLOSED COMPRESSION FRACTURE OF BODY OF L1 VERTEBRA (HCC): ICD-10-CM

## 2025-03-28 RX ORDER — FLURBIPROFEN SODIUM 0.3 MG/ML
SOLUTION/ DROPS OPHTHALMIC 2 TIMES DAILY
Qty: 100 EACH | Refills: 0 | Status: SHIPPED | OUTPATIENT
Start: 2025-03-28

## 2025-03-28 RX ORDER — HYDROCODONE BITARTRATE AND ACETAMINOPHEN 5; 325 MG/1; MG/1
1 TABLET ORAL EVERY 6 HOURS PRN
Qty: 120 TABLET | Refills: 0 | Status: SHIPPED | OUTPATIENT
Start: 2025-03-28 | End: 2025-04-27

## 2025-03-28 NOTE — TELEPHONE ENCOUNTER
Reason for call:   [x] Refill   [] Prior Auth  [] Other:     Office:   [x] PCP/Provider - Miguelito Stratton   [] Specialty/Provider -     Medication: HYDROcodone-acetaminophen (NORCO) 5-325 mg   Dose/Frequency: Take 1 tablet by mouth every 6 (six) hours as needed for pain   Quantity: 120    Insulin Pen Needle (B-D UF III MINI PEN NEEDLES) 31G X 5 MM   Inject under the skin 2 (two) times a day   Qty 100    Pharmacy: [facility]   Does the patient have enough for 3 days?   [] Yes   [x] No - Send as HP to POD    Mail Away Pharmacy   Does the patient have enough for 10 days?   [] Yes   [] No - Send as HP to POD

## 2025-03-29 NOTE — PROGRESS NOTES
Patient ID: Raffy Huang is a 64 y.o. male Date of Birth 1960       Chief Complaint   Patient presents with   • Wound     Right foot        Allergies:  Patient has no known allergies.    Diagnosis:  1. Diabetic ulcer of toe of right foot associated with type 2 diabetes mellitus, limited to breakdown of skin ([facility])  -     Debridement Diabetic Ulcer Anterior;Right;Medial;Plantar Toe D1, great  2. Type 2 diabetes mellitus with diabetic neuropathy, without long-term current use of insulin ([facility])     Diagnosis ICD-10-CM Associated Orders   1. Diabetic ulcer of toe of right foot associated with type 2 diabetes mellitus, limited to breakdown of skin ([facility])  E11.621 Debridement Diabetic Ulcer Anterior;Right;Medial;Plantar Toe D1, great    L97.511       2. Type 2 diabetes mellitus with diabetic neuropathy, without long-term current use of insulin ([facility])  E11.40            Assessment & Plan:  Overall good progress since last visit with near closure of wound  Continue with mupirocin/alginate dressing every other day to right great toe  Continue with limited activity.  Selective debridement of ulceration as noted below  Follow-up in approximately 2 weeks    Subjective:   2/21/2025: 64-year-old male type II diabetic seen for follow-up evaluation of right great toe DFU.  Reports good progress with diminished size and drainage.  Eyes any new pain/discomfort.    1/21/2025: Raffy Huang is a 64 y.o.year old male seen for diabetic foot exam.  Patient has class findings with type 2  DM.    BS is under control.  Patient concerned of thick toenails and discolored callus on the right great toe..  The patient denied any acute pedal disorder, redness, acute swelling, or recent injury.          The following portions of the patient's history were reviewed and updated as appropriate:   Patient Active Problem List   Diagnosis   • Lumbar spondylosis   • Chronic bilateral low back pain without sciatica   • Closed compression fracture of body  of L1 vertebra (HCC)   • Fall   • Pulmonary nodule   • Type 2 diabetes mellitus (HCC)   • Acute pain due to trauma   • Concussion without loss of consciousness   • Essential hypertension   • Hyperlipidemia   • Contusion of abdominal wall   • Corneal abrasion   • Spinal stenosis, lumbar   • Warts, genital   • Chronic, continuous use of opioids   • Smoking greater than 20 pack years   • Paroxysmal atrial fibrillation (HCC)     Past Medical History:   Diagnosis Date   • Alcoholism (HCC)    • Anxiety    • Atrial fibrillation (HCC)    • Diabetes (HCC)    • Diabetes mellitus, type 2 (HCC)    • Diverticulitis    • ED (erectile dysfunction)    • High cholesterol    • Hyperlipidemia    • Hypertension     LVH    • L1 vertebral fracture (HCC)    • Lung mass    • Neuropathy    • Nocturia    • Osteoarthritis    • Paroxysmal atrial fibrillation (HCC)    • Pilar cyst    • Shingles    • Smoker    • Spinal stenosis, lumbar      Past Surgical History:   Procedure Laterality Date   • WISDOM TOOTH EXTRACTION       Social History     Socioeconomic History   • Marital status: Single     Spouse name: None   • Number of children: None   • Years of education: None   • Highest education level: None   Occupational History   • None   Tobacco Use   • Smoking status: Every Day     Current packs/day: 2.00     Types: Cigarettes     Passive exposure: Current   • Smokeless tobacco: Never   Vaping Use   • Vaping status: Never Used   Substance and Sexual Activity   • Alcohol use: Not Currently   • Drug use: Yes     Types: Marijuana, Other     Comment: prescription pills (unprescribed) daily   • Sexual activity: Not Currently   Other Topics Concern   • None   Social History Narrative   • None     Social Drivers of Health     Financial Resource Strain: Not on file   Food Insecurity: Not on file   Transportation Needs: Not on file   Physical Activity: Not on file   Stress: Not on file   Social Connections: Not on file   Intimate Partner Violence: Not on  file   Housing Stability: Not on file        Current Outpatient Medications:   •  amLODIPine (NORVASC) 5 mg tablet, TAKE 1 TABLET DAILY -MAY CAUSE DIZZINESS OR LIGHTHEADEDNESS-, Disp: 30 tablet, Rfl: 5  •  amoxicillin (AMOXIL) 875 mg tablet, , Disp: , Rfl:   •  azelastine (ASTELIN) 0.1 % nasal spray, 1 spray into each nostril 2 (two) times a day Use in each nostril as directed, Disp: 30 mL, Rfl: 1  •  Contour Next Test test strip, TEST 4 TIMES A DAY, Disp: 100 strip, Rfl: 1  •  insulin glulisine (Apidra SoloStar) 100 units/mL injection pen, INJECT 10 UNITS UNDER THE SKIN 3 (THREE) TIMES A DAY WITH MEALS, Disp: 15 mL, Rfl: 3  •  labetalol (NORMODYNE) 200 mg tablet, take 1 tablet by mouth twice a day, Disp: 180 tablet, Rfl: 4  •  Lantus SoloStar 100 units/mL SOPN, INJECT 0.4 ML (40 UNITS TOTAL) UNDER THE SKIN DAILY AT BEDTIME, Disp: 15 mL, Rfl: 2  •  LORazepam (ATIVAN) 0.5 mg tablet, Take 2 tablets (1 mg total) by mouth 2 (two) times a day as needed for anxiety, Disp: 120 tablet, Rfl: 3  •  metFORMIN (GLUCOPHAGE) 500 mg tablet, TAKE 1 TABLET BY MOUTH 3 (THREE) TIMES A DAY-TAKE WITH FOOD, Disp: 270 tablet, Rfl: 1  •  methocarbamol (ROBAXIN) 500 mg tablet, TAKE 1 TABLET TWICE A DAY AS NEEDED FOR MUSCLE SPASMS - MAY CAUSE DROWSINESS, Disp: 60 tablet, Rfl: 3  •  naloxone (NARCAN) 4 mg/0.1 mL nasal spray, Administer 1 spray into a nostril. If no response after 2-3 minutes, give another dose in the other nostril using a new spray., Disp: 1 each, Rfl: 1  •  naloxone (NARCAN) 4 mg/0.1 mL nasal spray, Administer 1 spray into a nostril. If no response after 2-3 minutes, give another dose in the other nostril using a new spray., Disp: 1 each, Rfl: 1  •  Needles & Syringes MISC, by Does not apply route 3 (three) times a day before meals, Disp: 90 each, Rfl: 0  •  Ozempic, 2 MG/DOSE, 8 MG/3ML injection pen, INJECT 0.75 ML (2 MG TOTAL) UNDER THE SKIN EVERY 7 DAYS, Disp: 3 mL, Rfl: 5  •  podofilox (CONDYLOX) 0.5 % external solution,  Apply topically 2 (two) times a day Two warts on penis and scrotum, Disp: 3.5 mL, Rfl: 0  •  polyethylene glycol (MIRALAX) 17 g packet, Take 17 g by mouth daily as needed (constipation), Disp: 14 each, Rfl: 0  •  rosuvastatin (CRESTOR) 5 mg tablet, take 1 tablet by mouth at bedtime, Disp: 90 tablet, Rfl: 1  •  sildenafil (VIAGRA) 100 mg tablet, take 1 tablet by mouth if needed, Disp: 10 tablet, Rfl: 3  •  gabapentin (NEURONTIN) 300 mg capsule, take 1 capsule by mouth at bedtime, Disp: 30 capsule, Rfl: 3  •  glimepiride (AMARYL) 4 mg tablet, take 1 tablet by mouth once daily, Disp: 30 tablet, Rfl: 5  •  HYDROcodone-acetaminophen (NORCO) 5-325 mg per tablet, Take 1 tablet by mouth every 6 (six) hours as needed for pain Max Daily Amount: 4 tablets, Disp: 120 tablet, Rfl: 0  •  Insulin Pen Needle (B-D UF III MINI PEN NEEDLES) 31G X 5 MM MISC, Inject under the skin 2 (two) times a day, Disp: 100 each, Rfl: 0  •  lisinopril (ZESTRIL) 10 mg tablet, take 1 tablet by mouth once daily, Disp: 90 tablet, Rfl: 1  •  naloxone (NARCAN) 4 mg/0.1 mL nasal spray, Administer 1 spray into a nostril. If no response after 2-3 minutes, give another dose in the other nostril using a new spray., Disp: 1 each, Rfl: 1  Family History   Adopted: Yes      Review of Systems   Constitutional: Negative.    HENT: Negative.     Eyes: Negative.    Respiratory: Negative.     Cardiovascular: Negative.    Gastrointestinal: Negative.    Endocrine: Negative.    Genitourinary: Negative.    Musculoskeletal: Negative.    Skin:  Positive for wound.        DFU right great toe   Allergic/Immunologic: Negative.    Neurological:  Positive for numbness.   Hematological: Negative.    Psychiatric/Behavioral: Negative.           Objective:  Ht 6' (1.829 m) Comment: stated  Wt 107 kg (236 lb)   BMI 32.01 kg/m²     Physical Exam  Constitutional:       Appearance: Normal appearance.   HENT:      Head: Normocephalic.      Right Ear: Tympanic membrane normal.      Left  Ear: Tympanic membrane normal.      Nose: No congestion.      Mouth/Throat:      Pharynx: No oropharyngeal exudate or posterior oropharyngeal erythema.   Eyes:      Conjunctiva/sclera: Conjunctivae normal.      Pupils: Pupils are equal, round, and reactive to light.   Cardiovascular:      Rate and Rhythm: Normal rate and regular rhythm.      Pulses:           Dorsalis pedis pulses are 1+ on the right side and 1+ on the left side.        Posterior tibial pulses are 0 on the right side and 0 on the left side.   Pulmonary:      Effort: Pulmonary effort is normal.   Musculoskeletal:         General: Normal range of motion.   Feet:      Right foot:      Protective Sensation: 10 sites tested.  8 sites sensed.      Skin integrity: Ulcer (See comments) present.      Toenail Condition: Right toenails are abnormally thick. Fungal disease present.     Left foot:      Protective Sensation: 10 sites tested.  8 sites sensed.      Toenail Condition: Left toenails are abnormally thick. Fungal disease present.     Comments: Right foot: Plantar medial aspect of right great toe has a partial depth breakdown with hyperkeratotic margin.  No signs of infection.  Granular/fibrous base mixed.  Scant serosanguineous drainage.  Measures 0.1 x 0.1 x 0.1 cm.  Skin:     General: Skin is warm and dry.      Capillary Refill: Capillary refill takes 2 to 3 seconds.      Coloration: Skin is not pale.      Findings: No bruising, erythema, lesion or rash.      Comments: Multiple dystrophic nails bilaterally consistent with mycosis.  0.5 cm average thickness, brittle nature, and subungual debris noted.   Neurological:      Mental Status: He is alert.      Cranial Nerves: No cranial nerve deficit.      Sensory: Sensory deficit present.      Motor: No weakness.      Gait: Gait normal.      Deep Tendon Reflexes: Reflexes normal.      Comments: Monofilament testing diminished, vibratory sensation absent, numb tingling paresthesias noted bilaterally.  "  Psychiatric:         Mood and Affect: Mood normal.         Behavior: Behavior normal.         Judgment: Judgment normal.         Wound 07/22/20 Traumatic Laceration Head Posterior (Active)       Wound 01/21/25 Diabetic Ulcer Toe D1, great Anterior;Right;Medial;Plantar (Active)       Debridement   Wound 01/21/25 Diabetic Ulcer Toe D1, great Anterior;Right;Medial;Plantar     Date/Time: 2/21/2025 1:45 PM  Universal Protocol:  procedure performed by consultantConsent: Verbal consent obtained.  Risks and benefits: risks, benefits and alternatives were discussed  Consent given by: patient  Time out: Immediately prior to procedure a \"time out\" was called to verify the correct patient, procedure, equipment, support staff and site/side marked as required.  Patient understanding: patient states understanding of the procedure being performed  Patient identity confirmed: verbally with patient    Debridement Details  Performed by: physician  Debridement type: selective  Pain control: none    Post-debridement measurements  Length (cm): 0.1  Width (cm): 0.1  Depth (cm): 0.1  Percent debrided: 100%  Surface Area (cm^2): 0.01  Area Debrided (cm^2): 0.01  Volume (cm^3): 0    Devitalized tissue debrided: callus and slough  Instrument(s) utilized: blade and curette  Bleeding: small  Hemostasis obtained with: pressure  Procedural pain (0-10): 2  Post-procedural pain: 2   Response to treatment: procedure was tolerated well                 Wound Instructions:  Orders Placed This Encounter   Procedures   • Debridement Diabetic Ulcer Anterior;Right;Medial;Plantar Toe D1, great     This order was created via procedure documentation         Joel Myers DPM      Portions of the record may have been created with voice recognition software. Occasional wrong word or \"sound a like\" substitutions may have occurred due to the inherent limitations of voice recognition software. Read the chart carefully and recognize, using context, where " substitutions have occurred.

## 2025-04-03 DIAGNOSIS — F41.9 ANXIETY: ICD-10-CM

## 2025-04-03 DIAGNOSIS — S32.010A CLOSED COMPRESSION FRACTURE OF BODY OF L1 VERTEBRA (HCC): ICD-10-CM

## 2025-04-03 RX ORDER — LORAZEPAM 0.5 MG/1
TABLET ORAL
Qty: 120 TABLET | Refills: 3 | Status: SHIPPED | OUTPATIENT
Start: 2025-04-03

## 2025-04-03 RX ORDER — METHOCARBAMOL 500 MG/1
TABLET, FILM COATED ORAL
Qty: 60 TABLET | Refills: 3 | Status: SHIPPED | OUTPATIENT
Start: 2025-04-03

## 2025-04-22 DIAGNOSIS — N52.9 ERECTILE DYSFUNCTION, UNSPECIFIED ERECTILE DYSFUNCTION TYPE: ICD-10-CM

## 2025-04-22 NOTE — TELEPHONE ENCOUNTER
Reason for call:   [x] Refill   [] Prior Auth  [] Other:     Office:   [x] PCP/Provider -   [] Specialty/Provider -     Medication: sildenafil (VIAGRA) 100 mg tablet     Dose/Frequency:  take 1 tablet by mouth if needed     Quantity: 10 tablet    Pharmacy: [id number] - [place] -     Local Pharmacy   Does the patient have enough for 3 days?   [] Yes   [x] No - Send as HP to POD    Mail Away Pharmacy   Does the patient have enough for 10 days?   [] Yes   [] No - Send as HP to POD

## 2025-04-23 RX ORDER — SILDENAFIL 100 MG/1
TABLET, FILM COATED ORAL
Qty: 10 TABLET | Refills: 3 | Status: SHIPPED | OUTPATIENT
Start: 2025-04-23

## 2025-04-28 DIAGNOSIS — S32.010A CLOSED COMPRESSION FRACTURE OF BODY OF L1 VERTEBRA (HCC): ICD-10-CM

## 2025-04-28 NOTE — TELEPHONE ENCOUNTER
Reason for call:   [x] Refill   [] Prior Auth  [] Other:     Office:   [x] PCP/Provider -  Miguelito Stratton DO   [] Specialty/Provider -     Medication:  HYDROcodone-acetaminophen (NORCO) 5-325 mg per tablet     Dose/Frequency: : Take 1 tablet by mouth every 6 (six) hours as needed for pain     Quantity: 120    Pharmacy: [facility] - [place] - [address] [place] [address] Pharmacy   Does the patient have enough for 3 days?   [x] Yes   [] No - Send as HP to POD    Mail Away Pharmacy   Does the patient have enough for 10 days?   [] Yes   [] No - Send as HP to POD

## 2025-04-29 ENCOUNTER — TELEPHONE (OUTPATIENT)
Dept: INTERNAL MEDICINE CLINIC | Facility: CLINIC | Age: 65
End: 2025-04-29

## 2025-04-29 RX ORDER — HYDROCODONE BITARTRATE AND ACETAMINOPHEN 5; 325 MG/1; MG/1
1 TABLET ORAL EVERY 6 HOURS PRN
Qty: 120 TABLET | Refills: 0 | Status: SHIPPED | OUTPATIENT
Start: 2025-04-29 | End: 2025-05-29

## 2025-05-03 DIAGNOSIS — Z79.4 TYPE 2 DIABETES MELLITUS WITH OTHER SPECIFIED COMPLICATION, WITH LONG-TERM CURRENT USE OF INSULIN (HCC): ICD-10-CM

## 2025-05-03 DIAGNOSIS — E11.69 TYPE 2 DIABETES MELLITUS WITH OTHER SPECIFIED COMPLICATION, WITH LONG-TERM CURRENT USE OF INSULIN (HCC): ICD-10-CM

## 2025-05-05 ENCOUNTER — OFFICE VISIT (OUTPATIENT)
Dept: ENDOCRINOLOGY | Facility: HOSPITAL | Age: 65
End: 2025-05-05
Payer: COMMERCIAL

## 2025-05-05 VITALS
HEART RATE: 99 BPM | SYSTOLIC BLOOD PRESSURE: 138 MMHG | WEIGHT: 233.6 LBS | OXYGEN SATURATION: 98 % | DIASTOLIC BLOOD PRESSURE: 80 MMHG | BODY MASS INDEX: 31.64 KG/M2 | HEIGHT: 72 IN

## 2025-05-05 DIAGNOSIS — E11.69 TYPE 2 DIABETES MELLITUS WITH OTHER SPECIFIED COMPLICATION, WITH LONG-TERM CURRENT USE OF INSULIN (HCC): Primary | ICD-10-CM

## 2025-05-05 DIAGNOSIS — E78.5 HYPERLIPIDEMIA, UNSPECIFIED HYPERLIPIDEMIA TYPE: ICD-10-CM

## 2025-05-05 DIAGNOSIS — I10 ESSENTIAL HYPERTENSION: ICD-10-CM

## 2025-05-05 DIAGNOSIS — Z79.4 TYPE 2 DIABETES MELLITUS WITH OTHER SPECIFIED COMPLICATION, WITH LONG-TERM CURRENT USE OF INSULIN (HCC): Primary | ICD-10-CM

## 2025-05-05 DIAGNOSIS — R80.9 MICROALBUMINURIA: ICD-10-CM

## 2025-05-05 PROCEDURE — 95251 CONT GLUC MNTR ANALYSIS I&R: CPT | Performed by: PHYSICIAN ASSISTANT

## 2025-05-05 PROCEDURE — 99214 OFFICE O/P EST MOD 30 MIN: CPT | Performed by: PHYSICIAN ASSISTANT

## 2025-05-05 NOTE — PROGRESS NOTES
Name: Raffy Huang      : 1960      MRN: [id number]  Encounter Provider: Ke Colorado PA-C  Encounter Date: 2025   Encounter department: [facility]    No chief complaint on file.  :  Assessment & Plan  Type 2 diabetes mellitus with other specified complication, with long-term current use of insulin (HCC)  No lab work completed prior to today's office visit, but A1c has trended down from last A1c completed.  Review of his Dexcom I would expect A1c to remain relatively stable at this time.  Did discuss with him possible options of helping to improve glucose levels.  At this time due to his microalbuminuria, I would like to start on an SGLT2.  Discussed side effects of medication.  Sent over Jardiance 10 mg daily to the pharmacy.  He will continue with metformin 1000 mg twice a day, glimepiride 4 mg twice a day, Ozempic 2 mg weekly, Lantus 32 units daily, and NovoLog 10 units with meals.  Continue utilizing Dexcom to monitor glucose levels.  Contact the office with any concerns or questions.  Follow-up in 3 months with labwork completed prior to visit.  Lab Results   Component Value Date    HGBA1C 9.5 (H) 2025       Orders:  •  Hemoglobin A1C; Future  •  Comprehensive metabolic panel; Future  •  Lipid panel; Future  •  Empagliflozin (Jardiance) 10 MG TABS tablet; Take 1 tablet (10 mg total) by mouth every morning    Hyperlipidemia, unspecified hyperlipidemia type  Cholesterol levels are elevated.  Work on lifestyle adjustments to help with levels.  May need to increase rosuvastatin.       Essential hypertension  Normotensive in the office today.  Kidney function stable, but does have microalbuminuria.  At this time he will continue with lisinopril 10 mg daily, amlodipine 5 mg daily.  Repeat CMP prior to next office visit.       Microalbuminuria  Start Jardiance as noted above.           History of Present Illness     Raffy Huang is a 64 y.o.  male with type 2 diabetes for many years.  He is on oral agents and insulin at home and takes metformin 500 mg in the morning and 1000 mg in the evening, glimepiride 4 mg daily, Humalog 10 units with meals, Lantus 32 units in the evening, Ozempic 2 mg weekly.  He does try to make adjustments to his Humalog based on current glucose levels.  He denies nephropathy, retinopathy, heart attack, stroke and claudication but does admit to neuropathy with symptoms worse at night.  Has not followed up with a podiatrist.  No changes since last office visit.  No significant dietary changes.  Still has limited physical activity due to chronic back pain and neuropathy symptoms.  Had some increase stressors recently has he has had issues with his truck.     Hypoglycemic episodes:  May have episodes of hypoglycemia after meals depending on activity level and what he eats.     The patient's last eye exam was October 2023. Last diabetic foot exam was completed May 2024.      Blood Sugar/Glucometer/Pump/CGM review: Download of Dexcom from April 21 through May 4, 2025 reveals an average glucose level of 245 with a standard deviation of 69.  He is in target range 19% time, and above target range 81% of the time.  On average he remained relatively stable throughout the day, only concern is the amount of variability.     For hypertension he is treated with lisinopril 10 mg daily.  He is also managed on labetalol 100 mg twice a day and amlodipine 5 mg daily.  For hyperlipidemia, he is treated with rosuvastatin.     Has a history of falling off a ladder and having a vertebral fracture.  Was noted to have an L1 compression fracture at that time.  Does currently have chronic back pain.  Did follow-up with pain management.  Has had serial imaging since, and seems to be stable.  Was recommended to get a DEXA scan completed for further evaluation, but has yet to get that done.    Current regimen:   Metformin 1000 mg twice a day, glimepiride 4 mg  twice a day, Lantus 32 units daily, NovoLog 10 units with meals, Ozempic 2 mg weekly.      Last Eye Exam: 10/05/2023  Last Foot Exam: 05/02/2024  Health Maintenance   Topic Date Due   • Diabetic Eye Exam  10/05/2024   • Diabetic Foot Exam  05/05/2026           Review of Systems   Constitutional:  Negative for activity change, appetite change, fatigue and unexpected weight change.   HENT:  Negative for trouble swallowing.    Eyes:  Negative for visual disturbance.   Respiratory:  Negative for chest tightness and shortness of breath.    Cardiovascular:  Negative for chest pain, palpitations and leg swelling.   Gastrointestinal:  Negative for abdominal pain, diarrhea, nausea and vomiting.   Endocrine: Negative for cold intolerance, heat intolerance, polydipsia, polyphagia and polyuria.   Genitourinary:  Negative for frequency.   Musculoskeletal:  Positive for back pain and myalgias (in the back).   Skin:  Negative for rash and wound.   Neurological:  Positive for numbness (bilateral feet, worse at night.). Negative for dizziness, weakness, light-headedness and headaches.   Psychiatric/Behavioral:  Negative for dysphoric mood and sleep disturbance. The patient is not nervous/anxious.     as per HPI    Medical History Reviewed by provider this encounter:     .  Current Outpatient Medications on File Prior to Visit   Medication Sig Dispense Refill   • amLODIPine (NORVASC) 5 mg tablet TAKE 1 TABLET DAILY -MAY CAUSE DIZZINESS OR LIGHTHEADEDNESS- 30 tablet 5   • azelastine (ASTELIN) 0.1 % nasal spray 1 spray into each nostril 2 (two) times a day Use in each nostril as directed 30 mL 1   • Contour Next Test test strip TEST 4 TIMES A  strip 1   • gabapentin (NEURONTIN) 300 mg capsule take 1 capsule by mouth at bedtime 30 capsule 3   • glimepiride (AMARYL) 4 mg tablet take 1 tablet by mouth once daily 30 tablet 5   • HYDROcodone-acetaminophen (NORCO) 5-325 mg per tablet Take 1 tablet by mouth every 6 (six) hours as  needed for pain Max Daily Amount: 4 tablets 120 tablet 0   • insulin glulisine (Apidra SoloStar) 100 units/mL injection pen INJECT 10 UNITS UNDER THE SKIN 3 (THREE) TIMES A DAY WITH MEALS 15 mL 3   • Insulin Pen Needle (B-D UF III MINI PEN NEEDLES) 31G X 5 MM MISC Inject under the skin 2 (two) times a day 100 each 0   • labetalol (NORMODYNE) 200 mg tablet take 1 tablet by mouth twice a day 180 tablet 4   • Lantus SoloStar 100 units/mL SOPN INJECT 0.4 ML (40 UNITS TOTAL) UNDER THE SKIN DAILY AT BEDTIME (Patient taking differently: INJECT 32 units) 15 mL 2   • lisinopril (ZESTRIL) 10 mg tablet take 1 tablet by mouth once daily 90 tablet 1   • LORazepam (ATIVAN) 0.5 mg tablet TAKE TWO(2) TABLETS TWICE A DAY AS NEEDED FOR ANXIETY - MAY CAUSE DROWSINESS 120 tablet 3   • metFORMIN (GLUCOPHAGE) 500 mg tablet TAKE 1 TABLET BY MOUTH 3 (THREE) TIMES A DAY-TAKE WITH FOOD 270 tablet 1   • methocarbamol (ROBAXIN) 500 mg tablet TAKE 1 TABLET TWICE A DAY AS NEEDED FOR MUSCLE SPASMS - MAY CAUSE DROWSINESS 60 tablet 3   • naloxone (NARCAN) 4 mg/0.1 mL nasal spray Administer 1 spray into a nostril. If no response after 2-3 minutes, give another dose in the other nostril using a new spray. 1 each 1   • naloxone (NARCAN) 4 mg/0.1 mL nasal spray Administer 1 spray into a nostril. If no response after 2-3 minutes, give another dose in the other nostril using a new spray. 1 each 1   • Needles & Syringes MISC by Does not apply route 3 (three) times a day before meals 90 each 0   • Ozempic, 2 MG/DOSE, 8 MG/3ML injection pen INJECT 0.75 ML (2 MG TOTAL) UNDER THE SKIN EVERY 7 DAYS 3 mL 5   • podofilox (CONDYLOX) 0.5 % external solution Apply topically 2 (two) times a day Two warts on penis and scrotum 3.5 mL 0   • polyethylene glycol (MIRALAX) 17 g packet Take 17 g by mouth daily as needed (constipation) 14 each 0   • rosuvastatin (CRESTOR) 5 mg tablet take 1 tablet by mouth at bedtime 90 tablet 1   • sildenafil (VIAGRA) 100 mg tablet take 1  tablet by mouth if needed 10 tablet 3   • amoxicillin (AMOXIL) 875 mg tablet  (Patient not taking: Reported on 5/5/2025)     • naloxone (NARCAN) 4 mg/0.1 mL nasal spray Administer 1 spray into a nostril. If no response after 2-3 minutes, give another dose in the other nostril using a new spray. 1 each 1     No current facility-administered medications on file prior to visit.      Social History     Tobacco Use   • Smoking status: Every Day     Current packs/day: 2.00     Types: Cigarettes     Passive exposure: Current   • Smokeless tobacco: Never   Vaping Use   • Vaping status: Never Used   Substance and Sexual Activity   • Alcohol use: Not Currently   • Drug use: Yes     Types: Marijuana, Other     Comment: prescription pills (unprescribed) daily   • Sexual activity: Not Currently        Medical History Reviewed by provider this encounter:     .    Objective   /80   Pulse 99   Ht 6' (1.829 m)   Wt 106 kg (233 lb 9.6 oz)   SpO2 98%   BMI 31.68 kg/m²      Body mass index is 31.68 kg/m².  Wt Readings from Last 3 Encounters:   05/05/25 106 kg (233 lb 9.6 oz)   03/25/25 106 kg (233 lb)   02/21/25 107 kg (236 lb)     Physical Exam  Vitals and nursing note reviewed.   Constitutional:       General: He is not in acute distress.     Appearance: Normal appearance. He is well-developed. He is not diaphoretic.   Eyes:      General: No scleral icterus.     Extraocular Movements: Extraocular movements intact.      Conjunctiva/sclera: Conjunctivae normal.      Pupils: Pupils are equal, round, and reactive to light.   Cardiovascular:      Rate and Rhythm: Normal rate and regular rhythm.      Pulses: Pulses are weak.           Dorsalis pedis pulses are 1+ on the right side and 1+ on the left side.        Posterior tibial pulses are 1+ on the right side and 1+ on the left side.      Heart sounds: Normal heart sounds. No murmur heard.     No friction rub. No gallop.   Pulmonary:      Effort: Pulmonary effort is normal. No  tachypnea, bradypnea or respiratory distress.      Breath sounds: Normal breath sounds. No wheezing.   Musculoskeletal:      Cervical back: Normal range of motion.      Right lower leg: No edema.      Left lower leg: No edema.   Feet:      Right foot:      Skin integrity: Dry skin present. No ulcer, skin breakdown, erythema, warmth or callus.      Left foot:      Skin integrity: Dry skin present. No ulcer, skin breakdown, erythema, warmth or callus.   Lymphadenopathy:      Cervical: No cervical adenopathy.   Skin:     General: Skin is warm and dry.   Neurological:      Mental Status: He is alert and oriented to person, place, and time. Mental status is at baseline. He is not disoriented.      Motor: No abnormal muscle tone.      Gait: Gait normal.      Deep Tendon Reflexes: Reflexes are normal and symmetric.   Psychiatric:         Mood and Affect: Mood normal.         Behavior: Behavior normal.         Thought Content: Thought content normal.     Patient's shoes and socks removed.    Right Foot/Ankle   Right Foot Inspection  Skin Exam: skin normal, skin intact and dry skin. No warmth, no callus, no erythema, no maceration, no abnormal color, no pre-ulcer, no ulcer and no callus.     Toe Exam: ROM and strength within normal limits. No tenderness, erythema and  no right toe deformity    Sensory   Vibration: diminished  Proprioception: intact  Monofilament testing: diminished    Vascular  Capillary refills: < 3 seconds  The right DP pulse is 1+. The right PT pulse is 1+.     Left Foot/Ankle  Left Foot Inspection  Skin Exam: skin normal, skin intact and dry skin. No warmth, no erythema, no maceration, normal color, no pre-ulcer, no ulcer and no callus.     Toe Exam: ROM and strength within normal limits. No tenderness, no erythema and no left toe deformity.     Sensory   Vibration: diminished  Proprioception: intact  Monofilament testing: diminished    Vascular  Capillary refills: < 3 seconds  The left DP pulse is 1+.  The left PT pulse is 1+.     Assign Risk Category  No deformity present  Loss of protective sensation  Weak pulses  Risk: 2      Labs:   Lab Results   Component Value Date    HGBA1C 9.5 (H) 01/29/2025    HGBA1C 10.2 (H) 06/28/2024    HGBA1C 10.3 (H) 04/25/2024     Lab Results   Component Value Date    CREATININE 0.76 01/29/2025    CREATININE 0.91 06/28/2024    CREATININE 0.72 (L) 04/25/2024    BUN 13 01/29/2025    K 4.2 01/29/2025    CL 99 01/29/2025    CO2 24 01/29/2025     eGFR   Date Value Ref Range Status   01/29/2025 100 >59 mL/min/1.73 Final   07/27/2020 111 ml/min/1.73sq m Final     Lab Results   Component Value Date    HDL 51 01/29/2025    TRIG 88 01/29/2025    CHOLHDL 4.2 01/29/2025     Lab Results   Component Value Date    ALT 28 01/29/2025    AST 16 01/29/2025     Lab Results   Component Value Date    CIP8DCZIDWDY 1.820 07/28/2020       Patient Instructions   Continue to monitor diet, maintain physical activity.  Make sure to drink plenty water throughout the day.     Continue with metformin, glimepiride, Ozempic.      Continue with the same dose of humalog at this time.      Continue Lantus 32 units daily.    Start Jardiance 10 mg daily.     Continue using Dexcom to monitor blood sugar.     Continue with blood pressure and cholesterol medications.     Follow-up in 3 months.    Discussed with the patient and all questioned fully answered. He will call me if any problems arise.

## 2025-05-05 NOTE — ASSESSMENT & PLAN NOTE
Cholesterol levels are elevated.  Work on lifestyle adjustments to help with levels.  May need to increase rosuvastatin.

## 2025-05-05 NOTE — ASSESSMENT & PLAN NOTE
Normotensive in the office today.  Kidney function stable, but does have microalbuminuria.  At this time he will continue with lisinopril 10 mg daily, amlodipine 5 mg daily.  Repeat CMP prior to next office visit.

## 2025-05-05 NOTE — ASSESSMENT & PLAN NOTE
No lab work completed prior to today's office visit, but A1c has trended down from last A1c completed.  Review of his Dexcom I would expect A1c to remain relatively stable at this time.  Did discuss with him possible options of helping to improve glucose levels.  At this time due to his microalbuminuria, I would like to start on an SGLT2.  Discussed side effects of medication.  Sent over Jardiance 10 mg daily to the pharmacy.  He will continue with metformin 1000 mg twice a day, glimepiride 4 mg twice a day, Ozempic 2 mg weekly, Lantus 32 units daily, and NovoLog 10 units with meals.  Continue utilizing Dexcom to monitor glucose levels.  Contact the office with any concerns or questions.  Follow-up in 3 months with labwork completed prior to visit.  Lab Results   Component Value Date    HGBA1C 9.5 (H) 01/29/2025       Orders:  •  Hemoglobin A1C; Future  •  Comprehensive metabolic panel; Future  •  Lipid panel; Future  •  Empagliflozin (Jardiance) 10 MG TABS tablet; Take 1 tablet (10 mg total) by mouth every morning

## 2025-05-05 NOTE — PATIENT INSTRUCTIONS
Continue to monitor diet, maintain physical activity.  Make sure to drink plenty water throughout the day.     Continue with metformin, glimepiride, Ozempic.      Continue with the same dose of humalog at this time.      Continue Lantus 32 units daily.    Start Jardiance 10 mg daily.     Continue using Dexcom to monitor blood sugar.     Continue with blood pressure and cholesterol medications.     Follow-up in 3 months.

## 2025-05-06 RX ORDER — SEMAGLUTIDE 2.68 MG/ML
INJECTION, SOLUTION SUBCUTANEOUS
Qty: 3 ML | Refills: 5 | Status: SHIPPED | OUTPATIENT
Start: 2025-05-06

## 2025-05-14 ENCOUNTER — DOCUMENTATION (OUTPATIENT)
Dept: ENDOCRINOLOGY | Facility: HOSPITAL | Age: 65
End: 2025-05-14

## 2025-05-25 ENCOUNTER — HOSPITAL ENCOUNTER (EMERGENCY)
Dept: HOSPITAL 99 - EMR | Age: 65
Discharge: HOME | End: 2025-05-25
Payer: COMMERCIAL

## 2025-05-25 VITALS — DIASTOLIC BLOOD PRESSURE: 96 MMHG | SYSTOLIC BLOOD PRESSURE: 175 MMHG

## 2025-05-25 DIAGNOSIS — S00.252A: ICD-10-CM
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
## 2025-05-25 DIAGNOSIS — Z79.4: ICD-10-CM

## 2025-05-25 DIAGNOSIS — I10: ICD-10-CM

## 2025-05-25 DIAGNOSIS — T15.02XA: Primary | ICD-10-CM

## 2025-05-25 DIAGNOSIS — H00.015: ICD-10-CM

## 2025-05-25 DIAGNOSIS — E78.5: ICD-10-CM

## 2025-05-25 DIAGNOSIS — E11.9: ICD-10-CM

## 2025-05-25 PROCEDURE — 65222 REMOVE FOREIGN BODY FROM EYE: CPT

## 2025-05-25 PROCEDURE — 99283 EMERGENCY DEPT VISIT LOW MDM: CPT

## 2025-05-25 RX ADMIN — GENTAMICIN SULFATE 2 DROP: 3 SOLUTION/ DROPS OPHTHALMIC at 13:12

## 2025-05-27 ENCOUNTER — OFFICE VISIT (OUTPATIENT)
Dept: INTERNAL MEDICINE CLINIC | Facility: CLINIC | Age: 65
End: 2025-05-27
Payer: COMMERCIAL

## 2025-05-27 VITALS
HEIGHT: 72 IN | SYSTOLIC BLOOD PRESSURE: 130 MMHG | BODY MASS INDEX: 30.75 KG/M2 | WEIGHT: 227 LBS | DIASTOLIC BLOOD PRESSURE: 80 MMHG | TEMPERATURE: 97.8 F | HEART RATE: 76 BPM | OXYGEN SATURATION: 98 %

## 2025-05-27 DIAGNOSIS — M48.062 SPINAL STENOSIS OF LUMBAR REGION WITH NEUROGENIC CLAUDICATION: ICD-10-CM

## 2025-05-27 DIAGNOSIS — F11.90 CHRONIC, CONTINUOUS USE OF OPIOIDS: Primary | ICD-10-CM

## 2025-05-27 DIAGNOSIS — S32.010A CLOSED COMPRESSION FRACTURE OF BODY OF L1 VERTEBRA (HCC): ICD-10-CM

## 2025-05-27 PROBLEM — H61.21 IMPACTED CERUMEN OF RIGHT EAR: Status: ACTIVE | Noted: 2025-05-27

## 2025-05-27 PROBLEM — Z72.0 TOBACCO USE: Status: ACTIVE | Noted: 2025-05-27

## 2025-05-27 PROBLEM — H90.A32 MIXED CONDUCTIVE AND SENSORINEURAL HEARING LOSS OF LEFT EAR WITH RESTRICTED HEARING OF RIGHT EAR: Status: ACTIVE | Noted: 2025-05-27

## 2025-05-27 PROBLEM — H69.92 ACUTE DYSFUNCTION OF LEFT EUSTACHIAN TUBE: Status: ACTIVE | Noted: 2025-05-27

## 2025-05-27 PROCEDURE — 99213 OFFICE O/P EST LOW 20 MIN: CPT | Performed by: INTERNAL MEDICINE

## 2025-05-27 RX ORDER — HYDROCODONE BITARTRATE AND ACETAMINOPHEN 5; 325 MG/1; MG/1
1 TABLET ORAL EVERY 6 HOURS PRN
Qty: 120 TABLET | Refills: 0 | Status: SHIPPED | OUTPATIENT
Start: 2025-05-27 | End: 2025-06-26

## 2025-05-27 NOTE — PROGRESS NOTES
Depression Screening and Follow-up Plan: Patient was screened for depression during today's encounter. They screened negative with a PHQ-2 score of 0.          Assessment/Plan:    No problem-specific Assessment & Plan notes found for this encounter.       Diagnoses and all orders for this visit:    Chronic, continuous use of opioids  Comments:  - keeps secure  -understands narcan  -helps adl  -no oversedation    Spinal stenosis of lumbar region with neurogenic claudication          Subjective:      Patient ID: Raffy Huang is a 64 y.o. male.    Doing well        The following portions of the patient's history were reviewed and updated as appropriate: allergies, current medications, past family history, past medical history, past social history, past surgical history, and problem list.    Review of Systems   Constitutional:  Negative for activity change, appetite change, chills, diaphoresis, fatigue and fever.   HENT:  Negative for congestion, facial swelling, hearing loss, mouth sores, rhinorrhea, sore throat, trouble swallowing and voice change.    Eyes:  Negative for photophobia and pain.   Respiratory:  Negative for apnea, cough, chest tightness, shortness of breath and stridor.    Cardiovascular:  Negative for chest pain, palpitations and leg swelling.   Gastrointestinal:  Negative for abdominal distention, abdominal pain, blood in stool and constipation.   Endocrine: Negative for cold intolerance and heat intolerance.   Genitourinary:  Negative for difficulty urinating, dysuria, flank pain, genital sores, hematuria and urgency.   Musculoskeletal:  Negative for arthralgias, back pain, gait problem, joint swelling and myalgias.   Skin:  Negative for rash and wound.   Allergic/Immunologic: Negative for environmental allergies, food allergies and immunocompromised state.   Neurological:  Negative for dizziness, tremors, seizures, syncope, facial asymmetry, speech difficulty, weakness, light-headedness, numbness  and headaches.   Hematological:  Negative for adenopathy. Does not bruise/bleed easily.   Psychiatric/Behavioral:  Negative for agitation, behavioral problems, hallucinations, self-injury, sleep disturbance and suicidal ideas.          Objective:      /80   Pulse 76   Temp 97.8 °F (36.6 °C)   Ht 6' (1.829 m)   Wt 103 kg (227 lb)   SpO2 98%   BMI 30.79 kg/m²          Physical Exam  Vitals and nursing note reviewed.   Constitutional:       General: He is not in acute distress.     Appearance: Normal appearance. He is not ill-appearing.   HENT:      Head: Normocephalic.      Right Ear: External ear normal. There is no impacted cerumen.      Left Ear: External ear normal. There is no impacted cerumen.      Nose: No congestion or rhinorrhea.      Mouth/Throat:      Pharynx: No posterior oropharyngeal erythema.     Eyes:      General: No scleral icterus.        Right eye: No discharge.         Left eye: No discharge.     Neck:      Vascular: No carotid bruit.     Cardiovascular:      Rate and Rhythm: Normal rate and regular rhythm.      Heart sounds: Normal heart sounds. No murmur heard.     No friction rub. No gallop.   Pulmonary:      Breath sounds: No wheezing or rhonchi.   Abdominal:      General: There is no distension.      Tenderness: There is no abdominal tenderness. There is no guarding.     Musculoskeletal:         General: No swelling.      Cervical back: No rigidity.      Right lower leg: No edema.      Left lower leg: No edema.   Lymphadenopathy:      Cervical: No cervical adenopathy.     Skin:     Coloration: Skin is not jaundiced.     Neurological:      Mental Status: He is alert.      Cranial Nerves: No cranial nerve deficit.      Motor: No weakness.      Coordination: Coordination normal.     Psychiatric:         Mood and Affect: Mood normal.

## 2025-06-12 ENCOUNTER — HOSPITAL ENCOUNTER (EMERGENCY)
Dept: HOSPITAL 99 - EMR | Age: 65
Discharge: HOME | End: 2025-06-12
Payer: COMMERCIAL

## 2025-06-12 VITALS — SYSTOLIC BLOOD PRESSURE: 179 MMHG | DIASTOLIC BLOOD PRESSURE: 99 MMHG

## 2025-06-12 VITALS — DIASTOLIC BLOOD PRESSURE: 95 MMHG | SYSTOLIC BLOOD PRESSURE: 176 MMHG
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
## 2025-06-12 DIAGNOSIS — E11.9: ICD-10-CM

## 2025-06-12 DIAGNOSIS — I10: ICD-10-CM

## 2025-06-12 DIAGNOSIS — S05.02XA: Primary | ICD-10-CM

## 2025-06-12 PROCEDURE — 99283 EMERGENCY DEPT VISIT LOW MDM: CPT

## 2025-06-12 RX ADMIN — ERYTHROMYCIN 1 APPLIC: 5 OINTMENT OPHTHALMIC at 07:53

## 2025-06-25 DIAGNOSIS — S32.010A CLOSED COMPRESSION FRACTURE OF BODY OF L1 VERTEBRA (HCC): ICD-10-CM

## 2025-06-25 NOTE — TELEPHONE ENCOUNTER
Reason for call:   [x] Refill   [] Prior Auth  [] Other:     Office:   [x] PCP/Provider -   [] Specialty/Provider -       HYDROcodone-acetaminophen (NORCO) 5-325 mg per tablet 1 tablet, Oral, Every 6 hours PRN       Quantity: 30    Pharmacy: [facility]   Does the patient have enough for 3 days?   [] Yes   [x] No - Send as HP to POD    Mail Away Pharmacy   Does the patient have enough for 10 days?   [] Yes   [] No - Send as HP to POD

## 2025-06-26 ENCOUNTER — PROCEDURE VISIT (OUTPATIENT)
Dept: PODIATRY | Facility: CLINIC | Age: 65
End: 2025-06-26
Payer: COMMERCIAL

## 2025-06-26 VITALS — BODY MASS INDEX: 29.8 KG/M2 | HEIGHT: 72 IN | WEIGHT: 220 LBS

## 2025-06-26 DIAGNOSIS — B35.1 ONYCHOMYCOSIS: Primary | ICD-10-CM

## 2025-06-26 DIAGNOSIS — E11.40 TYPE 2 DIABETES MELLITUS WITH DIABETIC NEUROPATHY, WITHOUT LONG-TERM CURRENT USE OF INSULIN (HCC): ICD-10-CM

## 2025-06-26 DIAGNOSIS — L84 CORNS AND CALLUS: ICD-10-CM

## 2025-06-26 PROBLEM — H61.21 IMPACTED CERUMEN OF RIGHT EAR: Status: RESOLVED | Noted: 2025-05-27 | Resolved: 2025-06-26

## 2025-06-26 PROCEDURE — 11721 DEBRIDE NAIL 6 OR MORE: CPT | Performed by: PODIATRIST

## 2025-06-26 PROCEDURE — 11055 PARING/CUTG B9 HYPRKER LES 1: CPT | Performed by: PODIATRIST

## 2025-06-26 RX ORDER — HYDROCODONE BITARTRATE AND ACETAMINOPHEN 5; 325 MG/1; MG/1
1 TABLET ORAL EVERY 6 HOURS PRN
Qty: 120 TABLET | Refills: 0 | Status: SHIPPED | OUTPATIENT
Start: 2025-06-26 | End: 2025-07-26

## 2025-06-29 NOTE — ASSESSMENT & PLAN NOTE
Disease prevention and related risk factors of diabetes were identified and discussed.    The patient was educated in proper foot wear for diabetics.    Educated in daily foot assessment and routine diabetic foot care.    Discussed the importance of controlling BS through diet and exercise.    The patient will follow up in 12 weeks for further diabetic foot exam and care.  Lab Results   Component Value Date    HGBA1C 9.5 (H) 01/29/2025

## 2025-06-29 NOTE — PROGRESS NOTES
PATIENT:  Raffy Huang  1960    Assessment & Plan  Onychomycosis  Corns and callus  Debridement mycotic nails x7 and paring benign hyperkeratotic lesion x1  Meets class finding requirements for diabetic footcare Q8       Type 2 diabetes mellitus with diabetic neuropathy, without long-term current use of insulin (HCC)  Disease prevention and related risk factors of diabetes were identified and discussed.    The patient was educated in proper foot wear for diabetics.    Educated in daily foot assessment and routine diabetic foot care.    Discussed the importance of controlling BS through diet and exercise.    The patient will follow up in 12 weeks for further diabetic foot exam and care.  Lab Results   Component Value Date    HGBA1C 9.5 (H) 01/29/2025         Procedures: 60770, 29965  All mycotic toenails were reduced and debrided in length, width, and girth using a nail nipper and electric dremel.    All hyperkeratotic skin lesion(s) if present were sharply pared with a #10 scalpel with no evidence of ulceration/abscess.    Patient tolerated procedure(s) well without complications.    Procedures     HPI:  Raffy Huang is a 64 y.o.year old male seen for diabetic foot exam.  Patient has class findings with type 2  DM.    BS is under control.  Patient concerned of thick toenails and discolored callus on the right great toe..  The patient denied any acute pedal disorder, redness, acute swelling, or recent injury.      The following portions of the patient's history were reviewed and updated as appropriate: allergies, current medications, past family history, past medical history, past social history, past surgical history, and problem list.    REVIEW OF SYSTEMS:  GENERAL: No fever or chills  HEART: No chest pain, or palpitation  RESPIRATORY:  No acute SOB or cough  GI: No Nausea, vomit or diarrhea  NEUROLOGIC: No syncope or acute weakness    PHYSICAL EXAM:    Ht 6' (1.829 m) Comment: stated  Wt 99.8 kg (220 lb)    BMI 29.84 kg/m²     VASCULAR EXAM:  Posterior tibial artery absent bilateral  Dorsalis pedis artery +1 bilateral  The patient has moderate skin atrophy, color changes, lack of digital hair, and nail dystrophy.    There is trace lower extremity edema bilaterally.      NEUROLOGIC EXAM:  Sensation is intact to light touch  Sensation is intact to 10gm monofilament but diminished.    Vibratory sensation absent.     Numb tingling paresthesias noted bilateral.         DERMATOLOGIC EXAM:   Texture, Tone and Turgor are diminished bilateral.    The patient has dystrophic/hypertrophic toenails with yellow/white discoloration, onycholysis, and subungal debris.  Fungal odor noted.  Brittle nature noted.  Right foot nails severely dystrophic x 3 with 0.5 cm ave thickness (1 2 and 5)  Left foot nails severely dystrophic x 4 with 0.5 cm ave thickness (1-3 and 5)    Patient has hyperkeratotic lesions noted:  Right foot located at plantar medial right hallux IPJ.  Left foot located at none    Ulcers/Wounds:  Partial depth ulcer plantar medial aspect of right great toe IPJ closed 100%    No notable suspicious skin lesions.      MUSCULOSKELETAL EXAM:   No acute joint pain, edema, or redness.  Denies any acute musculoskeletal problem.    Patient has no gross pedal deformities. Patient has no ambulation limitations.      Patient wears DM shoes? No    Risk Category/Class Findings:  Q8(B1, B2 ABC)  0 = No loss of protective sensation    A1)  Has the patient had a previous amputation of the foot or integral skeletal portion thereof? No  B1)  Does the patient have absent posterior tibial pulse? Yes  B3)  Does the patient have absent dorsalis pedis? No  B2)  Does the patient have three of the following? Yes           1.  Hair growth (increased or decreased), 2.  Nail changes (thickening), and 3.  Pigmentary changes (discoloring)  C)  Does the patient have two of the following and one above? No

## 2025-07-01 ENCOUNTER — PATIENT OUTREACH (OUTPATIENT)
Dept: CASE MANAGEMENT | Facility: OTHER | Age: 65
End: 2025-07-01

## 2025-07-01 DIAGNOSIS — R73.09 HEMOGLOBIN A1C GREATER THAN 9%, INDICATING POOR DIABETIC CONTROL: Primary | ICD-10-CM

## 2025-07-01 NOTE — PROGRESS NOTES
Referral received from dept Admin Coord.  Chart review completed and Care Everywhere records refreshed.     Diabetes:        Type: 2          CGM: Dexcom        HgbA1c: 9.5    Outpatient care management call placed to patient. Reached VM without personal identifiers. Left generic msg without PHI requesting return call. Office hours and call back number provided. [facility] has not been accessed since 8/8/2024.  Will remain available to assist and await return call. Reminder sent for next outreach.

## 2025-07-02 DIAGNOSIS — S32.010A CLOSED COMPRESSION FRACTURE OF BODY OF L1 VERTEBRA (HCC): ICD-10-CM

## 2025-07-02 RX ORDER — LABETALOL 200 MG/1
200 TABLET, FILM COATED ORAL 2 TIMES DAILY
Qty: 180 TABLET | Refills: 1 | Status: SHIPPED | OUTPATIENT
Start: 2025-07-02

## 2025-07-02 NOTE — TELEPHONE ENCOUNTER
Reason for call:   [x] Refill   [] Prior Auth  [] Other:     Office:   [x] PCP/Provider - Miguelito Stratton DO   [] Specialty/Provider -     Medication: labetalol (NORMODYNE) 200 mg tablet     Dose/Frequency:  take 1 tablet by mouth twice a day     Quantity: 180    Pharmacy:  [facility] - [address] [place] [address] Pharmacy   Does the patient have enough for 3 days?   [] Yes   [x] No - Send as HP to POD    Mail Away Pharmacy   Does the patient have enough for 10 days?   [] Yes   [] No - Send as HP to POD

## 2025-07-03 ENCOUNTER — PATIENT OUTREACH (OUTPATIENT)
Dept: CASE MANAGEMENT | Facility: OTHER | Age: 65
End: 2025-07-03

## 2025-07-03 NOTE — PROGRESS NOTES
Outpatient care management note.     VM received on 7/2 from patient acknowledging msg left by this RN Care Manager. He requested return call.     Return call placed to patient.  Reached VM without personal identifiers. Left generic msg with call back number, office hours and encouragement to call. Msg included notice of the RN CM being OoO on 7/4 d/t to holiday / office closure. Will outreach mid-end of next week if no return call and send UTR letter.

## 2025-07-09 ENCOUNTER — PATIENT OUTREACH (OUTPATIENT)
Dept: CASE MANAGEMENT | Facility: OTHER | Age: 65
End: 2025-07-09

## 2025-07-09 NOTE — PROGRESS NOTES
I spoke with Raffy who reports he has been diabetic for 10-15 years. Years ago,as a newly diagnosed diabetic, he did receive diabetic education.   He reports taking his medications as directed. He does not drink sugary drinks but may not always follow his diet strictly. I advised him I will send basic education to his My chart.I asked him to read it and we can review the information at next outreach. He is agreeable.   His blood sugar was 90 first thing this morning.  He has my contact information and did consent to another call.

## 2025-07-14 ENCOUNTER — OFFICE VISIT (OUTPATIENT)
Dept: PHYSICAL THERAPY | Facility: CLINIC | Age: 65
End: 2025-07-14
Payer: COMMERCIAL

## 2025-07-14 DIAGNOSIS — M25.561 CHRONIC PAIN OF RIGHT KNEE: Primary | ICD-10-CM

## 2025-07-14 DIAGNOSIS — M17.11 PRIMARY OSTEOARTHRITIS OF RIGHT KNEE: ICD-10-CM

## 2025-07-14 DIAGNOSIS — G89.29 CHRONIC PAIN OF RIGHT KNEE: Primary | ICD-10-CM

## 2025-07-14 PROCEDURE — 97162 PT EVAL MOD COMPLEX 30 MIN: CPT | Performed by: PHYSICAL THERAPIST

## 2025-07-14 PROCEDURE — 97110 THERAPEUTIC EXERCISES: CPT | Performed by: PHYSICAL THERAPIST

## 2025-07-14 NOTE — PROGRESS NOTES
PT Evaluation     Today's date: 2025  Patient name: Raffy Huang  : 1960  MRN: [id number]  Referring provider: Emerson Corado PT  Dx:   Encounter Diagnosis     ICD-10-CM    1. Chronic pain of right knee  M25.561     G89.29       2. Primary osteoarthritis of right knee  M17.11                      Assessment  Impairments: abnormal coordination, abnormal muscle firing, abnormal or restricted ROM, abnormal movement, impaired physical strength, lacks appropriate home exercise program, pain with function and weight-bearing intolerance  Symptom irritability: moderate    Assessment details: Raffy is a 64 year old male with chronic right knee pain that has increased in intensity insidiously over the last few weeks.  Previous successful management with PT and requests return to program today.  He presents with mild swelling, decreased AROM, moderate overall irritability, mild gait deviations, mild balance deficit, and some unsteadiness with stair negotiation.  These impairments are increasing difficulty with all functional mobility tasks and increasing risk for a fall.  He requires PT to address these impairments and achieve set goals.   Understanding of Dx/Px/POC: good     Prognosis: good    Goals  Short Term Goals (4-6 weeks):  Pt will present with at least 0-115 degrees of knee flexion PROM.  Pt will ambulate at least 10 minutes continuously without AD.  Pt will ascend stairs with reciprocal pattern safely.    Long Term Goals (8-10 weeks):  Pt will present with full knee AROM to decrease difficulty with all functional mobility tasks.  Pt will ascend/descend stairs with reciprocal pattern safely.  Pt will be independent with comprehensive HEP.        Plan  Patient would benefit from: skilled physical therapy    Planned therapy interventions: joint mobilization, manual therapy, neuromuscular re-education, therapeutic activities, therapeutic exercise, graded activity, graded exercise and home exercise  "program    Frequency: 1-2x week  Duration in weeks: 12  Plan of Care beginning date: 2025  Plan of Care expiration date: 10/6/2025  Treatment plan discussed with: patient    Subjective Evaluation    History of Present Illness  Mechanism of injury: Casey reports right medial and lateral knee pain that has bothered him \"off an on for years\" but an insidious increase in this same pain beginning a few weeks ago. Increased pain with transfers from floor, stair negotation, and occasional difficulty sleeping. Does not wake him up but does have difficulty with getting comfortable at night. Did have PT previously that was effective.  Knee has not buckled. Goals of improving strength R knee for decreased pain with transfers, stair negotiation.   Patient Goals  Patient goals for therapy: decreased pain and increased strength  Patient goal: decreased difficulty stair negotation, transfers  Pain  Current pain ratin  At worst pain ratin  Location: R Medial/Lateral Knee  Progression: no change    Treatments  Previous treatment: physical therapy    Objective:     A/PROM:   Left   Right  Knee flexion    135   112  Knee extension  0   -10    Pain with end range knee flexion/extension     Strength:   Left   Right  Knee flex   5/5   4/5  Knee ext   5/5   4+/5  Hip flex    5/5   4/5  Hip ABD   4/5   4/5  Hip ext   4/5   4/5    5 Time STS: 14 seconds, severe shift L     Flexibility:  Quads    Poor   Poor  Hamstrings   Poor   Poor    Muscle guarding throughout     Special Tests:    Valgus Stress  -   -  Varus Stress   -   -  McMurrary's   -   -  Anterior Drawer   -   -  Posterior Drawer  -   -    Medial and lateral joint line pain  Moderate swelling     Function:   Stairs: 8\" step up, decreased control and mild antalgia R  Squatting: Severe compensation/ weight shift L  Ambulation: Mild unsteadiness, poor posture throughout, mild trunk flexion   DGI: 19          Daily Treatment Diary     Precautions: Past Medical History[1] "       POC Expires Reeval for Medicare to be completed  Unit Limit Auth Expiration Date PT/OT/STVisit Limit   10/6/2025 By visit NA 6 NA 24    Completed on visit                    Auth Status DATE 7/14        NA Visit # 1         Remaining 23        MANUAL THERAPY         Knee PROM                                                      THERAPEUTIC EXERCISE HEP         SLR X 10x, 3 sec        SLR ABD X 10x, 3 sec                   Heel Slides X 10x, 3 sec                   LAQ X 10x, 3 sec                   Heel Raises                     RB                    Mini Step Ups          Mini Squats                    Hamstring Stretching           NEUROMUSCULAR REEDUCATION           QS  10x, 3 sec                   Tandem Stance          SLS                    Weight Shifting in mIni squat                                                                                           THERAPEUTIC ACTIVITY                                                  GAIT TRAINING                                                  MODALITIES                                                [1]   Past Medical History:  Diagnosis Date    Alcoholism (HCC)     Anxiety     Atrial fibrillation (HCC)     Diabetes (HCC)     Diabetes mellitus, type 2 (HCC)     Diverticulitis     ED (erectile dysfunction)     High cholesterol     Hyperlipidemia     Hypertension     LVH     L1 vertebral fracture (HCC)     Lung mass     Neuropathy     Nocturia     Osteoarthritis     Paroxysmal atrial fibrillation (HCC)     Pilar cyst     Shingles     Smoker     Spinal stenosis, lumbar

## 2025-07-16 ENCOUNTER — PATIENT OUTREACH (OUTPATIENT)
Dept: CASE MANAGEMENT | Facility: OTHER | Age: 65
End: 2025-07-16

## 2025-07-18 ENCOUNTER — OFFICE VISIT (OUTPATIENT)
Dept: PHYSICAL THERAPY | Facility: CLINIC | Age: 65
End: 2025-07-18
Payer: COMMERCIAL

## 2025-07-18 DIAGNOSIS — M17.11 PRIMARY OSTEOARTHRITIS OF RIGHT KNEE: ICD-10-CM

## 2025-07-18 DIAGNOSIS — M25.561 CHRONIC PAIN OF RIGHT KNEE: Primary | ICD-10-CM

## 2025-07-18 DIAGNOSIS — N52.9 ERECTILE DYSFUNCTION, UNSPECIFIED ERECTILE DYSFUNCTION TYPE: ICD-10-CM

## 2025-07-18 DIAGNOSIS — G89.29 CHRONIC PAIN OF RIGHT KNEE: Primary | ICD-10-CM

## 2025-07-18 PROCEDURE — 97112 NEUROMUSCULAR REEDUCATION: CPT | Performed by: PHYSICAL THERAPIST

## 2025-07-18 PROCEDURE — 97110 THERAPEUTIC EXERCISES: CPT | Performed by: PHYSICAL THERAPIST

## 2025-07-18 RX ORDER — SILDENAFIL 100 MG/1
100 TABLET, FILM COATED ORAL AS NEEDED
Qty: 10 TABLET | Refills: 5 | Status: SHIPPED | OUTPATIENT
Start: 2025-07-18 | End: 2025-08-17

## 2025-07-18 NOTE — PROGRESS NOTES
Daily Note     Today's date: 2025  Patient name: Raffy Huang  : 1960  MRN: [id number]  Referring provider: Emerson Corado, PT  Dx:   Encounter Diagnosis     ICD-10-CM    1. Chronic pain of right knee  M25.561     G89.29       2. Primary osteoarthritis of right knee  M17.11                      Subjective: Casey has done well with the initial exercises.  Decreased pain in the knee. Feels he has already gotten stronger with some of the exercise.       Objective: See treatment diary below      Assessment: Tolerated treatment well. Improving irritability allowing for some progression in volume of exercise. Emphasis on working towards strengthening program.       Plan: Continue per plan of care.      Daily Treatment Diary     Precautions: Past Medical History[1]       POC Expires Reeval for Medicare to be completed  Unit Limit Auth Expiration Date PT/OT/STVisit Limit   10/6/2025 By visit NA 6 NA 24    Completed on visit                    Auth Status DATE        NA Visit # 1 2        Remaining 23 22       MANUAL THERAPY         Knee PROM                                                      THERAPEUTIC EXERCISE HEP         SLR X 10x, 3 sec 2x10, 3 sec        SLR ABD X 10x, 3 sec  2x10, 3 sec                  Heel Slides X 10x, 3 sec  20x, 3 sec                  LAQ X 10x, 3 sec  20x ea.                  Heel Raises    20x                 RB                    Mini Step Ups          Mini Squats   10x                 Hamstring Stretching           NEUROMUSCULAR REEDUCATION           QS  10x, 3 sec  20x, 3 sec                  Tandem Stance          SLS                    Weight Shifting in mIni squat                                                                                           THERAPEUTIC ACTIVITY                                                  GAIT TRAINING                                                  MODALITIES                                              [1]   Past Medical  History:  Diagnosis Date    Alcoholism (HCC)     Anxiety     Atrial fibrillation (HCC)     Diabetes (HCC)     Diabetes mellitus, type 2 (HCC)     Diverticulitis     ED (erectile dysfunction)     High cholesterol     Hyperlipidemia     Hypertension     LVH     L1 vertebral fracture (HCC)     Lung mass     Neuropathy     Nocturia     Osteoarthritis     Paroxysmal atrial fibrillation (HCC)     Pilar cyst     Shingles     Smoker     Spinal stenosis, lumbar

## 2025-07-18 NOTE — TELEPHONE ENCOUNTER
Not a duplicate  Patient switching pharmacy    Reason for call:   [x] Refill   [] Prior Auth  [x] Other: pharmacy change    Office:   [x] PCP/Provider - [facility]    Medication: sildenafil    Dose/Frequency: 100mg prn    Quantity: 10    [facility] - [address] [place] [address]   Does the patient have enough for 3 days?   [] Yes   [x] No - Send as HP to POD

## 2025-07-24 ENCOUNTER — APPOINTMENT (OUTPATIENT)
Dept: PHYSICAL THERAPY | Facility: CLINIC | Age: 65
End: 2025-07-24
Payer: COMMERCIAL

## 2025-07-25 ENCOUNTER — OFFICE VISIT (OUTPATIENT)
Dept: PHYSICAL THERAPY | Facility: CLINIC | Age: 65
End: 2025-07-25
Payer: COMMERCIAL

## 2025-07-25 DIAGNOSIS — M17.11 PRIMARY OSTEOARTHRITIS OF RIGHT KNEE: ICD-10-CM

## 2025-07-25 DIAGNOSIS — S32.010A CLOSED COMPRESSION FRACTURE OF BODY OF L1 VERTEBRA (HCC): ICD-10-CM

## 2025-07-25 DIAGNOSIS — G89.29 CHRONIC PAIN OF RIGHT KNEE: Primary | ICD-10-CM

## 2025-07-25 DIAGNOSIS — M25.561 CHRONIC PAIN OF RIGHT KNEE: Primary | ICD-10-CM

## 2025-07-25 PROCEDURE — 97112 NEUROMUSCULAR REEDUCATION: CPT | Performed by: PHYSICAL THERAPIST

## 2025-07-25 PROCEDURE — 97110 THERAPEUTIC EXERCISES: CPT | Performed by: PHYSICAL THERAPIST

## 2025-07-25 RX ORDER — HYDROCODONE BITARTRATE AND ACETAMINOPHEN 5; 325 MG/1; MG/1
1 TABLET ORAL EVERY 6 HOURS PRN
Qty: 120 TABLET | Refills: 0 | Status: SHIPPED | OUTPATIENT
Start: 2025-07-25 | End: 2025-08-24

## 2025-07-25 NOTE — TELEPHONE ENCOUNTER
Reason for call:   [x] Refill   [] Prior Auth  [] Other:     Office:   [x] PCP/Provider - Miguelito Stratton DO   [] Specialty/Provider -     Medication: HYDROcodone-acetaminophen (NORCO) 5-325 mg per tablet     Dose/Frequency: 1 tablet, Oral, Every 6 hours PRN     Quantity: 120    Pharmacy: [facility]   Does the patient have enough for 3 days?   [] Yes   [x] No - Send as HP to POD    Mail Away Pharmacy   Does the patient have enough for 10 days?   [] Yes   [] No - Send as HP to POD

## 2025-07-25 NOTE — PROGRESS NOTES
Daily Note     Today's date: 2025  Patient name: Raffy Huang  : 1960  MRN: [id number]  Referring provider: Emerson Corado, PT  Dx:   Encounter Diagnosis     ICD-10-CM    1. Chronic pain of right knee  M25.561     G89.29       2. Primary osteoarthritis of right knee  M17.11                      Subjective: Patient reports that last week he did a lot of fire wood cutting which resulted in a lot of twisting on the knee so he has been a little more flared up.       Objective: See treatment diary below      Assessment: Tolerated treatment well; initiated POC with MT after receiving consent from pt; he denies having increased pain throughout session.  MH performed with quad sets. Vcs provided on proper sequencing with exercises; he requires occasional gentle tactile cues for positioning throughout program. He demonstrates hip weakness/decreased endurance resulting in knee flexion especially with SLR flexion and abduction. Added slant board gastro stretch and seated hamstring stretch f/b knee flexion stretch. He continues to amb post session with decreased TKE in stance phase and decreased knee flexion in swing phase. Encouraged pt to perform gentle stretching at home.   Patient demonstrated fatigue post treatment and would benefit from continued PT      Plan: Continue per plan of care.      Daily Treatment Diary     Precautions: Past Medical History[1]       POC Expires Reeval for Medicare to be completed  Unit Limit Auth Expiration Date PT/OT/STVisit Limit   10/6/2025 By visit NA 6 NA 24    Completed on visit                    Auth Status DATE       NA Visit # 1 2 3       Remaining 23 22 21      MANUAL THERAPY         Knee PROM         R AP gliding in ho stretch position   SMF gentle      R Ho stretch    SMF                                 THERAPEUTIC EXERCISE HEP         SLR X 10x, 3 sec 2x10, 3 sec  0# 2 sec; 2x10       SLR ABD X 10x, 3 sec  2x10, 3 sec  0# 2 sec; 2x10                 Heel Slides X 10x, 3 sec  20x, 3 sec  Using SOS: 5 sec x 20                LAQ X 10x, 3 sec  20x ea.  0# 5 sec; 20 ea                Heel Raises    20x 20x at rail      Slant board gastroc stretch    20 sec x 3 ea                RB                              Mini Step Ups          Mini Squats   10x NV?                Hamstring Stretching     Seated: 20 sec x 3 ea with knee flexion in between      NEUROMUSCULAR REEDUCATION           QS  10x, 3 sec  20x, 3 sec  5 sec x 20 c MH                Tandem Stance          SLS                    Weight Shifting in mIni squat                                                                                           THERAPEUTIC ACTIVITY                                                  GAIT TRAINING                                                  MODALITIES                                                   [1]   Past Medical History:  Diagnosis Date    Alcoholism (HCC)     Anxiety     Atrial fibrillation (HCC)     Diabetes (HCC)     Diabetes mellitus, type 2 (HCC)     Diverticulitis     ED (erectile dysfunction)     High cholesterol     Hyperlipidemia     Hypertension     LVH     L1 vertebral fracture (HCC)     Lung mass     Neuropathy     Nocturia     Osteoarthritis     Paroxysmal atrial fibrillation (HCC)     Pilar cyst     Shingles     Smoker     Spinal stenosis, lumbar

## 2025-07-29 DIAGNOSIS — S32.010A CLOSED COMPRESSION FRACTURE OF BODY OF L1 VERTEBRA (HCC): ICD-10-CM

## 2025-07-30 ENCOUNTER — PATIENT OUTREACH (OUTPATIENT)
Dept: CASE MANAGEMENT | Facility: OTHER | Age: 65
End: 2025-07-30

## 2025-08-04 DIAGNOSIS — F41.9 ANXIETY: ICD-10-CM

## 2025-08-04 RX ORDER — LORAZEPAM 0.5 MG/1
TABLET ORAL
Qty: 120 TABLET | Refills: 3 | Status: SHIPPED | OUTPATIENT
Start: 2025-08-04

## 2025-08-05 ENCOUNTER — OFFICE VISIT (OUTPATIENT)
Dept: PHYSICAL THERAPY | Facility: CLINIC | Age: 65
End: 2025-08-05
Payer: COMMERCIAL

## 2025-08-05 DIAGNOSIS — G89.29 CHRONIC PAIN OF RIGHT KNEE: Primary | ICD-10-CM

## 2025-08-05 DIAGNOSIS — M17.11 PRIMARY OSTEOARTHRITIS OF RIGHT KNEE: ICD-10-CM

## 2025-08-05 DIAGNOSIS — M25.561 CHRONIC PAIN OF RIGHT KNEE: Primary | ICD-10-CM

## 2025-08-05 PROCEDURE — 97110 THERAPEUTIC EXERCISES: CPT

## 2025-08-05 PROCEDURE — 97112 NEUROMUSCULAR REEDUCATION: CPT

## 2025-08-07 ENCOUNTER — OFFICE VISIT (OUTPATIENT)
Dept: PHYSICAL THERAPY | Facility: CLINIC | Age: 65
End: 2025-08-07
Payer: COMMERCIAL

## 2025-08-07 DIAGNOSIS — M25.561 CHRONIC PAIN OF RIGHT KNEE: Primary | ICD-10-CM

## 2025-08-07 DIAGNOSIS — G89.29 CHRONIC PAIN OF RIGHT KNEE: Primary | ICD-10-CM

## 2025-08-07 DIAGNOSIS — M17.11 PRIMARY OSTEOARTHRITIS OF RIGHT KNEE: ICD-10-CM

## 2025-08-07 PROCEDURE — 97110 THERAPEUTIC EXERCISES: CPT

## 2025-08-07 PROCEDURE — 97112 NEUROMUSCULAR REEDUCATION: CPT

## 2025-08-19 LAB
ALBUMIN SERPL-MCNC: 4.3 G/DL (ref 3.9–4.9)
ALP SERPL-CCNC: 97 IU/L (ref 44–121)
ALT SERPL-CCNC: 25 IU/L (ref 0–44)
AST SERPL-CCNC: 21 IU/L (ref 0–40)
BILIRUB SERPL-MCNC: 0.2 MG/DL (ref 0–1.2)
BUN SERPL-MCNC: 18 MG/DL (ref 8–27)
BUN/CREAT SERPL: 21 (ref 10–24)
CALCIUM SERPL-MCNC: 9.7 MG/DL (ref 8.6–10.2)
CHLORIDE SERPL-SCNC: 97 MMOL/L (ref 96–106)
CHOLEST SERPL-MCNC: 193 MG/DL (ref 100–199)
CHOLEST/HDLC SERPL: 4.2 RATIO (ref 0–5)
CO2 SERPL-SCNC: 23 MMOL/L (ref 20–29)
CREAT SERPL-MCNC: 0.87 MG/DL (ref 0.76–1.27)
EGFR: 96 ML/MIN/1.73
EST. AVERAGE GLUCOSE BLD GHB EST-MCNC: 232 MG/DL
GLOBULIN SER-MCNC: 2.1 G/DL (ref 1.5–4.5)
GLUCOSE SERPL-MCNC: 123 MG/DL (ref 70–99)
HBA1C MFR BLD: 9.7 % (ref 4.8–5.6)
HDLC SERPL-MCNC: 46 MG/DL
LDLC SERPL CALC-MCNC: 130 MG/DL (ref 0–99)
POTASSIUM SERPL-SCNC: 4.2 MMOL/L (ref 3.5–5.2)
PROT SERPL-MCNC: 6.4 G/DL (ref 6–8.5)
SL AMB VLDL CHOLESTEROL CALC: 17 MG/DL (ref 5–40)
SODIUM SERPL-SCNC: 138 MMOL/L (ref 134–144)
TRIGL SERPL-MCNC: 95 MG/DL (ref 0–149)

## 2025-08-20 ENCOUNTER — OFFICE VISIT (OUTPATIENT)
Dept: ENDOCRINOLOGY | Facility: HOSPITAL | Age: 65
End: 2025-08-20
Payer: COMMERCIAL

## 2025-08-20 VITALS
OXYGEN SATURATION: 99 % | BODY MASS INDEX: 29.53 KG/M2 | HEART RATE: 96 BPM | HEIGHT: 72 IN | WEIGHT: 218 LBS | DIASTOLIC BLOOD PRESSURE: 88 MMHG | SYSTOLIC BLOOD PRESSURE: 142 MMHG

## 2025-08-20 DIAGNOSIS — E11.69 TYPE 2 DIABETES MELLITUS WITH OTHER SPECIFIED COMPLICATION, WITH LONG-TERM CURRENT USE OF INSULIN (HCC): Primary | ICD-10-CM

## 2025-08-20 DIAGNOSIS — E78.5 HYPERLIPIDEMIA, UNSPECIFIED HYPERLIPIDEMIA TYPE: ICD-10-CM

## 2025-08-20 DIAGNOSIS — I10 ESSENTIAL HYPERTENSION: ICD-10-CM

## 2025-08-20 DIAGNOSIS — Z79.4 TYPE 2 DIABETES MELLITUS WITH OTHER SPECIFIED COMPLICATION, WITH LONG-TERM CURRENT USE OF INSULIN (HCC): Primary | ICD-10-CM

## 2025-08-20 PROCEDURE — 99214 OFFICE O/P EST MOD 30 MIN: CPT | Performed by: PHYSICIAN ASSISTANT

## 2025-08-20 PROCEDURE — 95251 CONT GLUC MNTR ANALYSIS I&R: CPT | Performed by: PHYSICIAN ASSISTANT

## 2025-08-22 ENCOUNTER — PATIENT OUTREACH (OUTPATIENT)
Dept: CASE MANAGEMENT | Facility: OTHER | Age: 65
End: 2025-08-22

## 2025-08-25 PROBLEM — M25.561 ACUTE PAIN OF RIGHT KNEE: Status: ACTIVE | Noted: 2025-08-25
